# Patient Record
Sex: MALE | Race: WHITE | NOT HISPANIC OR LATINO | Employment: UNEMPLOYED | URBAN - METROPOLITAN AREA
[De-identification: names, ages, dates, MRNs, and addresses within clinical notes are randomized per-mention and may not be internally consistent; named-entity substitution may affect disease eponyms.]

---

## 2021-11-19 ENCOUNTER — HOSPITAL ENCOUNTER (OUTPATIENT)
Facility: HOSPITAL | Age: 81
Discharge: HOME | End: 2021-11-20
Attending: EMERGENCY MEDICINE | Admitting: FAMILY MEDICINE
Payer: MEDICARE

## 2021-11-19 DIAGNOSIS — I63.9 CVA (CEREBRAL VASCULAR ACCIDENT): ICD-10-CM

## 2021-11-19 DIAGNOSIS — R53.1 WEAKNESS: ICD-10-CM

## 2021-11-19 DIAGNOSIS — R42 DIZZINESS: Primary | ICD-10-CM

## 2021-11-19 DIAGNOSIS — I10 CHRONIC HYPERTENSION: ICD-10-CM

## 2021-11-19 DIAGNOSIS — I63.9 STROKE: ICD-10-CM

## 2021-11-19 LAB
ALBUMIN SERPL BCP-MCNC: 3.5 G/DL (ref 3.5–5.2)
ALP SERPL-CCNC: 116 U/L (ref 55–135)
ALT SERPL W/O P-5'-P-CCNC: 23 U/L (ref 10–44)
ANION GAP SERPL CALC-SCNC: 9 MMOL/L (ref 8–16)
AST SERPL-CCNC: 24 U/L (ref 10–40)
BASOPHILS # BLD AUTO: 0.03 K/UL (ref 0–0.2)
BASOPHILS NFR BLD: 0.3 % (ref 0–1.9)
BILIRUB SERPL-MCNC: 0.7 MG/DL (ref 0.1–1)
BUN SERPL-MCNC: 10 MG/DL (ref 8–23)
CALCIUM SERPL-MCNC: 9.1 MG/DL (ref 8.7–10.5)
CHLORIDE SERPL-SCNC: 104 MMOL/L (ref 95–110)
CO2 SERPL-SCNC: 23 MMOL/L (ref 23–29)
CREAT SERPL-MCNC: 0.8 MG/DL (ref 0.5–1.4)
CTP QC/QA: YES
DIFFERENTIAL METHOD: ABNORMAL
EOSINOPHIL # BLD AUTO: 0.1 K/UL (ref 0–0.5)
EOSINOPHIL NFR BLD: 0.6 % (ref 0–8)
ERYTHROCYTE [DISTWIDTH] IN BLOOD BY AUTOMATED COUNT: 13.3 % (ref 11.5–14.5)
EST. GFR  (AFRICAN AMERICAN): >60 ML/MIN/1.73 M^2
EST. GFR  (NON AFRICAN AMERICAN): >60 ML/MIN/1.73 M^2
GLUCOSE SERPL-MCNC: 93 MG/DL (ref 70–110)
HCT VFR BLD AUTO: 42.1 % (ref 40–54)
HGB BLD-MCNC: 14.2 G/DL (ref 14–18)
IMM GRANULOCYTES # BLD AUTO: 0.02 K/UL (ref 0–0.04)
IMM GRANULOCYTES NFR BLD AUTO: 0.2 % (ref 0–0.5)
LYMPHOCYTES # BLD AUTO: 2.4 K/UL (ref 1–4.8)
LYMPHOCYTES NFR BLD: 26.3 % (ref 18–48)
MCH RBC QN AUTO: 32.6 PG (ref 27–31)
MCHC RBC AUTO-ENTMCNC: 33.7 G/DL (ref 32–36)
MCV RBC AUTO: 97 FL (ref 82–98)
MONOCYTES # BLD AUTO: 0.8 K/UL (ref 0.3–1)
MONOCYTES NFR BLD: 8.5 % (ref 4–15)
NEUTROPHILS # BLD AUTO: 5.8 K/UL (ref 1.8–7.7)
NEUTROPHILS NFR BLD: 64.1 % (ref 38–73)
NRBC BLD-RTO: 0 /100 WBC
PLATELET # BLD AUTO: 246 K/UL (ref 150–450)
PMV BLD AUTO: 8.7 FL (ref 9.2–12.9)
POTASSIUM SERPL-SCNC: 4.5 MMOL/L (ref 3.5–5.1)
PROT SERPL-MCNC: 6.3 G/DL (ref 6–8.4)
RBC # BLD AUTO: 4.36 M/UL (ref 4.6–6.2)
SARS-COV-2 RDRP RESP QL NAA+PROBE: NEGATIVE
SODIUM SERPL-SCNC: 136 MMOL/L (ref 136–145)
TSH SERPL DL<=0.005 MIU/L-ACNC: 0.49 UIU/ML (ref 0.4–4)
WBC # BLD AUTO: 9.09 K/UL (ref 3.9–12.7)

## 2021-11-19 PROCEDURE — U0002 COVID-19 LAB TEST NON-CDC: HCPCS | Performed by: EMERGENCY MEDICINE

## 2021-11-19 PROCEDURE — G0378 HOSPITAL OBSERVATION PER HR: HCPCS

## 2021-11-19 PROCEDURE — 36415 COLL VENOUS BLD VENIPUNCTURE: CPT | Performed by: NURSE PRACTITIONER

## 2021-11-19 PROCEDURE — 93010 ELECTROCARDIOGRAM REPORT: CPT | Mod: ,,, | Performed by: INTERNAL MEDICINE

## 2021-11-19 PROCEDURE — 80053 COMPREHEN METABOLIC PANEL: CPT | Performed by: NURSE PRACTITIONER

## 2021-11-19 PROCEDURE — 84443 ASSAY THYROID STIM HORMONE: CPT | Performed by: EMERGENCY MEDICINE

## 2021-11-19 PROCEDURE — 93010 EKG 12-LEAD: ICD-10-PCS | Mod: ,,, | Performed by: INTERNAL MEDICINE

## 2021-11-19 PROCEDURE — 94760 N-INVAS EAR/PLS OXIMETRY 1: CPT

## 2021-11-19 PROCEDURE — 63600175 PHARM REV CODE 636 W HCPCS: Performed by: NURSE PRACTITIONER

## 2021-11-19 PROCEDURE — 99285 EMERGENCY DEPT VISIT HI MDM: CPT | Mod: 25

## 2021-11-19 PROCEDURE — 96372 THER/PROPH/DIAG INJ SC/IM: CPT

## 2021-11-19 PROCEDURE — 93005 ELECTROCARDIOGRAM TRACING: CPT

## 2021-11-19 PROCEDURE — 25000003 PHARM REV CODE 250: Performed by: EMERGENCY MEDICINE

## 2021-11-19 PROCEDURE — 80061 LIPID PANEL: CPT | Performed by: NURSE PRACTITIONER

## 2021-11-19 PROCEDURE — 85025 COMPLETE CBC W/AUTO DIFF WBC: CPT | Performed by: NURSE PRACTITIONER

## 2021-11-19 PROCEDURE — 83036 HEMOGLOBIN GLYCOSYLATED A1C: CPT | Performed by: NURSE PRACTITIONER

## 2021-11-19 RX ORDER — COLCHICINE 0.6 MG/1
TABLET ORAL
COMMUNITY
Start: 2021-09-16 | End: 2021-11-19

## 2021-11-19 RX ORDER — ATORVASTATIN CALCIUM 40 MG/1
80 TABLET, FILM COATED ORAL DAILY
Status: DISCONTINUED | OUTPATIENT
Start: 2021-11-20 | End: 2021-11-20 | Stop reason: HOSPADM

## 2021-11-19 RX ORDER — ENOXAPARIN SODIUM 100 MG/ML
40 INJECTION SUBCUTANEOUS EVERY 24 HOURS
Status: DISCONTINUED | OUTPATIENT
Start: 2021-11-19 | End: 2021-11-20 | Stop reason: HOSPADM

## 2021-11-19 RX ORDER — ACETAMINOPHEN 325 MG/1
650 TABLET ORAL EVERY 6 HOURS PRN
Status: DISCONTINUED | OUTPATIENT
Start: 2021-11-19 | End: 2021-11-20 | Stop reason: HOSPADM

## 2021-11-19 RX ORDER — ASPIRIN 81 MG/1
81 TABLET ORAL DAILY
Status: DISCONTINUED | OUTPATIENT
Start: 2021-11-20 | End: 2021-11-20 | Stop reason: HOSPADM

## 2021-11-19 RX ORDER — ASPIRIN 325 MG
325 TABLET ORAL
Status: COMPLETED | OUTPATIENT
Start: 2021-11-19 | End: 2021-11-19

## 2021-11-19 RX ORDER — TRANDOLAPRIL 4 MG/1
1 TABLET ORAL DAILY
COMMUNITY
Start: 2021-05-13 | End: 2024-01-24 | Stop reason: SDUPTHER

## 2021-11-19 RX ORDER — ALPRAZOLAM 0.5 MG/1
0.5 TABLET ORAL 2 TIMES DAILY PRN
COMMUNITY
Start: 2021-10-09

## 2021-11-19 RX ORDER — ESOMEPRAZOLE MAGNESIUM 40 MG/1
1 CAPSULE, DELAYED RELEASE ORAL EVERY MORNING
COMMUNITY
Start: 2021-05-13

## 2021-11-19 RX ORDER — LABETALOL HYDROCHLORIDE 5 MG/ML
10 INJECTION, SOLUTION INTRAVENOUS EVERY 4 HOURS PRN
Status: DISCONTINUED | OUTPATIENT
Start: 2021-11-19 | End: 2021-11-20 | Stop reason: HOSPADM

## 2021-11-19 RX ORDER — ATORVASTATIN CALCIUM 80 MG/1
1 TABLET, FILM COATED ORAL DAILY
COMMUNITY
Start: 2021-05-13 | End: 2024-01-24 | Stop reason: SDUPTHER

## 2021-11-19 RX ORDER — SODIUM CHLORIDE 0.9 % (FLUSH) 0.9 %
10 SYRINGE (ML) INJECTION
Status: DISCONTINUED | OUTPATIENT
Start: 2021-11-19 | End: 2021-11-20 | Stop reason: HOSPADM

## 2021-11-19 RX ORDER — ALPRAZOLAM 0.5 MG/1
0.5 TABLET ORAL 2 TIMES DAILY PRN
Status: DISCONTINUED | OUTPATIENT
Start: 2021-11-19 | End: 2021-11-20 | Stop reason: HOSPADM

## 2021-11-19 RX ORDER — LISINOPRIL 20 MG/1
20 TABLET ORAL DAILY
Status: DISCONTINUED | OUTPATIENT
Start: 2021-11-20 | End: 2021-11-20 | Stop reason: HOSPADM

## 2021-11-19 RX ORDER — ASPIRIN 81 MG/1
81 TABLET ORAL DAILY
COMMUNITY
End: 2024-01-24 | Stop reason: SDUPTHER

## 2021-11-19 RX ORDER — ONDANSETRON 2 MG/ML
4 INJECTION INTRAMUSCULAR; INTRAVENOUS EVERY 6 HOURS PRN
Status: DISCONTINUED | OUTPATIENT
Start: 2021-11-19 | End: 2021-11-20 | Stop reason: HOSPADM

## 2021-11-19 RX ADMIN — ENOXAPARIN SODIUM 40 MG: 40 INJECTION, SOLUTION INTRAVENOUS; SUBCUTANEOUS at 08:11

## 2021-11-19 RX ADMIN — ASPIRIN 325 MG ORAL TABLET 325 MG: 325 PILL ORAL at 04:11

## 2021-11-20 VITALS
WEIGHT: 202.81 LBS | DIASTOLIC BLOOD PRESSURE: 72 MMHG | TEMPERATURE: 97 F | HEIGHT: 70 IN | BODY MASS INDEX: 29.03 KG/M2 | OXYGEN SATURATION: 97 % | HEART RATE: 72 BPM | SYSTOLIC BLOOD PRESSURE: 138 MMHG | RESPIRATION RATE: 18 BRPM

## 2021-11-20 LAB
ALBUMIN SERPL BCP-MCNC: 3.5 G/DL (ref 3.5–5.2)
ALP SERPL-CCNC: 119 U/L (ref 55–135)
ALT SERPL W/O P-5'-P-CCNC: 22 U/L (ref 10–44)
ANION GAP SERPL CALC-SCNC: 10 MMOL/L (ref 8–16)
APTT BLDCRRT: 30 SEC (ref 21–32)
AST SERPL-CCNC: 18 U/L (ref 10–40)
AV INDEX (PROSTH): 0.64
AV MEAN GRADIENT: 7 MMHG
AV PEAK GRADIENT: 14 MMHG
AV REGURGITATION PRESSURE HALF TIME: 700.26 MS
AV VALVE AREA: 1.99 CM2
AV VELOCITY RATIO: 0.62
BACTERIA #/AREA URNS HPF: NORMAL /HPF
BASOPHILS # BLD AUTO: 0.04 K/UL (ref 0–0.2)
BASOPHILS NFR BLD: 0.5 % (ref 0–1.9)
BILIRUB SERPL-MCNC: 1.3 MG/DL (ref 0.1–1)
BILIRUB UR QL STRIP: NEGATIVE
BSA FOR ECHO PROCEDURE: 2.14 M2
BUN SERPL-MCNC: 11 MG/DL (ref 8–23)
CALCIUM SERPL-MCNC: 9 MG/DL (ref 8.7–10.5)
CHLORIDE SERPL-SCNC: 103 MMOL/L (ref 95–110)
CHOLEST SERPL-MCNC: 93 MG/DL (ref 120–199)
CHOLEST/HDLC SERPL: 2.2 {RATIO} (ref 2–5)
CK MB SERPL-MCNC: 1.6 NG/ML (ref 0.1–6.5)
CK MB SERPL-RTO: 2 % (ref 0–5)
CK SERPL-CCNC: 80 U/L (ref 20–200)
CLARITY UR: CLEAR
CO2 SERPL-SCNC: 23 MMOL/L (ref 23–29)
COLOR UR: YELLOW
CREAT SERPL-MCNC: 0.8 MG/DL (ref 0.5–1.4)
CV ECHO LV RWT: 0.56 CM
DIFFERENTIAL METHOD: ABNORMAL
DOP CALC AO PEAK VEL: 1.86 M/S
DOP CALC AO VTI: 42.3 CM
DOP CALC LVOT AREA: 3.1 CM2
DOP CALC LVOT DIAMETER: 1.99 CM
DOP CALC LVOT PEAK VEL: 1.16 M/S
DOP CALC LVOT STROKE VOLUME: 84.25 CM3
DOP CALCLVOT PEAK VEL VTI: 27.1 CM
E/A RATIO: 0.68
E/E' RATIO: 11.69 M/S
ECHO EF ESTIMATED: 50 %
ECHO LV POSTERIOR WALL: 1.18 CM (ref 0.6–1.1)
EJECTION FRACTION: 55 %
EOSINOPHIL # BLD AUTO: 0.1 K/UL (ref 0–0.5)
EOSINOPHIL NFR BLD: 1.5 % (ref 0–8)
ERYTHROCYTE [DISTWIDTH] IN BLOOD BY AUTOMATED COUNT: 13.5 % (ref 11.5–14.5)
EST. GFR  (AFRICAN AMERICAN): >60 ML/MIN/1.73 M^2
EST. GFR  (NON AFRICAN AMERICAN): >60 ML/MIN/1.73 M^2
ESTIMATED AVG GLUCOSE: 111 MG/DL (ref 68–131)
FRACTIONAL SHORTENING: 25 % (ref 28–44)
GLUCOSE SERPL-MCNC: 95 MG/DL (ref 70–110)
GLUCOSE UR QL STRIP: NEGATIVE
HBA1C MFR BLD: 5.5 % (ref 4–5.6)
HCT VFR BLD AUTO: 44.4 % (ref 40–54)
HDLC SERPL-MCNC: 42 MG/DL (ref 40–75)
HDLC SERPL: 45.2 % (ref 20–50)
HGB BLD-MCNC: 14.4 G/DL (ref 14–18)
HGB UR QL STRIP: ABNORMAL
IMM GRANULOCYTES # BLD AUTO: 0.03 K/UL (ref 0–0.04)
IMM GRANULOCYTES NFR BLD AUTO: 0.4 % (ref 0–0.5)
INR PPP: 1 (ref 0.8–1.2)
INTERVENTRICULAR SEPTUM: 1.17 CM (ref 0.6–1.1)
IVC DIAMETER: 1.25 CM
IVRT: 94.2 MSEC
KETONES UR QL STRIP: NEGATIVE
LA MAJOR: 3.98 CM
LA MINOR: 5.11 CM
LA WIDTH: 2.51 CM
LDLC SERPL CALC-MCNC: 40.8 MG/DL (ref 63–159)
LEFT ATRIUM SIZE: 3.49 CM
LEFT ATRIUM VOLUME INDEX: 15.9 ML/M2
LEFT ATRIUM VOLUME: 33.32 CM3
LEFT INTERNAL DIMENSION IN SYSTOLE: 3.12 CM (ref 2.1–4)
LEFT VENTRICLE DIASTOLIC VOLUME INDEX: 37.07 ML/M2
LEFT VENTRICLE DIASTOLIC VOLUME: 77.84 ML
LEFT VENTRICLE MASS INDEX: 82 G/M2
LEFT VENTRICLE SYSTOLIC VOLUME INDEX: 18.4 ML/M2
LEFT VENTRICLE SYSTOLIC VOLUME: 38.54 ML
LEFT VENTRICULAR INTERNAL DIMENSION IN DIASTOLE: 4.18 CM (ref 3.5–6)
LEFT VENTRICULAR MASS: 171.5 G
LEUKOCYTE ESTERASE UR QL STRIP: ABNORMAL
LV LATERAL E/E' RATIO: 10.86 M/S
LV SEPTAL E/E' RATIO: 12.67 M/S
LVOT MG: 2.94 MMHG
LVOT MV: 0.8 CM/S
LYMPHOCYTES # BLD AUTO: 2.2 K/UL (ref 1–4.8)
LYMPHOCYTES NFR BLD: 26.9 % (ref 18–48)
MAGNESIUM SERPL-MCNC: 2 MG/DL (ref 1.6–2.6)
MCH RBC QN AUTO: 31.9 PG (ref 27–31)
MCHC RBC AUTO-ENTMCNC: 32.4 G/DL (ref 32–36)
MCV RBC AUTO: 98 FL (ref 82–98)
MICROSCOPIC COMMENT: NORMAL
MONOCYTES # BLD AUTO: 0.7 K/UL (ref 0.3–1)
MONOCYTES NFR BLD: 9.3 % (ref 4–15)
MV PEAK A VEL: 1.12 M/S
MV PEAK E VEL: 0.76 M/S
MV STENOSIS PRESSURE HALF TIME: 77.11 MS
MV VALVE AREA P 1/2 METHOD: 2.85 CM2
NEUTROPHILS # BLD AUTO: 4.9 K/UL (ref 1.8–7.7)
NEUTROPHILS NFR BLD: 61.4 % (ref 38–73)
NITRITE UR QL STRIP: NEGATIVE
NONHDLC SERPL-MCNC: 51 MG/DL
NRBC BLD-RTO: 0 /100 WBC
PH UR STRIP: 6 [PH] (ref 5–8)
PHOSPHATE SERPL-MCNC: 3.4 MG/DL (ref 2.7–4.5)
PISA AR MAX VEL: 3.38 M/S
PISA TR MAX VEL: 2.37 M/S
PLATELET # BLD AUTO: 251 K/UL (ref 150–450)
PMV BLD AUTO: 8.9 FL (ref 9.2–12.9)
POTASSIUM SERPL-SCNC: 3.9 MMOL/L (ref 3.5–5.1)
PROT SERPL-MCNC: 6.6 G/DL (ref 6–8.4)
PROT UR QL STRIP: NEGATIVE
PROTHROMBIN TIME: 10.9 SEC (ref 9–12.5)
PV MV: 0.63 M/S
PV PEAK VELOCITY: 1.03 CM/S
RA MAJOR: 3.77 CM
RA PRESSURE: 3 MMHG
RA WIDTH: 3.29 CM
RBC # BLD AUTO: 4.51 M/UL (ref 4.6–6.2)
RBC #/AREA URNS HPF: 1 /HPF (ref 0–4)
SINUS: 3.16 CM
SODIUM SERPL-SCNC: 136 MMOL/L (ref 136–145)
SP GR UR STRIP: 1.01 (ref 1–1.03)
STJ: 2.52 CM
TDI LATERAL: 0.07 M/S
TDI SEPTAL: 0.06 M/S
TDI: 0.07 M/S
TR MAX PG: 22 MMHG
TRIGL SERPL-MCNC: 51 MG/DL (ref 30–150)
TROPONIN I SERPL DL<=0.01 NG/ML-MCNC: <0.006 NG/ML (ref 0–0.03)
TV REST PULMONARY ARTERY PRESSURE: 25 MMHG
URN SPEC COLLECT METH UR: ABNORMAL
UROBILINOGEN UR STRIP-ACNC: NEGATIVE EU/DL
WBC # BLD AUTO: 8 K/UL (ref 3.9–12.7)
WBC #/AREA URNS HPF: 5 /HPF (ref 0–5)

## 2021-11-20 PROCEDURE — G0378 HOSPITAL OBSERVATION PER HR: HCPCS

## 2021-11-20 PROCEDURE — 82553 CREATINE MB FRACTION: CPT | Performed by: NURSE PRACTITIONER

## 2021-11-20 PROCEDURE — 25000003 PHARM REV CODE 250: Performed by: NURSE PRACTITIONER

## 2021-11-20 PROCEDURE — 84484 ASSAY OF TROPONIN QUANT: CPT | Performed by: NURSE PRACTITIONER

## 2021-11-20 PROCEDURE — 84100 ASSAY OF PHOSPHORUS: CPT | Performed by: NURSE PRACTITIONER

## 2021-11-20 PROCEDURE — 92610 EVALUATE SWALLOWING FUNCTION: CPT

## 2021-11-20 PROCEDURE — 25000003 PHARM REV CODE 250: Performed by: INTERNAL MEDICINE

## 2021-11-20 PROCEDURE — 85610 PROTHROMBIN TIME: CPT | Performed by: NURSE PRACTITIONER

## 2021-11-20 PROCEDURE — 36415 COLL VENOUS BLD VENIPUNCTURE: CPT | Performed by: NURSE PRACTITIONER

## 2021-11-20 PROCEDURE — 80053 COMPREHEN METABOLIC PANEL: CPT | Performed by: NURSE PRACTITIONER

## 2021-11-20 PROCEDURE — 97165 OT EVAL LOW COMPLEX 30 MIN: CPT

## 2021-11-20 PROCEDURE — 97161 PT EVAL LOW COMPLEX 20 MIN: CPT

## 2021-11-20 PROCEDURE — 85730 THROMBOPLASTIN TIME PARTIAL: CPT | Performed by: NURSE PRACTITIONER

## 2021-11-20 PROCEDURE — 81000 URINALYSIS NONAUTO W/SCOPE: CPT | Performed by: NURSE PRACTITIONER

## 2021-11-20 PROCEDURE — 83735 ASSAY OF MAGNESIUM: CPT | Performed by: NURSE PRACTITIONER

## 2021-11-20 PROCEDURE — 85025 COMPLETE CBC W/AUTO DIFF WBC: CPT | Performed by: NURSE PRACTITIONER

## 2021-11-20 RX ADMIN — ACETAMINOPHEN 650 MG: 325 TABLET ORAL at 01:11

## 2021-11-20 RX ADMIN — ALPRAZOLAM 0.5 MG: 0.5 TABLET ORAL at 12:11

## 2021-11-20 RX ADMIN — ATORVASTATIN CALCIUM 80 MG: 40 TABLET, FILM COATED ORAL at 08:11

## 2021-11-20 RX ADMIN — LISINOPRIL 20 MG: 20 TABLET ORAL at 08:11

## 2021-11-20 RX ADMIN — ASPIRIN 81 MG: 81 TABLET, COATED ORAL at 08:11

## 2021-11-29 DIAGNOSIS — R06.02 SOB (SHORTNESS OF BREATH): Primary | ICD-10-CM

## 2021-12-01 ENCOUNTER — HOSPITAL ENCOUNTER (OUTPATIENT)
Dept: RADIATION THERAPY | Facility: HOSPITAL | Age: 81
Discharge: HOME | End: 2021-12-01
Attending: RADIOLOGY
Payer: MEDICARE

## 2021-12-02 PROBLEM — R91.8 LUNG MASS: Status: ACTIVE | Noted: 2021-12-02

## 2021-12-03 ENCOUNTER — HOSPITAL ENCOUNTER (OUTPATIENT)
Dept: RADIOLOGY | Facility: HOSPITAL | Age: 81
Discharge: HOME | End: 2021-12-03
Attending: INTERNAL MEDICINE
Payer: MEDICARE

## 2021-12-03 ENCOUNTER — OFFICE VISIT (OUTPATIENT)
Dept: PULMONOLOGY | Facility: CLINIC | Age: 81
End: 2021-12-03
Payer: MEDICARE

## 2021-12-03 VITALS
HEART RATE: 112 BPM | SYSTOLIC BLOOD PRESSURE: 138 MMHG | DIASTOLIC BLOOD PRESSURE: 72 MMHG | RESPIRATION RATE: 18 BRPM | BODY MASS INDEX: 29.37 KG/M2 | HEIGHT: 70 IN | OXYGEN SATURATION: 98 % | WEIGHT: 205.13 LBS

## 2021-12-03 DIAGNOSIS — R91.8 HILAR MASS: ICD-10-CM

## 2021-12-03 DIAGNOSIS — I63.9 CEREBROVASCULAR ACCIDENT (CVA), UNSPECIFIED MECHANISM: ICD-10-CM

## 2021-12-03 DIAGNOSIS — R06.02 SOB (SHORTNESS OF BREATH): ICD-10-CM

## 2021-12-03 DIAGNOSIS — R91.8 LUNG MASS: Primary | ICD-10-CM

## 2021-12-03 DIAGNOSIS — I10 BENIGN ESSENTIAL HTN: ICD-10-CM

## 2021-12-03 PROBLEM — I44.7 LBBB (LEFT BUNDLE BRANCH BLOCK): Status: ACTIVE | Noted: 2019-03-13

## 2021-12-03 PROBLEM — I25.2 HISTORY OF MI (MYOCARDIAL INFARCTION): Status: ACTIVE | Noted: 2017-12-12

## 2021-12-03 PROCEDURE — 99205 OFFICE O/P NEW HI 60 MIN: CPT | Mod: S$GLB,,, | Performed by: INTERNAL MEDICINE

## 2021-12-03 PROCEDURE — 99999 PR PBB SHADOW E&M-EST. PATIENT-LVL V: ICD-10-PCS | Mod: PBBFAC,,, | Performed by: INTERNAL MEDICINE

## 2021-12-03 PROCEDURE — 71046 XR CHEST PA AND LATERAL: ICD-10-PCS | Mod: 26,,, | Performed by: RADIOLOGY

## 2021-12-03 PROCEDURE — 71046 X-RAY EXAM CHEST 2 VIEWS: CPT | Mod: TC

## 2021-12-03 PROCEDURE — 99205 PR OFFICE/OUTPT VISIT, NEW, LEVL V, 60-74 MIN: ICD-10-PCS | Mod: S$GLB,,, | Performed by: INTERNAL MEDICINE

## 2021-12-03 PROCEDURE — 99999 PR PBB SHADOW E&M-EST. PATIENT-LVL V: CPT | Mod: PBBFAC,,, | Performed by: INTERNAL MEDICINE

## 2021-12-03 PROCEDURE — 71046 X-RAY EXAM CHEST 2 VIEWS: CPT | Mod: 26,,, | Performed by: RADIOLOGY

## 2021-12-03 RX ORDER — SODIUM CHLORIDE 9 MG/ML
INJECTION, SOLUTION INTRAVENOUS CONTINUOUS
Status: CANCELLED | OUTPATIENT
Start: 2021-12-03

## 2021-12-03 RX ORDER — LIDOCAINE HYDROCHLORIDE 40 MG/ML
4 SOLUTION TOPICAL ONCE
Status: CANCELLED | OUTPATIENT
Start: 2021-12-03 | End: 2021-12-03

## 2021-12-03 RX ORDER — NITROFURANTOIN 25; 75 MG/1; MG/1
100 CAPSULE ORAL 2 TIMES DAILY
COMMUNITY
Start: 2021-06-21

## 2021-12-06 ENCOUNTER — OFFICE VISIT (OUTPATIENT)
Dept: GASTROENTEROLOGY | Facility: CLINIC | Age: 81
End: 2021-12-06
Payer: MEDICARE

## 2021-12-06 VITALS
WEIGHT: 202.06 LBS | BODY MASS INDEX: 28.93 KG/M2 | OXYGEN SATURATION: 98 % | DIASTOLIC BLOOD PRESSURE: 70 MMHG | HEART RATE: 81 BPM | HEIGHT: 70 IN | SYSTOLIC BLOOD PRESSURE: 140 MMHG

## 2021-12-06 DIAGNOSIS — R91.8 LUNG MASS: ICD-10-CM

## 2021-12-06 DIAGNOSIS — Z86.010 HX OF COLONIC POLYPS: Primary | ICD-10-CM

## 2021-12-06 DIAGNOSIS — R63.4 WEIGHT LOSS: ICD-10-CM

## 2021-12-06 PROCEDURE — 99204 OFFICE O/P NEW MOD 45 MIN: CPT | Mod: S$GLB,,, | Performed by: INTERNAL MEDICINE

## 2021-12-06 PROCEDURE — 99204 PR OFFICE/OUTPT VISIT, NEW, LEVL IV, 45-59 MIN: ICD-10-PCS | Mod: S$GLB,,, | Performed by: INTERNAL MEDICINE

## 2021-12-06 PROCEDURE — 99999 PR PBB SHADOW E&M-EST. PATIENT-LVL III: CPT | Mod: PBBFAC,,, | Performed by: INTERNAL MEDICINE

## 2021-12-06 PROCEDURE — 99999 PR PBB SHADOW E&M-EST. PATIENT-LVL III: ICD-10-PCS | Mod: PBBFAC,,, | Performed by: INTERNAL MEDICINE

## 2021-12-08 ENCOUNTER — ANESTHESIA (OUTPATIENT)
Dept: ENDOSCOPY | Facility: HOSPITAL | Age: 81
End: 2021-12-08
Payer: MEDICARE

## 2021-12-08 ENCOUNTER — HOSPITAL ENCOUNTER (OUTPATIENT)
Facility: HOSPITAL | Age: 81
Discharge: HOME | End: 2021-12-08
Attending: INTERNAL MEDICINE | Admitting: INTERNAL MEDICINE
Payer: MEDICARE

## 2021-12-08 ENCOUNTER — ANESTHESIA EVENT (OUTPATIENT)
Dept: ENDOSCOPY | Facility: HOSPITAL | Age: 81
End: 2021-12-08
Payer: MEDICARE

## 2021-12-08 DIAGNOSIS — R91.8 HILAR MASS: ICD-10-CM

## 2021-12-08 DIAGNOSIS — R91.8 LUNG MASS: ICD-10-CM

## 2021-12-08 PROCEDURE — 63600175 PHARM REV CODE 636 W HCPCS: Performed by: NURSE ANESTHETIST, CERTIFIED REGISTERED

## 2021-12-08 PROCEDURE — 63600175 PHARM REV CODE 636 W HCPCS: Performed by: INTERNAL MEDICINE

## 2021-12-08 PROCEDURE — 31645 BRNCHSC W/THER ASPIR 1ST: CPT | Mod: ,,, | Performed by: INTERNAL MEDICINE

## 2021-12-08 PROCEDURE — 31652 PR BRONCH W/ EBUS, SAMPLING 1 OR 2 NODES, INCL GUIDE: ICD-10-PCS | Mod: ,,, | Performed by: INTERNAL MEDICINE

## 2021-12-08 PROCEDURE — 27200946 HC BRUSH, CYTOLOGY: Performed by: INTERNAL MEDICINE

## 2021-12-08 PROCEDURE — 31645 PR BRONCHOSCOPY,RX ASPIR PULM TREE: ICD-10-PCS | Mod: ,,, | Performed by: INTERNAL MEDICINE

## 2021-12-08 PROCEDURE — 37000008 HC ANESTHESIA 1ST 15 MINUTES: Performed by: INTERNAL MEDICINE

## 2021-12-08 PROCEDURE — 88305 TISSUE EXAM BY PATHOLOGIST: ICD-10-PCS | Mod: 26,,, | Performed by: PATHOLOGY

## 2021-12-08 PROCEDURE — 88173 CYTOPATH EVAL FNA REPORT: CPT | Mod: 26,,, | Performed by: PATHOLOGY

## 2021-12-08 PROCEDURE — 88305 TISSUE EXAM BY PATHOLOGIST: CPT | Mod: 26,,, | Performed by: PATHOLOGY

## 2021-12-08 PROCEDURE — 31623 DX BRONCHOSCOPE/BRUSH: CPT | Mod: 51,RT,, | Performed by: INTERNAL MEDICINE

## 2021-12-08 PROCEDURE — 27202059 HC NEEDLE, FNA (ANY): Performed by: INTERNAL MEDICINE

## 2021-12-08 PROCEDURE — 25000003 PHARM REV CODE 250: Performed by: NURSE ANESTHETIST, CERTIFIED REGISTERED

## 2021-12-08 PROCEDURE — 88173 CYTOPATH EVAL FNA REPORT: CPT | Mod: 59 | Performed by: PATHOLOGY

## 2021-12-08 PROCEDURE — 31625 BRONCHOSCOPY W/BIOPSY(S): CPT | Mod: 59,RT,, | Performed by: INTERNAL MEDICINE

## 2021-12-08 PROCEDURE — 31623 PR BRONCHOSCOPY,DIAGNOSTIC W BRUSH: ICD-10-PCS | Mod: 51,RT,, | Performed by: INTERNAL MEDICINE

## 2021-12-08 PROCEDURE — 31652 BRONCH EBUS SAMPLNG 1/2 NODE: CPT | Performed by: INTERNAL MEDICINE

## 2021-12-08 PROCEDURE — 31625 PR BRONCHOSCOPY,BIOPSY: ICD-10-PCS | Mod: 59,RT,, | Performed by: INTERNAL MEDICINE

## 2021-12-08 PROCEDURE — 88173 PR  INTERPRETATION OF FNA SMEAR: ICD-10-PCS | Mod: 26,,, | Performed by: PATHOLOGY

## 2021-12-08 PROCEDURE — 31623 DX BRONCHOSCOPE/BRUSH: CPT | Mod: 59,RT | Performed by: INTERNAL MEDICINE

## 2021-12-08 PROCEDURE — 27200944 HC BRONCH FORCEPS DISPOSABLE: Performed by: INTERNAL MEDICINE

## 2021-12-08 PROCEDURE — 31645 BRNCHSC W/THER ASPIR 1ST: CPT | Mod: 51 | Performed by: INTERNAL MEDICINE

## 2021-12-08 PROCEDURE — 88305 TISSUE EXAM BY PATHOLOGIST: CPT | Performed by: PATHOLOGY

## 2021-12-08 PROCEDURE — 31625 BRONCHOSCOPY W/BIOPSY(S): CPT | Mod: 59,RT | Performed by: INTERNAL MEDICINE

## 2021-12-08 PROCEDURE — 37000009 HC ANESTHESIA EA ADD 15 MINS: Performed by: INTERNAL MEDICINE

## 2021-12-08 PROCEDURE — 88305 TISSUE EXAM BY PATHOLOGIST: CPT | Mod: 59 | Performed by: PATHOLOGY

## 2021-12-08 PROCEDURE — 31652 BRONCH EBUS SAMPLNG 1/2 NODE: CPT | Mod: ,,, | Performed by: INTERNAL MEDICINE

## 2021-12-08 RX ORDER — PROPOFOL 10 MG/ML
VIAL (ML) INTRAVENOUS
Status: DISCONTINUED | OUTPATIENT
Start: 2021-12-08 | End: 2021-12-08

## 2021-12-08 RX ORDER — ONDANSETRON 2 MG/ML
INJECTION INTRAMUSCULAR; INTRAVENOUS
Status: DISCONTINUED | OUTPATIENT
Start: 2021-12-08 | End: 2021-12-08

## 2021-12-08 RX ORDER — LIDOCAINE HYDROCHLORIDE 10 MG/ML
INJECTION, SOLUTION EPIDURAL; INFILTRATION; INTRACAUDAL; PERINEURAL
Status: DISCONTINUED | OUTPATIENT
Start: 2021-12-08 | End: 2021-12-08

## 2021-12-08 RX ORDER — EPINEPHRINE 0.1 MG/ML
INJECTION INTRAVENOUS
Status: COMPLETED | OUTPATIENT
Start: 2021-12-08 | End: 2021-12-08

## 2021-12-08 RX ORDER — ROCURONIUM BROMIDE 10 MG/ML
INJECTION, SOLUTION INTRAVENOUS
Status: DISCONTINUED | OUTPATIENT
Start: 2021-12-08 | End: 2021-12-08

## 2021-12-08 RX ORDER — SODIUM CHLORIDE, SODIUM LACTATE, POTASSIUM CHLORIDE, CALCIUM CHLORIDE 600; 310; 30; 20 MG/100ML; MG/100ML; MG/100ML; MG/100ML
INJECTION, SOLUTION INTRAVENOUS CONTINUOUS
Status: DISCONTINUED | OUTPATIENT
Start: 2021-12-08 | End: 2021-12-08 | Stop reason: HOSPADM

## 2021-12-08 RX ADMIN — LIDOCAINE HYDROCHLORIDE 50 MG: 10 INJECTION, SOLUTION EPIDURAL; INFILTRATION; INTRACAUDAL; PERINEURAL at 01:12

## 2021-12-08 RX ADMIN — SUGAMMADEX 200 MG: 100 INJECTION, SOLUTION INTRAVENOUS at 02:12

## 2021-12-08 RX ADMIN — SODIUM CHLORIDE, SODIUM LACTATE, POTASSIUM CHLORIDE, AND CALCIUM CHLORIDE: .6; .31; .03; .02 INJECTION, SOLUTION INTRAVENOUS at 01:12

## 2021-12-08 RX ADMIN — ROCURONIUM BROMIDE 50 MG: 10 INJECTION, SOLUTION INTRAVENOUS at 01:12

## 2021-12-08 RX ADMIN — EPINEPHRINE 0.5 MG: 0.1 INJECTION, SOLUTION ENDOTRACHEAL; INTRACARDIAC; INTRAVENOUS at 01:12

## 2021-12-08 RX ADMIN — PROPOFOL 100 MG: 10 INJECTION, EMULSION INTRAVENOUS at 01:12

## 2021-12-08 RX ADMIN — ONDANSETRON 4 MG: 2 INJECTION, SOLUTION INTRAMUSCULAR; INTRAVENOUS at 02:12

## 2021-12-09 VITALS
HEART RATE: 79 BPM | RESPIRATION RATE: 17 BRPM | BODY MASS INDEX: 28.92 KG/M2 | DIASTOLIC BLOOD PRESSURE: 52 MMHG | SYSTOLIC BLOOD PRESSURE: 144 MMHG | TEMPERATURE: 98 F | WEIGHT: 202 LBS | HEIGHT: 70 IN | OXYGEN SATURATION: 92 %

## 2021-12-13 LAB
FINAL PATHOLOGIC DIAGNOSIS: NORMAL
GROSS: NORMAL
Lab: NORMAL

## 2021-12-14 ENCOUNTER — HOSPITAL ENCOUNTER (OUTPATIENT)
Dept: RADIOLOGY | Facility: HOSPITAL | Age: 81
Discharge: HOME | End: 2021-12-14
Attending: INTERNAL MEDICINE
Payer: MEDICARE

## 2021-12-14 DIAGNOSIS — R91.8 LUNG MASS: ICD-10-CM

## 2021-12-14 PROCEDURE — 78815 PET IMAGE W/CT SKULL-THIGH: CPT | Mod: TC,PI

## 2021-12-14 PROCEDURE — 78815 NM PET CT ROUTINE: ICD-10-PCS | Mod: 26,PI,, | Performed by: RADIOLOGY

## 2021-12-14 PROCEDURE — 78815 PET IMAGE W/CT SKULL-THIGH: CPT | Mod: 26,PI,, | Performed by: RADIOLOGY

## 2021-12-16 LAB
COMMENT: NORMAL
FINAL PATHOLOGIC DIAGNOSIS: NORMAL
Lab: NORMAL

## 2021-12-17 ENCOUNTER — TELEPHONE (OUTPATIENT)
Dept: SLEEP MEDICINE | Facility: CLINIC | Age: 81
End: 2021-12-17
Payer: MEDICARE

## 2021-12-17 DIAGNOSIS — C34.90 SQUAMOUS CELL CARCINOMA OF LUNG, UNSPECIFIED LATERALITY: ICD-10-CM

## 2021-12-22 ENCOUNTER — OFFICE VISIT (OUTPATIENT)
Dept: HEMATOLOGY/ONCOLOGY | Facility: CLINIC | Age: 81
End: 2021-12-22
Payer: MEDICARE

## 2021-12-22 ENCOUNTER — TELEPHONE (OUTPATIENT)
Dept: HEMATOLOGY/ONCOLOGY | Facility: CLINIC | Age: 81
End: 2021-12-22

## 2021-12-22 ENCOUNTER — OFFICE VISIT (OUTPATIENT)
Dept: RADIATION ONCOLOGY | Facility: CLINIC | Age: 81
End: 2021-12-22
Payer: MEDICARE

## 2021-12-22 VITALS
HEART RATE: 69 BPM | SYSTOLIC BLOOD PRESSURE: 168 MMHG | DIASTOLIC BLOOD PRESSURE: 79 MMHG | OXYGEN SATURATION: 97 % | BODY MASS INDEX: 28.69 KG/M2 | RESPIRATION RATE: 18 BRPM | HEART RATE: 69 BPM | DIASTOLIC BLOOD PRESSURE: 79 MMHG | OXYGEN SATURATION: 97 % | RESPIRATION RATE: 18 BRPM | SYSTOLIC BLOOD PRESSURE: 168 MMHG | WEIGHT: 200.38 LBS | TEMPERATURE: 98 F | TEMPERATURE: 98 F | HEIGHT: 70 IN | HEIGHT: 70 IN | BODY MASS INDEX: 28.69 KG/M2 | WEIGHT: 200.38 LBS

## 2021-12-22 DIAGNOSIS — C34.91 SQUAMOUS CELL CARCINOMA OF RIGHT LUNG: Primary | ICD-10-CM

## 2021-12-22 DIAGNOSIS — C34.90 SQUAMOUS CELL CARCINOMA OF LUNG, UNSPECIFIED LATERALITY: ICD-10-CM

## 2021-12-22 PROCEDURE — 3077F SYST BP >= 140 MM HG: CPT | Mod: CPTII,S$GLB,, | Performed by: RADIOLOGY

## 2021-12-22 PROCEDURE — 99999 PR PBB SHADOW E&M-EST. PATIENT-LVL III: ICD-10-PCS | Mod: PBBFAC,,, | Performed by: RADIOLOGY

## 2021-12-22 PROCEDURE — 99205 PR OFFICE/OUTPT VISIT, NEW, LEVL V, 60-74 MIN: ICD-10-PCS | Mod: S$GLB,,, | Performed by: INTERNAL MEDICINE

## 2021-12-22 PROCEDURE — 3288F FALL RISK ASSESSMENT DOCD: CPT | Mod: CPTII,S$GLB,, | Performed by: RADIOLOGY

## 2021-12-22 PROCEDURE — 1159F MED LIST DOCD IN RCRD: CPT | Mod: CPTII,S$GLB,, | Performed by: RADIOLOGY

## 2021-12-22 PROCEDURE — 1126F AMNT PAIN NOTED NONE PRSNT: CPT | Mod: CPTII,S$GLB,, | Performed by: RADIOLOGY

## 2021-12-22 PROCEDURE — 99205 OFFICE O/P NEW HI 60 MIN: CPT | Mod: S$GLB,,, | Performed by: INTERNAL MEDICINE

## 2021-12-22 PROCEDURE — 1101F PT FALLS ASSESS-DOCD LE1/YR: CPT | Mod: CPTII,S$GLB,, | Performed by: RADIOLOGY

## 2021-12-22 PROCEDURE — 99999 PR PBB SHADOW E&M-EST. PATIENT-LVL III: CPT | Mod: PBBFAC,,, | Performed by: RADIOLOGY

## 2021-12-22 PROCEDURE — 3288F FALL RISK ASSESSMENT DOCD: CPT | Mod: CPTII,S$GLB,, | Performed by: INTERNAL MEDICINE

## 2021-12-22 PROCEDURE — 3077F SYST BP >= 140 MM HG: CPT | Mod: CPTII,S$GLB,, | Performed by: INTERNAL MEDICINE

## 2021-12-22 PROCEDURE — 3288F PR FALLS RISK ASSESSMENT DOCUMENTED: ICD-10-PCS | Mod: CPTII,S$GLB,, | Performed by: RADIOLOGY

## 2021-12-22 PROCEDURE — 1159F PR MEDICATION LIST DOCUMENTED IN MEDICAL RECORD: ICD-10-PCS | Mod: CPTII,S$GLB,, | Performed by: RADIOLOGY

## 2021-12-22 PROCEDURE — 3288F PR FALLS RISK ASSESSMENT DOCUMENTED: ICD-10-PCS | Mod: CPTII,S$GLB,, | Performed by: INTERNAL MEDICINE

## 2021-12-22 PROCEDURE — 99205 OFFICE O/P NEW HI 60 MIN: CPT | Mod: S$GLB,,, | Performed by: RADIOLOGY

## 2021-12-22 PROCEDURE — 1101F PR PT FALLS ASSESS DOC 0-1 FALLS W/OUT INJ PAST YR: ICD-10-PCS | Mod: CPTII,S$GLB,, | Performed by: INTERNAL MEDICINE

## 2021-12-22 PROCEDURE — 99999 PR PBB SHADOW E&M-EST. PATIENT-LVL IV: ICD-10-PCS | Mod: PBBFAC,,, | Performed by: INTERNAL MEDICINE

## 2021-12-22 PROCEDURE — 1126F PR PAIN SEVERITY QUANTIFIED, NO PAIN PRESENT: ICD-10-PCS | Mod: CPTII,S$GLB,, | Performed by: INTERNAL MEDICINE

## 2021-12-22 PROCEDURE — 3078F DIAST BP <80 MM HG: CPT | Mod: CPTII,S$GLB,, | Performed by: RADIOLOGY

## 2021-12-22 PROCEDURE — 1101F PT FALLS ASSESS-DOCD LE1/YR: CPT | Mod: CPTII,S$GLB,, | Performed by: INTERNAL MEDICINE

## 2021-12-22 PROCEDURE — 3078F PR MOST RECENT DIASTOLIC BLOOD PRESSURE < 80 MM HG: ICD-10-PCS | Mod: CPTII,S$GLB,, | Performed by: INTERNAL MEDICINE

## 2021-12-22 PROCEDURE — 99999 PR PBB SHADOW E&M-EST. PATIENT-LVL IV: CPT | Mod: PBBFAC,,, | Performed by: INTERNAL MEDICINE

## 2021-12-22 PROCEDURE — 1126F AMNT PAIN NOTED NONE PRSNT: CPT | Mod: CPTII,S$GLB,, | Performed by: INTERNAL MEDICINE

## 2021-12-22 PROCEDURE — 3077F PR MOST RECENT SYSTOLIC BLOOD PRESSURE >= 140 MM HG: ICD-10-PCS | Mod: CPTII,S$GLB,, | Performed by: INTERNAL MEDICINE

## 2021-12-22 PROCEDURE — 99205 PR OFFICE/OUTPT VISIT, NEW, LEVL V, 60-74 MIN: ICD-10-PCS | Mod: S$GLB,,, | Performed by: RADIOLOGY

## 2021-12-22 PROCEDURE — 3078F PR MOST RECENT DIASTOLIC BLOOD PRESSURE < 80 MM HG: ICD-10-PCS | Mod: CPTII,S$GLB,, | Performed by: RADIOLOGY

## 2021-12-22 PROCEDURE — 3077F PR MOST RECENT SYSTOLIC BLOOD PRESSURE >= 140 MM HG: ICD-10-PCS | Mod: CPTII,S$GLB,, | Performed by: RADIOLOGY

## 2021-12-22 PROCEDURE — 1101F PR PT FALLS ASSESS DOC 0-1 FALLS W/OUT INJ PAST YR: ICD-10-PCS | Mod: CPTII,S$GLB,, | Performed by: RADIOLOGY

## 2021-12-22 PROCEDURE — 3078F DIAST BP <80 MM HG: CPT | Mod: CPTII,S$GLB,, | Performed by: INTERNAL MEDICINE

## 2021-12-22 PROCEDURE — 1126F PR PAIN SEVERITY QUANTIFIED, NO PAIN PRESENT: ICD-10-PCS | Mod: CPTII,S$GLB,, | Performed by: RADIOLOGY

## 2021-12-22 RX ORDER — PROCHLORPERAZINE MALEATE 5 MG
5 TABLET ORAL EVERY 6 HOURS PRN
Qty: 90 TABLET | Refills: 1 | Status: SHIPPED | OUTPATIENT
Start: 2021-12-22 | End: 2022-12-22

## 2021-12-22 RX ORDER — ONDANSETRON 8 MG/1
8 TABLET, ORALLY DISINTEGRATING ORAL EVERY 12 HOURS PRN
Qty: 90 TABLET | Refills: 1 | Status: SHIPPED | OUTPATIENT
Start: 2021-12-22 | End: 2022-12-22

## 2021-12-22 NOTE — H&P (VIEW-ONLY)
Subjective:   Date of Visit: 12/22/21   ?   ?    REFERRING PROVIDER: Riog Vences MD  [id number] [facility]  [place] [id number]   ?   CHIEF COMPLAINT:  LUNG CANCER???????   ?   ONCOLOGIC DIAGNOSIS:  Squamous cell carcinoma of lung  ?   CURRENT TREATMENT:  None    PAST TREATMENT:  None  ?   ONCOLOGIC HISTORY:   Final Pathologic Diagnosis  12/08/2021 BIOPSY OF BRONCHUS INTERMEDIUS:   SMALL BRONCHIAL FRAGMENT WITH NO SIGNIFICANT HISTOLOGIC ALTERATION   NO CARCINOMA, LYMPHOMA, OR GRANULOMATOUS DISEASE IDENTIFIED        FINE-NEEDLE ASPIRATION BIOPSY:  12/08/2021  1. Station 10R lymph node, fine needle aspiration (6 smears, 1 cell block):       -  Positive for malignant cells with histopathologic features of squamous   cell carcinoma, see comment   Comment: The aspirate shows detached fragments of squamous cell carcinoma in   a background of blood, cartilage and fibrous tissue.  No definitive lymph   node tissue is identified.   2. Bronchial washing, for cytology (1 ThinPrep, 1 cell block):       -  Negative for malignant cells.  Benign bronchial cells.   3. Bronchial brushing, for cytology (1 ThinPrep, 4 smears):       -  Negative for malignant cells.  Benign bronchial cells.     PET SCAN:  12/14/2021  FINDINGS:  Quality of the study: Adequate.     Head neck: No abnormal foci of increased tracer uptake are present.     Chest: There is a highly FDG avid perihilar soft tissue mass present in the superior segment of the right lower lobe which appears similar in size to prior CT measuring approximately 5.5 cm in long axis.  SUV max is 23.0.  There does appear to be some effacement and possible partial encasement of the bronchus intermedius.  No FDG avid thoracic adenopathy demonstrated.     Abdomen and pelvis: No abnormal foci of increased tracer uptake are present.     Skeletal: No abnormal foci of increased tracer uptake are present.     Physiologic uptake of the tracer is present within the brain, salivary  glands, myocardium, GI and  tracts.     Incidental CT findings: There is fairly extensive sigmoid diverticulosis without evidence of acute diverticulitis.  There is mild ectasia of the infrarenal abdominal aorta measuring up to 2.6 cm in diameter.  Clustered coarse calcifications noted at the dany hepatis, possibly representing calcified adenopathy with no associated FDG uptake.  Scattered pancreatic parenchymal calcifications also noted which may represent sequela of chronic pancreatitis.  There is a moderate-sized sliding-type hiatal hernia present.     Impression:     1. Highly FDG avid perihilar mass in the superior segment of the right lower lobe which is highly concerning for malignancy.  2. No FDG avid adenopathy or definite evidence of metastatic disease.  3. Observations as above        HPI:  81-year-old with coronary artery disease, hypertension and recent diagnosis of squamous cell carcinoma of right lung presents to discuss management with us.  No prior history for smoking or alcohol use patient.  Denies hemoptysis or hematemesis.  Denies worsening shortness of breath, unintentional weight loss, chest pain, nausea or vomiting, abdominal pain, diarrhea or dysuria.    Apparently patient was recently seen by Dr. Vences for shortness of breath for which x-ray showed a suspected right hilar mass.  CT scan was obtained and subsequently biopsied to revealed squamous cell carcinoma of right lung.    No pertinent family history.    Review of Systems   Constitutional: Negative for activity change, appetite change, chills, fatigue, fever and unexpected weight change.   HENT: Negative for hearing loss, mouth sores, nosebleeds, sore throat, tinnitus, trouble swallowing and voice change.    Eyes: Negative for visual disturbance.   Respiratory: Negative for cough, chest tightness, shortness of breath and wheezing.    Cardiovascular: Negative for chest pain, palpitations and leg swelling.   Gastrointestinal:  Negative for abdominal pain, anal bleeding, blood in stool, constipation, diarrhea, nausea and vomiting.   Genitourinary: Negative for frequency, hematuria and testicular pain.   Musculoskeletal: Negative for arthralgias, back pain, gait problem and neck pain.   Skin: Negative for color change, pallor, rash and wound.   Allergic/Immunologic: Negative for immunocompromised state.   Neurological: Positive for weakness. Negative for seizures, syncope, numbness and headaches.   Hematological: Negative for adenopathy. Does not bruise/bleed easily.   Psychiatric/Behavioral: Negative for agitation, confusion, decreased concentration, hallucinations and sleep disturbance. The patient is not nervous/anxious.        ?   PAST MEDICAL HISTORY:   Past Medical History:   Diagnosis Date    Benign essential HTN     CAD (coronary artery disease)     HLD (hyperlipidemia)     ?     PAST SURGICAL HISTORY:   Past Surgical History:   Procedure Laterality Date    BRONCHOSCOPY Bilateral 12/8/2021    Procedure: Bronchoscopy - insert lighted tube into airway to take a biopsy of lung;  Surgeon: Rigo Vences MD;  Location: [facility];  Service: Endoscopy;  Laterality: Bilateral;    CHOLECYSTECTOMY      CORONARY ANGIOPLASTY WITH STENT PLACEMENT      ENDOBRONCHIAL ULTRASOUND Bilateral 12/8/2021    Procedure: ENDOBRONCHIAL ULTRASOUND (EBUS);  Surgeon: Rigo Vences MD;  Location: [facility];  Service: Endoscopy;  Laterality: Bilateral;      ?   ALLERGIES:   Allergies as of 12/22/2021    (No Known Allergies)      ?   MEDICATIONS:?   Outpatient Medications Marked as Taking for the 12/22/21 encounter (Office Visit) with Juan Lopez MD   Medication Sig Dispense Refill    ALPRAZolam (XANAX) 0.5 MG tablet Take 0.5 mg by mouth 2 (two) times daily as needed.      aspirin (ECOTRIN) 81 MG EC tablet Take 81 mg by mouth once daily.      atorvastatin (LIPITOR) 80 MG tablet Take 1 tablet by mouth once daily.      esomeprazole  (NEXIUM) 40 MG capsule Take 1 capsule by mouth every morning.      trandolapriL (MAVIK) 4 MG Tab Take 1 tablet by mouth once daily.        ?   SOCIAL HISTORY:?   Social History     Tobacco Use    Smoking status: Never Smoker    Smokeless tobacco: Never Used    Tobacco comment: Chews on cigars   Substance Use Topics    Alcohol use: Not Currently        ?   FAMILY HISTORY:   family history includes Heart attack in his father.   ?     Objective:      Physical Exam  Constitutional:       General: He is not in acute distress.     Appearance: He is well-developed and well-nourished.   HENT:      Head: Normocephalic and atraumatic.      Right Ear: External ear normal.      Left Ear: External ear normal.      Mouth/Throat:      Pharynx: No oropharyngeal exudate.   Eyes:      General: No scleral icterus.        Right eye: No discharge.         Left eye: No discharge.      Conjunctiva/sclera: Conjunctivae normal.      Pupils: Pupils are equal, round, and reactive to light.   Neck:      Thyroid: No thyromegaly.   Cardiovascular:      Rate and Rhythm: Normal rate and regular rhythm.      Heart sounds: Normal heart sounds. No murmur heard.      Pulmonary:      Effort: Pulmonary effort is normal. No respiratory distress.      Breath sounds: Normal breath sounds. No wheezing.   Chest:      Chest wall: No tenderness.   Breasts:      Right: No supraclavicular adenopathy.      Left: No supraclavicular adenopathy.       Abdominal:      General: Bowel sounds are normal. There is no distension.      Palpations: Abdomen is soft. There is no mass.      Tenderness: There is no abdominal tenderness. There is no rebound.   Musculoskeletal:         General: No tenderness or edema. Normal range of motion.      Cervical back: Normal range of motion and neck supple.   Lymphadenopathy:      Cervical: No cervical adenopathy.      Right cervical: No superficial cervical adenopathy.     Left cervical: No superficial cervical adenopathy.       Upper Body:      Right upper body: No supraclavicular or pectoral adenopathy.      Left upper body: No supraclavicular or pectoral adenopathy.      Lower Body: No right inguinal adenopathy. No left inguinal adenopathy.   Skin:     General: Skin is warm and dry.      Capillary Refill: Capillary refill takes 2 to 3 seconds.      Coloration: Skin is not pale.      Findings: No erythema or rash.   Neurological:      Mental Status: He is alert and oriented to person, place, and time.      Cranial Nerves: No cranial nerve deficit.      Sensory: No sensory deficit.   Psychiatric:         Mood and Affect: Mood and affect normal.         Behavior: Behavior normal.         Judgment: Judgment normal.         ?   Vitals:    12/22/21 1311   BP: (!) 168/79   Pulse: 69   Resp: 18   Temp: 98.1 °F (36.7 °C)        ECOG SCORE    2 - Capable of all selfcare but unable to carry out any work activities, active > 50% of hours          Laboratory:  ?   No visits with results within 1 Day(s) from this visit.   Latest known visit with results is:   Admission on 12/08/2021, Discharged on 12/08/2021   Component Date Value Ref Range Status    Final Pathologic Diagnosis 12/08/2021    Final                    Value:BIOPSY OF BRONCHUS INTERMEDIUS:  SMALL BRONCHIAL FRAGMENT WITH NO SIGNIFICANT HISTOLOGIC ALTERATION  NO CARCINOMA, LYMPHOMA, OR GRANULOMATOUS DISEASE IDENTIFIED      Gross 12/08/2021    Final                    Value:Surgery ID:  [id number];  Pathology ID:  [id number]  1. Received in formalin labeled bronchus intermedius mass biopsy, are  multiple fragments, 1-2 mm in greatest dimension. Submitted entirely.  JEE--1-A  Grossed by: Verito Huang      Disclaimer 12/08/2021    Final                    Value:Unless the case is a 'gross only' or additional testing only, the final  diagnosis for each specimen is based on a microscopic examination of  appropriate tissue sections.      Final Pathologic Diagnosis 12/08/2021    Final                     Value:1. Station 10R lymph node, fine needle aspiration (6 smears, 1 cell block):      -  Positive for malignant cells with histopathologic features of squamous  cell carcinoma, see comment  Comment: The aspirate shows detached fragments of squamous cell carcinoma in  a background of blood, cartilage and fibrous tissue.  No definitive lymph  node tissue is identified.  2. Bronchial washing, for cytology (1 ThinPrep, 1 cell block):      -  Negative for malignant cells.  Benign bronchial cells.  3. Bronchial brushing, for cytology (1 ThinPrep, 4 smears):      -  Negative for malignant cells.  Benign bronchial cells.      Comment 12/08/2021    Final                    Value:The aspirate shows detached fragments of squamous cell carcinoma in a  background of blood, cartilage and fibrous tissue.  No definitive lymph node  tissue is identified.      Disclaimer 12/08/2021    Final                    Value:Screening was performed at [facility], [address] [place] [address].        ?   Tumor markers   ?   ?   Imaging: NM PET CT Routine Skull to Mid Thigh  Narrative: EXAMINATION:  NM PET CT ROUTINE    CLINICAL HISTORY:  right hilar mass; Other nonspecific abnormal finding of lung field    TECHNIQUE:  12.1 mCi of F18-FDG was administered intravenously in the left antecubital fossa.  After an approximately 60 min distribution time, PET/CT images were acquired from the skull base to the mid thigh. Transmission images were acquired to correct for attenuation using a whole body low-dose CT scan without contrast with the arms positioned above the head.    COMPARISON:  Outside CT performed 11/16/2021    FINDINGS:  Quality of the study: Adequate.    Head neck: No abnormal foci of increased tracer uptake are present.    Chest: There is a highly FDG avid perihilar soft tissue mass present in the superior segment of the right lower lobe which appears similar in  size to prior CT measuring approximately 5.5 cm in long axis.  SUV max is 23.0.  There does appear to be some effacement and possible partial encasement of the bronchus intermedius.  No FDG avid thoracic adenopathy demonstrated.    Abdomen and pelvis: No abnormal foci of increased tracer uptake are present.    Skeletal: No abnormal foci of increased tracer uptake are present.    Physiologic uptake of the tracer is present within the brain, salivary glands, myocardium, GI and  tracts.    Incidental CT findings: There is fairly extensive sigmoid diverticulosis without evidence of acute diverticulitis.  There is mild ectasia of the infrarenal abdominal aorta measuring up to 2.6 cm in diameter.  Clustered coarse calcifications noted at the dany hepatis, possibly representing calcified adenopathy with no associated FDG uptake.  Scattered pancreatic parenchymal calcifications also noted which may represent sequela of chronic pancreatitis.  There is a moderate-sized sliding-type hiatal hernia present.  Impression: 1. Highly FDG avid perihilar mass in the superior segment of the right lower lobe which is highly concerning for malignancy.  2. No FDG avid adenopathy or definite evidence of metastatic disease.  3. Observations as above    Electronically signed by: Geronimo Sigala MD  Date:    12/14/2021  Time:    11:42     ?      Pathology:  Pathology Results  (Last 10 years)               12/08/21 1412  Specimen to Pathology, Surgery Pulmonary and Thoracic Final result    Narrative:  Pre-op Diagnosis: Lung mass [R91.8]   Procedure(s):   Bronchoscopy - insert lighted tube into airway to take a   biopsy of lung   ENDOBRONCHIAL ULTRASOUND (EBUS)   1. Bronchus Intermedius Mass Bx   Release to patient->Immediate   Specimen total (fresh, frozen, permanent):->1              ?   Assessment/Plan:       1. Squamous cell carcinoma of lung, unspecified laterality          Squamous cell lung cancer  Stage II B (cT3, cN0, cM0) squamous  cell carcinoma of right lung.  Reviewed PET-CT scan, MRI of the brain and pathology report with patient and answered all his questions.  PET-CT scan did not reveal distant disease and MRI of brain was negative for metastasis.  We discussed prognosis and management of early stage lung cancer.    He is not interested in surgical options.  Will be discussing with our radiation oncology colleagues regarding concurrent chemotherapy and radiation    Regimen:  Carboplatin AUC=2 + Paclitaxel 45 mg/m² Weekly During Radiation [J Clin Oncol Off J Am Soc Clin Oncol. 2005; 23(77):6358-3378]    Patient is adamant of chemotherapy but willing to try for few cycles with option to stop at any time.    Recent 2D echocardiogram showed normal EF.    Will plan to obtain mediPort and signed chemo consent prior to initiating treatment.      Return in 2 weeks with repeat labs or sooner.      ?Squamous cell carcinoma of lung, unspecified laterality  -     CBC Auto Differential; Future; Expected date: 12/22/2021  -     Comprehensive Metabolic Panel; Future; Expected date: 12/22/2021  -     Echo; Future  -     IR Tunneled Cath Placement With Port; Future; Expected date: 12/22/2021  -     Ambulatory referral/consult to Hematology / Oncology  -     ondansetron (ZOFRAN-ODT) 8 MG TbDL; Take 1 tablet (8 mg total) by mouth every 12 (twelve) hours as needed.  Dispense: 90 tablet; Refill: 1  -     prochlorperazine (COMPAZINE) 5 MG tablet; Take 1 tablet (5 mg total) by mouth every 6 (six) hours as needed.  Dispense: 90 tablet; Refill: 1      Follow-Up: Follow up in about 2 weeks (around 1/5/2022).    ARIEL MOSS Md., Ph.D  Hematology & Oncology Department  Phone #: [phone]

## 2021-12-30 ENCOUNTER — TELEPHONE (OUTPATIENT)
Dept: HEMATOLOGY/ONCOLOGY | Facility: CLINIC | Age: 81
End: 2021-12-30
Payer: MEDICARE

## 2021-12-30 ENCOUNTER — PATIENT MESSAGE (OUTPATIENT)
Dept: HEMATOLOGY/ONCOLOGY | Facility: CLINIC | Age: 81
End: 2021-12-30

## 2021-12-30 ENCOUNTER — CLINICAL SUPPORT (OUTPATIENT)
Dept: HEMATOLOGY/ONCOLOGY | Facility: CLINIC | Age: 81
End: 2021-12-30
Payer: MEDICARE

## 2021-12-30 DIAGNOSIS — C34.90 SQUAMOUS CELL CARCINOMA OF LUNG, UNSPECIFIED LATERALITY: ICD-10-CM

## 2021-12-30 DIAGNOSIS — Z71.9 ENCOUNTER FOR EDUCATION: Primary | ICD-10-CM

## 2021-12-30 DIAGNOSIS — C34.90 SQUAMOUS CELL CARCINOMA OF LUNG, UNSPECIFIED LATERALITY: Primary | ICD-10-CM

## 2021-12-30 PROCEDURE — 77334 RADIATION TREATMENT AID(S): CPT | Mod: TC | Performed by: RADIOLOGY

## 2021-12-30 PROCEDURE — 77263 THER RADIOLOGY TX PLNG CPLX: CPT | Mod: ,,, | Performed by: RADIOLOGY

## 2021-12-30 PROCEDURE — 77334 PR  RADN TREATMENT AID(S) COMPLX: ICD-10-PCS | Mod: 26,,, | Performed by: RADIOLOGY

## 2021-12-30 PROCEDURE — 77014 PR  CT GUIDANCE PLACEMENT RAD THERAPY FIELDS: CPT | Mod: 26,,, | Performed by: RADIOLOGY

## 2021-12-30 PROCEDURE — 77014 HC CT GUIDANCE RADIATION THERAPY FLDS PLACEMENT: CPT | Mod: TC | Performed by: RADIOLOGY

## 2021-12-30 PROCEDURE — 77263 PR  RADIATION THERAPY PLAN COMPLEX: ICD-10-PCS | Mod: ,,, | Performed by: RADIOLOGY

## 2021-12-30 PROCEDURE — 77014 PR  CT GUIDANCE PLACEMENT RAD THERAPY FIELDS: ICD-10-PCS | Mod: 26,,, | Performed by: RADIOLOGY

## 2021-12-30 PROCEDURE — 77334 RADIATION TREATMENT AID(S): CPT | Mod: 26,,, | Performed by: RADIOLOGY

## 2021-12-30 RX ORDER — DEXAMETHASONE 4 MG/1
TABLET ORAL
Qty: 120 TABLET | Refills: 0 | Status: SHIPPED | OUTPATIENT
Start: 2021-12-30 | End: 2022-03-16

## 2022-01-02 DIAGNOSIS — D68.9 COAGULATION DEFECT, UNSPECIFIED: Primary | ICD-10-CM

## 2022-01-02 DIAGNOSIS — I10 BENIGN ESSENTIAL HTN: ICD-10-CM

## 2022-01-03 ENCOUNTER — TELEPHONE (OUTPATIENT)
Dept: HEMATOLOGY/ONCOLOGY | Facility: CLINIC | Age: 82
End: 2022-01-03
Payer: MEDICARE

## 2022-01-03 ENCOUNTER — HOSPITAL ENCOUNTER (OUTPATIENT)
Dept: RADIATION THERAPY | Facility: HOSPITAL | Age: 82
Discharge: HOME | End: 2022-01-03
Attending: RADIOLOGY
Payer: MEDICARE

## 2022-01-03 ENCOUNTER — TELEPHONE (OUTPATIENT)
Dept: RADIOLOGY | Facility: HOSPITAL | Age: 82
End: 2022-01-03
Payer: MEDICARE

## 2022-01-03 NOTE — NURSING
1147am: Incoming call from pt brother shereen regarding pt appts. Spoke with shereen. Answered all questions. Pt brother shereen verbalized understanding. Pt encouraged to contact me directly for any further questions and/or concerns.   Oncology Navigation   Intake  Date of Diagnosis: 2021  Cancer Type: Other  Internal / External Referral: Internal  Referral Source: Dr. Vences  Date of Referral: 2021  Initial Nurse Navigator Contact: 2021  Referral to Initial Contact Timeline (days): 5  Date Worked: 1/3/2022  Multiple appointments: Yes  Start of Treatment: 2021     Treatment  Current Status: Active       Medical Oncologist: Dr. Juan Lopez  Chemotherapy: Planned  Chemotherapy Regimen: Carboplatin Taxol    Radiation Therapy: Planned  Radiation Oncologist: Dr. Nadira Roberson  Start Date: 1/10/2021    Procedures: Port / PICC  Bronchoscopy Schedule Date: 2021  Echo Schedule Date: 2021  PET Scan Schedule Date: 2021  Port / PICC Schedule Date: 1/3/2021    General Referrals: Social work  Social Work: notified Haley Freitas LCSW  Social Work Referral Date: 2021       Radiation Oncologist: Dr. Nadira Roberson    Support Systems: Family members     Acuity  Stage: I-II  Systemic Treatment - predicted or initiated: More than one treatment modality concurrently (chemotherapy, radiation, etc.) (+2)  Treatment Tolerability: Has not started treatment yet/treatment fully completed and side effects resolved  ECO (+2)  Comorbidities in Medical History: 6+ (+2)   Needed: No  Verbalizes the need for more education: Yes  Navigation Acuity: 8     Follow Up  No follow-ups on file.

## 2022-01-03 NOTE — NURSING
1052am: Outgoing call to pt regarding missed call and VM from  from pt. Spoke with pt. Pt stated he ate cornflakes at 1030am. I asked pt about fasting instructions that radiology gave him when gave mediport date. Pt stated he didn't get any fasting instructions. I msg'd Jessica FRANCO nurse to let her know about this. Awaiting response.   Oncology Navigation   Intake  Date of Diagnosis: 2021  Cancer Type: Other  Internal / External Referral: Internal  Referral Source: Dr. Vences  Date of Referral: 2021  Initial Nurse Navigator Contact: 2021  Referral to Initial Contact Timeline (days): 5  Date Worked: 1/3/2022  Multiple appointments: Yes  Start of Treatment: 2021     Treatment  Current Status: Active       Medical Oncologist: Dr. Juan Lopez  Chemotherapy: Planned  Chemotherapy Regimen: Carboplatin Taxol    Radiation Therapy: Planned  Radiation Oncologist: Dr. Nadira Roberson  Start Date: 1/10/2021    Procedures: Port / PICC  Bronchoscopy Schedule Date: 2021  Echo Schedule Date: 2021  PET Scan Schedule Date: 2021  Port / PICC Schedule Date: 1/3/2021    General Referrals: Social work  Social Work: notified Haley Freitas LCSW  Social Work Referral Date: 2021       Radiation Oncologist: Dr. Nadira Roberson    Support Systems: Family members     Acuity  Stage: I-II  Systemic Treatment - predicted or initiated: More than one treatment modality concurrently (chemotherapy, radiation, etc.) (+2)  Treatment Tolerability: Has not started treatment yet/treatment fully completed and side effects resolved  ECO (+2)  Comorbidities in Medical History: 6+ (+2)   Needed: No  Verbalizes the need for more education: Yes  Navigation Acuity: 8     Follow Up  No follow-ups on file.

## 2022-01-03 NOTE — NURSING
1110am: Outgoing to pt regarding scheduled mediport placement today. Spoke with pt. Jessica from IR msg'd back stating pt will have to be rescheduled due to eating breakfast. Jessica informed of pt chemo start date on  and Rad onc starts 1/10. Pt instructed to write down instructions when given. Pt verbalized understanding.   Oncology Navigation   Intake  Date of Diagnosis: 2021  Cancer Type: Other  Internal / External Referral: Internal  Referral Source: Dr. Vences  Date of Referral: 2021  Initial Nurse Navigator Contact: 2021  Referral to Initial Contact Timeline (days): 5  Date Worked: 1/3/2022  Multiple appointments: Yes  Start of Treatment: 2021     Treatment  Current Status: Active       Medical Oncologist: Dr. Juan Lopez  Chemotherapy: Planned  Chemotherapy Regimen: Carboplatin Taxol    Radiation Therapy: Planned  Radiation Oncologist: Dr. Nadira Roberson  Start Date: 1/10/2021    Procedures: Port / PICC  Bronchoscopy Schedule Date: 2021  Echo Schedule Date: 2021  PET Scan Schedule Date: 2021  Port / PICC Schedule Date: 1/3/2021    General Referrals: Social work  Social Work: notified Haley Freitas LCSW  Social Work Referral Date: 2021       Radiation Oncologist: Dr. Nadira Roberson    Support Systems: Family members     Acuity  Stage: I-II  Systemic Treatment - predicted or initiated: More than one treatment modality concurrently (chemotherapy, radiation, etc.) (+2)  Treatment Tolerability: Has not started treatment yet/treatment fully completed and side effects resolved  ECO (+2)  Comorbidities in Medical History: 6+ (+2)   Needed: No  Verbalizes the need for more education: Yes  Navigation Acuity: 8     Follow Up  No follow-ups on file.

## 2022-01-03 NOTE — TELEPHONE ENCOUNTER
Interventional Radiology:    Spoke with both the pt and pt's brother and got pt rescheduled for 01/04 @ 1pm for mediport placement. Informed both pt and pt's brother for pt to be NPO after midnight, have a ride with pt to drive him home, sip of water with morning meds, and arrive to hospital on [address] for 11:30am. Both pt and pt's brother verbalized understanding of all.

## 2022-01-04 ENCOUNTER — HOSPITAL ENCOUNTER (OUTPATIENT)
Facility: HOSPITAL | Age: 82
Discharge: HOME | End: 2022-01-04
Attending: STUDENT IN AN ORGANIZED HEALTH CARE EDUCATION/TRAINING PROGRAM | Admitting: STUDENT IN AN ORGANIZED HEALTH CARE EDUCATION/TRAINING PROGRAM
Payer: MEDICARE

## 2022-01-04 ENCOUNTER — HOSPITAL ENCOUNTER (OUTPATIENT)
Dept: RADIOLOGY | Facility: HOSPITAL | Age: 82
Discharge: HOME | End: 2022-01-04
Attending: INTERNAL MEDICINE | Admitting: STUDENT IN AN ORGANIZED HEALTH CARE EDUCATION/TRAINING PROGRAM
Payer: MEDICARE

## 2022-01-04 DIAGNOSIS — C34.90 SQUAMOUS CELL CARCINOMA OF LUNG, UNSPECIFIED LATERALITY: ICD-10-CM

## 2022-01-04 LAB
CTP QC/QA: YES
SARS-COV-2 AG RESP QL IA.RAPID: NEGATIVE

## 2022-01-04 PROCEDURE — 76937 US GUIDE VASCULAR ACCESS: CPT | Mod: TC | Performed by: STUDENT IN AN ORGANIZED HEALTH CARE EDUCATION/TRAINING PROGRAM

## 2022-01-04 PROCEDURE — 63600175 PHARM REV CODE 636 W HCPCS: Performed by: STUDENT IN AN ORGANIZED HEALTH CARE EDUCATION/TRAINING PROGRAM

## 2022-01-04 PROCEDURE — 99152 MOD SED SAME PHYS/QHP 5/>YRS: CPT | Performed by: STUDENT IN AN ORGANIZED HEALTH CARE EDUCATION/TRAINING PROGRAM

## 2022-01-04 PROCEDURE — C1788 PORT, INDWELLING, IMP: HCPCS | Performed by: STUDENT IN AN ORGANIZED HEALTH CARE EDUCATION/TRAINING PROGRAM

## 2022-01-04 PROCEDURE — 36561 INSERT TUNNELED CV CATH: CPT | Mod: LT | Performed by: STUDENT IN AN ORGANIZED HEALTH CARE EDUCATION/TRAINING PROGRAM

## 2022-01-04 PROCEDURE — 77001 FLUOROGUIDE FOR VEIN DEVICE: CPT | Mod: TC | Performed by: STUDENT IN AN ORGANIZED HEALTH CARE EDUCATION/TRAINING PROGRAM

## 2022-01-04 PROCEDURE — 25000003 PHARM REV CODE 250: Performed by: STUDENT IN AN ORGANIZED HEALTH CARE EDUCATION/TRAINING PROGRAM

## 2022-01-04 PROCEDURE — 99153 MOD SED SAME PHYS/QHP EA: CPT | Performed by: STUDENT IN AN ORGANIZED HEALTH CARE EDUCATION/TRAINING PROGRAM

## 2022-01-04 RX ORDER — CEFAZOLIN SODIUM 2 G/50ML
SOLUTION INTRAVENOUS
Status: DISCONTINUED | OUTPATIENT
Start: 2022-01-04 | End: 2022-01-04 | Stop reason: HOSPADM

## 2022-01-04 RX ORDER — FENTANYL CITRATE 50 UG/ML
INJECTION, SOLUTION INTRAMUSCULAR; INTRAVENOUS
Status: DISCONTINUED | OUTPATIENT
Start: 2022-01-04 | End: 2022-01-04 | Stop reason: HOSPADM

## 2022-01-04 RX ORDER — HEPARIN SODIUM 1000 [USP'U]/ML
INJECTION, SOLUTION INTRAVENOUS; SUBCUTANEOUS
Status: DISCONTINUED | OUTPATIENT
Start: 2022-01-04 | End: 2022-01-04 | Stop reason: HOSPADM

## 2022-01-04 RX ORDER — CEFAZOLIN SODIUM 2 G/50ML
2 SOLUTION INTRAVENOUS ONCE
Status: DISCONTINUED | OUTPATIENT
Start: 2022-01-04 | End: 2022-01-04 | Stop reason: HOSPADM

## 2022-01-04 RX ORDER — VANCOMYCIN HYDROCHLORIDE 1 G/20ML
INJECTION, POWDER, LYOPHILIZED, FOR SOLUTION INTRAVENOUS
Status: DISCONTINUED | OUTPATIENT
Start: 2022-01-04 | End: 2022-01-04 | Stop reason: HOSPADM

## 2022-01-04 RX ORDER — LIDOCAINE HYDROCHLORIDE 20 MG/ML
INJECTION, SOLUTION INFILTRATION; PERINEURAL
Status: DISCONTINUED | OUTPATIENT
Start: 2022-01-04 | End: 2022-01-04 | Stop reason: HOSPADM

## 2022-01-04 RX ORDER — MIDAZOLAM HYDROCHLORIDE 1 MG/ML
INJECTION, SOLUTION INTRAMUSCULAR; INTRAVENOUS
Status: DISCONTINUED | OUTPATIENT
Start: 2022-01-04 | End: 2022-01-04 | Stop reason: HOSPADM

## 2022-01-04 RX ORDER — LIDOCAINE HYDROCHLORIDE AND EPINEPHRINE 20; 10 MG/ML; UG/ML
INJECTION, SOLUTION INFILTRATION; PERINEURAL
Status: DISCONTINUED | OUTPATIENT
Start: 2022-01-04 | End: 2022-01-04 | Stop reason: HOSPADM

## 2022-01-04 NOTE — Clinical Note
The left chest was prepped. The site was prepped with ChloraPrep. The site was clipped. The patient was draped. The patient was positioned supine.

## 2022-01-04 NOTE — DISCHARGE INSTRUCTIONS
DO NOT REMOVE DRESSING.  IT WILL BE REMOVED AT FOLLOW UP APPOINTMENT                                YOU MAY SHOWER THE DAY AFTER THE PROCEDURE,  DO NOT REMOVE THE DRESSING FOR SHOWER. USE HIBICLENS  SOAP ON CHEST AND NECK AREA                                  FOR 1 WEEK.  REMOVE SLING FOR SHOWER, THEN REAPPLY AFTER SHOWER.                                USE ICE PACK FOR 48 HOURS                                                                          NOZIN INSTRUCTIONS     GOAL: TO REDUCE THE RISK OF POST-PROCEDURAL INFECTIONS BY BACTERIA IN THE NASAL CAVITY.  THINK OF IT AS A HAND  FOR YOUR NOSE.       HOW TO USE:  1. SHAKE NOZIN BOTTLE WELL                            2. TAKE A COTTON SWAB AND APPLY FOUR DROPS TO TIP.                            3. INSERT COTTON SWAB INTO ONE NOSTRIL, BEING SURE NOT TO GO DEEPER INTO NOSE THAN THE TIP OF THE SWAB.                            4.SWAB NOSTRIL 6 TIMES CLOCKWISE AND 6 TIMES COUNERCLOCKWISE.  MAKE SURE TO SWAB THE INSIDE FRONT POCKET OF NOSTRIL.                             5. TAKE SWAB OUT AND APPLY 2 DROPS TO THE SAME COTTON TIP.  REPEAT STEPS 3 AND 4 IN THE OTHER NOSTRIL      DO STEPS 1-5 TWICE A DAY FOR 7 DAYS.

## 2022-01-04 NOTE — Clinical Note
0 ml of contrast were injected throughout the case. 0 mL of contrast was the total wasted during the case. 0 mL was the total amount used during the case.

## 2022-01-04 NOTE — PLAN OF CARE
Went over discharge instructions with patient.   Stressed importance of making and keeping all follow ups as well as making prescribed medication changes.   Patient verbalized understanding and has no questions in regards to discharge.  IV removed, catheter intact.  Primary nurse notified of pt's discharge status.

## 2022-01-05 ENCOUNTER — TELEPHONE (OUTPATIENT)
Dept: HEMATOLOGY/ONCOLOGY | Facility: CLINIC | Age: 82
End: 2022-01-05
Payer: MEDICARE

## 2022-01-05 NOTE — NURSING
1131am: incoming call from pt. Pt thought I called him. I didn't but pt said port placement yesterday 22 went well. Told pt everything is good   Oncology Navigation   Intake  Date of Diagnosis: 2021  Cancer Type: Other  Internal / External Referral: Internal  Referral Source: Dr. Vences  Date of Referral: 2021  Initial Nurse Navigator Contact: 2021  Referral to Initial Contact Timeline (days): 5  Date Worked: 2022  Multiple appointments: Yes  Start of Treatment: 2021     Treatment  Current Status: Active       Medical Oncologist: Dr. Juan Lopez  Chemotherapy: Planned  Chemotherapy Regimen: Carboplatin Taxol    Radiation Therapy: Planned  Radiation Oncologist: Dr. Nadira Roberson  Start Date: 1/10/2021    Procedures: Port / PICC  Bronchoscopy Schedule Date: 2021  Echo Schedule Date: 2021  PET Scan Schedule Date: 2021  Port / PICC Schedule Date: 1/3/2021    General Referrals: Social work  Social Work: notified Haley Freitas LCSW  Social Work Referral Date: 2021       Radiation Oncologist: Dr. Nadira Roberson    Support Systems: Family members     Acuity  Stage: I-II  Systemic Treatment - predicted or initiated: More than one treatment modality concurrently (chemotherapy, radiation, etc.) (+2)  Treatment Tolerability: Has not started treatment yet/treatment fully completed and side effects resolved  ECO (+2)  Comorbidities in Medical History: 6+ (+2)   Needed: No  Verbalizes the need for more education: Yes  Navigation Acuity: 8     Follow Up  No follow-ups on file.   to go for next appt on 1/10/22 with Dr. Roberson. Pt verbalized understanding.

## 2022-01-06 PROCEDURE — 77301 RADIOTHERAPY DOSE PLAN IMRT: CPT | Mod: 26,,, | Performed by: RADIOLOGY

## 2022-01-06 PROCEDURE — 77301 RADIOTHERAPY DOSE PLAN IMRT: CPT | Mod: TC | Performed by: RADIOLOGY

## 2022-01-06 PROCEDURE — 77301 PR  INTEN MOD RADIOTHER PLAN W/DOSE VOL HIST: ICD-10-PCS | Mod: 26,,, | Performed by: RADIOLOGY

## 2022-01-07 PROCEDURE — 77300 RADIATION THERAPY DOSE PLAN: CPT | Mod: 26,,, | Performed by: RADIOLOGY

## 2022-01-07 PROCEDURE — 77470 PR  SPECIAL RADIATION TREATMENT: ICD-10-PCS | Mod: 26,59,, | Performed by: RADIOLOGY

## 2022-01-07 PROCEDURE — 77470 SPECIAL RADIATION TREATMENT: CPT | Mod: 26,59,, | Performed by: RADIOLOGY

## 2022-01-07 PROCEDURE — 77338 DESIGN MLC DEVICE FOR IMRT: CPT | Mod: 26,,, | Performed by: RADIOLOGY

## 2022-01-07 PROCEDURE — 77300 PR RADIATION THERAPY,DOSIMETRY PLAN: ICD-10-PCS | Mod: 26,,, | Performed by: RADIOLOGY

## 2022-01-07 PROCEDURE — 77470 SPECIAL RADIATION TREATMENT: CPT | Mod: 59,TC | Performed by: RADIOLOGY

## 2022-01-07 PROCEDURE — 77338 PR  MLC IMRT DESIGN & CONSTRUCTION PER IMRT PLAN: ICD-10-PCS | Mod: 26,,, | Performed by: RADIOLOGY

## 2022-01-07 PROCEDURE — 77338 DESIGN MLC DEVICE FOR IMRT: CPT | Mod: TC | Performed by: RADIOLOGY

## 2022-01-07 PROCEDURE — 77300 RADIATION THERAPY DOSE PLAN: CPT | Mod: TC | Performed by: RADIOLOGY

## 2022-01-07 NOTE — DISCHARGE SUMMARY
Radiology Post-Procedure Note    Pre Op Diagnosis: Lung cancer    Post Op Diagnosis: Same    Procedure: Port placement    Procedure performed by: Elaina Larios MD    Written Informed Consent Obtained: Yes    Specimen Removed: No    Estimated Blood Loss: Minimal    Findings:   Using realtime U/S guidance a 8 Fr port catheter was placed into the left internal jugular vein with tip of the catheter in the SVC. A pocket was made in the upper chest wall, and a port was placed. The skin was sutured closed over the port.    Port is ready for use.     Elaina Larios MD  Interventional Radiology

## 2022-01-11 ENCOUNTER — PATIENT MESSAGE (OUTPATIENT)
Dept: HEMATOLOGY/ONCOLOGY | Facility: CLINIC | Age: 82
End: 2022-01-11
Payer: MEDICARE

## 2022-01-11 VITALS
WEIGHT: 191 LBS | RESPIRATION RATE: 18 BRPM | TEMPERATURE: 98 F | SYSTOLIC BLOOD PRESSURE: 130 MMHG | HEART RATE: 77 BPM | HEIGHT: 70 IN | DIASTOLIC BLOOD PRESSURE: 64 MMHG | BODY MASS INDEX: 27.35 KG/M2 | OXYGEN SATURATION: 100 %

## 2022-01-13 ENCOUNTER — LAB VISIT (OUTPATIENT)
Dept: LAB | Facility: HOSPITAL | Age: 82
End: 2022-01-13
Attending: INTERNAL MEDICINE
Payer: MEDICARE

## 2022-01-13 ENCOUNTER — DOCUMENTATION ONLY (OUTPATIENT)
Dept: RADIATION ONCOLOGY | Facility: CLINIC | Age: 82
End: 2022-01-13
Payer: MEDICARE

## 2022-01-13 DIAGNOSIS — C34.90 SQUAMOUS CELL CARCINOMA OF LUNG, UNSPECIFIED LATERALITY: ICD-10-CM

## 2022-01-13 LAB
ALBUMIN SERPL BCP-MCNC: 3.8 G/DL (ref 3.5–5.2)
ALP SERPL-CCNC: 130 U/L (ref 55–135)
ALT SERPL W/O P-5'-P-CCNC: 26 U/L (ref 10–44)
ANION GAP SERPL CALC-SCNC: 8 MMOL/L (ref 8–16)
AST SERPL-CCNC: 18 U/L (ref 10–40)
BASOPHILS # BLD AUTO: 0.02 K/UL (ref 0–0.2)
BASOPHILS NFR BLD: 0.2 % (ref 0–1.9)
BILIRUB SERPL-MCNC: 0.6 MG/DL (ref 0.1–1)
BUN SERPL-MCNC: 10 MG/DL (ref 8–23)
CALCIUM SERPL-MCNC: 9.4 MG/DL (ref 8.7–10.5)
CHLORIDE SERPL-SCNC: 102 MMOL/L (ref 95–110)
CO2 SERPL-SCNC: 28 MMOL/L (ref 23–29)
CREAT SERPL-MCNC: 0.8 MG/DL (ref 0.5–1.4)
DIFFERENTIAL METHOD: ABNORMAL
EOSINOPHIL # BLD AUTO: 0.1 K/UL (ref 0–0.5)
EOSINOPHIL NFR BLD: 0.7 % (ref 0–8)
ERYTHROCYTE [DISTWIDTH] IN BLOOD BY AUTOMATED COUNT: 12.9 % (ref 11.5–14.5)
EST. GFR  (AFRICAN AMERICAN): >60 ML/MIN/1.73 M^2
EST. GFR  (NON AFRICAN AMERICAN): >60 ML/MIN/1.73 M^2
GLUCOSE SERPL-MCNC: 104 MG/DL (ref 70–110)
HCT VFR BLD AUTO: 48.2 % (ref 40–54)
HGB BLD-MCNC: 15.8 G/DL (ref 14–18)
IMM GRANULOCYTES # BLD AUTO: 0.03 K/UL (ref 0–0.04)
IMM GRANULOCYTES NFR BLD AUTO: 0.3 % (ref 0–0.5)
LYMPHOCYTES # BLD AUTO: 1.8 K/UL (ref 1–4.8)
LYMPHOCYTES NFR BLD: 19.8 % (ref 18–48)
MCH RBC QN AUTO: 32.4 PG (ref 27–31)
MCHC RBC AUTO-ENTMCNC: 32.8 G/DL (ref 32–36)
MCV RBC AUTO: 99 FL (ref 82–98)
MONOCYTES # BLD AUTO: 0.7 K/UL (ref 0.3–1)
MONOCYTES NFR BLD: 7.5 % (ref 4–15)
NEUTROPHILS # BLD AUTO: 6.5 K/UL (ref 1.8–7.7)
NEUTROPHILS NFR BLD: 71.5 % (ref 38–73)
NRBC BLD-RTO: 0 /100 WBC
PLATELET # BLD AUTO: 228 K/UL (ref 150–450)
PMV BLD AUTO: 8.8 FL (ref 9.2–12.9)
POTASSIUM SERPL-SCNC: 4.5 MMOL/L (ref 3.5–5.1)
PROT SERPL-MCNC: 7.1 G/DL (ref 6–8.4)
RBC # BLD AUTO: 4.87 M/UL (ref 4.6–6.2)
SODIUM SERPL-SCNC: 138 MMOL/L (ref 136–145)
WBC # BLD AUTO: 9.07 K/UL (ref 3.9–12.7)

## 2022-01-13 PROCEDURE — 77014 HC CT GUIDANCE RADIATION THERAPY FLDS PLACEMENT: CPT | Mod: TC | Performed by: RADIOLOGY

## 2022-01-13 PROCEDURE — 36415 COLL VENOUS BLD VENIPUNCTURE: CPT | Performed by: INTERNAL MEDICINE

## 2022-01-13 PROCEDURE — 85025 COMPLETE CBC W/AUTO DIFF WBC: CPT | Performed by: INTERNAL MEDICINE

## 2022-01-13 PROCEDURE — 77014 PR  CT GUIDANCE PLACEMENT RAD THERAPY FIELDS: ICD-10-PCS | Mod: 26,,, | Performed by: RADIOLOGY

## 2022-01-13 PROCEDURE — 77014 PR  CT GUIDANCE PLACEMENT RAD THERAPY FIELDS: CPT | Mod: 26,,, | Performed by: RADIOLOGY

## 2022-01-13 PROCEDURE — 77386 HC IMRT, COMPLEX: CPT | Performed by: RADIOLOGY

## 2022-01-13 PROCEDURE — 80053 COMPREHEN METABOLIC PANEL: CPT | Performed by: INTERNAL MEDICINE

## 2022-01-13 NOTE — PLAN OF CARE
Day 1 of outpatient xrt to the lung. Lung handout, verbal instructions & Miaderm Cream were given to the patient. Skin care & side effects were reviewed. Contact info was provided. Patient verbalized understanding.

## 2022-01-14 ENCOUNTER — DOCUMENTATION ONLY (OUTPATIENT)
Dept: HEMATOLOGY/ONCOLOGY | Facility: CLINIC | Age: 82
End: 2022-01-14
Payer: MEDICARE

## 2022-01-14 ENCOUNTER — INFUSION (OUTPATIENT)
Dept: INFUSION THERAPY | Facility: HOSPITAL | Age: 82
End: 2022-01-14
Attending: INTERNAL MEDICINE
Payer: MEDICARE

## 2022-01-14 ENCOUNTER — OFFICE VISIT (OUTPATIENT)
Dept: HEMATOLOGY/ONCOLOGY | Facility: CLINIC | Age: 82
End: 2022-01-14
Payer: MEDICARE

## 2022-01-14 VITALS
OXYGEN SATURATION: 97 % | SYSTOLIC BLOOD PRESSURE: 110 MMHG | RESPIRATION RATE: 18 BRPM | DIASTOLIC BLOOD PRESSURE: 58 MMHG | HEART RATE: 63 BPM | WEIGHT: 197.44 LBS | TEMPERATURE: 98 F | BODY MASS INDEX: 28.33 KG/M2

## 2022-01-14 DIAGNOSIS — C34.91 SQUAMOUS CELL CARCINOMA OF RIGHT LUNG: Primary | ICD-10-CM

## 2022-01-14 DIAGNOSIS — C34.90 SQUAMOUS CELL CARCINOMA OF LUNG, UNSPECIFIED LATERALITY: Primary | ICD-10-CM

## 2022-01-14 PROCEDURE — 1159F MED LIST DOCD IN RCRD: CPT | Mod: CPTII,95,, | Performed by: INTERNAL MEDICINE

## 2022-01-14 PROCEDURE — 77014 PR  CT GUIDANCE PLACEMENT RAD THERAPY FIELDS: CPT | Mod: 26,,, | Performed by: RADIOLOGY

## 2022-01-14 PROCEDURE — 77386 HC IMRT, COMPLEX: CPT | Performed by: RADIOLOGY

## 2022-01-14 PROCEDURE — 99214 OFFICE O/P EST MOD 30 MIN: CPT | Mod: 95,,, | Performed by: INTERNAL MEDICINE

## 2022-01-14 PROCEDURE — 96413 CHEMO IV INFUSION 1 HR: CPT

## 2022-01-14 PROCEDURE — 1160F PR REVIEW ALL MEDS BY PRESCRIBER/CLIN PHARMACIST DOCUMENTED: ICD-10-PCS | Mod: CPTII,95,, | Performed by: INTERNAL MEDICINE

## 2022-01-14 PROCEDURE — 1160F RVW MEDS BY RX/DR IN RCRD: CPT | Mod: CPTII,95,, | Performed by: INTERNAL MEDICINE

## 2022-01-14 PROCEDURE — 77014 HC CT GUIDANCE RADIATION THERAPY FLDS PLACEMENT: CPT | Mod: TC | Performed by: RADIOLOGY

## 2022-01-14 PROCEDURE — 96375 TX/PRO/DX INJ NEW DRUG ADDON: CPT

## 2022-01-14 PROCEDURE — 63600175 PHARM REV CODE 636 W HCPCS: Performed by: INTERNAL MEDICINE

## 2022-01-14 PROCEDURE — 96367 TX/PROPH/DG ADDL SEQ IV INF: CPT

## 2022-01-14 PROCEDURE — 99214 PR OFFICE/OUTPT VISIT, EST, LEVL IV, 30-39 MIN: ICD-10-PCS | Mod: 95,,, | Performed by: INTERNAL MEDICINE

## 2022-01-14 PROCEDURE — 96415 CHEMO IV INFUSION ADDL HR: CPT

## 2022-01-14 PROCEDURE — 1159F PR MEDICATION LIST DOCUMENTED IN MEDICAL RECORD: ICD-10-PCS | Mod: CPTII,95,, | Performed by: INTERNAL MEDICINE

## 2022-01-14 PROCEDURE — 96417 CHEMO IV INFUS EACH ADDL SEQ: CPT

## 2022-01-14 PROCEDURE — 25000003 PHARM REV CODE 250: Performed by: INTERNAL MEDICINE

## 2022-01-14 PROCEDURE — 77014 PR  CT GUIDANCE PLACEMENT RAD THERAPY FIELDS: ICD-10-PCS | Mod: 26,,, | Performed by: RADIOLOGY

## 2022-01-14 RX ORDER — FAMOTIDINE 10 MG/ML
20 INJECTION INTRAVENOUS
Status: COMPLETED | OUTPATIENT
Start: 2022-01-14 | End: 2022-01-14

## 2022-01-14 RX ORDER — EPINEPHRINE 0.3 MG/.3ML
0.3 INJECTION SUBCUTANEOUS ONCE AS NEEDED
Status: CANCELLED | OUTPATIENT
Start: 2022-01-14

## 2022-01-14 RX ORDER — DIPHENHYDRAMINE HYDROCHLORIDE 50 MG/ML
50 INJECTION INTRAMUSCULAR; INTRAVENOUS ONCE AS NEEDED
Status: CANCELLED | OUTPATIENT
Start: 2022-01-14

## 2022-01-14 RX ORDER — FAMOTIDINE 10 MG/ML
20 INJECTION INTRAVENOUS
Status: CANCELLED | OUTPATIENT
Start: 2022-01-14

## 2022-01-14 RX ORDER — HEPARIN 100 UNIT/ML
500 SYRINGE INTRAVENOUS
Status: DISCONTINUED | OUTPATIENT
Start: 2022-01-14 | End: 2022-01-14 | Stop reason: HOSPADM

## 2022-01-14 RX ORDER — SODIUM CHLORIDE 0.9 % (FLUSH) 0.9 %
10 SYRINGE (ML) INJECTION
Status: CANCELLED | OUTPATIENT
Start: 2022-01-14

## 2022-01-14 RX ORDER — HEPARIN 100 UNIT/ML
500 SYRINGE INTRAVENOUS
Status: CANCELLED | OUTPATIENT
Start: 2022-01-14

## 2022-01-14 RX ADMIN — PACLITAXEL 96 MG: 6 INJECTION, SOLUTION INTRAVENOUS at 10:01

## 2022-01-14 RX ADMIN — FAMOTIDINE 20 MG: 10 INJECTION INTRAVENOUS at 09:01

## 2022-01-14 RX ADMIN — HEPARIN 500 UNITS: 100 SYRINGE at 12:01

## 2022-01-14 RX ADMIN — DIPHENHYDRAMINE HYDROCHLORIDE 50 MG: 50 INJECTION, SOLUTION INTRAMUSCULAR; INTRAVENOUS at 10:01

## 2022-01-14 RX ADMIN — PALONOSETRON HYDROCHLORIDE: 0.25 INJECTION, SOLUTION INTRAVENOUS at 09:01

## 2022-01-14 RX ADMIN — CARBOPLATIN 230 MG: 10 INJECTION, SOLUTION INTRAVENOUS at 12:01

## 2022-01-14 NOTE — DISCHARGE INSTRUCTIONS
Patient Education       Paclitaxel (Conventional) (pac li TAKS el con MARIA INES sha nal)   Brand Names: [place] APO-Paclitaxel   Warning   · This drug may lower the ability of the bone marrow to make blood cells that the body needs. If blood cell counts get very low, this can lead to bleeding problems, infections, or anemia. If you have questions, talk with the doctor.  · If you have a low white blood cell count, talk with your doctor. This drug must not be used in people with certain low white blood cell counts.  · Have blood work checked as you have been told by the doctor. Talk with the doctor.  · Very bad and sometimes deadly allergic side effects have rarely happened. Talk with your doctor.  · Other drugs will be given with this drug to help avoid side effects.    What is this drug used for?   · It is used to treat cancer.    What do I need to tell my doctor BEFORE I take this drug?   · If you are allergic to this drug; any part of this drug; or any other drugs, foods, or substances. Tell your doctor about the allergy and what signs you had.  · If you are breast-feeding. Do not breast-feed while you take this drug.  This drug may interact with other drugs or health problems.  Tell your doctor and pharmacist about all of your drugs (prescription or OTC, natural products, vitamins) and health problems. You must check to make sure that it is safe for you to take this drug with all of your drugs and health problems. Do not start, stop, or change the dose of any drug without checking with your doctor.  What are some things I need to know or do while I take this drug?   · Tell all of your health care providers that you take this drug. This includes your doctors, nurses, pharmacists, and dentists.  · If you have upset stomach, throwing up, diarrhea, or are not hungry, talk with your doctor. There may be ways to lower these side effects.  · You may have more chance of getting an infection. Wash hands often. Stay away from  people with infections, colds, or flu.  · You may bleed more easily. Be careful and avoid injury. Use a soft toothbrush and an electric razor.  · Talk with your doctor before getting any vaccines. Use of some vaccines with this drug may either raise the chance of an infection or make the vaccine not work as well.  · Some products have alcohol in them. Talk with the doctor.  · Talk with your doctor before you use alcohol, marijuana or other forms of cannabis, or prescription or OTC drugs that may slow your actions.  · Check blood pressure and heart rate as the doctor has told you.  · Change in eyesight may rarely happen. Eyesight most often gets back to normal when this drug is stopped.  · It is common to have nerve problems with this drug. Nerve problems may include a numbness, tingling, or burning feeling in your hands or feet. Call your doctor if you have nerve problems that are very bad, cause problems with daily living, or do not go away.  · If you are 65 or older, use this drug with care. You could have more side effects.  · This drug may cause harm to an unborn baby. If you may become pregnant, you must use birth control while taking this drug. If you get pregnant, call your doctor right away.    What are some side effects that I need to call my doctor about right away?   WARNING/CAUTION: Even though it may be rare, some people may have very bad and sometimes deadly side effects when taking a drug. Tell your doctor or get medical help right away if you have any of the following signs or symptoms that may be related to a very bad side effect:  · Signs of an allergic reaction, like rash; hives; itching; red, swollen, blistered, or peeling skin with or without fever; wheezing; tightness in the chest or throat; trouble breathing, swallowing, or talking; unusual hoarseness; or swelling of the mouth, face, lips, tongue, or throat.  · Signs of infection like fever, chills, very bad sore throat, ear or sinus pain,  cough, more sputum or change in color of sputum, pain with passing urine, mouth sores, or wound that will not heal.  · Signs of bleeding like throwing up or coughing up blood; vomit that looks like coffee grounds; blood in the urine; black, red, or tarry stools; bleeding from the gums; abnormal vaginal bleeding; bruises without a cause or that get bigger; or bleeding you cannot stop.  · Signs of high or low blood pressure like very bad headache or dizziness, passing out, or change in eyesight.  · Signs of kidney problems like unable to pass urine, change in how much urine is passed, blood in the urine, or a big weight gain.  · Shortness of breath.  · Swelling.  · Flushing.  · Chest pain or pressure.  · Fast, slow, or abnormal heartbeat.  · Change in eyesight.  · This drug may cause tissue damage if the drug leaks from the vein. Tell your nurse if you have any redness, burning, pain, swelling, blisters, skin sores, or leaking of fluid where the drug is going into your body.  · Very bad and sometimes deadly liver problems have happened with this drug. Call your doctor right away if you have signs of liver problems like dark urine, feeling tired, not hungry, upset stomach or stomach pain, light-colored stools, throwing up, or yellow skin or eyes.  What are some other side effects of this drug?   All drugs may cause side effects. However, many people have no side effects or only have minor side effects. Call your doctor or get medical help if any of these side effects or any other side effects bother you or do not go away:  · Hair loss.  · Feeling tired or weak.  · Diarrhea, upset stomach, or throwing up.  · Mouth sores.  · Muscle or joint pain.  These are not all of the side effects that may occur. If you have questions about side effects, call your doctor. Call your doctor for medical advice about side effects.  You may report side effects to your national health agency.  You may report side effects to the FDA at  9-385-300-9434. You may also report side effects at https://www.fda.gov/medwatch.  How is this drug best taken?   Use this drug as ordered by your doctor. Read all information given to you. Follow all instructions closely.  · It is given as an infusion into a vein over a period of time.  What do I do if I miss a dose?   · Call your doctor to find out what to do.    How do I store and/or throw out this drug?   · If you need to store this drug at home, talk with your doctor, nurse, or pharmacist about how to store it.    General drug facts   · If your symptoms or health problems do not get better or if they become worse, call your doctor.  · Do not share your drugs with others and do not take anyone else's drugs.  · Keep all drugs in a safe place. Keep all drugs out of the reach of children and pets.  · Throw away unused or  drugs. Do not flush down a toilet or pour down a drain unless you are told to do so. Check with your pharmacist if you have questions about the best way to throw out drugs. There may be drug take-back programs in your area.  · Some drugs may have another patient information leaflet. If you have any questions about this drug, please talk with your doctor, nurse, pharmacist, or other health care provider.  · Some drugs may have another patient information leaflet. Check with your pharmacist. If you have any questions about this drug, please talk with your doctor, nurse, pharmacist, or other health care provider.  · If you think there has been an overdose, call your poison control center or get medical care right away. Be ready to tell or show what was taken, how much, and when it happened.    Consumer Information Use and Disclaimer   This generalized information is a limited summary of diagnosis, treatment, and/or medication information. It is not meant to be comprehensive and should be used as a tool to help the user understand and/or assess potential diagnostic and treatment options. It  does NOT include all information about conditions, treatments, medications, side effects, or risks that may apply to a specific patient. It is not intended to be medical advice or a substitute for the medical advice, diagnosis, or treatment of a health care provider based on the health care provider's examination and assessment of a patient's specific and unique circumstances. Patients must speak with a health care provider for complete information about their health, medical questions, and treatment options, including any risks or benefits regarding use of medications. This information does not endorse any treatments or medications as safe, effective, or approved for treating a specific patient. UpToDate, Inc. and its affiliates disclaim any warranty or liability relating to this information or the use thereof. The use of this information is governed by the Terms of Use, available at https://www.[employer]/en/solutions/D[employer]/about/bruno.  Last Reviewed Date   2020-09-30  Copyright   © 2021 UpToDate, Inc. and its affiliates and/or licensors. All rights reserved.  [facility]  [id number] [place] [address] Drive  [phone] phone     [phone] fax  Hours of Operation: Monday- Friday 8:00am- 5:00pm  After hours phone  [phone]  Hematology / Oncology Physicians on call      Dr. Jae Gallegos, HEDY Stacy NP    Please call with any concerns regarding your appointment today.  Patient Education       Carboplatin (ALEX monico surinder tin)   Brand Names: [place] Paraplatin   Warning   · Low blood cell counts may happen with this drug. Low blood cell counts may raise the chance of needing a blood transfusion or getting an infection or bleeding. If you have questions, talk with the doctor.  · Unsafe allergic effects may rarely happen.  · Throwing up may happen often with this drug.  · Other drugs may be given before  this drug to help avoid side effects.    What is this drug used for?   · It is used to treat ovarian cancer.  · It may be given to you for other reasons. Talk with the doctor.    What do I need to tell my doctor BEFORE I take this drug?   · If you are allergic to this drug; any part of this drug; or any other drugs, foods, or substances. Tell your doctor about the allergy and what signs you had.  · If you have any of these health problems: Low blood cell count or poor bone marrow function.  · If you have bleeding problems.  · If you are breast-feeding. Do not breast-feed while you take this drug.  This is not a list of all drugs or health problems that interact with this drug.  Tell your doctor and pharmacist about all of your drugs (prescription or OTC, natural products, vitamins) and health problems. You must check to make sure that it is safe for you to take this drug with all of your drugs and health problems. Do not start, stop, or change the dose of any drug without checking with your doctor.  What are some things I need to know or do while I take this drug?   · Tell all of your health care providers that you take this drug. This includes your doctors, nurses, pharmacists, and dentists.  · Have blood work checked as you have been told by the doctor. Talk with the doctor.  · Talk with your doctor before getting any vaccines. Use of some vaccines with this drug may either raise the chance of an infection or make the vaccine not work as well.  · You may have more chance of getting an infection. Wash hands often. Stay away from people with infections, colds, or flu.  · You may bleed more easily. Be careful and avoid injury. Use a soft toothbrush and an electric razor.  · If you have upset stomach, throwing up, diarrhea, or are not hungry, talk with your doctor. There may be ways to lower these side effects.  · If you are 65 or older, use this drug with care. You could have more side effects.  · This drug may cause  harm to an unborn baby. If you may become pregnant, you must use birth control while taking this drug. If you get pregnant, call your doctor right away.    What are some side effects that I need to call my doctor about right away?   WARNING/CAUTION: Even though it may be rare, some people may have very bad and sometimes deadly side effects when taking a drug. Tell your doctor or get medical help right away if you have any of the following signs or symptoms that may be related to a very bad side effect:  · Signs of an allergic reaction, like rash; hives; itching; red, swollen, blistered, or peeling skin with or without fever; wheezing; tightness in the chest or throat; trouble breathing, swallowing, or talking; unusual hoarseness; or swelling of the mouth, face, lips, tongue, or throat.  · Signs of infection like fever, chills, very bad sore throat, ear or sinus pain, cough, more sputum or change in color of sputum, pain with passing urine, mouth sores, or wound that will not heal.  · Signs of bleeding like throwing up or coughing up blood; vomit that looks like coffee grounds; blood in the urine; black, red, or tarry stools; bleeding from the gums; abnormal vaginal bleeding; bruises without a cause or that get bigger; or bleeding you cannot stop.  · Signs of electrolyte problems like mood changes, confusion, muscle pain or weakness, a heartbeat that does not feel normal, seizures, not hungry, or very bad upset stomach or throwing up.  · Signs of kidney problems like unable to pass urine, change in how much urine is passed, blood in the urine, or a big weight gain.  · Signs of liver problems like dark urine, feeling tired, not hungry, upset stomach or stomach pain, light-colored stools, throwing up, or yellow skin or eyes.  · Pale skin.  · Ringing in the ears, hearing loss, or any other changes in hearing.  · A burning, numbness, or tingling feeling that is not normal.  · Loss of eyesight.  · This drug may cause  tissue damage if the drug leaks from the vein. Tell your nurse if you have any redness, burning, pain, swelling, blisters, skin sores, or leaking of fluid where the drug is going into your body.  What are some other side effects of this drug?   All drugs may cause side effects. However, many people have no side effects or only have minor side effects. Call your doctor or get medical help if any of these side effects or any other side effects bother you or do not go away:  · Feeling tired or weak.  · Constipation, diarrhea, stomach pain, upset stomach, or throwing up.  · Mouth irritation or mouth sores.  · Hair loss.  These are not all of the side effects that may occur. If you have questions about side effects, call your doctor. Call your doctor for medical advice about side effects.  You may report side effects to your national health agency.  You may report side effects to the FDA at 1-949.264.9516. You may also report side effects at https://www.fda.gov/medwatch.  How is this drug best taken?   Use this drug as ordered by your doctor. Read all information given to you. Follow all instructions closely.  · It is given as an infusion into a vein over a period of time.  What do I do if I miss a dose?   · Call your doctor to find out what to do.    How do I store and/or throw out this drug?   · If you need to store this drug at home, talk with your doctor, nurse, or pharmacist about how to store it.    General drug facts   · If your symptoms or health problems do not get better or if they become worse, call your doctor.  · Do not share your drugs with others and do not take anyone else's drugs.  · Keep all drugs in a safe place. Keep all drugs out of the reach of children and pets.  · Throw away unused or  drugs. Do not flush down a toilet or pour down a drain unless you are told to do so. Check with your pharmacist if you have questions about the best way to throw out drugs. There may be drug take-back  programs in your area.  · Some drugs may have another patient information leaflet. If you have any questions about this drug, please talk with your doctor, nurse, pharmacist, or other health care provider.  · Some drugs may have another patient information leaflet. Check with your pharmacist. If you have any questions about this drug, please talk with your doctor, nurse, pharmacist, or other health care provider.  · If you think there has been an overdose, call your poison control center or get medical care right away. Be ready to tell or show what was taken, how much, and when it happened.    Consumer Information Use and Disclaimer   This generalized information is a limited summary of diagnosis, treatment, and/or medication information. It is not meant to be comprehensive and should be used as a tool to help the user understand and/or assess potential diagnostic and treatment options. It does NOT include all information about conditions, treatments, medications, side effects, or risks that may apply to a specific patient. It is not intended to be medical advice or a substitute for the medical advice, diagnosis, or treatment of a health care provider based on the health care provider's examination and assessment of a patient's specific and unique circumstances. Patients must speak with a health care provider for complete information about their health, medical questions, and treatment options, including any risks or benefits regarding use of medications. This information does not endorse any treatments or medications as safe, effective, or approved for treating a specific patient. UpToDate, Inc. and its affiliates disclaim any warranty or liability relating to this information or the use thereof. The use of this information is governed by the Terms of Use, available at https://www.[employer].com/en/solutions/lexicomp/about/bruno.  Last Reviewed Date   2020-03-10  Copyright   © 2021 UpToDate, Inc. and its  affiliates and/or licensors. All rights reserved.  Patient Education       Preventing Falls   The Basics   Written by the doctors and editors at [facility]   Am I at risk of falling? -- Your risk of falling increases as you grow older. That's because getting older can make it harder to walk steadily and keep your balance. Also, the effects of falls are more serious in older people.  Overall, 3 to 4 out of every 10 people over the age of 65 fall each year. Up to 75 percent of people who fracture a hip never recover to the point they were before they had their fracture. If you have fallen in the past, you are at higher risk of falling again.  Several things can increase your risk of a fall, including:  · Illness  · A change in the medicines you take  · An unsafe or unfamiliar setting (for example, a room with rugs or furniture that might trip you, or an area you don't know well)  How can my doctor help me to avoid falling? -- Your doctor can talk to you about the following things:  · Past falls - It is important to tell your doctor about any times you have fallen or almost fallen. He or she can then suggest ways to prevent another fall.  · Your health conditions - Some health problems can put you at risk of falling. These include conditions that affect eyesight, hearing, muscle strength, or balance.  · The medicines you take - Certain medicines can increase the risk of falling. These include some medicines that are used for sleeping problems, anxiety, high blood pressure, or depression. Adding new medicines, or changing doses of some medicines, can also affect your risk of falling.  The more your doctor knows about your situation, the better he or she will be able to help you. For example, if you fell because you have a condition that causes pain, your doctor might suggest treatments to deal with the pain. Or if one of your medicines is making you dizzy and more likely to fall, your doctor might switch you to a  different medicine.  Is there anything I can do on my own? -- Yes. To help keep from falling, you can:  · Make your home safer - To avoid falling at home, get rid of things that might make you trip or slip. This might include furniture, electrical cords, clutter, and loose rugs (figure 1). Keep your home well-lit so that you can easily see where you are going. Avoid storing things in high places so you don't have to reach or climb.  · Wear sturdy shoes that fit well - Wearing shoes with high heels or slippery soles, or shoes that are too loose, can lead to falls. Walking around in bare feet, or only socks, can also increase your risk of falling.  · Take vitamin D pills - Taking vitamin D might lower the risk of falls in older people. This is because vitamin D helps make bones and muscles stronger. Your doctor can talk to you about whether you should take extra vitamin D, and how much.  · Stay active - Exercising on a regular basis can help lower your risk of falling. It might also help prevent you from getting hurt if you do fall. It is best to do a few different activities that help with both strength and balance. There are many kinds of exercise that can be safe for older people. These include walking, swimming, and Matt Chi (a Chinese martial art that involves slow, gentle movements).  · Use a cane, walker, and other safety devices - If your doctor recommends that you use a cane or walker, be sure that it's the right size and you know how to use it. There are other devices that might help you avoid falling, too. These include grab bars or a sturdy seat for the shower, non-slip bath mats, and hand rails or treads for the stairs (to prevent slipping).  If you worry that you could fall, there are also alarm buttons that let you call for help if you fall and can't get up.  What should I do if I fall? -- If you fall, see your doctor right away, even if you aren't hurt. Your doctor can try to figure out what caused you  to fall, and how likely you are to fall again. He or she will do an exam and talk to you about your health problems, medicines, and activities. Then he or she can suggest things you can do to avoid falling again.  Many older people have a hard time recovering after a fall. Doing things to prevent falling can help you to protect your health and independence.  All topics are updated as new evidence becomes available and our peer review process is complete.  This topic retrieved from [employer] on: Sep 21, 2021.  Topic 21420 Version 18.0  Release: 29.4.2 - C29.263  © 2021 UpToDate, Inc. and/or its affiliates. All rights reserved.  figure 1: How to avoid falling at home     This picture shows some of the things that can cause a fall in your home. Look around and remove any loose rugs, electrical cords, clutter, or furniture that could trip you.  Graphic 22065 Version 1.0    Consumer Information Use and Disclaimer   This information is not specific medical advice and does not replace information you receive from your health care provider. This is only a brief summary of general information. It does NOT include all information about conditions, illnesses, injuries, tests, procedures, treatments, therapies, discharge instructions or life-style choices that may apply to you. You must talk with your health care provider for complete information about your health and treatment options. This information should not be used to decide whether or not to accept your health care provider's advice, instructions or recommendations. Only your health care provider has the knowledge and training to provide advice that is right for you. The use of this information is governed by the [employer] End User License Agreement, available at https://www.[employer]/en/solutions/Joonto/about/bruno.The use of [employer] content is governed by the [employer] Terms of Use. ©2021 UpToDate, Inc. All rights reserved.  Copyright   © 2021 UpToDate, Inc.  and/or its affiliates. All rights reserved.  Patient Education       Fatigue   About this topic   Fatigue is a strong feeling of being tired and weak. This often happens after doing activities that are very hard to do. Then, you don't have much energy to do other things. Just as your body can be fatigued, your mind can as well. You might have trouble being able to think clearly about things. Some people have little desire to do anything. Fatigue is normal when you have had physical and mental activity. Stress can also cause fatigue. Other causes of fatigue can be the flu or other health problems, drugs, or sleep problems.  Fatigue can also be a sign of a more serious problem. Some of these are:  · Low red blood cells  · Anxiety or worrying too much  · Low mood  · Always being in pain  · Problems with your thyroid, liver, or kidneys  · Drug or alcohol use  · Health problems like cancer, arthritis, and heart or lung disease  Most of the time, fatigue will get better after a few days of rest.     What are the causes?   · Lifestyle causes like lack of sleep, working too much, unhealthy habits, or getting too little or too much exercise  · Emotional causes like stress, low mood, grief, or being bored  · Medical causes like illnesses or certain drugs that you take for those problems  What are the main signs?   · Feeling tired or sleepy  · Having no energy or feeling weak  · Feeling worn out and needing to rest a lot  · Not caring about work and other activities  How does the doctor diagnose this health problem?   Your doctor will take your history and do an exam. The doctor will ask you questions about how you feel. The doctor may order:  · Lab tests  · X-ray  · CT scan  · Electrocardiogram (ECG)  How does the doctor treat this health problem?   Your doctor will need to find out what is causing the problem to treat it. In many cases, more rest, sleep, a good diet, and less stress may help. Sometimes, the problem is caused  by a more serious illness or problem. Your doctor will work to find the cause. Your doctor may send you to an expert to help with low mood or worry.  What drugs may be needed?   The doctor may order drugs to:  · Give extra vitamins and minerals  · Treat the problem that causes the fatigue  What problems could happen?   · Body's normal defenses or immune system are lowered  · Not able to do the things you often do  · Problems with sex  · Not feeling hungry  · Not being able to act as fast or do things as well. This could cause accidents when driving, at work, or at home.  · Headaches, feeling angry, and not able to think clearly about things. These could cause you to not make good decisions.  · Trouble with your memory or concentration  · Having problems walking and exercising  · Falling asleep during the day  · Having problems seeing or seeing things that are not really there  · Feeling like not doing things  What can be done to prevent this health problem?   · Learn to cope well with your work and stress.  · Do relaxation exercises.  · Try to get enough sleep at night.  · Eat healthy foods. Don't eat foods that have a lot of sugar.  · Limit your alcohol intake  Where can I learn more?   Centers for Disease Control and Prevention  https://www.cdc.gov/me-cfs/about/index.html   National [place] on Aging  https://www.jeremias.nih.gov/health/fatigue-older-adults   NHS Choices  http://www.nhs.uk/livewell/tiredness-and-fatigue/pages/tiredness-and-fatigue.aspx   Last Reviewed Date   2021-02-24  Consumer Information Use and Disclaimer   This information is not specific medical advice and does not replace information you receive from your health care provider. This is only a brief summary of general information. It does NOT include all information about conditions, illnesses, injuries, tests, procedures, treatments, therapies, discharge instructions or life-style choices that may apply to you. You must talk with your health  care provider for complete information about your health and treatment options. This information should not be used to decide whether or not to accept your health care providers advice, instructions or recommendations. Only your health care provider has the knowledge and training to provide advice that is right for you.  Copyright   Copyright © 2021 [employer] and its affiliates and/or licensors. All rights reserved.

## 2022-01-14 NOTE — PROGRESS NOTES
Subjective:      DATE OF VISIT: 1/14/22     ?  Patient ID:?Alton Black is a 81 y.o. male.?? MR#: [id number]   ?   PRIMARY ONCOLOGIST: Dr. Lopez    ? Primary Care Providers:  Rohan Kenny MD, MD (General)     CHIEF COMPLAINT: ?  Initiation of chemoradiation 01/14/2022???   ?   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   CURRENT TREATMENT:  Chemoradiation with carboplatin paclitaxel weekly, cycle 1 day 1 1/14/22    PAST TREATMENT:  None  ?   ONCOLOGIC HISTORY:   ?   Oncology History   Squamous cell lung cancer   12/17/2021 Initial Diagnosis    Squamous cell lung cancer     12/22/2021 Cancer Staged    Staging form: Lung, AJCC 8th Edition  - Clinical stage from 12/22/2021: Stage IIB (cT3, cN0, cM0)     1/14/2022 -  Chemotherapy    Treatment Summary   Plan Name: OP NSCLC PACLITAXEL + CARBOPLATIN (AUC) QW + RADIATION  Treatment Goal: Curative  Status: Active  Start Date: 1/14/2022 (Planned)  End Date: 2/18/2022 (Planned)  Provider: Juan Lopez MD  Chemotherapy: dexAMETHasone (DECADRON) 4 MG Tab, 8 mg, Oral, Daily, 0 of 1 cycle, Start date: --, End date: --  CARBOplatin (PARAPLATIN) 230 mg in sodium chloride 0.9% 250 mL chemo infusion, 230 mg (original dose ), Intravenous, Clinic/HOD 1 time, 0 of 6 cycles  Dose modification:   (Cycle 1)  PACLitaxeL (TAXOL) 45 mg/m2 = 96 mg in sodium chloride 0.9% 250 mL chemo infusion, 45 mg/m2 = 96 mg, Intravenous, Clinic/HOD 1 time, 0 of 6 cycles       Cancer Staging  Squamous cell lung cancer  - Clinical stage from 12/22/2021: Stage IIB (cT3, cN0, cM0) - Signed by Juan Lopez MD on 12/22/2021         HPI    He has plan to initiate chemoradiation today.  Already had radiation earlier this morning.  He had chemotherapy teaching and consent already.  We reviewed prophylactic nausea medications as well as dexamethasone on days 2 and 3 he has picked up from pharmacy already.  No new symptoms or concerns.  No new cough chest pain  shortness of breath hemoptysis/hematemesis.    Review of Systems    ?   A comprehensive 14-point review of systems was reviewed with patient and was negative other than as specified above.   ?   PAST MEDICAL HISTORY:   Past Medical History:   Diagnosis Date    Benign essential HTN     CAD (coronary artery disease)     HLD (hyperlipidemia)     ?     PAST SURGICAL HISTORY:   Past Surgical History:   Procedure Laterality Date    BRONCHOSCOPY Bilateral 12/8/2021    Procedure: Bronchoscopy - insert lighted tube into airway to take a biopsy of lung;  Surgeon: Rigo Vences MD;  Location: [facility] ENDO;  Service: Endoscopy;  Laterality: Bilateral;    CHOLECYSTECTOMY      CORONARY ANGIOPLASTY WITH STENT PLACEMENT      ENDOBRONCHIAL ULTRASOUND Bilateral 12/8/2021    Procedure: ENDOBRONCHIAL ULTRASOUND (EBUS);  Surgeon: Rigo Vences MD;  Location: [facility] ENDO;  Service: Endoscopy;  Laterality: Bilateral;    FLUOROSCOPY N/A 1/4/2022    Procedure: Mediport Insertion;  Surgeon: Elaina Larios MD;  Location: [facility] CATH LAB;  Service: General;  Laterality: N/A;  yuridia from 1/3 to 1/4      ?   ALLERGIES:   Allergies as of 01/14/2022    (No Known Allergies)      ?   MEDICATIONS:?   No outpatient medications have been marked as taking for the 1/14/22 encounter (Office Visit) with Winnie Baez MD.      ?   SOCIAL HISTORY:?   Social History     Tobacco Use    Smoking status: Never Smoker    Smokeless tobacco: Never Used    Tobacco comment: Chews on cigars   Substance Use Topics    Alcohol use: Not Currently      ?      ?   FAMILY HISTORY:   family history includes Heart attack in his father.   ?        Objective:      Physical Exam      ?  Limited due to virtual visit  There were no vitals filed for this visit.   ?   ECOG:?1   General appearance: Generally well appearing, in no acute distress.     ?   Laboratory:  ?   No visits with results within 1 Day(s) from this visit.   Latest known visit with  results is:   Lab Visit on 01/13/2022   Component Date Value Ref Range Status    WBC 01/13/2022 9.07  3.90 - 12.70 K/uL Final    RBC 01/13/2022 4.87  4.60 - 6.20 M/uL Final    Hemoglobin 01/13/2022 15.8  14.0 - 18.0 g/dL Final    Hematocrit 01/13/2022 48.2  40.0 - 54.0 % Final    MCV 01/13/2022 99* 82 - 98 fL Final    MCH 01/13/2022 32.4* 27.0 - 31.0 pg Final    MCHC 01/13/2022 32.8  32.0 - 36.0 g/dL Final    RDW 01/13/2022 12.9  11.5 - 14.5 % Final    Platelets 01/13/2022 228  150 - 450 K/uL Final    MPV 01/13/2022 8.8* 9.2 - 12.9 fL Final    Immature Granulocytes 01/13/2022 0.3  0.0 - 0.5 % Final    Gran # (ANC) 01/13/2022 6.5  1.8 - 7.7 K/uL Final    Immature Grans (Abs) 01/13/2022 0.03  0.00 - 0.04 K/uL Final    Lymph # 01/13/2022 1.8  1.0 - 4.8 K/uL Final    Mono # 01/13/2022 0.7  0.3 - 1.0 K/uL Final    Eos # 01/13/2022 0.1  0.0 - 0.5 K/uL Final    Baso # 01/13/2022 0.02  0.00 - 0.20 K/uL Final    nRBC 01/13/2022 0  0 /100 WBC Final    Gran % 01/13/2022 71.5  38.0 - 73.0 % Final    Lymph % 01/13/2022 19.8  18.0 - 48.0 % Final    Mono % 01/13/2022 7.5  4.0 - 15.0 % Final    Eosinophil % 01/13/2022 0.7  0.0 - 8.0 % Final    Basophil % 01/13/2022 0.2  0.0 - 1.9 % Final    Differential Method 01/13/2022 Automated   Final    Sodium 01/13/2022 138  136 - 145 mmol/L Final    Potassium 01/13/2022 4.5  3.5 - 5.1 mmol/L Final    Chloride 01/13/2022 102  95 - 110 mmol/L Final    CO2 01/13/2022 28  23 - 29 mmol/L Final    Glucose 01/13/2022 104  70 - 110 mg/dL Final    BUN 01/13/2022 10  8 - 23 mg/dL Final    Creatinine 01/13/2022 0.8  0.5 - 1.4 mg/dL Final    Calcium 01/13/2022 9.4  8.7 - 10.5 mg/dL Final    Total Protein 01/13/2022 7.1  6.0 - 8.4 g/dL Final    Albumin 01/13/2022 3.8  3.5 - 5.2 g/dL Final    Total Bilirubin 01/13/2022 0.6  0.1 - 1.0 mg/dL Final    Alkaline Phosphatase 01/13/2022 130  55 - 135 U/L Final    AST 01/13/2022 18  10 - 40 U/L Final    ALT 01/13/2022 26  10  - 44 U/L Final    Anion Gap 01/13/2022 8  8 - 16 mmol/L Final    eGFR if African American 01/13/2022 >60  >60 mL/min/1.73 m^2 Final    eGFR if non African American 01/13/2022 >60  >60 mL/min/1.73 m^2 Final      ?     Imaging:  ?    Results for orders placed or performed during the hospital encounter of 11/19/21 (from the past 2160 hour(s))   CT Head Without Contrast    Impression    No CT evidence of acute intracranial abnormality.    Tiny, remote left basal ganglia lacunar type infarcts.    Moderate degree of chronic microvascular ischemic change.    All CT scans at this facility use dose modulation, iterative reconstruction, and/or weight base dosing when appropriate to reduce radiation dose to as low as reasonably achievable.      Electronically signed by: Juan J Redman  Date:    11/19/2021  Time:    15:22     Results for orders placed or performed during the hospital encounter of 11/19/21 (from the past 2160 hour(s))   MRI Brain Without Contrast    Impression    No hemorrhage or infarct.    Moderate microvascular ischemic change.      Electronically signed by: Baljinder Pappas  Date:    11/19/2021  Time:    17:43     Results for orders placed or performed during the hospital encounter of 12/14/21 (from the past 2160 hour(s))   NM PET CT Routine Skull to Mid Thigh    Impression    1. Highly FDG avid perihilar mass in the superior segment of the right lower lobe which is highly concerning for malignancy.  2. No FDG avid adenopathy or definite evidence of metastatic disease.  3. Observations as above      Electronically signed by: Geronimo Sigala MD  Date:    12/14/2021  Time:    11:42       Results for orders placed or performed during the hospital encounter of 11/19/21 (from the past 2160 hour(s))   MRI Brain Without Contrast    Impression    No hemorrhage or infarct.    Moderate microvascular ischemic change.      Electronically signed by: Baljinder Pappas  Date:    11/19/2021  Time:    17:43         ?    Assessment/Plan:   Squamous cell carcinoma of lung, unspecified laterality  -     CBC auto differential; Future; Expected date: 01/21/2022  -     Comprehensive Metabolic Panel; Future; Expected date: 01/21/2022       1. Squamous cell carcinoma of lung, unspecified laterality          Plan:     Problem List Items Addressed This Visit        Oncology    Squamous cell lung cancer - Primary        Squamous cell carcinoma lung stage II colon:  patient expressed not interested in surgical option and therefore definitive chemoradiation was recommended by primary oncologist Dr. Jessica doty and radiation oncologist  E-mail.  Cycle 1 day 1 plan today, 01/14/2022.  Received antiemetics and reviewed use potential toxicities of his therapy which she expresses good understanding of.  Labs reviewed from 01/13/2021 without any concerning findings.  Will proceed with 1st weekly carboplatin paclitaxel today, order sign.  Will have him follow-up with primary oncologist for cycle 2 in 1 week with CBC CMP prior.

## 2022-01-14 NOTE — NURSING
1015am: pt here in person in fusion for 1st chemo treatment. Spoke with pt at chairside with brother, Brodie. Pt was feeling ok still very anxious. Reviewed steroid day 2 and 3 medication regimen again. Pt verbalized understanding. Pt encouraged to contact me directly if have any further questions and/or concerns. Will visit pt for 2nd treatment next week.   Oncology Navigation   Intake  Date of Diagnosis: 2021  Cancer Type: Other  Internal / External Referral: Internal  Referral Source: Dr. Vences  Date of Referral: 2021  Initial Nurse Navigator Contact: 2021  Referral to Initial Contact Timeline (days): 5  Date Worked: 2022  Multiple appointments: Yes  Start of Treatment: 2021     Treatment  Current Status: Active       Medical Oncologist: Dr. Juan Lopez  Chemotherapy: Planned  Chemotherapy Regimen: Carboplatin Taxol    Radiation Therapy: Planned  Radiation Oncologist: Dr. Nadira Roberson  Start Date: 1/10/2021    Procedures: Port / PICC  Bronchoscopy Schedule Date: 2021  Echo Schedule Date: 2021  PET Scan Schedule Date: 2021  Port / PICC Schedule Date: 1/3/2021    General Referrals: Social work  Social Work: notified Haley Freitas LCSW  Social Work Referral Date: 2021       Radiation Oncologist: Dr. Nadira Roberson    Support Systems: Family members     Acuity  Stage: I-II  Systemic Treatment - predicted or initiated: More than one treatment modality concurrently (chemotherapy, radiation, etc.) (+2)  Treatment Tolerability: Has not started treatment yet/treatment fully completed and side effects resolved  ECO (+2)  Comorbidities in Medical History: 6+ (+2)   Needed: No  Verbalizes the need for more education: Yes  Navigation Acuity: 8     Follow Up  No follow-ups on file.

## 2022-01-14 NOTE — PLAN OF CARE
Problem: Adult Inpatient Plan of Care  Goal: Plan of Care Review  Outcome: Ongoing, Progressing  Flowsheets (Taken 1/14/2022 1050)  Plan of Care Reviewed With:   patient   sibling  Goal: Patient-Specific Goal (Individualized)  Outcome: Ongoing, Progressing  Flowsheets (Taken 1/14/2022 1050)  Anxieties, Fears or Concerns: worried because this his first treatment  Individualized Care Needs: answer questions, provide reassurance, feet elevated  Patient-Specific Goals (Include Timeframe): get familiar with treatment, have no side effects or minimal\  Goal: Optimal Comfort and Wellbeing  Outcome: Ongoing, Progressing  Intervention: Provide Person-Centered Care  Flowsheets (Taken 1/14/2022 1050)  Trust Relationship/Rapport:   care explained   questions encouraged   reassurance provided   choices provided   emotional support provided   thoughts/feelings acknowledged   empathic listening provided   questions answered     Problem: Fall Injury Risk  Goal: Absence of Fall and Fall-Related Injury  Outcome: Ongoing, Progressing     Problem: Nausea and Vomiting (Chemotherapy Effects)  Goal: Fluid and Electrolyte Balance  Outcome: Ongoing, Progressing  Intervention: Prevent and Manage Nausea and Vomiting  Flowsheets (Taken 1/14/2022 1050)  Nausea/Vomiting Interventions: (premedicated against nausea) other (see comments)  Environmental Support:   calm environment promoted   environmental consistency promoted   rest periods encouraged     Problem: Neutropenia (Chemotherapy Effects)  Goal: Absence of Infection  Outcome: Ongoing, Progressing  Intervention: Prevent Infection and Maximize Resistance  Flowsheets (Taken 1/14/2022 1050)  Infection Prevention:   hand hygiene promoted   equipment surfaces disinfected   environmental surveillance performed   rest/sleep promoted   visitors restricted/screened   personal protective equipment utilized

## 2022-01-18 PROCEDURE — 77014 PR  CT GUIDANCE PLACEMENT RAD THERAPY FIELDS: CPT | Mod: 26,,, | Performed by: RADIOLOGY

## 2022-01-18 PROCEDURE — 77386 HC IMRT, COMPLEX: CPT | Performed by: RADIOLOGY

## 2022-01-18 PROCEDURE — 77014 HC CT GUIDANCE RADIATION THERAPY FLDS PLACEMENT: CPT | Mod: TC | Performed by: RADIOLOGY

## 2022-01-18 PROCEDURE — 77014 PR  CT GUIDANCE PLACEMENT RAD THERAPY FIELDS: ICD-10-PCS | Mod: 26,,, | Performed by: RADIOLOGY

## 2022-01-19 ENCOUNTER — DOCUMENTATION ONLY (OUTPATIENT)
Dept: RADIATION ONCOLOGY | Facility: CLINIC | Age: 82
End: 2022-01-19
Payer: MEDICARE

## 2022-01-19 PROCEDURE — 77014 PR  CT GUIDANCE PLACEMENT RAD THERAPY FIELDS: ICD-10-PCS | Mod: 26,,, | Performed by: RADIOLOGY

## 2022-01-19 PROCEDURE — 77386 HC IMRT, COMPLEX: CPT | Performed by: RADIOLOGY

## 2022-01-19 PROCEDURE — 77014 HC CT GUIDANCE RADIATION THERAPY FLDS PLACEMENT: CPT | Mod: TC | Performed by: RADIOLOGY

## 2022-01-19 PROCEDURE — 77014 PR  CT GUIDANCE PLACEMENT RAD THERAPY FIELDS: CPT | Mod: 26,,, | Performed by: RADIOLOGY

## 2022-01-20 ENCOUNTER — LAB VISIT (OUTPATIENT)
Dept: LAB | Facility: HOSPITAL | Age: 82
End: 2022-01-20
Attending: INTERNAL MEDICINE
Payer: MEDICARE

## 2022-01-20 DIAGNOSIS — C34.90 SQUAMOUS CELL CARCINOMA OF LUNG, UNSPECIFIED LATERALITY: ICD-10-CM

## 2022-01-20 LAB
ALBUMIN SERPL BCP-MCNC: 3.3 G/DL (ref 3.5–5.2)
ALP SERPL-CCNC: 104 U/L (ref 55–135)
ALT SERPL W/O P-5'-P-CCNC: 37 U/L (ref 10–44)
ANION GAP SERPL CALC-SCNC: 8 MMOL/L (ref 8–16)
AST SERPL-CCNC: 20 U/L (ref 10–40)
BASOPHILS # BLD AUTO: 0.01 K/UL (ref 0–0.2)
BASOPHILS NFR BLD: 0.1 % (ref 0–1.9)
BILIRUB SERPL-MCNC: 1 MG/DL (ref 0.1–1)
BUN SERPL-MCNC: 10 MG/DL (ref 8–23)
CALCIUM SERPL-MCNC: 8.5 MG/DL (ref 8.7–10.5)
CHLORIDE SERPL-SCNC: 101 MMOL/L (ref 95–110)
CO2 SERPL-SCNC: 25 MMOL/L (ref 23–29)
CREAT SERPL-MCNC: 0.8 MG/DL (ref 0.5–1.4)
DIFFERENTIAL METHOD: ABNORMAL
EOSINOPHIL # BLD AUTO: 0.1 K/UL (ref 0–0.5)
EOSINOPHIL NFR BLD: 0.9 % (ref 0–8)
ERYTHROCYTE [DISTWIDTH] IN BLOOD BY AUTOMATED COUNT: 12.6 % (ref 11.5–14.5)
EST. GFR  (AFRICAN AMERICAN): >60 ML/MIN/1.73 M^2
EST. GFR  (NON AFRICAN AMERICAN): >60 ML/MIN/1.73 M^2
GLUCOSE SERPL-MCNC: 95 MG/DL (ref 70–110)
HCT VFR BLD AUTO: 43.8 % (ref 40–54)
HGB BLD-MCNC: 14.6 G/DL (ref 14–18)
IMM GRANULOCYTES # BLD AUTO: 0.07 K/UL (ref 0–0.04)
IMM GRANULOCYTES NFR BLD AUTO: 1 % (ref 0–0.5)
LYMPHOCYTES # BLD AUTO: 1.3 K/UL (ref 1–4.8)
LYMPHOCYTES NFR BLD: 18.6 % (ref 18–48)
MCH RBC QN AUTO: 32.4 PG (ref 27–31)
MCHC RBC AUTO-ENTMCNC: 33.3 G/DL (ref 32–36)
MCV RBC AUTO: 97 FL (ref 82–98)
MONOCYTES # BLD AUTO: 0.2 K/UL (ref 0.3–1)
MONOCYTES NFR BLD: 3.4 % (ref 4–15)
NEUTROPHILS # BLD AUTO: 5.3 K/UL (ref 1.8–7.7)
NEUTROPHILS NFR BLD: 76 % (ref 38–73)
NRBC BLD-RTO: 0 /100 WBC
PLATELET # BLD AUTO: 196 K/UL (ref 150–450)
PMV BLD AUTO: 9.6 FL (ref 9.2–12.9)
POTASSIUM SERPL-SCNC: 4.3 MMOL/L (ref 3.5–5.1)
PROT SERPL-MCNC: 6.2 G/DL (ref 6–8.4)
RBC # BLD AUTO: 4.5 M/UL (ref 4.6–6.2)
SODIUM SERPL-SCNC: 134 MMOL/L (ref 136–145)
WBC # BLD AUTO: 7 K/UL (ref 3.9–12.7)

## 2022-01-20 PROCEDURE — 36415 COLL VENOUS BLD VENIPUNCTURE: CPT | Performed by: INTERNAL MEDICINE

## 2022-01-20 PROCEDURE — 85025 COMPLETE CBC W/AUTO DIFF WBC: CPT | Performed by: INTERNAL MEDICINE

## 2022-01-20 PROCEDURE — 77014 PR  CT GUIDANCE PLACEMENT RAD THERAPY FIELDS: CPT | Mod: 26,,, | Performed by: RADIOLOGY

## 2022-01-20 PROCEDURE — 77386 HC IMRT, COMPLEX: CPT | Performed by: RADIOLOGY

## 2022-01-20 PROCEDURE — 80053 COMPREHEN METABOLIC PANEL: CPT | Performed by: INTERNAL MEDICINE

## 2022-01-20 PROCEDURE — 77014 PR  CT GUIDANCE PLACEMENT RAD THERAPY FIELDS: ICD-10-PCS | Mod: 26,,, | Performed by: RADIOLOGY

## 2022-01-20 PROCEDURE — 77014 HC CT GUIDANCE RADIATION THERAPY FLDS PLACEMENT: CPT | Mod: TC | Performed by: RADIOLOGY

## 2022-01-21 ENCOUNTER — DOCUMENTATION ONLY (OUTPATIENT)
Dept: HEMATOLOGY/ONCOLOGY | Facility: CLINIC | Age: 82
End: 2022-01-21

## 2022-01-21 ENCOUNTER — INFUSION (OUTPATIENT)
Dept: INFUSION THERAPY | Facility: HOSPITAL | Age: 82
End: 2022-01-21
Attending: INTERNAL MEDICINE
Payer: MEDICARE

## 2022-01-21 ENCOUNTER — OFFICE VISIT (OUTPATIENT)
Dept: HEMATOLOGY/ONCOLOGY | Facility: CLINIC | Age: 82
End: 2022-01-21
Payer: MEDICARE

## 2022-01-21 VITALS
DIASTOLIC BLOOD PRESSURE: 70 MMHG | WEIGHT: 199.75 LBS | OXYGEN SATURATION: 98 % | BODY MASS INDEX: 28.66 KG/M2 | HEART RATE: 82 BPM | SYSTOLIC BLOOD PRESSURE: 140 MMHG | TEMPERATURE: 97 F

## 2022-01-21 VITALS
HEART RATE: 60 BPM | RESPIRATION RATE: 18 BRPM | DIASTOLIC BLOOD PRESSURE: 66 MMHG | SYSTOLIC BLOOD PRESSURE: 123 MMHG | TEMPERATURE: 98 F | OXYGEN SATURATION: 98 %

## 2022-01-21 DIAGNOSIS — C34.91 SQUAMOUS CELL CARCINOMA OF RIGHT LUNG: Primary | ICD-10-CM

## 2022-01-21 DIAGNOSIS — C34.90 SQUAMOUS CELL CARCINOMA OF LUNG, UNSPECIFIED LATERALITY: Primary | ICD-10-CM

## 2022-01-21 PROCEDURE — 96413 CHEMO IV INFUSION 1 HR: CPT

## 2022-01-21 PROCEDURE — 96375 TX/PRO/DX INJ NEW DRUG ADDON: CPT

## 2022-01-21 PROCEDURE — 3077F SYST BP >= 140 MM HG: CPT | Mod: CPTII,S$GLB,, | Performed by: INTERNAL MEDICINE

## 2022-01-21 PROCEDURE — 77014 PR  CT GUIDANCE PLACEMENT RAD THERAPY FIELDS: CPT | Mod: 26,,, | Performed by: RADIOLOGY

## 2022-01-21 PROCEDURE — 1159F MED LIST DOCD IN RCRD: CPT | Mod: CPTII,S$GLB,, | Performed by: INTERNAL MEDICINE

## 2022-01-21 PROCEDURE — 63600175 PHARM REV CODE 636 W HCPCS: Performed by: INTERNAL MEDICINE

## 2022-01-21 PROCEDURE — 1126F AMNT PAIN NOTED NONE PRSNT: CPT | Mod: CPTII,S$GLB,, | Performed by: INTERNAL MEDICINE

## 2022-01-21 PROCEDURE — 77386 HC IMRT, COMPLEX: CPT | Performed by: RADIOLOGY

## 2022-01-21 PROCEDURE — 3288F PR FALLS RISK ASSESSMENT DOCUMENTED: ICD-10-PCS | Mod: CPTII,S$GLB,, | Performed by: INTERNAL MEDICINE

## 2022-01-21 PROCEDURE — 1101F PT FALLS ASSESS-DOCD LE1/YR: CPT | Mod: CPTII,S$GLB,, | Performed by: INTERNAL MEDICINE

## 2022-01-21 PROCEDURE — 77014 PR  CT GUIDANCE PLACEMENT RAD THERAPY FIELDS: ICD-10-PCS | Mod: 26,,, | Performed by: RADIOLOGY

## 2022-01-21 PROCEDURE — 99999 PR PBB SHADOW E&M-EST. PATIENT-LVL III: ICD-10-PCS | Mod: PBBFAC,,, | Performed by: INTERNAL MEDICINE

## 2022-01-21 PROCEDURE — 96367 TX/PROPH/DG ADDL SEQ IV INF: CPT

## 2022-01-21 PROCEDURE — 1101F PR PT FALLS ASSESS DOC 0-1 FALLS W/OUT INJ PAST YR: ICD-10-PCS | Mod: CPTII,S$GLB,, | Performed by: INTERNAL MEDICINE

## 2022-01-21 PROCEDURE — 3077F PR MOST RECENT SYSTOLIC BLOOD PRESSURE >= 140 MM HG: ICD-10-PCS | Mod: CPTII,S$GLB,, | Performed by: INTERNAL MEDICINE

## 2022-01-21 PROCEDURE — 96417 CHEMO IV INFUS EACH ADDL SEQ: CPT

## 2022-01-21 PROCEDURE — 99999 PR PBB SHADOW E&M-EST. PATIENT-LVL III: CPT | Mod: PBBFAC,,, | Performed by: INTERNAL MEDICINE

## 2022-01-21 PROCEDURE — 3078F DIAST BP <80 MM HG: CPT | Mod: CPTII,S$GLB,, | Performed by: INTERNAL MEDICINE

## 2022-01-21 PROCEDURE — 1126F PR PAIN SEVERITY QUANTIFIED, NO PAIN PRESENT: ICD-10-PCS | Mod: CPTII,S$GLB,, | Performed by: INTERNAL MEDICINE

## 2022-01-21 PROCEDURE — 77336 RADIATION PHYSICS CONSULT: CPT | Performed by: RADIOLOGY

## 2022-01-21 PROCEDURE — 3288F FALL RISK ASSESSMENT DOCD: CPT | Mod: CPTII,S$GLB,, | Performed by: INTERNAL MEDICINE

## 2022-01-21 PROCEDURE — 25000003 PHARM REV CODE 250: Performed by: INTERNAL MEDICINE

## 2022-01-21 PROCEDURE — 96415 CHEMO IV INFUSION ADDL HR: CPT

## 2022-01-21 PROCEDURE — 1159F PR MEDICATION LIST DOCUMENTED IN MEDICAL RECORD: ICD-10-PCS | Mod: CPTII,S$GLB,, | Performed by: INTERNAL MEDICINE

## 2022-01-21 PROCEDURE — 99215 PR OFFICE/OUTPT VISIT, EST, LEVL V, 40-54 MIN: ICD-10-PCS | Mod: S$GLB,,, | Performed by: INTERNAL MEDICINE

## 2022-01-21 PROCEDURE — 77014 HC CT GUIDANCE RADIATION THERAPY FLDS PLACEMENT: CPT | Mod: TC | Performed by: RADIOLOGY

## 2022-01-21 PROCEDURE — 3078F PR MOST RECENT DIASTOLIC BLOOD PRESSURE < 80 MM HG: ICD-10-PCS | Mod: CPTII,S$GLB,, | Performed by: INTERNAL MEDICINE

## 2022-01-21 PROCEDURE — 99215 OFFICE O/P EST HI 40 MIN: CPT | Mod: S$GLB,,, | Performed by: INTERNAL MEDICINE

## 2022-01-21 RX ORDER — FAMOTIDINE 10 MG/ML
20 INJECTION INTRAVENOUS
Status: COMPLETED | OUTPATIENT
Start: 2022-01-21 | End: 2022-01-21

## 2022-01-21 RX ORDER — EPINEPHRINE 1 MG/ML
0.3 INJECTION, SOLUTION INTRACARDIAC; INTRAMUSCULAR; INTRAVENOUS; SUBCUTANEOUS ONCE AS NEEDED
Status: DISCONTINUED | OUTPATIENT
Start: 2022-01-21 | End: 2022-01-21 | Stop reason: HOSPADM

## 2022-01-21 RX ORDER — SODIUM CHLORIDE 0.9 % (FLUSH) 0.9 %
10 SYRINGE (ML) INJECTION
Status: DISCONTINUED | OUTPATIENT
Start: 2022-01-21 | End: 2022-01-21 | Stop reason: HOSPADM

## 2022-01-21 RX ORDER — DIPHENHYDRAMINE HYDROCHLORIDE 50 MG/ML
50 INJECTION INTRAMUSCULAR; INTRAVENOUS ONCE AS NEEDED
Status: DISCONTINUED | OUTPATIENT
Start: 2022-01-21 | End: 2022-01-21 | Stop reason: HOSPADM

## 2022-01-21 RX ORDER — SODIUM CHLORIDE 0.9 % (FLUSH) 0.9 %
10 SYRINGE (ML) INJECTION
Status: CANCELLED | OUTPATIENT
Start: 2022-01-21

## 2022-01-21 RX ORDER — HEPARIN 100 UNIT/ML
500 SYRINGE INTRAVENOUS
Status: CANCELLED | OUTPATIENT
Start: 2022-01-21

## 2022-01-21 RX ORDER — EPINEPHRINE 0.3 MG/.3ML
0.3 INJECTION SUBCUTANEOUS ONCE AS NEEDED
Status: CANCELLED | OUTPATIENT
Start: 2022-01-21

## 2022-01-21 RX ORDER — DIPHENHYDRAMINE HYDROCHLORIDE 50 MG/ML
50 INJECTION INTRAMUSCULAR; INTRAVENOUS ONCE AS NEEDED
Status: CANCELLED | OUTPATIENT
Start: 2022-01-21

## 2022-01-21 RX ORDER — FAMOTIDINE 10 MG/ML
20 INJECTION INTRAVENOUS
Status: CANCELLED | OUTPATIENT
Start: 2022-01-21

## 2022-01-21 RX ORDER — HEPARIN 100 UNIT/ML
500 SYRINGE INTRAVENOUS
Status: DISCONTINUED | OUTPATIENT
Start: 2022-01-21 | End: 2022-01-21 | Stop reason: HOSPADM

## 2022-01-21 RX ADMIN — PACLITAXEL 96 MG: 6 INJECTION, SOLUTION INTRAVENOUS at 10:01

## 2022-01-21 RX ADMIN — SODIUM CHLORIDE 50 MG: 900 INJECTION, SOLUTION INTRAVENOUS at 10:01

## 2022-01-21 RX ADMIN — PALONOSETRON HYDROCHLORIDE: 0.25 INJECTION, SOLUTION INTRAVENOUS at 09:01

## 2022-01-21 RX ADMIN — CARBOPLATIN 230 MG: 10 INJECTION, SOLUTION INTRAVENOUS at 12:01

## 2022-01-21 RX ADMIN — SODIUM CHLORIDE: 0.9 INJECTION, SOLUTION INTRAVENOUS at 09:01

## 2022-01-21 RX ADMIN — FAMOTIDINE 20 MG: 10 INJECTION INTRAVENOUS at 09:01

## 2022-01-21 RX ADMIN — HEPARIN 500 UNITS: 100 SYRINGE at 12:01

## 2022-01-21 NOTE — PROGRESS NOTES
Subjective:       Patient ID: Alton Black is a 81 y.o. male.    Chief Complaint: No chief complaint on file.    HPI       Mr Black returns today for follow up.  Briefly, he is an 81 year old male currently being treated with concurernt chemo-XRT for a stage IIB squamous cell lung cancer.  He is due for his second dose of carboplatin plus paclitaxel today.  His LFTs today are normal while his creatinine is 0.8 mg/dL.  Bilirubin is 1 mg/dL.  His WBCs are 7000 per cubic mm, hemoglobin 14.6 grams/deciliter, hematocrit 43.8% and platelets 196,000 per cubic mm.  His calcium is 8.5 mg/dL.    Review of Systems    Overall he feels well.  He tolerated the chemotherapy last week without experiencing any significant side effects.  His ECOG PS is 1. He He denies any anxiety, depression, easy bruising, fevers, chills, night  sweats, weight loss, nausea, vomiting, diarrhea, constipation, diplopia, blurred vision, headache, chest pain, palpitations, shortness of breath, breast pain, abdominal pain, extremity pain, or difficulty ambulating.  The remainder of the ten-point ROS, including general, skin, lymph, H/N, cardiorespiratory, GI, , Neuro, Endocrine, and psychiatric is negative.   Objective:      Physical Exam    He is alert, oriented to time, place, person, pleasant, well      nourished, in no acute physical distress.  He is accompanied by his brother.                                   VITAL SIGNS:  Reviewed                                      HEENT:  Normal.  There are no nasal, oral, lip, gingival, auricular, lid,    or conjunctival lesions.  Mucosae are moist and pink, and there is no        thrush.  Pupils are equal, reactive to light and accommodation.              Extraocular muscle movements are intact.    Dentition is good.  There is no frontal or maxillary tenderness.                                   NECK:  Supple without JVD, adenopathy, or thyromegaly.                       LUNGS:  Clear to  auscultation without wheezing, rales, or rhonchi.           CARDIOVASCULAR:  Reveals an S1, S2, no murmurs, no rubs, no gallops.     A left-sided Port-A-Cath is identified.      ABDOMEN:  Soft, nontender, without organomegaly.  Bowel sounds are    present.                                                                     EXTREMITIES:  No cyanosis, clubbing, or edema.                                                               LYMPHATIC:  There is no cervical, axillary, or supraclavicular adenopathy.   SKIN:  Warm and moist, without petechiae, rashes, induration, or ecchymoses.           NEUROLOGIC:  DTRs are 0-1+ bilaterally, symmetrical, motor function is 5/5,  and cranial nerves are  within normal limits.    Assessment:       Problem List Items Addressed This Visit     Squamous cell lung cancer - Primary          Plan:         I had a very long discussion with Mr. Black and his brother.  I explained to him that he is being treated with curative intent.  He may proceed with his weekly radiosensitizing chemotherapy today.  He will have repeat labs 6 days from now, continue the daily radiation, and return in 7 days to see Dr. Lopez or me for his next cycle of chemotherapy.  His multiple questions and his brother's questions were answered to their satisfaction.

## 2022-01-21 NOTE — NURSING
"1025am: Pt here in infusion for 2nd chemo. Spoke with pt at chairside in infusion pt brother present as well. Pt appears well. Pt stated, "He feels fine." Pt had no concerns, complaints, and/or needs at this time. Pt informed I will contact him in 1 mth for acuity onc candelario check based on acuity score of 8. Pt verbalized understanding. Pt encouraged to contact me directly if have any further questions and/or concerns.   Oncology Navigation   Intake  Date of Diagnosis: 2021  Cancer Type: Other  Internal / External Referral: Internal  Referral Source: Dr. Vences  Date of Referral: 2021  Initial Nurse Navigator Contact: 2021  Referral to Initial Contact Timeline (days): 5  Date Worked: 2022  Multiple appointments: Yes  Start of Treatment: 2021     Treatment  Current Status: Active       Medical Oncologist: Dr. Juan Lopez  Chemotherapy: Planned  Chemotherapy Regimen: Carboplatin Taxol    Radiation Therapy: Planned  Radiation Oncologist: Dr. Nadira Roberson  Start Date: 1/10/2021    Procedures: Port / PICC  Bronchoscopy Schedule Date: 2021  Echo Schedule Date: 2021  PET Scan Schedule Date: 2021  Port / PICC Schedule Date: 1/3/2021    General Referrals: Social work  Social Work: notified Haley Freitas LCSW  Social Work Referral Date: 2021       Radiation Oncologist: Dr. Nadira Roberson    Support Systems: Family members     Acuity  Stage: I-II  Systemic Treatment - predicted or initiated: More than one treatment modality concurrently (chemotherapy, radiation, etc.) (+2)  Treatment Tolerability: Has not started treatment yet/treatment fully completed and side effects resolved  ECO (+2)  Comorbidities in Medical History: 6+ (+2)   Needed: No  Verbalizes the need for more education: Yes  Navigation Acuity: 8     Follow Up  No follow-ups on file.     "

## 2022-01-21 NOTE — PLAN OF CARE
Problem: Adult Inpatient Plan of Care  Goal: Plan of Care Review  Outcome: Ongoing, Progressing  Flowsheets (Taken 1/21/2022 0958)  Plan of Care Reviewed With:   patient   sibling  Goal: Patient-Specific Goal (Individualized)  Outcome: Ongoing, Progressing  Flowsheets (Taken 1/21/2022 0958)  Anxieties, Fears or Concerns: anxious about reaction to treatment  Individualized Care Needs: in recliner pillow provided, reassurance provided, POC reviewed extensively  Patient-Specific Goals (Include Timeframe): tolerate treatment  Goal: Optimal Comfort and Wellbeing  Outcome: Ongoing, Progressing  Intervention: Provide Person-Centered Care  Flowsheets (Taken 1/21/2022 0958)  Trust Relationship/Rapport:   care explained   choices provided   emotional support provided   empathic listening provided   questions answered   questions encouraged   reassurance provided   thoughts/feelings acknowledged     Problem: Fall Injury Risk  Goal: Absence of Fall and Fall-Related Injury  Outcome: Ongoing, Progressing  Intervention: Identify and Manage Contributors  Flowsheets (Taken 1/21/2022 0958)  Self-Care Promotion:   independence encouraged   safe use of adaptive equipment encouraged  Medication Review/Management: medications reviewed  Intervention: Promote Injury-Free Environment  Flowsheets (Taken 1/21/2022 0958)  Safety Promotion/Fall Prevention:   assistive device/personal item within reach   in recliner, wheels locked   instructed to call staff for mobility     Problem: Nausea and Vomiting (Chemotherapy Effects)  Goal: Fluid and Electrolyte Balance  Outcome: Ongoing, Progressing  Intervention: Prevent and Manage Nausea and Vomiting  Flowsheets (Taken 1/21/2022 0958)  Environmental Support:   calm environment promoted   distractions minimized   environmental consistency promoted   personal routine supported   rest periods encouraged     Problem: Neutropenia (Chemotherapy Effects)  Goal: Absence of Infection  Outcome: Ongoing,  Progressing  Intervention: Prevent Infection and Maximize Resistance  Flowsheets (Taken 1/21/2022 6873)  Infection Prevention:   environmental surveillance performed   equipment surfaces disinfected   hand hygiene promoted   personal protective equipment utilized   rest/sleep promoted   single patient room provided

## 2022-01-21 NOTE — Clinical Note
Return in a week to see either Dr. Lopez or me.  He needs labs one day prior please. Chemotherapy a week from today

## 2022-01-24 PROCEDURE — 77014 PR  CT GUIDANCE PLACEMENT RAD THERAPY FIELDS: ICD-10-PCS | Mod: 26,,, | Performed by: RADIOLOGY

## 2022-01-24 PROCEDURE — 77014 HC CT GUIDANCE RADIATION THERAPY FLDS PLACEMENT: CPT | Mod: TC | Performed by: RADIOLOGY

## 2022-01-24 PROCEDURE — 77014 PR  CT GUIDANCE PLACEMENT RAD THERAPY FIELDS: CPT | Mod: 26,,, | Performed by: RADIOLOGY

## 2022-01-24 PROCEDURE — 77386 HC IMRT, COMPLEX: CPT | Performed by: RADIOLOGY

## 2022-01-25 PROCEDURE — 77014 HC CT GUIDANCE RADIATION THERAPY FLDS PLACEMENT: CPT | Mod: TC | Performed by: RADIOLOGY

## 2022-01-25 PROCEDURE — 77386 HC IMRT, COMPLEX: CPT | Performed by: RADIOLOGY

## 2022-01-25 PROCEDURE — 77014 PR  CT GUIDANCE PLACEMENT RAD THERAPY FIELDS: CPT | Mod: 26,,, | Performed by: RADIOLOGY

## 2022-01-25 PROCEDURE — 77014 PR  CT GUIDANCE PLACEMENT RAD THERAPY FIELDS: ICD-10-PCS | Mod: 26,,, | Performed by: RADIOLOGY

## 2022-01-26 ENCOUNTER — DOCUMENTATION ONLY (OUTPATIENT)
Dept: RADIATION ONCOLOGY | Facility: CLINIC | Age: 82
End: 2022-01-26
Payer: MEDICARE

## 2022-01-26 PROCEDURE — 77386 HC IMRT, COMPLEX: CPT | Performed by: RADIOLOGY

## 2022-01-26 PROCEDURE — 77014 PR  CT GUIDANCE PLACEMENT RAD THERAPY FIELDS: CPT | Mod: 26,,, | Performed by: RADIOLOGY

## 2022-01-26 PROCEDURE — 77014 HC CT GUIDANCE RADIATION THERAPY FLDS PLACEMENT: CPT | Mod: TC | Performed by: RADIOLOGY

## 2022-01-26 PROCEDURE — 77014 PR  CT GUIDANCE PLACEMENT RAD THERAPY FIELDS: ICD-10-PCS | Mod: 26,,, | Performed by: RADIOLOGY

## 2022-01-26 NOTE — PLAN OF CARE
Day 9 of outpatient xrt to the lung. Doing well, denies any cp, no sore swallowing, no sob. Will continue to monitor.

## 2022-01-27 ENCOUNTER — LAB VISIT (OUTPATIENT)
Dept: LAB | Facility: HOSPITAL | Age: 82
End: 2022-01-27
Attending: INTERNAL MEDICINE
Payer: MEDICARE

## 2022-01-27 DIAGNOSIS — C34.90 SQUAMOUS CELL CARCINOMA OF LUNG, UNSPECIFIED LATERALITY: ICD-10-CM

## 2022-01-27 LAB
ALBUMIN SERPL BCP-MCNC: 3.2 G/DL (ref 3.5–5.2)
ALP SERPL-CCNC: 101 U/L (ref 55–135)
ALT SERPL W/O P-5'-P-CCNC: 31 U/L (ref 10–44)
ANION GAP SERPL CALC-SCNC: 8 MMOL/L (ref 8–16)
AST SERPL-CCNC: 20 U/L (ref 10–40)
BILIRUB SERPL-MCNC: 1.1 MG/DL (ref 0.1–1)
BUN SERPL-MCNC: 8 MG/DL (ref 8–23)
CALCIUM SERPL-MCNC: 8.3 MG/DL (ref 8.7–10.5)
CHLORIDE SERPL-SCNC: 100 MMOL/L (ref 95–110)
CO2 SERPL-SCNC: 26 MMOL/L (ref 23–29)
CREAT SERPL-MCNC: 0.7 MG/DL (ref 0.5–1.4)
ERYTHROCYTE [DISTWIDTH] IN BLOOD BY AUTOMATED COUNT: 12.5 % (ref 11.5–14.5)
EST. GFR  (AFRICAN AMERICAN): >60 ML/MIN/1.73 M^2
EST. GFR  (NON AFRICAN AMERICAN): >60 ML/MIN/1.73 M^2
GLUCOSE SERPL-MCNC: 98 MG/DL (ref 70–110)
HCT VFR BLD AUTO: 43.8 % (ref 40–54)
HGB BLD-MCNC: 14.5 G/DL (ref 14–18)
IMM GRANULOCYTES # BLD AUTO: 0.05 K/UL (ref 0–0.04)
MCH RBC QN AUTO: 32.6 PG (ref 27–31)
MCHC RBC AUTO-ENTMCNC: 33.1 G/DL (ref 32–36)
MCV RBC AUTO: 98 FL (ref 82–98)
NEUTROPHILS # BLD AUTO: 4.2 K/UL (ref 1.8–7.7)
PLATELET # BLD AUTO: 175 K/UL (ref 150–450)
PMV BLD AUTO: 9.5 FL (ref 9.2–12.9)
POTASSIUM SERPL-SCNC: 4.4 MMOL/L (ref 3.5–5.1)
PROT SERPL-MCNC: 5.9 G/DL (ref 6–8.4)
RBC # BLD AUTO: 4.45 M/UL (ref 4.6–6.2)
SODIUM SERPL-SCNC: 134 MMOL/L (ref 136–145)
WBC # BLD AUTO: 5.17 K/UL (ref 3.9–12.7)

## 2022-01-27 PROCEDURE — 80053 COMPREHEN METABOLIC PANEL: CPT | Performed by: INTERNAL MEDICINE

## 2022-01-27 PROCEDURE — 77014 HC CT GUIDANCE RADIATION THERAPY FLDS PLACEMENT: CPT | Mod: TC | Performed by: RADIOLOGY

## 2022-01-27 PROCEDURE — 77014 PR  CT GUIDANCE PLACEMENT RAD THERAPY FIELDS: CPT | Mod: 26,,, | Performed by: RADIOLOGY

## 2022-01-27 PROCEDURE — 36415 COLL VENOUS BLD VENIPUNCTURE: CPT | Performed by: INTERNAL MEDICINE

## 2022-01-27 PROCEDURE — 77014 PR  CT GUIDANCE PLACEMENT RAD THERAPY FIELDS: ICD-10-PCS | Mod: 26,,, | Performed by: RADIOLOGY

## 2022-01-27 PROCEDURE — 85027 COMPLETE CBC AUTOMATED: CPT | Performed by: INTERNAL MEDICINE

## 2022-01-27 PROCEDURE — 77386 HC IMRT, COMPLEX: CPT | Performed by: RADIOLOGY

## 2022-01-28 ENCOUNTER — OFFICE VISIT (OUTPATIENT)
Dept: HEMATOLOGY/ONCOLOGY | Facility: CLINIC | Age: 82
End: 2022-01-28
Payer: MEDICARE

## 2022-01-28 ENCOUNTER — INFUSION (OUTPATIENT)
Dept: INFUSION THERAPY | Facility: HOSPITAL | Age: 82
End: 2022-01-28
Attending: INTERNAL MEDICINE
Payer: MEDICARE

## 2022-01-28 VITALS
TEMPERATURE: 98 F | OXYGEN SATURATION: 99 % | WEIGHT: 198.19 LBS | RESPIRATION RATE: 18 BRPM | BODY MASS INDEX: 27.75 KG/M2 | DIASTOLIC BLOOD PRESSURE: 64 MMHG | HEART RATE: 66 BPM | HEIGHT: 71 IN | SYSTOLIC BLOOD PRESSURE: 116 MMHG

## 2022-01-28 VITALS
HEIGHT: 71 IN | SYSTOLIC BLOOD PRESSURE: 131 MMHG | TEMPERATURE: 97 F | DIASTOLIC BLOOD PRESSURE: 68 MMHG | WEIGHT: 198.19 LBS | BODY MASS INDEX: 27.75 KG/M2 | OXYGEN SATURATION: 98 % | HEART RATE: 79 BPM

## 2022-01-28 DIAGNOSIS — C34.90 SQUAMOUS CELL CARCINOMA OF LUNG, UNSPECIFIED LATERALITY: Primary | ICD-10-CM

## 2022-01-28 DIAGNOSIS — C34.91 SQUAMOUS CELL CARCINOMA OF RIGHT LUNG: Primary | ICD-10-CM

## 2022-01-28 DIAGNOSIS — D64.9 ANEMIA, UNSPECIFIED TYPE: ICD-10-CM

## 2022-01-28 PROCEDURE — 3288F PR FALLS RISK ASSESSMENT DOCUMENTED: ICD-10-PCS | Mod: CPTII,S$GLB,, | Performed by: INTERNAL MEDICINE

## 2022-01-28 PROCEDURE — 99215 OFFICE O/P EST HI 40 MIN: CPT | Mod: S$GLB,,, | Performed by: INTERNAL MEDICINE

## 2022-01-28 PROCEDURE — A4216 STERILE WATER/SALINE, 10 ML: HCPCS | Performed by: INTERNAL MEDICINE

## 2022-01-28 PROCEDURE — 3078F PR MOST RECENT DIASTOLIC BLOOD PRESSURE < 80 MM HG: ICD-10-PCS | Mod: CPTII,S$GLB,, | Performed by: INTERNAL MEDICINE

## 2022-01-28 PROCEDURE — 25000003 PHARM REV CODE 250: Performed by: INTERNAL MEDICINE

## 2022-01-28 PROCEDURE — 99999 PR PBB SHADOW E&M-EST. PATIENT-LVL III: CPT | Mod: PBBFAC,,, | Performed by: INTERNAL MEDICINE

## 2022-01-28 PROCEDURE — 1126F PR PAIN SEVERITY QUANTIFIED, NO PAIN PRESENT: ICD-10-PCS | Mod: CPTII,S$GLB,, | Performed by: INTERNAL MEDICINE

## 2022-01-28 PROCEDURE — 3288F FALL RISK ASSESSMENT DOCD: CPT | Mod: CPTII,S$GLB,, | Performed by: INTERNAL MEDICINE

## 2022-01-28 PROCEDURE — 3078F DIAST BP <80 MM HG: CPT | Mod: CPTII,S$GLB,, | Performed by: INTERNAL MEDICINE

## 2022-01-28 PROCEDURE — 77336 RADIATION PHYSICS CONSULT: CPT | Performed by: RADIOLOGY

## 2022-01-28 PROCEDURE — 1126F AMNT PAIN NOTED NONE PRSNT: CPT | Mod: CPTII,S$GLB,, | Performed by: INTERNAL MEDICINE

## 2022-01-28 PROCEDURE — 96367 TX/PROPH/DG ADDL SEQ IV INF: CPT

## 2022-01-28 PROCEDURE — 77014 HC CT GUIDANCE RADIATION THERAPY FLDS PLACEMENT: CPT | Mod: TC | Performed by: RADIOLOGY

## 2022-01-28 PROCEDURE — 1101F PR PT FALLS ASSESS DOC 0-1 FALLS W/OUT INJ PAST YR: ICD-10-PCS | Mod: CPTII,S$GLB,, | Performed by: INTERNAL MEDICINE

## 2022-01-28 PROCEDURE — 96417 CHEMO IV INFUS EACH ADDL SEQ: CPT

## 2022-01-28 PROCEDURE — 77014 PR  CT GUIDANCE PLACEMENT RAD THERAPY FIELDS: ICD-10-PCS | Mod: 26,,, | Performed by: RADIOLOGY

## 2022-01-28 PROCEDURE — 96375 TX/PRO/DX INJ NEW DRUG ADDON: CPT

## 2022-01-28 PROCEDURE — 99999 PR PBB SHADOW E&M-EST. PATIENT-LVL III: ICD-10-PCS | Mod: PBBFAC,,, | Performed by: INTERNAL MEDICINE

## 2022-01-28 PROCEDURE — 99215 PR OFFICE/OUTPT VISIT, EST, LEVL V, 40-54 MIN: ICD-10-PCS | Mod: S$GLB,,, | Performed by: INTERNAL MEDICINE

## 2022-01-28 PROCEDURE — 1101F PT FALLS ASSESS-DOCD LE1/YR: CPT | Mod: CPTII,S$GLB,, | Performed by: INTERNAL MEDICINE

## 2022-01-28 PROCEDURE — 3075F PR MOST RECENT SYSTOLIC BLOOD PRESS GE 130-139MM HG: ICD-10-PCS | Mod: CPTII,S$GLB,, | Performed by: INTERNAL MEDICINE

## 2022-01-28 PROCEDURE — 77386 HC IMRT, COMPLEX: CPT | Performed by: RADIOLOGY

## 2022-01-28 PROCEDURE — 96413 CHEMO IV INFUSION 1 HR: CPT

## 2022-01-28 PROCEDURE — 63600175 PHARM REV CODE 636 W HCPCS: Performed by: INTERNAL MEDICINE

## 2022-01-28 PROCEDURE — 3075F SYST BP GE 130 - 139MM HG: CPT | Mod: CPTII,S$GLB,, | Performed by: INTERNAL MEDICINE

## 2022-01-28 PROCEDURE — 77014 PR  CT GUIDANCE PLACEMENT RAD THERAPY FIELDS: CPT | Mod: 26,,, | Performed by: RADIOLOGY

## 2022-01-28 RX ORDER — FAMOTIDINE 10 MG/ML
20 INJECTION INTRAVENOUS
Status: CANCELLED | OUTPATIENT
Start: 2022-01-28

## 2022-01-28 RX ORDER — SODIUM CHLORIDE 0.9 % (FLUSH) 0.9 %
10 SYRINGE (ML) INJECTION
Status: DISCONTINUED | OUTPATIENT
Start: 2022-01-28 | End: 2022-01-28 | Stop reason: HOSPADM

## 2022-01-28 RX ORDER — SODIUM CHLORIDE 0.9 % (FLUSH) 0.9 %
10 SYRINGE (ML) INJECTION
Status: CANCELLED | OUTPATIENT
Start: 2022-01-28

## 2022-01-28 RX ORDER — EPINEPHRINE 0.3 MG/.3ML
0.3 INJECTION SUBCUTANEOUS ONCE AS NEEDED
Status: CANCELLED | OUTPATIENT
Start: 2022-01-28

## 2022-01-28 RX ORDER — HEPARIN 100 UNIT/ML
500 SYRINGE INTRAVENOUS
Status: CANCELLED | OUTPATIENT
Start: 2022-01-28

## 2022-01-28 RX ORDER — DIPHENHYDRAMINE HYDROCHLORIDE 50 MG/ML
50 INJECTION INTRAMUSCULAR; INTRAVENOUS ONCE AS NEEDED
Status: CANCELLED | OUTPATIENT
Start: 2022-01-28

## 2022-01-28 RX ORDER — HEPARIN 100 UNIT/ML
500 SYRINGE INTRAVENOUS
Status: DISCONTINUED | OUTPATIENT
Start: 2022-01-28 | End: 2022-01-28 | Stop reason: HOSPADM

## 2022-01-28 RX ORDER — FAMOTIDINE 10 MG/ML
20 INJECTION INTRAVENOUS
Status: COMPLETED | OUTPATIENT
Start: 2022-01-28 | End: 2022-01-28

## 2022-01-28 RX ADMIN — PALONOSETRON HYDROCHLORIDE: 0.25 INJECTION, SOLUTION INTRAVENOUS at 09:01

## 2022-01-28 RX ADMIN — Medication 10 ML: at 11:01

## 2022-01-28 RX ADMIN — FAMOTIDINE 20 MG: 10 INJECTION INTRAVENOUS at 09:01

## 2022-01-28 RX ADMIN — PACLITAXEL 96 MG: 6 INJECTION, SOLUTION INTRAVENOUS at 10:01

## 2022-01-28 RX ADMIN — CARBOPLATIN 255 MG: 10 INJECTION, SOLUTION INTRAVENOUS at 11:01

## 2022-01-28 RX ADMIN — SODIUM CHLORIDE: 0.9 INJECTION, SOLUTION INTRAVENOUS at 09:01

## 2022-01-28 RX ADMIN — Medication 500 UNITS: at 11:01

## 2022-01-28 RX ADMIN — DIPHENHYDRAMINE HYDROCHLORIDE 50 MG: 50 INJECTION, SOLUTION INTRAMUSCULAR; INTRAVENOUS at 09:01

## 2022-01-28 NOTE — PLAN OF CARE
Problem: Adult Inpatient Plan of Care  Goal: Plan of Care Review  Outcome: Ongoing, Progressing  Flowsheets (Taken 1/28/2022 0927)  Plan of Care Reviewed With: patient  Goal: Patient-Specific Goal (Individualized)  Outcome: Ongoing, Progressing  Flowsheets (Taken 1/28/2022 0927)  Anxieties, Fears or Concerns: Patient anxious about needle sticks  Individualized Care Needs: Feet reclined and pillow provided for comfort measures  Patient-Specific Goals (Include Timeframe): tolerate treatment without adverse reaction  Goal: Optimal Comfort and Wellbeing  Outcome: Ongoing, Progressing  Intervention: Provide Person-Centered Care  Flowsheets (Taken 1/28/2022 0927)  Trust Relationship/Rapport:   questions answered   choices provided   questions encouraged   care explained   emotional support provided   reassurance provided   empathic listening provided   thoughts/feelings acknowledged

## 2022-01-28 NOTE — DISCHARGE INSTRUCTIONS
[facility]  [address] [address]  [address]  [phone] phone     [phone] fax  Hours of Operation: Monday- Friday 8:00am- 5:00pm  After hours phone  [phone]  Hematology / Oncology Physicians on call      Dr. Jae Rubio, HEDY Contreras NP Tyesha Taylor, NP    Please call with any concerns regarding your appointment today.        FALL PREVENTION   Falls often occur due to slipping, tripping or losing your balance. Here are ways to reduce your risk of falling again.    Was there anything that caused your fall that can be fixed, removed or replaced?    Make your home safe by keeping walkways clear of objects you may trip over.    Use non-slip pads under rugs.    Do not walk in poorly lit areas.    Do not stand on chairs or wobbly ladders.    Use caution when reaching overhead or looking upward. This position can cause a loss of balance.    Be sure your shoes fit properly, have non-slip bottoms and are in good condition.    Be cautious when going up and down stairs, curbs, and when walking on uneven sidewalks.    If your balance is poor, consider using a cane or walker.    If your fall was related to alcohol use, stop or limit alcohol intake.    If your fall was related to use of sleeping medicines, talk to your doctor about this. You may need to reduce your dosage at bedtime if you awaken during the night to go to the bathroom.    To reduce the need for nighttime bathroom trips:    Avoid drinking fluids for several hours before going to bed    Empty your bladder before going to bed    Men can keep a urinal at the bedside   © 0333-1831 Jessy Ng, [address] [place] [address], [place] [address]. All rights reserved. This information is not intended as a substitute for professional medical care. Always follow your healthcare professional's  instructions.      Discharge Instructions for Chemotherapy   Your doctor prescribed a type of medication therapy for you called chemotherapy. Doctors prescribe chemotherapy for many different types of illnesses, including cancer. There are many types of chemotherapy. This sheet provides general guidelines on how you can take care of yourself after your chemotherapy.   Mouth Care   Dont be discouraged if you get mouth sores, even if you are following all your doctors instructions. Many people get mouth sores as a side effect of chemotherapy. Heres what you can do to prevent mouth sores:    Keep your mouth clean. Brush your teeth with a soft-bristle toothbrush after every meal.    Use an oral swab or special soft toothbrush if your gums bleed during regular brushing.    Dont use dental floss if your platelet count is below 50,000. Your doctor or nurse will tell you if this is the case.    Use any mouthwashes given to you as directed.    If you cant tolerate regular methods, use salt and baking soda to clean your mouth. Mix 1 teaspoon(s) of salt and 1 teaspoon(s) of baking soda into an 8-ounce glass of warm water. Swish and spit.    Watch your mouth and tongue for white patches. This is a sign of fungal infection, a common side effect of chemotherapy. Be sure to tell your doctor about these patches. Medication can be prescribed to help you fight the fungal infection.  Other Home Care    Try to exercise. Exercise keeps you strong and keeps your heart and lungs active. Walk as much as you can without becoming dizzy or weak.    Keep clean. During chemotherapy, your body cant fight infection very well. Take short baths or showers.    Use moisturizing soap. Chemotherapy can make your skin dry.    Apply moisturizing lotion several times a day to help relieve dry skin.    Dont take very hot or very cold showers or baths.    Dont be surprised if your chemotherapy causes slight burns to your skin--usually on  the hands and feet. Some drugs used in high doses cause this to happen. Ask for a special cream to help relieve the burn and protect your skin.    Let your doctor know if your throat is sore. You may have an infection that needs treatment.    Remember, many patients feel sick and lose their appetites during treatment. Eat small meals several times a day to keep your strength up.    Choose bland foods with little taste or smell if you are reacting strongly to food.    Be sure to cook all food thoroughly. This kills bacteria and helps you avoid infection.    Eat foods that are soft. Soft foods are less likely to cause stomach irritation.   Follow-Up   Make a follow-up appointment as directed by our staff.   When to Call Your Doctor   Call your doctor right away if you have any of the following:    Unexplained bleeding    Trouble concentrating    Ongoing fatigue    Shortness of breath, wheezing, or trouble breathing    Rapid, irregular heartbeat; chest pain    Dizziness, lightheadedness    Constant feeling of being cold    Hives or a cut or rash that swells, turns red, feels hot or painful, or begins to ooze    Fever of 100.4°F or higher, or chills    © 0633-6596 Jessy \[facility]\"", [address] [place] [address], [place] [address]. All rights reserved. This information is not intended as a substitute for professional medical care.

## 2022-01-28 NOTE — PROGRESS NOTES
Subjective:       Patient ID: Alton Black is a 81 y.o. male.    Chief Complaint: No chief complaint on file.    HPI       Mr Black returns today for follow up in his next cycle of chemotherapy.  Briefly, he is an 81 year old male currently being treated with concurernt chemo-XRT for a stage IIB squamous cell lung cancer.  He is due for his third dose of carboplatin plus paclitaxel today.  His LFTs from yesterday are normal while his creatinine is 0.7 mg/dL.  Bilirubin is 1.1 mg/dL.  His WBCs are 5,100 per cubic mm, hemoglobin 14.5 grams/deciliter, hematocrit 43.8% and platelets 175,000 per cubic mm.  His calcium is 8.3 mg/dL and his albumin is 3.2 grams/dL..    Review of Systems    Overall he feels well.  He tolerated the chemotherapy last week without experiencing any significant side effects other than fatigue.  His ECOG PS remains at 1.  He denies any anxiety, depression, easy bruising, fevers, chills, night  sweats, weight loss, nausea, vomiting, diarrhea, constipation, diplopia, blurred vision, headache, chest pain, palpitations, shortness of breath, breast pain, abdominal pain, extremity pain, or difficulty ambulating.  The remainder of the ten-point ROS, including general, skin, lymph, H/N, cardiorespiratory, GI, , Neuro, Endocrine, and psychiatric is negative.   Objective:      Physical Exam    He is alert, oriented to time, place, person, pleasant, well      nourished, in no acute physical distress.                                    VITAL SIGNS:  Reviewed                                      HEENT:  Normal.  There are no nasal, oral, lip, gingival, auricular, lid,    or conjunctival lesions.  Mucosae are moist and pink, and there is no        thrush.  Pupils are equal, reactive to light and accommodation.              Extraocular muscle movements are intact.    Dentition is good.  There is no frontal or maxillary tenderness.                                   NECK:  Supple without JVD,  adenopathy, or thyromegaly.                       LUNGS:  Clear to auscultation without wheezing, rales, or rhonchi.           CARDIOVASCULAR:  Reveals an S1, S2, no murmurs, no rubs, no gallops.     A left-sided Port-A-Cath is identified.      ABDOMEN:  Soft, nontender, without organomegaly.  Bowel sounds are    present.                                                                     EXTREMITIES:  No cyanosis, clubbing, or edema.                                                               LYMPHATIC:  There is no cervical, axillary, or supraclavicular adenopathy.   SKIN:  Warm and moist, without petechiae, rashes, induration, or ecchymoses.  There is minimal erythema within the radiation field.           NEUROLOGIC:  DTRs are 0-1+ bilaterally, symmetrical, motor function is 5/5,  and cranial nerves are  within normal limits.    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 Black.  I again explained to him that he is being treated with curative intent.  He may proceed with his weekly radiosensitizing chemotherapy today.  He will have repeat labs 6 days from now, continue the daily radiation, and return in 7 days to see Dr. Lopez or me for his next cycle of chemotherapy.  His multiple questions and his brother's questions were answered to their satisfaction.

## 2022-01-31 PROCEDURE — 77014 HC CT GUIDANCE RADIATION THERAPY FLDS PLACEMENT: CPT | Mod: TC | Performed by: RADIOLOGY

## 2022-01-31 PROCEDURE — 77014 PR  CT GUIDANCE PLACEMENT RAD THERAPY FIELDS: CPT | Mod: 26,,, | Performed by: RADIOLOGY

## 2022-01-31 PROCEDURE — 77386 HC IMRT, COMPLEX: CPT | Performed by: RADIOLOGY

## 2022-01-31 PROCEDURE — 77014 PR  CT GUIDANCE PLACEMENT RAD THERAPY FIELDS: ICD-10-PCS | Mod: 26,,, | Performed by: RADIOLOGY

## 2022-02-01 ENCOUNTER — HOSPITAL ENCOUNTER (OUTPATIENT)
Dept: RADIATION THERAPY | Facility: HOSPITAL | Age: 82
Discharge: HOME | End: 2022-02-01
Attending: RADIOLOGY
Payer: MEDICARE

## 2022-02-01 PROCEDURE — 77014 PR  CT GUIDANCE PLACEMENT RAD THERAPY FIELDS: ICD-10-PCS | Mod: 26,,, | Performed by: RADIOLOGY

## 2022-02-01 PROCEDURE — 77014 HC CT GUIDANCE RADIATION THERAPY FLDS PLACEMENT: CPT | Mod: TC | Performed by: RADIOLOGY

## 2022-02-01 PROCEDURE — 77014 PR  CT GUIDANCE PLACEMENT RAD THERAPY FIELDS: CPT | Mod: 26,,, | Performed by: RADIOLOGY

## 2022-02-01 PROCEDURE — 77386 HC IMRT, COMPLEX: CPT | Performed by: RADIOLOGY

## 2022-02-02 ENCOUNTER — DOCUMENTATION ONLY (OUTPATIENT)
Dept: RADIATION ONCOLOGY | Facility: CLINIC | Age: 82
End: 2022-02-02
Payer: MEDICARE

## 2022-02-02 PROCEDURE — 77014 HC CT GUIDANCE RADIATION THERAPY FLDS PLACEMENT: CPT | Mod: TC | Performed by: RADIOLOGY

## 2022-02-02 PROCEDURE — 77386 HC IMRT, COMPLEX: CPT | Performed by: RADIOLOGY

## 2022-02-02 PROCEDURE — 77014 PR  CT GUIDANCE PLACEMENT RAD THERAPY FIELDS: CPT | Mod: 26,,, | Performed by: RADIOLOGY

## 2022-02-02 PROCEDURE — 77014 PR  CT GUIDANCE PLACEMENT RAD THERAPY FIELDS: ICD-10-PCS | Mod: 26,,, | Performed by: RADIOLOGY

## 2022-02-02 NOTE — PLAN OF CARE
Day 14 of outpatient xrt to the lung. doing good; denies any SOB, cough, or sore swallowing; no pain. Will continue to monitor.

## 2022-02-03 ENCOUNTER — LAB VISIT (OUTPATIENT)
Dept: LAB | Facility: HOSPITAL | Age: 82
End: 2022-02-03
Attending: INTERNAL MEDICINE
Payer: MEDICARE

## 2022-02-03 DIAGNOSIS — C34.90 SQUAMOUS CELL CARCINOMA OF LUNG, UNSPECIFIED LATERALITY: ICD-10-CM

## 2022-02-03 LAB
ALBUMIN SERPL BCP-MCNC: 2.9 G/DL (ref 3.5–5.2)
ALP SERPL-CCNC: 100 U/L (ref 55–135)
ALT SERPL W/O P-5'-P-CCNC: 26 U/L (ref 10–44)
ANION GAP SERPL CALC-SCNC: 7 MMOL/L (ref 8–16)
AST SERPL-CCNC: 18 U/L (ref 10–40)
BASOPHILS # BLD AUTO: 0.01 K/UL (ref 0–0.2)
BASOPHILS NFR BLD: 0.3 % (ref 0–1.9)
BILIRUB SERPL-MCNC: 0.6 MG/DL (ref 0.1–1)
BUN SERPL-MCNC: 9 MG/DL (ref 8–23)
CALCIUM SERPL-MCNC: 8 MG/DL (ref 8.7–10.5)
CHLORIDE SERPL-SCNC: 101 MMOL/L (ref 95–110)
CO2 SERPL-SCNC: 27 MMOL/L (ref 23–29)
CREAT SERPL-MCNC: 0.7 MG/DL (ref 0.5–1.4)
DIFFERENTIAL METHOD: ABNORMAL
EOSINOPHIL # BLD AUTO: 0 K/UL (ref 0–0.5)
EOSINOPHIL NFR BLD: 0.6 % (ref 0–8)
ERYTHROCYTE [DISTWIDTH] IN BLOOD BY AUTOMATED COUNT: 13 % (ref 11.5–14.5)
EST. GFR  (AFRICAN AMERICAN): >60 ML/MIN/1.73 M^2
EST. GFR  (NON AFRICAN AMERICAN): >60 ML/MIN/1.73 M^2
GLUCOSE SERPL-MCNC: 92 MG/DL (ref 70–110)
HCT VFR BLD AUTO: 41 % (ref 40–54)
HGB BLD-MCNC: 13.5 G/DL (ref 14–18)
IMM GRANULOCYTES # BLD AUTO: 0.05 K/UL (ref 0–0.04)
IMM GRANULOCYTES NFR BLD AUTO: 1.5 % (ref 0–0.5)
LYMPHOCYTES # BLD AUTO: 0.4 K/UL (ref 1–4.8)
LYMPHOCYTES NFR BLD: 13.3 % (ref 18–48)
MCH RBC QN AUTO: 32.8 PG (ref 27–31)
MCHC RBC AUTO-ENTMCNC: 32.9 G/DL (ref 32–36)
MCV RBC AUTO: 100 FL (ref 82–98)
MONOCYTES # BLD AUTO: 0.2 K/UL (ref 0.3–1)
MONOCYTES NFR BLD: 4.6 % (ref 4–15)
NEUTROPHILS # BLD AUTO: 2.6 K/UL (ref 1.8–7.7)
NEUTROPHILS NFR BLD: 79.7 % (ref 38–73)
NRBC BLD-RTO: 0 /100 WBC
PLATELET # BLD AUTO: 140 K/UL (ref 150–450)
PMV BLD AUTO: 9.3 FL (ref 9.2–12.9)
POTASSIUM SERPL-SCNC: 4.1 MMOL/L (ref 3.5–5.1)
PROT SERPL-MCNC: 5.4 G/DL (ref 6–8.4)
RBC # BLD AUTO: 4.11 M/UL (ref 4.6–6.2)
SODIUM SERPL-SCNC: 135 MMOL/L (ref 136–145)
WBC # BLD AUTO: 3.24 K/UL (ref 3.9–12.7)

## 2022-02-03 PROCEDURE — 77014 PR  CT GUIDANCE PLACEMENT RAD THERAPY FIELDS: ICD-10-PCS | Mod: 26,,, | Performed by: RADIOLOGY

## 2022-02-03 PROCEDURE — 77014 HC CT GUIDANCE RADIATION THERAPY FLDS PLACEMENT: CPT | Mod: TC | Performed by: RADIOLOGY

## 2022-02-03 PROCEDURE — 77386 HC IMRT, COMPLEX: CPT | Performed by: RADIOLOGY

## 2022-02-03 PROCEDURE — 80053 COMPREHEN METABOLIC PANEL: CPT | Performed by: INTERNAL MEDICINE

## 2022-02-03 PROCEDURE — 77014 PR  CT GUIDANCE PLACEMENT RAD THERAPY FIELDS: CPT | Mod: 26,,, | Performed by: RADIOLOGY

## 2022-02-03 PROCEDURE — 36415 COLL VENOUS BLD VENIPUNCTURE: CPT | Performed by: INTERNAL MEDICINE

## 2022-02-03 PROCEDURE — 85025 COMPLETE CBC W/AUTO DIFF WBC: CPT | Performed by: INTERNAL MEDICINE

## 2022-02-04 ENCOUNTER — INFUSION (OUTPATIENT)
Dept: INFUSION THERAPY | Facility: HOSPITAL | Age: 82
End: 2022-02-04
Attending: INTERNAL MEDICINE
Payer: MEDICARE

## 2022-02-04 ENCOUNTER — OFFICE VISIT (OUTPATIENT)
Dept: HEMATOLOGY/ONCOLOGY | Facility: CLINIC | Age: 82
End: 2022-02-04
Payer: MEDICARE

## 2022-02-04 VITALS
WEIGHT: 199.75 LBS | TEMPERATURE: 97 F | HEART RATE: 74 BPM | BODY MASS INDEX: 27.97 KG/M2 | OXYGEN SATURATION: 97 % | HEIGHT: 71 IN | SYSTOLIC BLOOD PRESSURE: 155 MMHG | RESPIRATION RATE: 20 BRPM | DIASTOLIC BLOOD PRESSURE: 75 MMHG

## 2022-02-04 VITALS
DIASTOLIC BLOOD PRESSURE: 65 MMHG | WEIGHT: 199.75 LBS | HEIGHT: 71 IN | RESPIRATION RATE: 18 BRPM | OXYGEN SATURATION: 98 % | TEMPERATURE: 97 F | HEART RATE: 62 BPM | SYSTOLIC BLOOD PRESSURE: 129 MMHG | BODY MASS INDEX: 27.97 KG/M2

## 2022-02-04 DIAGNOSIS — C34.90 SQUAMOUS CELL CARCINOMA OF LUNG, UNSPECIFIED LATERALITY: Primary | ICD-10-CM

## 2022-02-04 DIAGNOSIS — T45.1X5A CHEMOTHERAPY INDUCED NEUTROPENIA: ICD-10-CM

## 2022-02-04 DIAGNOSIS — C34.91 SQUAMOUS CELL CARCINOMA OF RIGHT LUNG: Primary | ICD-10-CM

## 2022-02-04 DIAGNOSIS — D84.9 IMMUNE DEFICIENCY DISORDER: ICD-10-CM

## 2022-02-04 DIAGNOSIS — D64.9 ANEMIA, UNSPECIFIED TYPE: ICD-10-CM

## 2022-02-04 DIAGNOSIS — D70.1 CHEMOTHERAPY INDUCED NEUTROPENIA: ICD-10-CM

## 2022-02-04 PROCEDURE — 77014 PR  CT GUIDANCE PLACEMENT RAD THERAPY FIELDS: CPT | Mod: 26,,, | Performed by: RADIOLOGY

## 2022-02-04 PROCEDURE — 77386 HC IMRT, COMPLEX: CPT | Performed by: RADIOLOGY

## 2022-02-04 PROCEDURE — 96413 CHEMO IV INFUSION 1 HR: CPT

## 2022-02-04 PROCEDURE — 1159F MED LIST DOCD IN RCRD: CPT | Mod: CPTII,S$GLB,, | Performed by: INTERNAL MEDICINE

## 2022-02-04 PROCEDURE — 99999 PR PBB SHADOW E&M-EST. PATIENT-LVL III: ICD-10-PCS | Mod: PBBFAC,,, | Performed by: INTERNAL MEDICINE

## 2022-02-04 PROCEDURE — 25000003 PHARM REV CODE 250: Performed by: INTERNAL MEDICINE

## 2022-02-04 PROCEDURE — 1126F AMNT PAIN NOTED NONE PRSNT: CPT | Mod: CPTII,S$GLB,, | Performed by: INTERNAL MEDICINE

## 2022-02-04 PROCEDURE — 77014 HC CT GUIDANCE RADIATION THERAPY FLDS PLACEMENT: CPT | Mod: TC | Performed by: RADIOLOGY

## 2022-02-04 PROCEDURE — 3078F PR MOST RECENT DIASTOLIC BLOOD PRESSURE < 80 MM HG: ICD-10-PCS | Mod: CPTII,S$GLB,, | Performed by: INTERNAL MEDICINE

## 2022-02-04 PROCEDURE — A4216 STERILE WATER/SALINE, 10 ML: HCPCS | Performed by: INTERNAL MEDICINE

## 2022-02-04 PROCEDURE — 96375 TX/PRO/DX INJ NEW DRUG ADDON: CPT

## 2022-02-04 PROCEDURE — 1101F PR PT FALLS ASSESS DOC 0-1 FALLS W/OUT INJ PAST YR: ICD-10-PCS | Mod: CPTII,S$GLB,, | Performed by: INTERNAL MEDICINE

## 2022-02-04 PROCEDURE — 3077F PR MOST RECENT SYSTOLIC BLOOD PRESSURE >= 140 MM HG: ICD-10-PCS | Mod: CPTII,S$GLB,, | Performed by: INTERNAL MEDICINE

## 2022-02-04 PROCEDURE — 1101F PT FALLS ASSESS-DOCD LE1/YR: CPT | Mod: CPTII,S$GLB,, | Performed by: INTERNAL MEDICINE

## 2022-02-04 PROCEDURE — 96367 TX/PROPH/DG ADDL SEQ IV INF: CPT

## 2022-02-04 PROCEDURE — 3288F PR FALLS RISK ASSESSMENT DOCUMENTED: ICD-10-PCS | Mod: CPTII,S$GLB,, | Performed by: INTERNAL MEDICINE

## 2022-02-04 PROCEDURE — 96417 CHEMO IV INFUS EACH ADDL SEQ: CPT

## 2022-02-04 PROCEDURE — 63600175 PHARM REV CODE 636 W HCPCS: Performed by: INTERNAL MEDICINE

## 2022-02-04 PROCEDURE — 77336 RADIATION PHYSICS CONSULT: CPT | Performed by: RADIOLOGY

## 2022-02-04 PROCEDURE — 99999 PR PBB SHADOW E&M-EST. PATIENT-LVL III: CPT | Mod: PBBFAC,,, | Performed by: INTERNAL MEDICINE

## 2022-02-04 PROCEDURE — 3077F SYST BP >= 140 MM HG: CPT | Mod: CPTII,S$GLB,, | Performed by: INTERNAL MEDICINE

## 2022-02-04 PROCEDURE — 99215 PR OFFICE/OUTPT VISIT, EST, LEVL V, 40-54 MIN: ICD-10-PCS | Mod: S$GLB,,, | Performed by: INTERNAL MEDICINE

## 2022-02-04 PROCEDURE — 99215 OFFICE O/P EST HI 40 MIN: CPT | Mod: S$GLB,,, | Performed by: INTERNAL MEDICINE

## 2022-02-04 PROCEDURE — 1159F PR MEDICATION LIST DOCUMENTED IN MEDICAL RECORD: ICD-10-PCS | Mod: CPTII,S$GLB,, | Performed by: INTERNAL MEDICINE

## 2022-02-04 PROCEDURE — 3288F FALL RISK ASSESSMENT DOCD: CPT | Mod: CPTII,S$GLB,, | Performed by: INTERNAL MEDICINE

## 2022-02-04 PROCEDURE — 3078F DIAST BP <80 MM HG: CPT | Mod: CPTII,S$GLB,, | Performed by: INTERNAL MEDICINE

## 2022-02-04 PROCEDURE — 77014 PR  CT GUIDANCE PLACEMENT RAD THERAPY FIELDS: ICD-10-PCS | Mod: 26,,, | Performed by: RADIOLOGY

## 2022-02-04 PROCEDURE — 1126F PR PAIN SEVERITY QUANTIFIED, NO PAIN PRESENT: ICD-10-PCS | Mod: CPTII,S$GLB,, | Performed by: INTERNAL MEDICINE

## 2022-02-04 RX ORDER — DIPHENHYDRAMINE HYDROCHLORIDE 50 MG/ML
50 INJECTION INTRAMUSCULAR; INTRAVENOUS ONCE AS NEEDED
Status: CANCELLED | OUTPATIENT
Start: 2022-02-04

## 2022-02-04 RX ORDER — EPINEPHRINE 0.3 MG/.3ML
0.3 INJECTION SUBCUTANEOUS ONCE AS NEEDED
Status: CANCELLED | OUTPATIENT
Start: 2022-02-04

## 2022-02-04 RX ORDER — FAMOTIDINE 10 MG/ML
20 INJECTION INTRAVENOUS
Status: CANCELLED | OUTPATIENT
Start: 2022-02-04

## 2022-02-04 RX ORDER — HEPARIN 100 UNIT/ML
500 SYRINGE INTRAVENOUS
Status: CANCELLED | OUTPATIENT
Start: 2022-02-04

## 2022-02-04 RX ORDER — SODIUM CHLORIDE 0.9 % (FLUSH) 0.9 %
10 SYRINGE (ML) INJECTION
Status: DISCONTINUED | OUTPATIENT
Start: 2022-02-04 | End: 2022-02-04 | Stop reason: HOSPADM

## 2022-02-04 RX ORDER — SODIUM CHLORIDE 0.9 % (FLUSH) 0.9 %
10 SYRINGE (ML) INJECTION
Status: CANCELLED | OUTPATIENT
Start: 2022-02-04

## 2022-02-04 RX ORDER — HEPARIN 100 UNIT/ML
500 SYRINGE INTRAVENOUS
Status: DISCONTINUED | OUTPATIENT
Start: 2022-02-04 | End: 2022-02-04 | Stop reason: HOSPADM

## 2022-02-04 RX ORDER — FAMOTIDINE 10 MG/ML
20 INJECTION INTRAVENOUS
Status: COMPLETED | OUTPATIENT
Start: 2022-02-04 | End: 2022-02-04

## 2022-02-04 RX ADMIN — CARBOPLATIN 255 MG: 10 INJECTION, SOLUTION INTRAVENOUS at 11:02

## 2022-02-04 RX ADMIN — HEPARIN 500 UNITS: 100 SYRINGE at 11:02

## 2022-02-04 RX ADMIN — SODIUM CHLORIDE 50 MG: 900 INJECTION, SOLUTION INTRAVENOUS at 09:02

## 2022-02-04 RX ADMIN — Medication 10 ML: at 11:02

## 2022-02-04 RX ADMIN — PALONOSETRON HYDROCHLORIDE: 0.25 INJECTION, SOLUTION INTRAVENOUS at 09:02

## 2022-02-04 RX ADMIN — FAMOTIDINE 20 MG: 10 INJECTION, SOLUTION INTRAVENOUS at 09:02

## 2022-02-04 RX ADMIN — SODIUM CHLORIDE: 0.9 INJECTION, SOLUTION INTRAVENOUS at 09:02

## 2022-02-04 RX ADMIN — PACLITAXEL 96 MG: 6 INJECTION, SOLUTION INTRAVENOUS at 10:02

## 2022-02-04 NOTE — DISCHARGE INSTRUCTIONS
[facility]  [address]  [address]  [phone] phone     [phone] fax  Hours of Operation: Monday- Friday 8:00am- 5:00pm  After hours phone  [phone]  Hematology / Oncology Physicians on call      Dr. Jae Rubio, HEDY Contreras NP Tyesha Taylor, NP    Please call with any concerns regarding your appointment today.        FALL PREVENTION   Falls often occur due to slipping, tripping or losing your balance. Here are ways to reduce your risk of falling again.    Was there anything that caused your fall that can be fixed, removed or replaced?    Make your home safe by keeping walkways clear of objects you may trip over.    Use non-slip pads under rugs.    Do not walk in poorly lit areas.    Do not stand on chairs or wobbly ladders.    Use caution when reaching overhead or looking upward. This position can cause a loss of balance.    Be sure your shoes fit properly, have non-slip bottoms and are in good condition.    Be cautious when going up and down stairs, curbs, and when walking on uneven sidewalks.    If your balance is poor, consider using a cane or walker.    If your fall was related to alcohol use, stop or limit alcohol intake.    If your fall was related to use of sleeping medicines, talk to your doctor about this. You may need to reduce your dosage at bedtime if you awaken during the night to go to the bathroom.    To reduce the need for nighttime bathroom trips:    Avoid drinking fluids for several hours before going to bed    Empty your bladder before going to bed    Men can keep a urinal at the bedside   © 3462-2792 Jessy Ng, [address] [place] [address], [place] [address]. All rights reserved. This information is not intended as a substitute for professional medical care. Always follow your healthcare professional's  instructions.      Discharge Instructions for Chemotherapy   Your doctor prescribed a type of medication therapy for you called chemotherapy. Doctors prescribe chemotherapy for many different types of illnesses, including cancer. There are many types of chemotherapy. This sheet provides general guidelines on how you can take care of yourself after your chemotherapy.   Mouth Care   Dont be discouraged if you get mouth sores, even if you are following all your doctors instructions. Many people get mouth sores as a side effect of chemotherapy. Heres what you can do to prevent mouth sores:    Keep your mouth clean. Brush your teeth with a soft-bristle toothbrush after every meal.    Use an oral swab or special soft toothbrush if your gums bleed during regular brushing.    Dont use dental floss if your platelet count is below 50,000. Your doctor or nurse will tell you if this is the case.    Use any mouthwashes given to you as directed.    If you cant tolerate regular methods, use salt and baking soda to clean your mouth. Mix 1 teaspoon(s) of salt and 1 teaspoon(s) of baking soda into an 8-ounce glass of warm water. Swish and spit.    Watch your mouth and tongue for white patches. This is a sign of fungal infection, a common side effect of chemotherapy. Be sure to tell your doctor about these patches. Medication can be prescribed to help you fight the fungal infection.  Other Home Care    Try to exercise. Exercise keeps you strong and keeps your heart and lungs active. Walk as much as you can without becoming dizzy or weak.    Keep clean. During chemotherapy, your body cant fight infection very well. Take short baths or showers.    Use moisturizing soap. Chemotherapy can make your skin dry.    Apply moisturizing lotion several times a day to help relieve dry skin.    Dont take very hot or very cold showers or baths.    Dont be surprised if your chemotherapy causes slight burns to your skin--usually on  the hands and feet. Some drugs used in high doses cause this to happen. Ask for a special cream to help relieve the burn and protect your skin.    Let your doctor know if your throat is sore. You may have an infection that needs treatment.    Remember, many patients feel sick and lose their appetites during treatment. Eat small meals several times a day to keep your strength up.    Choose bland foods with little taste or smell if you are reacting strongly to food.    Be sure to cook all food thoroughly. This kills bacteria and helps you avoid infection.    Eat foods that are soft. Soft foods are less likely to cause stomach irritation.   Follow-Up   Make a follow-up appointment as directed by our staff.   When to Call Your Doctor   Call your doctor right away if you have any of the following:    Unexplained bleeding    Trouble concentrating    Ongoing fatigue    Shortness of breath, wheezing, or trouble breathing    Rapid, irregular heartbeat; chest pain    Dizziness, lightheadedness    Constant feeling of being cold    Hives or a cut or rash that swells, turns red, feels hot or painful, or begins to ooze    Fever of 100.4°F or higher, or chills    © 8824-8724 Jessy \[facility]\"", [address] [place] [address], [place] [address]. All rights reserved. This information is not intended as a substitute for professional medical care.

## 2022-02-04 NOTE — PLAN OF CARE
Problem: Adult Inpatient Plan of Care  Goal: Plan of Care Review  Outcome: Ongoing, Progressing  Flowsheets (Taken 2/4/2022 0922)  Plan of Care Reviewed With: patient  Goal: Patient-Specific Goal (Individualized)  Outcome: Ongoing, Progressing  Flowsheets (Taken 2/4/2022 0922)  Anxieties, Fears or Concerns: patietn anxious about port access and needle insertion  Individualized Care Needs: Feet elevated in recliner, warm blanket and pillow provided for comfort measures  Patient-Specific Goals (Include Timeframe): tolerate cycle 4 of treatment without adverse reaction  Goal: Optimal Comfort and Wellbeing  Outcome: Ongoing, Progressing  Intervention: Provide Person-Centered Care  Flowsheets (Taken 2/4/2022 0922)  Trust Relationship/Rapport:   care explained   questions answered   choices provided   questions encouraged   emotional support provided   reassurance provided   empathic listening provided   thoughts/feelings acknowledged

## 2022-02-04 NOTE — PROGRESS NOTES
Subjective:      DATE OF VISIT: 2/4/22     ?  Patient ID:?Alton Black is a 81 y.o. male.?? MR#: [id number]   ?   PRIMARY ONCOLOGIST: Dr. Lopez    ? Primary Care Providers:  Rohan Kenny MD, MD (General)     CHIEF COMPLAINT: ?  Follow-up on chemoradiation, cycle 4 planned today   ?   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   CURRENT TREATMENT:  Chemoradiation with carboplatin paclitaxel weekly, cycle 1 day 1 1/14/22    PAST TREATMENT:  None  ?   ONCOLOGIC HISTORY:   ?   Oncology History   Squamous cell lung cancer   12/17/2021 Initial Diagnosis    Squamous cell lung cancer     12/22/2021 Cancer Staged    Staging form: Lung, AJCC 8th Edition  - Clinical stage from 12/22/2021: Stage IIB (cT3, cN0, cM0)     1/14/2022 -  Chemotherapy    Treatment Summary   Plan Name: OP NSCLC PACLITAXEL + CARBOPLATIN (AUC) QW + RADIATION  Treatment Goal: Curative  Status: Active  Start Date: 1/14/2022  End Date: 2/18/2022 (Planned)  Provider: Juan Lopez MD  Chemotherapy: dexAMETHasone (DECADRON) 4 MG Tab, 8 mg, Oral, Daily, 1 of 1 cycle, Start date: --, End date: --  CARBOplatin (PARAPLATIN) 230 mg in sodium chloride 0.9% 273 mL chemo infusion, 230 mg (100 % of original dose 230 mg), Intravenous, Clinic/HOD 1 time, 3 of 6 cycles  Dose modification:   (original dose 230 mg, Cycle 1)  Administration: 230 mg (1/14/2022), 230 mg (1/21/2022), 255 mg (1/28/2022)  PACLitaxeL (TAXOL) 45 mg/m2 = 96 mg in sodium chloride 0.9% 266 mL chemo infusion, 45 mg/m2 = 96 mg, Intravenous, Clinic/HOD 1 time, 3 of 6 cycles  Administration: 96 mg (1/14/2022), 96 mg (1/21/2022), 96 mg (1/28/2022)       Cancer Staging  Squamous cell lung cancer  - Clinical stage from 12/22/2021: Stage IIB (cT3, cN0, cM0) - Signed by Juan Lopez MD on 12/22/2021         HPI    He has been doing well on chemoradiation presenting today for cycle 4. No infectious symptoms, cough, chest pain, shortness of breath, fatigue  or change in appetite.  Mild discomfort with port access.  Port site clean dry and intact.    Review of Systems    ?   A comprehensive 14-point review of systems was reviewed with patient and was negative other than as specified above.   ?      Objective:      Physical Exam        Vitals:    02/04/22 0832   BP: (!) 155/75   Pulse: 74   Resp: 20   Temp: 97.2 °F (36.2 °C)      ?   General appearance: Generally well appearing, in no acute distress.   Head, eyes, ears, nose, and throat: Oropharynx clear with moist mucous membranes.   Cardiovascular: Regular rate and rhythm, S1, S2, no audible murmurs.   Respiratory: Lungs clear to auscultation bilaterally.   Abdomen: nontender, nondistended.   Extremities: Warm, without edema.   Neurologic: Alert and oriented.  Skin: No rashes, ecchymoses or petechial lesion.   Psychiatric: normal mood and affect, conversant and appropriate    ?   Laboratory:  ?   No visits with results within 1 Day(s) from this visit.   Latest known visit with results is:   Lab Visit on 02/03/2022   Component Date Value Ref Range Status    WBC 02/03/2022 3.24* 3.90 - 12.70 K/uL Final    RBC 02/03/2022 4.11* 4.60 - 6.20 M/uL Final    Hemoglobin 02/03/2022 13.5* 14.0 - 18.0 g/dL Final    Hematocrit 02/03/2022 41.0  40.0 - 54.0 % Final    MCV 02/03/2022 100* 82 - 98 fL Final    MCH 02/03/2022 32.8* 27.0 - 31.0 pg Final    MCHC 02/03/2022 32.9  32.0 - 36.0 g/dL Final    RDW 02/03/2022 13.0  11.5 - 14.5 % Final    Platelets 02/03/2022 140* 150 - 450 K/uL Final    MPV 02/03/2022 9.3  9.2 - 12.9 fL Final    Immature Granulocytes 02/03/2022 1.5* 0.0 - 0.5 % Final    Gran # (ANC) 02/03/2022 2.6  1.8 - 7.7 K/uL Final    Immature Grans (Abs) 02/03/2022 0.05* 0.00 - 0.04 K/uL Final    Lymph # 02/03/2022 0.4* 1.0 - 4.8 K/uL Final    Mono # 02/03/2022 0.2* 0.3 - 1.0 K/uL Final    Eos # 02/03/2022 0.0  0.0 - 0.5 K/uL Final    Baso # 02/03/2022 0.01  0.00 - 0.20 K/uL Final    nRBC 02/03/2022 0  0  /100 WBC Final    Gran % 02/03/2022 79.7* 38.0 - 73.0 % Final    Lymph % 02/03/2022 13.3* 18.0 - 48.0 % Final    Mono % 02/03/2022 4.6  4.0 - 15.0 % Final    Eosinophil % 02/03/2022 0.6  0.0 - 8.0 % Final    Basophil % 02/03/2022 0.3  0.0 - 1.9 % Final    Differential Method 02/03/2022 Automated   Final    Sodium 02/03/2022 135* 136 - 145 mmol/L Final    Potassium 02/03/2022 4.1  3.5 - 5.1 mmol/L Final    Chloride 02/03/2022 101  95 - 110 mmol/L Final    CO2 02/03/2022 27  23 - 29 mmol/L Final    Glucose 02/03/2022 92  70 - 110 mg/dL Final    BUN 02/03/2022 9  8 - 23 mg/dL Final    Creatinine 02/03/2022 0.7  0.5 - 1.4 mg/dL Final    Calcium 02/03/2022 8.0* 8.7 - 10.5 mg/dL Final    Total Protein 02/03/2022 5.4* 6.0 - 8.4 g/dL Final    Albumin 02/03/2022 2.9* 3.5 - 5.2 g/dL Final    Total Bilirubin 02/03/2022 0.6  0.1 - 1.0 mg/dL Final    Alkaline Phosphatase 02/03/2022 100  55 - 135 U/L Final    AST 02/03/2022 18  10 - 40 U/L Final    ALT 02/03/2022 26  10 - 44 U/L Final    Anion Gap 02/03/2022 7* 8 - 16 mmol/L Final    eGFR if African American 02/03/2022 >60  >60 mL/min/1.73 m^2 Final    eGFR if non African American 02/03/2022 >60  >60 mL/min/1.73 m^2 Final      ?     Imaging:  ?    Results for orders placed or performed during the hospital encounter of 11/19/21 (from the past 2160 hour(s))   CT Head Without Contrast    Impression    No CT evidence of acute intracranial abnormality.    Tiny, remote left basal ganglia lacunar type infarcts.    Moderate degree of chronic microvascular ischemic change.    All CT scans at this facility use dose modulation, iterative reconstruction, and/or weight base dosing when appropriate to reduce radiation dose to as low as reasonably achievable.      Electronically signed by: Juan J Redman  Date:    11/19/2021  Time:    15:22     Results for orders placed or performed during the hospital encounter of 11/19/21 (from the past 2160 hour(s))   MRI Brain  Without Contrast    Impression    No hemorrhage or infarct.    Moderate microvascular ischemic change.      Electronically signed by: Baljinder Pappas  Date:    11/19/2021  Time:    17:43     Results for orders placed or performed during the hospital encounter of 12/14/21 (from the past 2160 hour(s))   NM PET CT Routine Skull to Mid Thigh    Impression    1. Highly FDG avid perihilar mass in the superior segment of the right lower lobe which is highly concerning for malignancy.  2. No FDG avid adenopathy or definite evidence of metastatic disease.  3. Observations as above      Electronically signed by: Geronimo Sigala MD  Date:    12/14/2021  Time:    11:42       Results for orders placed or performed during the hospital encounter of 11/19/21 (from the past 2160 hour(s))   MRI Brain Without Contrast    Impression    No hemorrhage or infarct.    Moderate microvascular ischemic change.      Electronically signed by: Baljinder Pappas  Date:    11/19/2021  Time:    17:43         ?   Assessment/Plan:   Squamous cell carcinoma of lung, unspecified laterality  -     CBC auto differential; Future; Expected date: 02/11/2022  -     Comprehensive Metabolic Panel; Future; Expected date: 02/11/2022    Anemia, unspecified type    Chemotherapy induced neutropenia    Immune deficiency disorder       1. Squamous cell carcinoma of lung, unspecified laterality    2. Anemia, unspecified type    3. Chemotherapy induced neutropenia    4. Immune deficiency disorder          Plan:     Problem List Items Addressed This Visit        Oncology    Anemia    Squamous cell lung cancer - Primary      Other Visit Diagnoses     Chemotherapy induced neutropenia        Immune deficiency disorder            Squamous cell carcinoma lung stage II colon:  patient expressed not interested in surgical option and therefore definitive chemoradiation was recommended by primary oncologist Dr. Jessica doty and radiation oncologist  E-mail.  Cycle 1 day 1 plan  today, 01/14/2022.  Received antiemetics and reviewed use potential toxicities of his therapy which she expresses good understanding of.  Labs reviewed from 01/13/2021 without any concerning findings.  Will proceed with 1st weekly carboplatin paclitaxel today, order sign.  Will have him follow-up with primary oncologist for cycle 2 in 1 week with CBC CMP prior.     Labs reviewed notable for progressive leukopenia ANC 2.6.  Tolerating treatment well.  Continue with ongoing daily radiation.  Proceed with cycle 4 carboplatin paclitaxel today  Revisit 1 week with CBC, CMP prior and follow-up with Dr. Lopez primary oncologist

## 2022-02-04 NOTE — PROGRESS NOTES
Patient, Alton Black (MRN #[id number]), presented with a recent Platelet count less than 150 K/uL consistent with the definition of thrombocytopenia (ICD10 - D69.6).    Platelets   Date Value Ref Range Status   02/03/2022 140 (L) 150 - 450 K/uL Final     The patient's thrombocytopenia was monitored, evaluated, addressed and/or treated. This addendum to the medical record is made on 02/04/2022.

## 2022-02-07 PROCEDURE — 77014 PR  CT GUIDANCE PLACEMENT RAD THERAPY FIELDS: CPT | Mod: 26,,, | Performed by: RADIOLOGY

## 2022-02-07 PROCEDURE — 77014 HC CT GUIDANCE RADIATION THERAPY FLDS PLACEMENT: CPT | Mod: TC | Performed by: RADIOLOGY

## 2022-02-07 PROCEDURE — 77386 HC IMRT, COMPLEX: CPT | Performed by: RADIOLOGY

## 2022-02-07 PROCEDURE — 77014 PR  CT GUIDANCE PLACEMENT RAD THERAPY FIELDS: ICD-10-PCS | Mod: 26,,, | Performed by: RADIOLOGY

## 2022-02-08 PROCEDURE — 77014 PR  CT GUIDANCE PLACEMENT RAD THERAPY FIELDS: ICD-10-PCS | Mod: 26,,, | Performed by: RADIOLOGY

## 2022-02-08 PROCEDURE — 77014 HC CT GUIDANCE RADIATION THERAPY FLDS PLACEMENT: CPT | Mod: TC | Performed by: RADIOLOGY

## 2022-02-08 PROCEDURE — 77386 HC IMRT, COMPLEX: CPT | Performed by: RADIOLOGY

## 2022-02-08 PROCEDURE — 77014 PR  CT GUIDANCE PLACEMENT RAD THERAPY FIELDS: CPT | Mod: 26,,, | Performed by: RADIOLOGY

## 2022-02-09 ENCOUNTER — DOCUMENTATION ONLY (OUTPATIENT)
Dept: RADIATION ONCOLOGY | Facility: CLINIC | Age: 82
End: 2022-02-09
Payer: MEDICARE

## 2022-02-09 PROCEDURE — 77014 PR  CT GUIDANCE PLACEMENT RAD THERAPY FIELDS: CPT | Mod: 26,,, | Performed by: RADIOLOGY

## 2022-02-09 PROCEDURE — 77386 HC IMRT, COMPLEX: CPT | Performed by: RADIOLOGY

## 2022-02-09 PROCEDURE — 77014 HC CT GUIDANCE RADIATION THERAPY FLDS PLACEMENT: CPT | Mod: TC | Performed by: RADIOLOGY

## 2022-02-09 PROCEDURE — 77014 PR  CT GUIDANCE PLACEMENT RAD THERAPY FIELDS: ICD-10-PCS | Mod: 26,,, | Performed by: RADIOLOGY

## 2022-02-09 NOTE — PLAN OF CARE
Day 19 of outpatient xrt to the lung. Doing well, denies any issues at this time, no pain, no SOB, no sore swallowing, no cough, good appetite. Will continue to monitor.

## 2022-02-10 PROCEDURE — 77014 PR  CT GUIDANCE PLACEMENT RAD THERAPY FIELDS: ICD-10-PCS | Mod: 26,,, | Performed by: RADIOLOGY

## 2022-02-10 PROCEDURE — 77386 HC IMRT, COMPLEX: CPT | Performed by: RADIOLOGY

## 2022-02-10 PROCEDURE — 77014 PR  CT GUIDANCE PLACEMENT RAD THERAPY FIELDS: CPT | Mod: 26,,, | Performed by: RADIOLOGY

## 2022-02-10 PROCEDURE — 77014 HC CT GUIDANCE RADIATION THERAPY FLDS PLACEMENT: CPT | Mod: TC | Performed by: RADIOLOGY

## 2022-02-11 PROCEDURE — 77014 PR  CT GUIDANCE PLACEMENT RAD THERAPY FIELDS: CPT | Mod: 26,,, | Performed by: RADIOLOGY

## 2022-02-11 PROCEDURE — 77386 HC IMRT, COMPLEX: CPT | Performed by: RADIOLOGY

## 2022-02-11 PROCEDURE — 77014 PR  CT GUIDANCE PLACEMENT RAD THERAPY FIELDS: ICD-10-PCS | Mod: 26,,, | Performed by: RADIOLOGY

## 2022-02-11 PROCEDURE — 77336 RADIATION PHYSICS CONSULT: CPT | Performed by: RADIOLOGY

## 2022-02-11 PROCEDURE — 77014 HC CT GUIDANCE RADIATION THERAPY FLDS PLACEMENT: CPT | Mod: TC | Performed by: RADIOLOGY

## 2022-02-12 NOTE — ASSESSMENT & PLAN NOTE
Stage II B (cT3, cN0, cM0) squamous cell carcinoma of right lung.  Reviewed PET-CT scan, MRI of the brain and pathology report with patient and answered all his questions.  PET-CT scan did not reveal distant disease and MRI of brain was negative for metastasis.  We discussed prognosis and management of early-stage lung cancer.     He is not interested in surgical options.  Opted for definitive chemoRT.      Regimen:  Carboplatin AUC=2 + Paclitaxel 45 mg/m² Weekly During Radiation [J Clin Oncol Off J Am Soc Clin Oncol. 2005; 23(25):1272-4371]       2D echocardiogram from 11/20/21 showed EF of 55%.     s/p C4D1 of treatment. Reviewed labs, no concerning cytopenia, will proceed with C5D1. RTC in 1 week.

## 2022-02-14 ENCOUNTER — OFFICE VISIT (OUTPATIENT)
Dept: HEMATOLOGY/ONCOLOGY | Facility: CLINIC | Age: 82
End: 2022-02-14
Payer: MEDICARE

## 2022-02-14 ENCOUNTER — INFUSION (OUTPATIENT)
Dept: INFUSION THERAPY | Facility: HOSPITAL | Age: 82
End: 2022-02-14
Attending: INTERNAL MEDICINE
Payer: MEDICARE

## 2022-02-14 VITALS
TEMPERATURE: 97 F | OXYGEN SATURATION: 99 % | WEIGHT: 201.25 LBS | SYSTOLIC BLOOD PRESSURE: 137 MMHG | DIASTOLIC BLOOD PRESSURE: 69 MMHG | HEART RATE: 91 BPM | HEIGHT: 71 IN | BODY MASS INDEX: 28.18 KG/M2

## 2022-02-14 VITALS
OXYGEN SATURATION: 98 % | WEIGHT: 201.25 LBS | HEART RATE: 65 BPM | HEIGHT: 71 IN | RESPIRATION RATE: 18 BRPM | DIASTOLIC BLOOD PRESSURE: 65 MMHG | BODY MASS INDEX: 28.18 KG/M2 | SYSTOLIC BLOOD PRESSURE: 129 MMHG | TEMPERATURE: 97 F

## 2022-02-14 DIAGNOSIS — C34.91 SQUAMOUS CELL CARCINOMA OF RIGHT LUNG: Primary | ICD-10-CM

## 2022-02-14 DIAGNOSIS — C34.90 SQUAMOUS CELL CARCINOMA OF LUNG, UNSPECIFIED LATERALITY: ICD-10-CM

## 2022-02-14 DIAGNOSIS — D84.821 IMMUNODEFICIENCY DUE TO CHEMOTHERAPY: Primary | ICD-10-CM

## 2022-02-14 DIAGNOSIS — Z79.899 IMMUNODEFICIENCY DUE TO CHEMOTHERAPY: Primary | ICD-10-CM

## 2022-02-14 DIAGNOSIS — T45.1X5A IMMUNODEFICIENCY DUE TO CHEMOTHERAPY: Primary | ICD-10-CM

## 2022-02-14 PROCEDURE — 25000003 PHARM REV CODE 250: Performed by: INTERNAL MEDICINE

## 2022-02-14 PROCEDURE — 3075F SYST BP GE 130 - 139MM HG: CPT | Mod: CPTII,S$GLB,, | Performed by: INTERNAL MEDICINE

## 2022-02-14 PROCEDURE — 99215 OFFICE O/P EST HI 40 MIN: CPT | Mod: 25,S$GLB,, | Performed by: INTERNAL MEDICINE

## 2022-02-14 PROCEDURE — 3078F DIAST BP <80 MM HG: CPT | Mod: CPTII,S$GLB,, | Performed by: INTERNAL MEDICINE

## 2022-02-14 PROCEDURE — 1101F PR PT FALLS ASSESS DOC 0-1 FALLS W/OUT INJ PAST YR: ICD-10-PCS | Mod: CPTII,S$GLB,, | Performed by: INTERNAL MEDICINE

## 2022-02-14 PROCEDURE — 99215 PR OFFICE/OUTPT VISIT, EST, LEVL V, 40-54 MIN: ICD-10-PCS | Mod: 25,S$GLB,, | Performed by: INTERNAL MEDICINE

## 2022-02-14 PROCEDURE — 96375 TX/PRO/DX INJ NEW DRUG ADDON: CPT

## 2022-02-14 PROCEDURE — 96413 CHEMO IV INFUSION 1 HR: CPT

## 2022-02-14 PROCEDURE — 77014 PR  CT GUIDANCE PLACEMENT RAD THERAPY FIELDS: CPT | Mod: 26,,, | Performed by: RADIOLOGY

## 2022-02-14 PROCEDURE — 1126F AMNT PAIN NOTED NONE PRSNT: CPT | Mod: CPTII,S$GLB,, | Performed by: INTERNAL MEDICINE

## 2022-02-14 PROCEDURE — 1101F PT FALLS ASSESS-DOCD LE1/YR: CPT | Mod: CPTII,S$GLB,, | Performed by: INTERNAL MEDICINE

## 2022-02-14 PROCEDURE — 63600175 PHARM REV CODE 636 W HCPCS: Performed by: INTERNAL MEDICINE

## 2022-02-14 PROCEDURE — 77386 HC IMRT, COMPLEX: CPT | Performed by: RADIOLOGY

## 2022-02-14 PROCEDURE — 96417 CHEMO IV INFUS EACH ADDL SEQ: CPT

## 2022-02-14 PROCEDURE — 3075F PR MOST RECENT SYSTOLIC BLOOD PRESS GE 130-139MM HG: ICD-10-PCS | Mod: CPTII,S$GLB,, | Performed by: INTERNAL MEDICINE

## 2022-02-14 PROCEDURE — 3078F PR MOST RECENT DIASTOLIC BLOOD PRESSURE < 80 MM HG: ICD-10-PCS | Mod: CPTII,S$GLB,, | Performed by: INTERNAL MEDICINE

## 2022-02-14 PROCEDURE — 99999 PR PBB SHADOW E&M-EST. PATIENT-LVL III: CPT | Mod: PBBFAC,,, | Performed by: INTERNAL MEDICINE

## 2022-02-14 PROCEDURE — 1126F PR PAIN SEVERITY QUANTIFIED, NO PAIN PRESENT: ICD-10-PCS | Mod: CPTII,S$GLB,, | Performed by: INTERNAL MEDICINE

## 2022-02-14 PROCEDURE — 3288F PR FALLS RISK ASSESSMENT DOCUMENTED: ICD-10-PCS | Mod: CPTII,S$GLB,, | Performed by: INTERNAL MEDICINE

## 2022-02-14 PROCEDURE — 77014 PR  CT GUIDANCE PLACEMENT RAD THERAPY FIELDS: ICD-10-PCS | Mod: 26,,, | Performed by: RADIOLOGY

## 2022-02-14 PROCEDURE — 77014 HC CT GUIDANCE RADIATION THERAPY FLDS PLACEMENT: CPT | Mod: TC | Performed by: RADIOLOGY

## 2022-02-14 PROCEDURE — 99999 PR PBB SHADOW E&M-EST. PATIENT-LVL III: ICD-10-PCS | Mod: PBBFAC,,, | Performed by: INTERNAL MEDICINE

## 2022-02-14 PROCEDURE — 3288F FALL RISK ASSESSMENT DOCD: CPT | Mod: CPTII,S$GLB,, | Performed by: INTERNAL MEDICINE

## 2022-02-14 PROCEDURE — 96367 TX/PROPH/DG ADDL SEQ IV INF: CPT

## 2022-02-14 RX ORDER — DIPHENHYDRAMINE HYDROCHLORIDE 50 MG/ML
50 INJECTION INTRAMUSCULAR; INTRAVENOUS ONCE AS NEEDED
Status: CANCELLED | OUTPATIENT
Start: 2022-02-14

## 2022-02-14 RX ORDER — EPINEPHRINE 0.3 MG/.3ML
0.3 INJECTION SUBCUTANEOUS ONCE AS NEEDED
Status: CANCELLED | OUTPATIENT
Start: 2022-02-14

## 2022-02-14 RX ORDER — HEPARIN 100 UNIT/ML
500 SYRINGE INTRAVENOUS
Status: CANCELLED | OUTPATIENT
Start: 2022-02-14

## 2022-02-14 RX ORDER — FAMOTIDINE 10 MG/ML
20 INJECTION INTRAVENOUS
Status: COMPLETED | OUTPATIENT
Start: 2022-02-14 | End: 2022-02-14

## 2022-02-14 RX ORDER — HEPARIN 100 UNIT/ML
500 SYRINGE INTRAVENOUS
Status: DISCONTINUED | OUTPATIENT
Start: 2022-02-14 | End: 2022-02-14 | Stop reason: HOSPADM

## 2022-02-14 RX ORDER — FAMOTIDINE 10 MG/ML
20 INJECTION INTRAVENOUS
Status: CANCELLED | OUTPATIENT
Start: 2022-02-14

## 2022-02-14 RX ORDER — SODIUM CHLORIDE 0.9 % (FLUSH) 0.9 %
10 SYRINGE (ML) INJECTION
Status: CANCELLED | OUTPATIENT
Start: 2022-02-14

## 2022-02-14 RX ADMIN — PALONOSETRON HYDROCHLORIDE: 0.25 INJECTION, SOLUTION INTRAVENOUS at 11:02

## 2022-02-14 RX ADMIN — CARBOPLATIN 255 MG: 10 INJECTION, SOLUTION INTRAVENOUS at 01:02

## 2022-02-14 RX ADMIN — Medication 500 UNITS: at 01:02

## 2022-02-14 RX ADMIN — DIPHENHYDRAMINE HYDROCHLORIDE 50 MG: 50 INJECTION INTRAMUSCULAR; INTRAVENOUS at 11:02

## 2022-02-14 RX ADMIN — PACLITAXEL 96 MG: 6 INJECTION, SOLUTION INTRAVENOUS at 12:02

## 2022-02-14 RX ADMIN — FAMOTIDINE 20 MG: 10 INJECTION, SOLUTION INTRAVENOUS at 11:02

## 2022-02-14 RX ADMIN — SODIUM CHLORIDE: 0.9 INJECTION, SOLUTION INTRAVENOUS at 11:02

## 2022-02-14 NOTE — DISCHARGE INSTRUCTIONS
.[facility]  [address] [address]  [address] [address]  [phone] phone     [phone] fax  Hours of Operation: Monday- Friday 8:00am- 5:00pm  After hours phone  [phone]  Hematology / Oncology Physicians on call    Dr. Jae Monterroso      Nurse Practitioners:    Laquita Gallegos, HEDY Stacy, HEDY Patel, MELIZA Ruiz      Please don't hesitate to call if you have any concerns.        .WAYS TO HELP PREVENT INFECTION         WASH YOUR HANDS OFTEN DURING THE DAY, ESPECIALLY BEFORE YOU EAT, AFTER USING THE BATHROOM, AND AFTER TOUCHING ANIMALS     STAY AWAY FROM PEOPLE WHO HAVE ILLNESSES YOU CAN CATCH; SUCH AS COLDS, FLU, CHICKEN POX     TRY TO AVOID CROWDS     STAY AWAY FROM CHILDREN WHO RECENTLY HAVE RECEIVED LIVE VIRUS VACCINES     MAINTAIN GOOD MOUTH CARE     DO NOT SQUEEZE OR SCRATCH PIMPLES     CLEAN CUTS & SCRAPES RIGHT AWAY AND DAILY UNTIL HEALED WITH WARM WATER, SOAP & AN ANTISEPTIC     AVOID CONTACT WITH LITTER BOXES, BIRD CAGES, & FISH TANKS     AVOID STANDING WATER, IE., BIRD BATHS, FLOWER POTS/VASES, OR HUMIDIFIERS     WEAR GLOVES WHEN GARDENING OR CLEANING UP AFTER OTHERS, ESPECIALLY BABIES & SMALL CHILDREN     DO NOT EAT RAW FISH, SEAFOOD, MEAT, OR EGGS    .FALL PREVENTION   Falls often occur due to slipping, tripping or losing your balance. Here are ways to reduce your risk of falling again.   Was there anything that caused your fall that can be fixed, removed or replaced?   Make your home safe by keeping walkways clear of objects you may trip over.   Use non-slip pads under rugs.   Do not walk in poorly lit areas.   Do not stand on chairs or wobbly ladders.   Use caution when reaching overhead or looking upward. This position can cause a loss of balance.   Be sure your shoes fit properly, have non-slip bottoms and are in good condition.   Be cautious when going up and down stairs,  curbs, and when walking on uneven sidewalks.   If your balance is poor, consider using a cane or walker.   If your fall was related to alcohol use, stop or limit alcohol intake.   If your fall was related to use of sleeping medicines, talk to your doctor about this. You may need to reduce your dosage at bedtime if you awaken during the night to go to the bathroom.   To reduce the need for nighttime bathroom trips:   Avoid drinking fluids for several hours before going to bed   Empty your bladder before going to bed   Men can keep a urinal at the bedside   © 5146-1669 [employer], [address] [place] [address], [place] [address]. All rights reserved. This information is not intended as a substitute for professional medical care. Always follow your healthcare professional's instructions.

## 2022-02-14 NOTE — PROGRESS NOTES
Subjective:      DATE OF VISIT: 2/14/22     ?  Patient ID:?Alton Black is a 81 y.o. male.?? MR#: [id number]   ?   PRIMARY ONCOLOGIST: Dr. Lopez    ? Primary Care Providers:  Rohan Kenny MD, MD (General)     CHIEF COMPLAINT: ?  Follow-up on chemoradiation, cycle 4 planned today   ?   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   CURRENT TREATMENT:  Chemoradiation with carboplatin paclitaxel weekly, cycle 1 day 1 1/14/22    PAST TREATMENT:  None  ?   ONCOLOGIC HISTORY:   ?   Oncology History   Squamous cell lung cancer   12/17/2021 Initial Diagnosis    Squamous cell lung cancer     12/22/2021 Cancer Staged    Staging form: Lung, AJCC 8th Edition  - Clinical stage from 12/22/2021: Stage IIB (cT3, cN0, cM0)     1/14/2022 -  Chemotherapy    Treatment Summary   Plan Name: OP NSCLC PACLITAXEL + CARBOPLATIN (AUC) QW + RADIATION  Treatment Goal: Curative  Status: Active  Start Date: 1/14/2022  End Date: 2/21/2022 (Planned)  Provider: Juan Lopez MD  Chemotherapy: dexAMETHasone (DECADRON) 4 MG Tab, 8 mg, Oral, Daily, 1 of 1 cycle, Start date: --, End date: --  CARBOplatin (PARAPLATIN) 230 mg in sodium chloride 0.9% 273 mL chemo infusion, 230 mg (100 % of original dose 230 mg), Intravenous, Clinic/HOD 1 time, 5 of 6 cycles  Dose modification:   (original dose 230 mg, Cycle 1)  Administration: 230 mg (1/14/2022), 230 mg (1/21/2022), 255 mg (1/28/2022), 255 mg (2/4/2022), 255 mg (2/14/2022)  PACLitaxeL (TAXOL) 45 mg/m2 = 96 mg in sodium chloride 0.9% 266 mL chemo infusion, 45 mg/m2 = 96 mg, Intravenous, Clinic/HOD 1 time, 5 of 6 cycles  Administration: 96 mg (1/14/2022), 96 mg (1/21/2022), 96 mg (1/28/2022), 96 mg (2/4/2022), 96 mg (2/14/2022)       Cancer Staging  Squamous cell lung cancer  - Clinical stage from 12/22/2021: Stage IIB (cT3, cN0, cM0) - Signed by Juan Lopez MD on 12/22/2021         HPI    He has been doing well on chemoradiation presenting today for cycle  5. No infectious symptoms, cough, chest pain, shortness of breath, fatigue or change in appetite.  Mild discomfort with port access.      Review of Systems    ?   A comprehensive 14-point review of systems was reviewed with patient and was negative other than as specified above.   ?      Objective:      Physical Exam        Vitals:    02/14/22 0905   BP: 137/69   Pulse: 91   Temp: 97.2 °F (36.2 °C)      ?   General appearance: Generally well appearing, in no acute distress.   Head, eyes, ears, nose, and throat: Oropharynx clear with moist mucous membranes.   Cardiovascular: Regular rate and rhythm, S1, S2, no audible murmurs.   Respiratory: Lungs clear to auscultation bilaterally.   Abdomen: nontender, nondistended.   Extremities: Warm, without edema.   Neurologic: Alert and oriented.  Skin: No rashes, ecchymoses or petechial lesion.   Psychiatric: normal mood and affect, conversant and appropriate    ?   Laboratory:  ?   Lab Visit on 02/14/2022   Component Date Value Ref Range Status    WBC 02/14/2022 2.16* 3.90 - 12.70 K/uL Final    RBC 02/14/2022 3.96* 4.60 - 6.20 M/uL Final    Hemoglobin 02/14/2022 13.1* 14.0 - 18.0 g/dL Final    Hematocrit 02/14/2022 39.7* 40.0 - 54.0 % Final    MCV 02/14/2022 100* 82 - 98 fL Final    MCH 02/14/2022 33.1* 27.0 - 31.0 pg Final    MCHC 02/14/2022 33.0  32.0 - 36.0 g/dL Final    RDW 02/14/2022 13.6  11.5 - 14.5 % Final    Platelets 02/14/2022 128* 150 - 450 K/uL Final    MPV 02/14/2022 9.4  9.2 - 12.9 fL Final    Immature Granulocytes 02/14/2022 1.9* 0.0 - 0.5 % Final    Gran # (ANC) 02/14/2022 1.4* 1.8 - 7.7 K/uL Final    Immature Grans (Abs) 02/14/2022 0.04  0.00 - 0.04 K/uL Final    Lymph # 02/14/2022 0.4* 1.0 - 4.8 K/uL Final    Mono # 02/14/2022 0.3  0.3 - 1.0 K/uL Final    Eos # 02/14/2022 0.0  0.0 - 0.5 K/uL Final    Baso # 02/14/2022 0.02  0.00 - 0.20 K/uL Final    nRBC 02/14/2022 0  0 /100 WBC Final    Gran % 02/14/2022 64.7  38.0 - 73.0 % Final     Lymph % 02/14/2022 20.4  18.0 - 48.0 % Final    Mono % 02/14/2022 11.6  4.0 - 15.0 % Final    Eosinophil % 02/14/2022 0.5  0.0 - 8.0 % Final    Basophil % 02/14/2022 0.9  0.0 - 1.9 % Final    Differential Method 02/14/2022 Automated   Final    Sodium 02/14/2022 137  136 - 145 mmol/L Final    Potassium 02/14/2022 4.5  3.5 - 5.1 mmol/L Final    Chloride 02/14/2022 102  95 - 110 mmol/L Final    CO2 02/14/2022 29  23 - 29 mmol/L Final    Glucose 02/14/2022 98  70 - 110 mg/dL Final    BUN 02/14/2022 8  8 - 23 mg/dL Final    Creatinine 02/14/2022 0.7  0.5 - 1.4 mg/dL Final    Calcium 02/14/2022 8.3* 8.7 - 10.5 mg/dL Final    Total Protein 02/14/2022 5.4* 6.0 - 8.4 g/dL Final    Albumin 02/14/2022 3.1* 3.5 - 5.2 g/dL Final    Total Bilirubin 02/14/2022 0.4  0.1 - 1.0 mg/dL Final    Alkaline Phosphatase 02/14/2022 104  55 - 135 U/L Final    AST 02/14/2022 17  10 - 40 U/L Final    ALT 02/14/2022 26  10 - 44 U/L Final    Anion Gap 02/14/2022 6* 8 - 16 mmol/L Final    eGFR if African American 02/14/2022 >60  >60 mL/min/1.73 m^2 Final    eGFR if non African American 02/14/2022 >60  >60 mL/min/1.73 m^2 Final      ?     Imaging:  ?    Results for orders placed or performed during the hospital encounter of 11/19/21 (from the past 2160 hour(s))   CT Head Without Contrast    Impression    No CT evidence of acute intracranial abnormality.    Tiny, remote left basal ganglia lacunar type infarcts.    Moderate degree of chronic microvascular ischemic change.    All CT scans at this facility use dose modulation, iterative reconstruction, and/or weight base dosing when appropriate to reduce radiation dose to as low as reasonably achievable.      Electronically signed by: Juan J Redman  Date:    11/19/2021  Time:    15:22     Results for orders placed or performed during the hospital encounter of 11/19/21 (from the past 2160 hour(s))   MRI Brain Without Contrast    Impression    No hemorrhage or infarct.    Moderate  microvascular ischemic change.      Electronically signed by: Baljinder Pappas  Date:    11/19/2021  Time:    17:43     Results for orders placed or performed during the hospital encounter of 12/14/21 (from the past 2160 hour(s))   NM PET CT Routine Skull to Mid Thigh    Impression    1. Highly FDG avid perihilar mass in the superior segment of the right lower lobe which is highly concerning for malignancy.  2. No FDG avid adenopathy or definite evidence of metastatic disease.  3. Observations as above      Electronically signed by: Geronimo Sigala MD  Date:    12/14/2021  Time:    11:42       Results for orders placed or performed during the hospital encounter of 11/19/21 (from the past 2160 hour(s))   MRI Brain Without Contrast    Impression    No hemorrhage or infarct.    Moderate microvascular ischemic change.      Electronically signed by: Baljinder Pappas  Date:    11/19/2021  Time:    17:43         ?   Assessment/Plan:   Immunodeficiency due to chemotherapy  -     CBC Auto Differential; Future; Expected date: 02/14/2022  -     Comprehensive Metabolic Panel; Future; Expected date: 02/14/2022    Squamous cell carcinoma of lung, unspecified laterality  -     CBC Auto Differential; Future; Expected date: 02/14/2022  -     Comprehensive Metabolic Panel; Future; Expected date: 02/14/2022       1. Immunodeficiency due to chemotherapy    2. Squamous cell carcinoma of lung, unspecified laterality          Plan:     Problem List Items Addressed This Visit        Oncology    Squamous cell lung cancer    Current Assessment & Plan     Stage II B (cT3, cN0, cM0) squamous cell carcinoma of right lung.  Reviewed PET-CT scan, MRI of the brain and pathology report with patient and answered all his questions.  PET-CT scan did not reveal distant disease and MRI of brain was negative for metastasis.  We discussed prognosis and management of early-stage lung cancer.     He is not interested in surgical options.  Opted for definitive  chemoRT.      Regimen:  Carboplatin AUC=2 + Paclitaxel 45 mg/m² Weekly During Radiation [J Clin Oncol Off J Am Soc Clin Oncol. 2005; 23(25):9827-0285]       2D echocardiogram from 11/20/21 showed EF of 55%.     s/p C4D1 of treatment. Reviewed labs, no concerning cytopenia, will proceed with C5D1. RTC in 1 week.            Other Visit Diagnoses     Immunodeficiency due to chemotherapy    -  Primary        Squamous cell lung cancer  Stage II B (cT3, cN0, cM0) squamous cell carcinoma of right lung.  Reviewed PET-CT scan, MRI of the brain and pathology report with patient and answered all his questions.  PET-CT scan did not reveal distant disease and MRI of brain was negative for metastasis.  We discussed prognosis and management of early-stage lung cancer.     He is not interested in surgical options.  Opted for definitive chemoRT.      Regimen:  Carboplatin AUC=2 + Paclitaxel 45 mg/m² Weekly During Radiation [J Clin Oncol Off J Am Soc Clin Oncol. 2005; 23(25):1090-7037]       2D echocardiogram from 11/20/21 showed EF of 55%.     s/p C4D1 of treatment. Reviewed labs, no concerning cytopenia, will proceed with C5D1. RTC in 1 week.     Immunodeficiency due to chemotherapy  -     CBC Auto Differential; Future; Expected date: 02/14/2022  -     Comprehensive Metabolic Panel; Future; Expected date: 02/14/2022    Squamous cell carcinoma of lung, unspecified laterality  -     CBC Auto Differential; Future; Expected date: 02/14/2022  -     Comprehensive Metabolic Panel; Future; Expected date: 02/14/2022          Juan Lopez MD

## 2022-02-15 PROCEDURE — 77014 HC CT GUIDANCE RADIATION THERAPY FLDS PLACEMENT: CPT | Mod: TC | Performed by: RADIOLOGY

## 2022-02-15 PROCEDURE — 77386 HC IMRT, COMPLEX: CPT | Performed by: RADIOLOGY

## 2022-02-15 PROCEDURE — 77014 PR  CT GUIDANCE PLACEMENT RAD THERAPY FIELDS: ICD-10-PCS | Mod: 26,,, | Performed by: RADIOLOGY

## 2022-02-15 PROCEDURE — 77014 PR  CT GUIDANCE PLACEMENT RAD THERAPY FIELDS: CPT | Mod: 26,,, | Performed by: RADIOLOGY

## 2022-02-16 ENCOUNTER — DOCUMENTATION ONLY (OUTPATIENT)
Dept: RADIATION ONCOLOGY | Facility: CLINIC | Age: 82
End: 2022-02-16
Payer: MEDICARE

## 2022-02-16 PROCEDURE — 77014 PR  CT GUIDANCE PLACEMENT RAD THERAPY FIELDS: CPT | Mod: 26,,, | Performed by: RADIOLOGY

## 2022-02-16 PROCEDURE — 77014 PR  CT GUIDANCE PLACEMENT RAD THERAPY FIELDS: ICD-10-PCS | Mod: 26,,, | Performed by: RADIOLOGY

## 2022-02-16 PROCEDURE — 77014 HC CT GUIDANCE RADIATION THERAPY FLDS PLACEMENT: CPT | Mod: TC | Performed by: RADIOLOGY

## 2022-02-16 PROCEDURE — 77386 HC IMRT, COMPLEX: CPT | Performed by: RADIOLOGY

## 2022-02-16 NOTE — PLAN OF CARE
Day 24 of outpatient xrt to the lung. Doing well, denies any pain, no sob, no reflux, no sore swallowing. Will continue to monitor.

## 2022-02-17 PROCEDURE — 77014 PR  CT GUIDANCE PLACEMENT RAD THERAPY FIELDS: CPT | Mod: 26,,, | Performed by: RADIOLOGY

## 2022-02-17 PROCEDURE — 77386 HC IMRT, COMPLEX: CPT | Performed by: RADIOLOGY

## 2022-02-17 PROCEDURE — 77014 PR  CT GUIDANCE PLACEMENT RAD THERAPY FIELDS: ICD-10-PCS | Mod: 26,,, | Performed by: RADIOLOGY

## 2022-02-17 PROCEDURE — 77014 HC CT GUIDANCE RADIATION THERAPY FLDS PLACEMENT: CPT | Mod: TC | Performed by: RADIOLOGY

## 2022-02-18 PROCEDURE — 77014 HC CT GUIDANCE RADIATION THERAPY FLDS PLACEMENT: CPT | Mod: TC | Performed by: RADIOLOGY

## 2022-02-18 PROCEDURE — 77386 HC IMRT, COMPLEX: CPT | Performed by: RADIOLOGY

## 2022-02-18 PROCEDURE — 77014 PR  CT GUIDANCE PLACEMENT RAD THERAPY FIELDS: ICD-10-PCS | Mod: 26,,, | Performed by: RADIOLOGY

## 2022-02-18 PROCEDURE — 77014 PR  CT GUIDANCE PLACEMENT RAD THERAPY FIELDS: CPT | Mod: 26,,, | Performed by: RADIOLOGY

## 2022-02-20 NOTE — ASSESSMENT & PLAN NOTE
Stage II B (cT3, cN0, cM0) squamous cell carcinoma of right lung.  Reviewed PET-CT scan, MRI of the brain and pathology report with patient and answered all his questions.  PET-CT scan did not reveal distant disease and MRI of brain was negative for metastasis.  We discussed prognosis and management of early-stage lung cancer.     He is not interested in surgical options.  Opted for definitive chemoRT.      Regimen:  Carboplatin AUC=2 + Paclitaxel 45 mg/m² Weekly During Radiation [J Clin Oncol Off J Am Soc Clin Oncol. 2005; 23(79):4863-4285]        2D echocardiogram from 11/20/21 showed EF of 55%.      s/p C5D1 of treatment. Reviewed labs, no concerning cytopenia, will proceed with C6D1. RTC in 2 weeks for lab evaluation. Plan for restaging CT in 4 weeks.

## 2022-02-21 ENCOUNTER — INFUSION (OUTPATIENT)
Dept: INFUSION THERAPY | Facility: HOSPITAL | Age: 82
End: 2022-02-21
Attending: INTERNAL MEDICINE
Payer: MEDICARE

## 2022-02-21 ENCOUNTER — OFFICE VISIT (OUTPATIENT)
Dept: HEMATOLOGY/ONCOLOGY | Facility: CLINIC | Age: 82
End: 2022-02-21
Payer: MEDICARE

## 2022-02-21 VITALS
HEIGHT: 71 IN | OXYGEN SATURATION: 100 % | BODY MASS INDEX: 28.02 KG/M2 | WEIGHT: 200.19 LBS | TEMPERATURE: 97 F | DIASTOLIC BLOOD PRESSURE: 73 MMHG | RESPIRATION RATE: 16 BRPM | HEART RATE: 78 BPM | SYSTOLIC BLOOD PRESSURE: 127 MMHG

## 2022-02-21 DIAGNOSIS — T45.1X5A IMMUNODEFICIENCY DUE TO CHEMOTHERAPY: Primary | ICD-10-CM

## 2022-02-21 DIAGNOSIS — C34.90 SQUAMOUS CELL CARCINOMA OF LUNG, UNSPECIFIED LATERALITY: ICD-10-CM

## 2022-02-21 DIAGNOSIS — D84.821 IMMUNODEFICIENCY DUE TO CHEMOTHERAPY: Primary | ICD-10-CM

## 2022-02-21 DIAGNOSIS — C34.91 SQUAMOUS CELL CARCINOMA OF RIGHT LUNG: Primary | ICD-10-CM

## 2022-02-21 DIAGNOSIS — Z79.899 IMMUNODEFICIENCY DUE TO CHEMOTHERAPY: Primary | ICD-10-CM

## 2022-02-21 PROCEDURE — 99215 OFFICE O/P EST HI 40 MIN: CPT | Mod: 25,95,, | Performed by: INTERNAL MEDICINE

## 2022-02-21 PROCEDURE — 77386 HC IMRT, COMPLEX: CPT | Performed by: RADIOLOGY

## 2022-02-21 PROCEDURE — 96413 CHEMO IV INFUSION 1 HR: CPT

## 2022-02-21 PROCEDURE — 96367 TX/PROPH/DG ADDL SEQ IV INF: CPT

## 2022-02-21 PROCEDURE — 77014 PR  CT GUIDANCE PLACEMENT RAD THERAPY FIELDS: ICD-10-PCS | Mod: 26,,, | Performed by: RADIOLOGY

## 2022-02-21 PROCEDURE — 77336 RADIATION PHYSICS CONSULT: CPT | Performed by: RADIOLOGY

## 2022-02-21 PROCEDURE — 77014 HC CT GUIDANCE RADIATION THERAPY FLDS PLACEMENT: CPT | Mod: TC | Performed by: RADIOLOGY

## 2022-02-21 PROCEDURE — 96375 TX/PRO/DX INJ NEW DRUG ADDON: CPT

## 2022-02-21 PROCEDURE — 99215 PR OFFICE/OUTPT VISIT, EST, LEVL V, 40-54 MIN: ICD-10-PCS | Mod: 25,95,, | Performed by: INTERNAL MEDICINE

## 2022-02-21 PROCEDURE — 77014 PR  CT GUIDANCE PLACEMENT RAD THERAPY FIELDS: CPT | Mod: 26,,, | Performed by: RADIOLOGY

## 2022-02-21 PROCEDURE — 96417 CHEMO IV INFUS EACH ADDL SEQ: CPT

## 2022-02-21 PROCEDURE — 25000003 PHARM REV CODE 250: Performed by: INTERNAL MEDICINE

## 2022-02-21 PROCEDURE — 63600175 PHARM REV CODE 636 W HCPCS: Performed by: INTERNAL MEDICINE

## 2022-02-21 RX ORDER — HEPARIN 100 UNIT/ML
500 SYRINGE INTRAVENOUS
Status: DISCONTINUED | OUTPATIENT
Start: 2022-02-21 | End: 2022-02-21 | Stop reason: HOSPADM

## 2022-02-21 RX ORDER — SODIUM CHLORIDE 0.9 % (FLUSH) 0.9 %
10 SYRINGE (ML) INJECTION
Status: CANCELLED | OUTPATIENT
Start: 2022-02-21

## 2022-02-21 RX ORDER — FAMOTIDINE 10 MG/ML
20 INJECTION INTRAVENOUS
Status: COMPLETED | OUTPATIENT
Start: 2022-02-21 | End: 2022-02-21

## 2022-02-21 RX ORDER — DIPHENHYDRAMINE HYDROCHLORIDE 50 MG/ML
50 INJECTION INTRAMUSCULAR; INTRAVENOUS ONCE AS NEEDED
Status: CANCELLED | OUTPATIENT
Start: 2022-02-21

## 2022-02-21 RX ORDER — EPINEPHRINE 0.3 MG/.3ML
0.3 INJECTION SUBCUTANEOUS ONCE AS NEEDED
Status: CANCELLED | OUTPATIENT
Start: 2022-02-21

## 2022-02-21 RX ORDER — HEPARIN 100 UNIT/ML
500 SYRINGE INTRAVENOUS
Status: CANCELLED | OUTPATIENT
Start: 2022-02-21

## 2022-02-21 RX ORDER — FAMOTIDINE 10 MG/ML
20 INJECTION INTRAVENOUS
Status: CANCELLED | OUTPATIENT
Start: 2022-02-21

## 2022-02-21 RX ADMIN — CARBOPLATIN 255 MG: 10 INJECTION, SOLUTION INTRAVENOUS at 01:02

## 2022-02-21 RX ADMIN — FAMOTIDINE 20 MG: 10 INJECTION, SOLUTION INTRAVENOUS at 10:02

## 2022-02-21 RX ADMIN — SODIUM CHLORIDE: 0.9 INJECTION, SOLUTION INTRAVENOUS at 10:02

## 2022-02-21 RX ADMIN — DIPHENHYDRAMINE HYDROCHLORIDE 50 MG: 50 INJECTION INTRAMUSCULAR; INTRAVENOUS at 11:02

## 2022-02-21 RX ADMIN — Medication 500 UNITS: at 01:02

## 2022-02-21 RX ADMIN — PALONOSETRON HYDROCHLORIDE: 0.25 INJECTION, SOLUTION INTRAVENOUS at 11:02

## 2022-02-21 RX ADMIN — PACLITAXEL 96 MG: 6 INJECTION, SOLUTION INTRAVENOUS at 12:02

## 2022-02-21 NOTE — PROGRESS NOTES
Subjective:      DATE OF VISIT: 2/21/22     ?  Patient ID:?Alton Black is a 81 y.o. male.?? MR#: [id number]   ?   PRIMARY ONCOLOGIST: Dr. Lopez    ? Primary Care Providers:  Rohan Kenny MD, MD (General)     CHIEF COMPLAINT: ?  Follow-up on chemoradiation, cycle 4 planned today   ?   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   CURRENT TREATMENT:  Chemoradiation with carboplatin paclitaxel weekly, cycle 1 day 1 1/14/22    PAST TREATMENT:  None  ?   ONCOLOGIC HISTORY:   ?   Oncology History   Squamous cell lung cancer   12/17/2021 Initial Diagnosis    Squamous cell lung cancer     12/22/2021 Cancer Staged    Staging form: Lung, AJCC 8th Edition  - Clinical stage from 12/22/2021: Stage IIB (cT3, cN0, cM0)     1/14/2022 -  Chemotherapy    Treatment Summary   Plan Name: OP NSCLC PACLITAXEL + CARBOPLATIN (AUC) QW + RADIATION  Treatment Goal: Curative  Status: Active  Start Date: 1/14/2022  End Date: 2/21/2022 (Planned)  Provider: Juan Lopez MD  Chemotherapy: dexAMETHasone (DECADRON) 4 MG Tab, 8 mg, Oral, Daily, 1 of 1 cycle, Start date: --, End date: --  CARBOplatin (PARAPLATIN) 230 mg in sodium chloride 0.9% 273 mL chemo infusion, 230 mg (100 % of original dose 230 mg), Intravenous, Clinic/HOD 1 time, 5 of 6 cycles  Dose modification:   (original dose 230 mg, Cycle 1)  Administration: 230 mg (1/14/2022), 230 mg (1/21/2022), 255 mg (1/28/2022), 255 mg (2/4/2022), 255 mg (2/14/2022)  PACLitaxeL (TAXOL) 45 mg/m2 = 96 mg in sodium chloride 0.9% 266 mL chemo infusion, 45 mg/m2 = 96 mg, Intravenous, Clinic/HOD 1 time, 5 of 6 cycles  Administration: 96 mg (1/14/2022), 96 mg (1/21/2022), 96 mg (1/28/2022), 96 mg (2/4/2022), 96 mg (2/14/2022)       Cancer Staging  Squamous cell lung cancer  - Clinical stage from 12/22/2021: Stage IIB (cT3, cN0, cM0) - Signed by Juan Lopez MD on 12/22/2021         HPI    He has been doing well on chemoradiation presenting today for cycle  6. No infectious symptoms, cough, chest pain, shortness of breath, fatigue or change in appetite.  Mild discomfort with port access.      Review of Systems    ?   A comprehensive 14-point review of systems was reviewed with patient and was negative other than as specified above.   ?      Objective:      Physical Exam    Unable to assess    There were no vitals filed for this visit.   ?   ?   Laboratory:  ?   Lab Visit on 02/21/2022   Component Date Value Ref Range Status    WBC 02/21/2022 2.59 (A) 3.90 - 12.70 K/uL Final    RBC 02/21/2022 4.04 (A) 4.60 - 6.20 M/uL Final    Hemoglobin 02/21/2022 13.2 (A) 14.0 - 18.0 g/dL Final    Hematocrit 02/21/2022 40.3  40.0 - 54.0 % Final    MCV 02/21/2022 100 (A) 82 - 98 fL Final    MCH 02/21/2022 32.7 (A) 27.0 - 31.0 pg Final    MCHC 02/21/2022 32.8  32.0 - 36.0 g/dL Final    RDW 02/21/2022 13.9  11.5 - 14.5 % Final    Platelets 02/21/2022 171  150 - 450 K/uL Final    MPV 02/21/2022 9.5  9.2 - 12.9 fL Final    Sodium 02/21/2022 137  136 - 145 mmol/L Final    Potassium 02/21/2022 4.7  3.5 - 5.1 mmol/L Final    Chloride 02/21/2022 103  95 - 110 mmol/L Final    CO2 02/21/2022 29  23 - 29 mmol/L Final    Glucose 02/21/2022 97  70 - 110 mg/dL Final    BUN 02/21/2022 11  8 - 23 mg/dL Final    Creatinine 02/21/2022 0.7  0.5 - 1.4 mg/dL Final    Calcium 02/21/2022 8.3 (A) 8.7 - 10.5 mg/dL Final    Total Protein 02/21/2022 5.5 (A) 6.0 - 8.4 g/dL Final    Albumin 02/21/2022 3.2 (A) 3.5 - 5.2 g/dL Final    Total Bilirubin 02/21/2022 0.5  0.1 - 1.0 mg/dL Final    Alkaline Phosphatase 02/21/2022 101  55 - 135 U/L Final    AST 02/21/2022 15  10 - 40 U/L Final    ALT 02/21/2022 28  10 - 44 U/L Final    Anion Gap 02/21/2022 5 (A) 8 - 16 mmol/L Final    eGFR if African American 02/21/2022 >60  >60 mL/min/1.73 m^2 Final    eGFR if non African American 02/21/2022 >60  >60 mL/min/1.73 m^2 Final      ?     Imaging:  ?    No results found for this or any previous visit  (from the past 2160 hour(s)).  No results found for this or any previous visit (from the past 2160 hour(s)).  Results for orders placed or performed during the hospital encounter of 12/14/21 (from the past 2160 hour(s))   NM PET CT Routine Skull to Mid Thigh    Impression    1. Highly FDG avid perihilar mass in the superior segment of the right lower lobe which is highly concerning for malignancy.  2. No FDG avid adenopathy or definite evidence of metastatic disease.  3. Observations as above      Electronically signed by: Geronimo Sigala MD  Date:    12/14/2021  Time:    11:42       No results found for this or any previous visit (from the past 2160 hour(s)).      ?   Assessment/Plan:   Immunodeficiency due to chemotherapy  -     CBC Auto Differential; Future; Expected date: 02/21/2022  -     Comprehensive Metabolic Panel; Future; Expected date: 02/21/2022    Squamous cell carcinoma of lung, unspecified laterality  -     CBC Auto Differential; Future; Expected date: 02/21/2022  -     Comprehensive Metabolic Panel; Future; Expected date: 02/21/2022       1. Immunodeficiency due to chemotherapy    2. Squamous cell carcinoma of lung, unspecified laterality          Plan:     Problem List Items Addressed This Visit        Oncology    Squamous cell lung cancer    Current Assessment & Plan     Stage II B (cT3, cN0, cM0) squamous cell carcinoma of right lung.  Reviewed PET-CT scan, MRI of the brain and pathology report with patient and answered all his questions.  PET-CT scan did not reveal distant disease and MRI of brain was negative for metastasis.  We discussed prognosis and management of early-stage lung cancer.     He is not interested in surgical options.  Opted for definitive chemoRT.      Regimen:  Carboplatin AUC=2 + Paclitaxel 45 mg/m² Weekly During Radiation [J Clin Oncol Off J Am Soc Clin Oncol. 2005; 23(25):8291-8407]        2D echocardiogram from 11/20/21 showed EF of 55%.      s/p C5D1 of treatment.  Reviewed labs, no concerning cytopenia, will proceed with C6D1. RTC in 2 weeks for lab evaluation. Plan for restaging CT in 4 weeks.              Other Visit Diagnoses     Immunodeficiency due to chemotherapy    -  Primary        Squamous cell lung cancer  Stage II B (cT3, cN0, cM0) squamous cell carcinoma of right lung.  Reviewed PET-CT scan, MRI of the brain and pathology report with patient and answered all his questions.  PET-CT scan did not reveal distant disease and MRI of brain was negative for metastasis.  We discussed prognosis and management of early-stage lung cancer.     He is not interested in surgical options.  Opted for definitive chemoRT.      Regimen:  Carboplatin AUC=2 + Paclitaxel 45 mg/m² Weekly During Radiation [J Clin Oncol Off J Am Soc Clin Oncol. 2005; 23(26):5549-0173]        2D echocardiogram from 11/20/21 showed EF of 55%.      s/p C5D1 of treatment. Reviewed labs, no concerning cytopenia, will proceed with C6D1. RTC in 2 weeks for lab evaluation. Plan for restaging CT in 4 weeks.     Immunodeficiency due to chemotherapy  -     CBC Auto Differential; Future; Expected date: 02/21/2022  -     Comprehensive Metabolic Panel; Future; Expected date: 02/21/2022    Squamous cell carcinoma of lung, unspecified laterality  -     CBC Auto Differential; Future; Expected date: 02/21/2022  -     Comprehensive Metabolic Panel; Future; Expected date: 02/21/2022      Consult Start Time: 02/21/2022 09:20 CST  Consult End Time: 02/21/2022 09:40 CST      The patient location is: [facility]  The chief complaint leading to consultation is: lung cancer    Visit type: audiovisual    Face to Face time with patient: 20 minutes  20 minutes of total time spent on the encounter, which includes face to face time and non-face to face time preparing to see the patient (eg, review of tests), Obtaining and/or reviewing separately obtained history, Documenting clinical information in the electronic or other health  record, Independently interpreting results (not separately reported) and communicating results to the patient/family/caregiver, or Care coordination (not separately reported).       Each patient to whom he or she provides medical services by telemedicine is:  (1) informed of the relationship between the physician and patient and the respective role of any other health care provider with respect to management of the patient; and (2) notified that he or she may decline to receive medical services by telemedicine and may withdraw from such care at any time.      Juan Lopez MD

## 2022-02-21 NOTE — DISCHARGE INSTRUCTIONS
.[facility]  [address] [facility]  [address]  [phone] phone     [phone] fax  Hours of Operation: Monday- Friday 8:00am- 5:00pm  After hours phone  [phone]  Hematology / Oncology Physicians on call    Dr. Jae Monterroso      Nurse Practitioners:    Laquita Gallegos, HEDY Rubio, HEDY Satcy, HEDY Patel, HEDY King, PA      Please don't hesitate to call if you have any concerns.    .FALL PREVENTION   Falls often occur due to slipping, tripping or losing your balance. Here are ways to reduce your risk of falling again.   Was there anything that caused your fall that can be fixed, removed or replaced?   Make your home safe by keeping walkways clear of objects you may trip over.   Use non-slip pads under rugs.   Do not walk in poorly lit areas.   Do not stand on chairs or wobbly ladders.   Use caution when reaching overhead or looking upward. This position can cause a loss of balance.   Be sure your shoes fit properly, have non-slip bottoms and are in good condition.   Be cautious when going up and down stairs, curbs, and when walking on uneven sidewalks.   If your balance is poor, consider using a cane or walker.   If your fall was related to alcohol use, stop or limit alcohol intake.   If your fall was related to use of sleeping medicines, talk to your doctor about this. You may need to reduce your dosage at bedtime if you awaken during the night to go to the bathroom.   To reduce the need for nighttime bathroom trips:   Avoid drinking fluids for several hours before going to bed   Empty your bladder before going to bed   Men can keep a urinal at the bedside   © 9412-9617 [facility], [address] [place] [address], [place] [address]. All rights reserved. This information is not intended as a substitute for professional medical care. Always follow your healthcare professional's instructions.  .WAYS  TO HELP PREVENT INFECTION        WASH YOUR HANDS OFTEN DURING THE DAY, ESPECIALLY BEFORE YOU EAT, AFTER USING THE BATHROOM, AND AFTER TOUCHING ANIMALS    STAY AWAY FROM PEOPLE WHO HAVE ILLNESSES YOU CAN CATCH; SUCH AS COLDS, FLU, CHICKEN POX    TRY TO AVOID CROWDS    STAY AWAY FROM CHILDREN WHO RECENTLY HAVE RECEIVED LIVE VIRUS VACCINES    MAINTAIN GOOD MOUTH CARE    DO NOT SQUEEZE OR SCRATCH PIMPLES    CLEAN CUTS & SCRAPES RIGHT AWAY AND DAILY UNTIL HEALED WITH WARM WATER, SOAP & AN ANTISEPTIC    AVOID CONTACT WITH LITTER BOXES, BIRD CAGES, & FISH TANKS    AVOID STANDING WATER, IE., BIRD BATHS, FLOWER POTS/VASES, OR HUMIDIFIERS    WEAR GLOVES WHEN GARDENING OR CLEANING UP AFTER OTHERS, ESPECIALLY BABIES & SMALL CHILDREN    DO NOT EAT RAW FISH, SEAFOOD, MEAT, OR EGGS

## 2022-02-22 PROCEDURE — 77014 PR  CT GUIDANCE PLACEMENT RAD THERAPY FIELDS: ICD-10-PCS | Mod: 26,,, | Performed by: RADIOLOGY

## 2022-02-22 PROCEDURE — 77014 PR  CT GUIDANCE PLACEMENT RAD THERAPY FIELDS: CPT | Mod: 26,,, | Performed by: RADIOLOGY

## 2022-02-22 PROCEDURE — 77386 HC IMRT, COMPLEX: CPT | Performed by: RADIOLOGY

## 2022-02-22 PROCEDURE — 77014 HC CT GUIDANCE RADIATION THERAPY FLDS PLACEMENT: CPT | Mod: TC | Performed by: RADIOLOGY

## 2022-02-23 ENCOUNTER — DOCUMENTATION ONLY (OUTPATIENT)
Dept: HEMATOLOGY/ONCOLOGY | Facility: CLINIC | Age: 82
End: 2022-02-23
Payer: MEDICARE

## 2022-02-23 ENCOUNTER — DOCUMENTATION ONLY (OUTPATIENT)
Dept: RADIATION ONCOLOGY | Facility: CLINIC | Age: 82
End: 2022-02-23
Payer: MEDICARE

## 2022-02-23 PROCEDURE — 77014 HC CT GUIDANCE RADIATION THERAPY FLDS PLACEMENT: CPT | Mod: TC | Performed by: RADIOLOGY

## 2022-02-23 PROCEDURE — 77014 PR  CT GUIDANCE PLACEMENT RAD THERAPY FIELDS: CPT | Mod: 26,,, | Performed by: RADIOLOGY

## 2022-02-23 PROCEDURE — 77014 PR  CT GUIDANCE PLACEMENT RAD THERAPY FIELDS: ICD-10-PCS | Mod: 26,,, | Performed by: RADIOLOGY

## 2022-02-23 PROCEDURE — 77386 HC IMRT, COMPLEX: CPT | Performed by: RADIOLOGY

## 2022-02-23 NOTE — PLAN OF CARE
Day 29 of outpatient xrt to the lung. Doing good; denies any pain, SOB, cough or sore swallowing. Will continue to monitor.

## 2022-02-23 NOTE — PROGRESS NOTES
He finishes radiation tomorrow - not sure if he had any more carbo planned in the future.  Has had a good response on our on board imaging here, so I think he should qualify for immunotherapy if you agree.

## 2022-02-24 ENCOUNTER — DOCUMENTATION ONLY (OUTPATIENT)
Dept: RADIATION ONCOLOGY | Facility: CLINIC | Age: 82
End: 2022-02-24
Payer: MEDICARE

## 2022-02-24 PROCEDURE — 77336 RADIATION PHYSICS CONSULT: CPT | Performed by: RADIOLOGY

## 2022-02-24 PROCEDURE — 77014 PR  CT GUIDANCE PLACEMENT RAD THERAPY FIELDS: ICD-10-PCS | Mod: 26,,, | Performed by: RADIOLOGY

## 2022-02-24 PROCEDURE — 77014 HC CT GUIDANCE RADIATION THERAPY FLDS PLACEMENT: CPT | Mod: TC | Performed by: RADIOLOGY

## 2022-02-24 PROCEDURE — 77386 HC IMRT, COMPLEX: CPT | Performed by: RADIOLOGY

## 2022-02-24 PROCEDURE — 77014 PR  CT GUIDANCE PLACEMENT RAD THERAPY FIELDS: CPT | Mod: 26,,, | Performed by: RADIOLOGY

## 2022-03-03 ENCOUNTER — TELEPHONE (OUTPATIENT)
Dept: RADIATION ONCOLOGY | Facility: CLINIC | Age: 82
End: 2022-03-03
Payer: MEDICARE

## 2022-03-03 NOTE — TELEPHONE ENCOUNTER
Made a f/u call to check on the patient since he completed xrt to the lung last week. He said he is doing great, he denies any issues, reminded him of his f/u appt with Dr Roberson & informed him if he needs anything before then to call us, patient verbalized understanding.  ----- Message from Alison Bermudez RN sent at 2/24/2022  9:39 AM CST -----  Regarding: Make 1 week f/u call

## 2022-03-07 ENCOUNTER — OFFICE VISIT (OUTPATIENT)
Dept: HEMATOLOGY/ONCOLOGY | Facility: CLINIC | Age: 82
End: 2022-03-07
Payer: MEDICARE

## 2022-03-07 ENCOUNTER — LAB VISIT (OUTPATIENT)
Dept: LAB | Facility: HOSPITAL | Age: 82
End: 2022-03-07
Attending: INTERNAL MEDICINE
Payer: MEDICARE

## 2022-03-07 VITALS
HEART RATE: 96 BPM | OXYGEN SATURATION: 96 % | BODY MASS INDEX: 28.8 KG/M2 | DIASTOLIC BLOOD PRESSURE: 65 MMHG | WEIGHT: 205.69 LBS | HEIGHT: 71 IN | SYSTOLIC BLOOD PRESSURE: 120 MMHG | TEMPERATURE: 98 F

## 2022-03-07 DIAGNOSIS — Z08 ENCOUNTER FOR FOLLOW-UP EXAMINATION AFTER COMPLETED TREATMENT FOR MALIGNANT NEOPLASM: ICD-10-CM

## 2022-03-07 DIAGNOSIS — D84.821 IMMUNODEFICIENCY DUE TO CHEMOTHERAPY: ICD-10-CM

## 2022-03-07 DIAGNOSIS — Z79.899 IMMUNODEFICIENCY DUE TO CHEMOTHERAPY: ICD-10-CM

## 2022-03-07 DIAGNOSIS — C34.90 SQUAMOUS CELL CARCINOMA OF LUNG, UNSPECIFIED LATERALITY: Primary | ICD-10-CM

## 2022-03-07 DIAGNOSIS — D70.1 CHEMOTHERAPY INDUCED NEUTROPENIA: ICD-10-CM

## 2022-03-07 DIAGNOSIS — T45.1X5A IMMUNODEFICIENCY DUE TO CHEMOTHERAPY: ICD-10-CM

## 2022-03-07 DIAGNOSIS — T45.1X5A CHEMOTHERAPY INDUCED NEUTROPENIA: ICD-10-CM

## 2022-03-07 DIAGNOSIS — C34.90 SQUAMOUS CELL CARCINOMA OF LUNG, UNSPECIFIED LATERALITY: ICD-10-CM

## 2022-03-07 DIAGNOSIS — D69.6 THROMBOCYTOPENIA: ICD-10-CM

## 2022-03-07 LAB
ALBUMIN SERPL BCP-MCNC: 3.2 G/DL (ref 3.5–5.2)
ALP SERPL-CCNC: 103 U/L (ref 55–135)
ALT SERPL W/O P-5'-P-CCNC: 37 U/L (ref 10–44)
ANION GAP SERPL CALC-SCNC: 7 MMOL/L (ref 8–16)
AST SERPL-CCNC: 26 U/L (ref 10–40)
BASOPHILS # BLD AUTO: 0.01 K/UL (ref 0–0.2)
BASOPHILS NFR BLD: 0.4 % (ref 0–1.9)
BILIRUB SERPL-MCNC: 0.6 MG/DL (ref 0.1–1)
BUN SERPL-MCNC: 8 MG/DL (ref 8–23)
CALCIUM SERPL-MCNC: 8.5 MG/DL (ref 8.7–10.5)
CHLORIDE SERPL-SCNC: 104 MMOL/L (ref 95–110)
CO2 SERPL-SCNC: 27 MMOL/L (ref 23–29)
CREAT SERPL-MCNC: 0.7 MG/DL (ref 0.5–1.4)
DIFFERENTIAL METHOD: ABNORMAL
EOSINOPHIL # BLD AUTO: 0 K/UL (ref 0–0.5)
EOSINOPHIL NFR BLD: 0 % (ref 0–8)
ERYTHROCYTE [DISTWIDTH] IN BLOOD BY AUTOMATED COUNT: 15.4 % (ref 11.5–14.5)
EST. GFR  (AFRICAN AMERICAN): >60 ML/MIN/1.73 M^2
EST. GFR  (NON AFRICAN AMERICAN): >60 ML/MIN/1.73 M^2
GLUCOSE SERPL-MCNC: 96 MG/DL (ref 70–110)
HCT VFR BLD AUTO: 37.1 % (ref 40–54)
HGB BLD-MCNC: 12.2 G/DL (ref 14–18)
IMM GRANULOCYTES # BLD AUTO: 0.08 K/UL (ref 0–0.04)
IMM GRANULOCYTES NFR BLD AUTO: 3.4 % (ref 0–0.5)
LYMPHOCYTES # BLD AUTO: 0.6 K/UL (ref 1–4.8)
LYMPHOCYTES NFR BLD: 26.1 % (ref 18–48)
MCH RBC QN AUTO: 32.8 PG (ref 27–31)
MCHC RBC AUTO-ENTMCNC: 32.9 G/DL (ref 32–36)
MCV RBC AUTO: 100 FL (ref 82–98)
MONOCYTES # BLD AUTO: 0.6 K/UL (ref 0.3–1)
MONOCYTES NFR BLD: 26.9 % (ref 4–15)
NEUTROPHILS # BLD AUTO: 1 K/UL (ref 1.8–7.7)
NEUTROPHILS NFR BLD: 43.2 % (ref 38–73)
NRBC BLD-RTO: 0 /100 WBC
PLATELET # BLD AUTO: 109 K/UL (ref 150–450)
PMV BLD AUTO: 9.4 FL (ref 9.2–12.9)
POTASSIUM SERPL-SCNC: 4.3 MMOL/L (ref 3.5–5.1)
PROT SERPL-MCNC: 5.8 G/DL (ref 6–8.4)
RBC # BLD AUTO: 3.72 M/UL (ref 4.6–6.2)
SODIUM SERPL-SCNC: 138 MMOL/L (ref 136–145)
WBC # BLD AUTO: 2.38 K/UL (ref 3.9–12.7)

## 2022-03-07 PROCEDURE — 99215 OFFICE O/P EST HI 40 MIN: CPT | Mod: S$GLB,,, | Performed by: INTERNAL MEDICINE

## 2022-03-07 PROCEDURE — 3074F SYST BP LT 130 MM HG: CPT | Mod: CPTII,S$GLB,, | Performed by: INTERNAL MEDICINE

## 2022-03-07 PROCEDURE — 1159F PR MEDICATION LIST DOCUMENTED IN MEDICAL RECORD: ICD-10-PCS | Mod: CPTII,S$GLB,, | Performed by: INTERNAL MEDICINE

## 2022-03-07 PROCEDURE — 1126F AMNT PAIN NOTED NONE PRSNT: CPT | Mod: CPTII,S$GLB,, | Performed by: INTERNAL MEDICINE

## 2022-03-07 PROCEDURE — 1160F PR REVIEW ALL MEDS BY PRESCRIBER/CLIN PHARMACIST DOCUMENTED: ICD-10-PCS | Mod: CPTII,S$GLB,, | Performed by: INTERNAL MEDICINE

## 2022-03-07 PROCEDURE — 3078F DIAST BP <80 MM HG: CPT | Mod: CPTII,S$GLB,, | Performed by: INTERNAL MEDICINE

## 2022-03-07 PROCEDURE — 3288F PR FALLS RISK ASSESSMENT DOCUMENTED: ICD-10-PCS | Mod: CPTII,S$GLB,, | Performed by: INTERNAL MEDICINE

## 2022-03-07 PROCEDURE — 99215 PR OFFICE/OUTPT VISIT, EST, LEVL V, 40-54 MIN: ICD-10-PCS | Mod: S$GLB,,, | Performed by: INTERNAL MEDICINE

## 2022-03-07 PROCEDURE — 99417 PR PROLONGED SVC, OUTPT, W/WO DIRECT PT CONTACT,  EA ADDTL 15 MIN: ICD-10-PCS | Mod: S$GLB,,, | Performed by: INTERNAL MEDICINE

## 2022-03-07 PROCEDURE — 3288F FALL RISK ASSESSMENT DOCD: CPT | Mod: CPTII,S$GLB,, | Performed by: INTERNAL MEDICINE

## 2022-03-07 PROCEDURE — 3078F PR MOST RECENT DIASTOLIC BLOOD PRESSURE < 80 MM HG: ICD-10-PCS | Mod: CPTII,S$GLB,, | Performed by: INTERNAL MEDICINE

## 2022-03-07 PROCEDURE — 1159F MED LIST DOCD IN RCRD: CPT | Mod: CPTII,S$GLB,, | Performed by: INTERNAL MEDICINE

## 2022-03-07 PROCEDURE — 3074F PR MOST RECENT SYSTOLIC BLOOD PRESSURE < 130 MM HG: ICD-10-PCS | Mod: CPTII,S$GLB,, | Performed by: INTERNAL MEDICINE

## 2022-03-07 PROCEDURE — 85025 COMPLETE CBC W/AUTO DIFF WBC: CPT | Performed by: INTERNAL MEDICINE

## 2022-03-07 PROCEDURE — 36415 COLL VENOUS BLD VENIPUNCTURE: CPT | Performed by: INTERNAL MEDICINE

## 2022-03-07 PROCEDURE — 1101F PR PT FALLS ASSESS DOC 0-1 FALLS W/OUT INJ PAST YR: ICD-10-PCS | Mod: CPTII,S$GLB,, | Performed by: INTERNAL MEDICINE

## 2022-03-07 PROCEDURE — 99999 PR PBB SHADOW E&M-EST. PATIENT-LVL V: CPT | Mod: PBBFAC,,, | Performed by: INTERNAL MEDICINE

## 2022-03-07 PROCEDURE — 99999 PR PBB SHADOW E&M-EST. PATIENT-LVL V: ICD-10-PCS | Mod: PBBFAC,,, | Performed by: INTERNAL MEDICINE

## 2022-03-07 PROCEDURE — 1126F PR PAIN SEVERITY QUANTIFIED, NO PAIN PRESENT: ICD-10-PCS | Mod: CPTII,S$GLB,, | Performed by: INTERNAL MEDICINE

## 2022-03-07 PROCEDURE — 80053 COMPREHEN METABOLIC PANEL: CPT | Performed by: INTERNAL MEDICINE

## 2022-03-07 PROCEDURE — 1160F RVW MEDS BY RX/DR IN RCRD: CPT | Mod: CPTII,S$GLB,, | Performed by: INTERNAL MEDICINE

## 2022-03-07 PROCEDURE — 1101F PT FALLS ASSESS-DOCD LE1/YR: CPT | Mod: CPTII,S$GLB,, | Performed by: INTERNAL MEDICINE

## 2022-03-07 PROCEDURE — 99417 PROLNG OP E/M EACH 15 MIN: CPT | Mod: S$GLB,,, | Performed by: INTERNAL MEDICINE

## 2022-03-07 NOTE — PATIENT INSTRUCTIONS
Nurse navigation arrange for restaging CT chest abdomen pelvis, immunotherapy teaching session and follow-up to discuss results of restaging and tentative plan for immunotherapy teaching and cycle 1 day 1 durvalumab (treatment plan placed, pending insurance authorization) with lab CBC, CMP, TSH prior ordered.

## 2022-03-07 NOTE — Clinical Note
Did Dr. SOUTH discuss with you any plan on this aptient? Completed CRT ?immunotherapy See no tx plan or consent or ct scan

## 2022-03-08 NOTE — PROGRESS NOTES
Subjective:      DATE OF VISIT: 3/7/22     ?  Patient ID:?Alton Black is a 81 y.o. male.?? MR#: [id number]   ?   PRIMARY ONCOLOGIST: Dr. Lopez    ? Primary Care Providers:  Rohan Kenny MD, MD (General)     CHIEF COMPLAINT: ?  Follow-up after completion of chemoradiation  ?   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   CURRENT TREATMENT:  Chemoradiation with carboplatin paclitaxel weekly, cycle 1 day 1 1/14/22, completed 02/24/2022; consideration of consolidation immunotherapy maintenance, see below    PAST TREATMENT:  None  ?   ONCOLOGIC HISTORY:   ?   Oncology History   Squamous cell lung cancer   1/13/2021 - 2/24/2022 Radiation Therapy    Treating physician: Karol  Total Dose: 60 Gy  Fractions: 30     12/17/2021 Initial Diagnosis    Squamous cell lung cancer     12/22/2021 Cancer Staged    Staging form: Lung, AJCC 8th Edition  - Clinical stage from 12/22/2021: Stage IIB (cT3, cN0, cM0)     1/14/2022 - 2/21/2022 Chemotherapy    Treatment Summary   Plan Name: OP NSCLC PACLITAXEL + CARBOPLATIN (AUC) QW + RADIATION  Treatment Goal: Curative  Status: Inactive  Start Date: 1/14/2022  End Date: 2/21/2022  Provider: Juan Lopez MD  Chemotherapy: dexAMETHasone (DECADRON) 4 MG Tab, 8 mg, Oral, Daily, 1 of 1 cycle, Start date: --, End date: --  CARBOplatin (PARAPLATIN) 230 mg in sodium chloride 0.9% 273 mL chemo infusion, 230 mg (100 % of original dose 230 mg), Intravenous, Clinic/HOD 1 time, 6 of 6 cycles  Dose modification:   (original dose 230 mg, Cycle 1)  Administration: 230 mg (1/14/2022), 230 mg (1/21/2022), 255 mg (1/28/2022), 255 mg (2/4/2022), 255 mg (2/14/2022), 255 mg (2/21/2022)  PACLitaxeL (TAXOL) 45 mg/m2 = 96 mg in sodium chloride 0.9% 266 mL chemo infusion, 45 mg/m2 = 96 mg, Intravenous, Clinic/HOD 1 time, 6 of 6 cycles  Administration: 96 mg (1/14/2022), 96 mg (1/21/2022), 96 mg (1/28/2022), 96 mg (2/4/2022), 96 mg (2/14/2022), 96 mg (2/21/2022)      2/28/2022 -  Chemotherapy    Treatment Summary   Plan Name: OP DURVALUMAB 1500 MG Q4W  Treatment Goal: Curative  Status: Active  Start Date: 2/28/2022 (Planned)  End Date: 1/30/2023 (Planned)  Provider: Winnie Baez MD  Chemotherapy: durvalumab (IMFINZI) 1,500 mg in sodium chloride 0.9% 280 mL chemo infusion, 1,500 mg, Intravenous, Clinic/HOD 1 time, 0 of 13 cycles       Cancer Staging  Squamous cell lung cancer  - Clinical stage from 12/22/2021: Stage IIB (cT3, cN0, cM0) - Signed by Juan Lopez MD on 12/22/2021         HPI    I had the pleasure seeing again Mr. Black who presents after completion of chemoradiation 02/24/2022.  He tolerated therapy exceptionally well.  He denies any notable side effects from his therapy.  Today we discussed consideration of maintenance immunotherapy pending restaging imaging.    Review of Systems    ?   A comprehensive 14-point review of systems was reviewed with patient and was negative other than as specified above.   ?      Objective:      Physical Exam        Vitals:    03/07/22 0959   BP: 120/65   Pulse: 96   Temp: 97.5 °F (36.4 °C)      ?   General appearance: Generally well appearing, in no acute distress.   Head, eyes, ears, nose, and throat: Oropharynx clear with moist mucous membranes.   Cardiovascular: Regular rate and rhythm, S1, S2, no audible murmurs.   Respiratory: Lungs clear to auscultation bilaterally.   Abdomen: nontender, nondistended.   Extremities: Warm, without edema.   Neurologic: Alert and oriented.  Skin: No rashes, ecchymoses or petechial lesion.   Psychiatric: normal mood and affect, conversant and appropriate    ?   Laboratory:  ?   Lab Visit on 03/07/2022   Component Date Value Ref Range Status    WBC 03/07/2022 2.38 (A) 3.90 - 12.70 K/uL Final    RBC 03/07/2022 3.72 (A) 4.60 - 6.20 M/uL Final    Hemoglobin 03/07/2022 12.2 (A) 14.0 - 18.0 g/dL Final    Hematocrit 03/07/2022 37.1 (A) 40.0 - 54.0 % Final    MCV 03/07/2022 100 (A)  82 - 98 fL Final    MCH 03/07/2022 32.8 (A) 27.0 - 31.0 pg Final    MCHC 03/07/2022 32.9  32.0 - 36.0 g/dL Final    RDW 03/07/2022 15.4 (A) 11.5 - 14.5 % Final    Platelets 03/07/2022 109 (A) 150 - 450 K/uL Final    MPV 03/07/2022 9.4  9.2 - 12.9 fL Final    Immature Granulocytes 03/07/2022 3.4 (A) 0.0 - 0.5 % Final    Gran # (ANC) 03/07/2022 1.0 (A) 1.8 - 7.7 K/uL Final    Immature Grans (Abs) 03/07/2022 0.08 (A) 0.00 - 0.04 K/uL Final    Lymph # 03/07/2022 0.6 (A) 1.0 - 4.8 K/uL Final    Mono # 03/07/2022 0.6  0.3 - 1.0 K/uL Final    Eos # 03/07/2022 0.0  0.0 - 0.5 K/uL Final    Baso # 03/07/2022 0.01  0.00 - 0.20 K/uL Final    nRBC 03/07/2022 0  0 /100 WBC Final    Gran % 03/07/2022 43.2  38.0 - 73.0 % Final    Lymph % 03/07/2022 26.1  18.0 - 48.0 % Final    Mono % 03/07/2022 26.9 (A) 4.0 - 15.0 % Final    Eosinophil % 03/07/2022 0.0  0.0 - 8.0 % Final    Basophil % 03/07/2022 0.4  0.0 - 1.9 % Final    Differential Method 03/07/2022 Automated   Final    Sodium 03/07/2022 138  136 - 145 mmol/L Final    Potassium 03/07/2022 4.3  3.5 - 5.1 mmol/L Final    Chloride 03/07/2022 104  95 - 110 mmol/L Final    CO2 03/07/2022 27  23 - 29 mmol/L Final    Glucose 03/07/2022 96  70 - 110 mg/dL Final    BUN 03/07/2022 8  8 - 23 mg/dL Final    Creatinine 03/07/2022 0.7  0.5 - 1.4 mg/dL Final    Calcium 03/07/2022 8.5 (A) 8.7 - 10.5 mg/dL Final    Total Protein 03/07/2022 5.8 (A) 6.0 - 8.4 g/dL Final    Albumin 03/07/2022 3.2 (A) 3.5 - 5.2 g/dL Final    Total Bilirubin 03/07/2022 0.6  0.1 - 1.0 mg/dL Final    Alkaline Phosphatase 03/07/2022 103  55 - 135 U/L Final    AST 03/07/2022 26  10 - 40 U/L Final    ALT 03/07/2022 37  10 - 44 U/L Final    Anion Gap 03/07/2022 7 (A) 8 - 16 mmol/L Final    eGFR if African American 03/07/2022 >60  >60 mL/min/1.73 m^2 Final    eGFR if non African American 03/07/2022 >60  >60 mL/min/1.73 m^2 Final      ?     Imaging:  ?    No results found for this or any  previous visit (from the past 2160 hour(s)).  No results found for this or any previous visit (from the past 2160 hour(s)).  Results for orders placed or performed during the hospital encounter of 12/14/21 (from the past 2160 hour(s))   NM PET CT Routine Skull to Mid Thigh    Impression    1. Highly FDG avid perihilar mass in the superior segment of the right lower lobe which is highly concerning for malignancy.  2. No FDG avid adenopathy or definite evidence of metastatic disease.  3. Observations as above      Electronically signed by: Geronimo Sigala MD  Date:    12/14/2021  Time:    11:42       No results found for this or any previous visit (from the past 2160 hour(s)).      ?   Assessment/Plan:   Squamous cell carcinoma of lung, unspecified laterality  -     CT Chest Abdomen Pelvis With Contrast; Future; Expected date: 03/07/2022  -     TSH; Future; Expected date: 03/14/2022  -     CBC auto differential; Future; Expected date: 03/14/2022  -     Comprehensive Metabolic Panel; Future; Expected date: 03/14/2022    Immunodeficiency due to chemotherapy    Thrombocytopenia    Chemotherapy induced neutropenia    Encounter for follow-up examination after completed treatment for malignant neoplasm   -     TSH; Future; Expected date: 03/14/2022       1. Squamous cell carcinoma of lung, unspecified laterality    2. Immunodeficiency due to chemotherapy    3. Thrombocytopenia    4. Chemotherapy induced neutropenia    5. Encounter for follow-up examination after completed treatment for malignant neoplasm           Plan:     Problem List Items Addressed This Visit        Oncology    Squamous cell lung cancer - Primary      Other Visit Diagnoses     Immunodeficiency due to chemotherapy        Thrombocytopenia        Chemotherapy induced neutropenia        Encounter for follow-up examination after completed treatment for malignant neoplasm             Squamous cell carcinoma lung stage II:  patient expressed not interested in  surgical option and therefore definitive chemoradiation was recommended by primary oncologist Dr. Jessica doty and radiation oncologist Dr. Roberson.  Cycle 1 day 1 plan today, 01/14/2022 and completed 02/24/2022.  Tolerated exceptionally well without notable toxicity.  He does have pancytopenia likely chemotherapy effect.  I am covering for Dr. Lopez today. We will plan for restaging imaging given completion of chemoradiation.  I discussed in detail consideration of consolidation maintenance immunotherapy while most validated in [place] trial with stage III non-small cell lung cancer reasonable to consider given non resected stage II disease if no evidence of disease progression after definitive chemoradiation (per NCCN guidelines).  Discussed with nurse navigation who will assist in restaging scan and follow-up afterwards.  We completed teaching and consent form for durvalumab today in clinic and have asked nurse navigation to arrange for immunotherapy teaching session as well.    FOLLOW-UP  Patient Instructions   Nurse navigation arrange for restaging CT chest abdomen pelvis, immunotherapy teaching session and follow-up to discuss results of restaging and tentative plan for immunotherapy teaching and cycle 1 day 1 durvalumab (treatment plan placed, pending insurance authorization) with lab CBC, CMP, TSH prior ordered.    60 minutes of total time spent on the encounter, which includes face to face time and non-face to face time preparing to see the patient (eg, review of tests), Obtaining and/or reviewing separately obtained history, Documenting clinical information in the electronic or other health record, Independently interpreting results (not separately reported) and communicating results to the patient/family/caregiver, or Care coordination (not separately reported).

## 2022-03-14 ENCOUNTER — HOSPITAL ENCOUNTER (OUTPATIENT)
Dept: RADIOLOGY | Facility: HOSPITAL | Age: 82
Discharge: HOME | End: 2022-03-14
Attending: INTERNAL MEDICINE
Payer: MEDICARE

## 2022-03-14 ENCOUNTER — TELEPHONE (OUTPATIENT)
Dept: HEMATOLOGY/ONCOLOGY | Facility: CLINIC | Age: 82
End: 2022-03-14
Payer: MEDICARE

## 2022-03-14 DIAGNOSIS — C34.90 SQUAMOUS CELL CARCINOMA OF LUNG, UNSPECIFIED LATERALITY: ICD-10-CM

## 2022-03-14 PROCEDURE — 74177 CT CHEST ABDOMEN PELVIS WITH CONTRAST (XPD): ICD-10-PCS | Mod: 26,,, | Performed by: RADIOLOGY

## 2022-03-14 PROCEDURE — 25500020 PHARM REV CODE 255: Performed by: INTERNAL MEDICINE

## 2022-03-14 PROCEDURE — 71260 CT THORAX DX C+: CPT | Mod: 26,,, | Performed by: RADIOLOGY

## 2022-03-14 PROCEDURE — 71260 CT THORAX DX C+: CPT | Mod: TC

## 2022-03-14 PROCEDURE — 71260 CT CHEST ABDOMEN PELVIS WITH CONTRAST (XPD): ICD-10-PCS | Mod: 26,,, | Performed by: RADIOLOGY

## 2022-03-14 PROCEDURE — 74177 CT ABD & PELVIS W/CONTRAST: CPT | Mod: TC

## 2022-03-14 PROCEDURE — 74177 CT ABD & PELVIS W/CONTRAST: CPT | Mod: 26,,, | Performed by: RADIOLOGY

## 2022-03-14 RX ADMIN — IOHEXOL 100 ML: 350 INJECTION, SOLUTION INTRAVENOUS at 01:03

## 2022-03-14 NOTE — NURSING
1430pm: Outgoing call to pt regarding immunotherapy teaching, labs, and infusion appts. Spoke with pt. Discussed with pt that we'll proceed with these appts as scheduled if when pt sees Dr. Baez on Wed 3/16 to review CT scan results and Imfinzi is still recommended. Pt scheduled for labs on 3/18 at 930am at [facility], teaching on 3/18 at 10am at [facility], and for Imfinzi infusion on 3/21 at 1pm at [facility]. Pt verbalized understanding. Pt encouraged to contact me directly if have any further questions and/or concerns.   Oncology Navigation   Intake  Date of Diagnosis: 2021  Cancer Type: Other  Internal / External Referral: Internal  Referral Source: Dr. Vences  Date of Referral: 2021  Initial Nurse Navigator Contact: 2021  Referral to Initial Contact Timeline (days): 5  Date Worked: 3/14/2022  Multiple appointments: Yes  Start of Treatment: 2021     Treatment  Current Status: Active       Medical Oncologist: Dr. Juan Lopez  Chemotherapy: Planned  Chemotherapy Regimen: Carboplatin Taxol    Radiation Therapy: Planned  Radiation Oncologist: Dr. Nadira Roberson  Start Date: 1/10/2021    Procedures: Port / PICC  Bronchoscopy Schedule Date: 2021  Echo Schedule Date: 2021  PET Scan Schedule Date: 2021  Port / PICC Schedule Date: 1/3/2021    General Referrals: Social work  Social Work: notified Haley Feritas LCSW  Social Work Referral Date: 2021       Radiation Oncologist: Dr. Nadira Roberson    Support Systems: Family members     Acuity  Stage: I-II  Systemic Treatment - predicted or initiated: More than one treatment modality concurrently (chemotherapy, radiation, etc.) (+2)  Treatment Tolerability: Has not started treatment yet/treatment fully completed and side effects resolved  ECO (+2)  Comorbidities in Medical History: 6+ (+2)   Needed: No  Verbalizes the need for more education: Yes  Navigation Acuity: 8     Follow Up  No follow-ups on file.

## 2022-03-16 ENCOUNTER — OFFICE VISIT (OUTPATIENT)
Dept: HEMATOLOGY/ONCOLOGY | Facility: CLINIC | Age: 82
End: 2022-03-16
Payer: MEDICARE

## 2022-03-16 ENCOUNTER — LAB VISIT (OUTPATIENT)
Dept: LAB | Facility: HOSPITAL | Age: 82
End: 2022-03-16
Attending: INTERNAL MEDICINE
Payer: MEDICARE

## 2022-03-16 VITALS
SYSTOLIC BLOOD PRESSURE: 126 MMHG | BODY MASS INDEX: 28.66 KG/M2 | OXYGEN SATURATION: 97 % | HEART RATE: 90 BPM | TEMPERATURE: 98 F | RESPIRATION RATE: 20 BRPM | WEIGHT: 205.5 LBS | DIASTOLIC BLOOD PRESSURE: 74 MMHG

## 2022-03-16 DIAGNOSIS — C34.90 SQUAMOUS CELL CARCINOMA OF LUNG, UNSPECIFIED LATERALITY: Primary | ICD-10-CM

## 2022-03-16 DIAGNOSIS — Z09 ENCOUNTER FOR FOLLOW-UP EXAMINATION AFTER COMPLETED TREATMENT FOR CONDITIONS OTHER THAN MALIGNANT NEOPLASM: ICD-10-CM

## 2022-03-16 DIAGNOSIS — R93.89 ABNORMAL FINDINGS ON DIAGNOSTIC IMAGING OF OTHER SPECIFIED BODY STRUCTURES: ICD-10-CM

## 2022-03-16 DIAGNOSIS — D69.6 THROMBOCYTOPENIA: ICD-10-CM

## 2022-03-16 DIAGNOSIS — Z08 ENCOUNTER FOR FOLLOW-UP EXAMINATION AFTER COMPLETED TREATMENT FOR MALIGNANT NEOPLASM: ICD-10-CM

## 2022-03-16 DIAGNOSIS — D84.821 IMMUNODEFICIENCY DUE TO CHEMOTHERAPY: ICD-10-CM

## 2022-03-16 DIAGNOSIS — Z79.899 IMMUNODEFICIENCY DUE TO CHEMOTHERAPY: ICD-10-CM

## 2022-03-16 DIAGNOSIS — T45.1X5A IMMUNODEFICIENCY DUE TO CHEMOTHERAPY: ICD-10-CM

## 2022-03-16 DIAGNOSIS — C34.90 SQUAMOUS CELL CARCINOMA OF LUNG, UNSPECIFIED LATERALITY: ICD-10-CM

## 2022-03-16 LAB
ALBUMIN SERPL BCP-MCNC: 3.6 G/DL (ref 3.5–5.2)
ALP SERPL-CCNC: 132 U/L (ref 55–135)
ALT SERPL W/O P-5'-P-CCNC: 29 U/L (ref 10–44)
ANION GAP SERPL CALC-SCNC: 11 MMOL/L (ref 8–16)
AST SERPL-CCNC: 21 U/L (ref 10–40)
BASOPHILS # BLD AUTO: 0.01 K/UL (ref 0–0.2)
BASOPHILS NFR BLD: 0.3 % (ref 0–1.9)
BILIRUB SERPL-MCNC: 0.7 MG/DL (ref 0.1–1)
BUN SERPL-MCNC: 6 MG/DL (ref 8–23)
CALCIUM SERPL-MCNC: 9.2 MG/DL (ref 8.7–10.5)
CHLORIDE SERPL-SCNC: 103 MMOL/L (ref 95–110)
CO2 SERPL-SCNC: 24 MMOL/L (ref 23–29)
CREAT SERPL-MCNC: 0.7 MG/DL (ref 0.5–1.4)
DIFFERENTIAL METHOD: ABNORMAL
EOSINOPHIL # BLD AUTO: 0 K/UL (ref 0–0.5)
EOSINOPHIL NFR BLD: 0.6 % (ref 0–8)
ERYTHROCYTE [DISTWIDTH] IN BLOOD BY AUTOMATED COUNT: 17.2 % (ref 11.5–14.5)
EST. GFR  (AFRICAN AMERICAN): >60 ML/MIN/1.73 M^2
EST. GFR  (NON AFRICAN AMERICAN): >60 ML/MIN/1.73 M^2
GLUCOSE SERPL-MCNC: 102 MG/DL (ref 70–110)
HCT VFR BLD AUTO: 38.9 % (ref 40–54)
HGB BLD-MCNC: 12.7 G/DL (ref 14–18)
IMM GRANULOCYTES # BLD AUTO: 0.03 K/UL (ref 0–0.04)
IMM GRANULOCYTES NFR BLD AUTO: 0.8 % (ref 0–0.5)
LYMPHOCYTES # BLD AUTO: 0.7 K/UL (ref 1–4.8)
LYMPHOCYTES NFR BLD: 19.5 % (ref 18–48)
MCH RBC QN AUTO: 33.4 PG (ref 27–31)
MCHC RBC AUTO-ENTMCNC: 32.6 G/DL (ref 32–36)
MCV RBC AUTO: 102 FL (ref 82–98)
MONOCYTES # BLD AUTO: 0.5 K/UL (ref 0.3–1)
MONOCYTES NFR BLD: 15 % (ref 4–15)
NEUTROPHILS # BLD AUTO: 2.3 K/UL (ref 1.8–7.7)
NEUTROPHILS NFR BLD: 63.8 % (ref 38–73)
NRBC BLD-RTO: 0 /100 WBC
PLATELET # BLD AUTO: 111 K/UL (ref 150–450)
PMV BLD AUTO: 8.8 FL (ref 9.2–12.9)
POTASSIUM SERPL-SCNC: 4.3 MMOL/L (ref 3.5–5.1)
PROT SERPL-MCNC: 6.5 G/DL (ref 6–8.4)
RBC # BLD AUTO: 3.8 M/UL (ref 4.6–6.2)
SODIUM SERPL-SCNC: 138 MMOL/L (ref 136–145)
TSH SERPL DL<=0.005 MIU/L-ACNC: 0.71 UIU/ML (ref 0.4–4)
WBC # BLD AUTO: 3.54 K/UL (ref 3.9–12.7)

## 2022-03-16 PROCEDURE — 3078F PR MOST RECENT DIASTOLIC BLOOD PRESSURE < 80 MM HG: ICD-10-PCS | Mod: CPTII,S$GLB,, | Performed by: INTERNAL MEDICINE

## 2022-03-16 PROCEDURE — 1101F PT FALLS ASSESS-DOCD LE1/YR: CPT | Mod: CPTII,S$GLB,, | Performed by: INTERNAL MEDICINE

## 2022-03-16 PROCEDURE — 99215 PR OFFICE/OUTPT VISIT, EST, LEVL V, 40-54 MIN: ICD-10-PCS | Mod: S$GLB,,, | Performed by: INTERNAL MEDICINE

## 2022-03-16 PROCEDURE — 99215 OFFICE O/P EST HI 40 MIN: CPT | Mod: S$GLB,,, | Performed by: INTERNAL MEDICINE

## 2022-03-16 PROCEDURE — 1126F PR PAIN SEVERITY QUANTIFIED, NO PAIN PRESENT: ICD-10-PCS | Mod: CPTII,S$GLB,, | Performed by: INTERNAL MEDICINE

## 2022-03-16 PROCEDURE — 84443 ASSAY THYROID STIM HORMONE: CPT | Performed by: INTERNAL MEDICINE

## 2022-03-16 PROCEDURE — 99999 PR PBB SHADOW E&M-EST. PATIENT-LVL IV: CPT | Mod: PBBFAC,,, | Performed by: INTERNAL MEDICINE

## 2022-03-16 PROCEDURE — 3288F FALL RISK ASSESSMENT DOCD: CPT | Mod: CPTII,S$GLB,, | Performed by: INTERNAL MEDICINE

## 2022-03-16 PROCEDURE — 80053 COMPREHEN METABOLIC PANEL: CPT | Performed by: INTERNAL MEDICINE

## 2022-03-16 PROCEDURE — 1126F AMNT PAIN NOTED NONE PRSNT: CPT | Mod: CPTII,S$GLB,, | Performed by: INTERNAL MEDICINE

## 2022-03-16 PROCEDURE — 36415 COLL VENOUS BLD VENIPUNCTURE: CPT | Performed by: INTERNAL MEDICINE

## 2022-03-16 PROCEDURE — 1159F MED LIST DOCD IN RCRD: CPT | Mod: CPTII,S$GLB,, | Performed by: INTERNAL MEDICINE

## 2022-03-16 PROCEDURE — 99999 PR PBB SHADOW E&M-EST. PATIENT-LVL IV: ICD-10-PCS | Mod: PBBFAC,,, | Performed by: INTERNAL MEDICINE

## 2022-03-16 PROCEDURE — 1159F PR MEDICATION LIST DOCUMENTED IN MEDICAL RECORD: ICD-10-PCS | Mod: CPTII,S$GLB,, | Performed by: INTERNAL MEDICINE

## 2022-03-16 PROCEDURE — 3078F DIAST BP <80 MM HG: CPT | Mod: CPTII,S$GLB,, | Performed by: INTERNAL MEDICINE

## 2022-03-16 PROCEDURE — 1101F PR PT FALLS ASSESS DOC 0-1 FALLS W/OUT INJ PAST YR: ICD-10-PCS | Mod: CPTII,S$GLB,, | Performed by: INTERNAL MEDICINE

## 2022-03-16 PROCEDURE — 3074F PR MOST RECENT SYSTOLIC BLOOD PRESSURE < 130 MM HG: ICD-10-PCS | Mod: CPTII,S$GLB,, | Performed by: INTERNAL MEDICINE

## 2022-03-16 PROCEDURE — 3074F SYST BP LT 130 MM HG: CPT | Mod: CPTII,S$GLB,, | Performed by: INTERNAL MEDICINE

## 2022-03-16 PROCEDURE — 85025 COMPLETE CBC W/AUTO DIFF WBC: CPT | Performed by: INTERNAL MEDICINE

## 2022-03-16 PROCEDURE — 3288F PR FALLS RISK ASSESSMENT DOCUMENTED: ICD-10-PCS | Mod: CPTII,S$GLB,, | Performed by: INTERNAL MEDICINE

## 2022-03-16 RX ORDER — SODIUM CHLORIDE 0.9 % (FLUSH) 0.9 %
10 SYRINGE (ML) INJECTION
Status: CANCELLED | OUTPATIENT
Start: 2022-03-16

## 2022-03-16 RX ORDER — EPINEPHRINE 0.3 MG/.3ML
0.3 INJECTION SUBCUTANEOUS ONCE AS NEEDED
Status: CANCELLED | OUTPATIENT
Start: 2022-03-16

## 2022-03-16 RX ORDER — DIPHENHYDRAMINE HYDROCHLORIDE 50 MG/ML
50 INJECTION INTRAMUSCULAR; INTRAVENOUS ONCE AS NEEDED
Status: CANCELLED | OUTPATIENT
Start: 2022-03-16

## 2022-03-16 RX ORDER — HEPARIN 100 UNIT/ML
500 SYRINGE INTRAVENOUS
Status: CANCELLED | OUTPATIENT
Start: 2022-03-16

## 2022-03-16 NOTE — PATIENT INSTRUCTIONS
Immunotherapy teaching planned   cycle 1 day 1 immunotherapy, order sign note: Labs already completed today 03/16/2022  RV week after cycle 1 day 1 revisit with nurse practitioner, CBC, CMP, follow-up  Revisit 4 weeks afterwards for cycle 2 day 1 with CBC CMP TSH prior

## 2022-03-16 NOTE — PROGRESS NOTES
Subjective:      DATE OF VISIT: 3/16/22     ?  Patient ID:?Alton Black is a 81 y.o. male.?? MR#: [id number]   ?   PRIMARY ONCOLOGIST: Dr. Lopez    ? Primary Care Providers:  Rohan Kenny MD, MD (General)     CHIEF COMPLAINT: ?  Follow-up after completion of chemoradiation  ?   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   CURRENT TREATMENT:  Chemoradiation with carboplatin paclitaxel weekly, cycle 1 day 1 1/14/22, completed 02/24/2022; plan for immunotherapy maintenance, see below    PAST TREATMENT:  None  ?   ONCOLOGIC HISTORY:   ?   Oncology History   Squamous cell lung cancer   1/13/2021 - 2/24/2022 Radiation Therapy    Treating physician: Karol  Total Dose: 60 Gy  Fractions: 30     12/17/2021 Initial Diagnosis    Squamous cell lung cancer     12/22/2021 Cancer Staged    Staging form: Lung, AJCC 8th Edition  - Clinical stage from 12/22/2021: Stage IIB (cT3, cN0, cM0)     1/14/2022 - 2/21/2022 Chemotherapy    Treatment Summary   Plan Name: OP NSCLC PACLITAXEL + CARBOPLATIN (AUC) QW + RADIATION  Treatment Goal: Curative  Status: Inactive  Start Date: 1/14/2022  End Date: 2/21/2022  Provider: Juan Lopez MD  Chemotherapy: dexAMETHasone (DECADRON) 4 MG Tab, 8 mg, Oral, Daily, 1 of 1 cycle, Start date: --, End date: --  CARBOplatin (PARAPLATIN) 230 mg in sodium chloride 0.9% 273 mL chemo infusion, 230 mg (100 % of original dose 230 mg), Intravenous, Clinic/HOD 1 time, 6 of 6 cycles  Dose modification:   (original dose 230 mg, Cycle 1)  Administration: 230 mg (1/14/2022), 230 mg (1/21/2022), 255 mg (1/28/2022), 255 mg (2/4/2022), 255 mg (2/14/2022), 255 mg (2/21/2022)  PACLitaxeL (TAXOL) 45 mg/m2 = 96 mg in sodium chloride 0.9% 266 mL chemo infusion, 45 mg/m2 = 96 mg, Intravenous, Clinic/HOD 1 time, 6 of 6 cycles  Administration: 96 mg (1/14/2022), 96 mg (1/21/2022), 96 mg (1/28/2022), 96 mg (2/4/2022), 96 mg (2/14/2022), 96 mg (2/21/2022)     2/21/2022 -  Chemotherapy     Treatment Summary   Plan Name: OP DURVALUMAB 1500 MG Q4W  Treatment Goal: Curative  Status: Active  Start Date: 2/21/2022 (Planned)  End Date: 1/23/2023 (Planned)  Provider: Winnie Baez MD  Chemotherapy: durvalumab (IMFINZI) 1,500 mg in sodium chloride 0.9% 280 mL chemo infusion, 1,500 mg, Intravenous, Clinic/HOD 1 time, 0 of 13 cycles       Cancer Staging  Squamous cell lung cancer  - Clinical stage from 12/22/2021: Stage IIB (cT3, cN0, cM0) - Signed by Juan Lopez MD on 12/22/2021         HPI    He comes today to review results of restaging imaging in further discussion of his diagnosis/treatment planning.  No history of rheumatologic condition.  Review of Systems    ?   A comprehensive 14-point review of systems was reviewed with patient and was negative other than as specified above.   ?      Objective:      Physical Exam        Vitals:    03/16/22 1039   BP: 126/74   Pulse: 90   Resp: 20   Temp: 97.5 °F (36.4 °C)      ?   General appearance: Generally well appearing, in no acute distress.   Head, eyes, ears, nose, and throat: Oropharynx clear with moist mucous membranes.   Cardiovascular: Regular rate and rhythm, S1, S2, no audible murmurs.   Respiratory: Lungs clear to auscultation bilaterally.   Abdomen: nontender, nondistended.   Extremities: Warm, without edema.   Neurologic: Alert and oriented.  Skin: No rashes, ecchymoses or petechial lesion.   Psychiatric: normal mood and affect, conversant and appropriate    ?   Laboratory:  ?   Lab Visit on 03/16/2022   Component Date Value Ref Range Status    WBC 03/16/2022 3.54 (A) 3.90 - 12.70 K/uL Final    RBC 03/16/2022 3.80 (A) 4.60 - 6.20 M/uL Final    Hemoglobin 03/16/2022 12.7 (A) 14.0 - 18.0 g/dL Final    Hematocrit 03/16/2022 38.9 (A) 40.0 - 54.0 % Final    MCV 03/16/2022 102 (A) 82 - 98 fL Final    MCH 03/16/2022 33.4 (A) 27.0 - 31.0 pg Final    MCHC 03/16/2022 32.6  32.0 - 36.0 g/dL Final    RDW 03/16/2022 17.2 (A) 11.5 - 14.5 %  Final    Platelets 03/16/2022 111 (A) 150 - 450 K/uL Final    MPV 03/16/2022 8.8 (A) 9.2 - 12.9 fL Final    Immature Granulocytes 03/16/2022 0.8 (A) 0.0 - 0.5 % Final    Gran # (ANC) 03/16/2022 2.3  1.8 - 7.7 K/uL Final    Immature Grans (Abs) 03/16/2022 0.03  0.00 - 0.04 K/uL Final    Lymph # 03/16/2022 0.7 (A) 1.0 - 4.8 K/uL Final    Mono # 03/16/2022 0.5  0.3 - 1.0 K/uL Final    Eos # 03/16/2022 0.0  0.0 - 0.5 K/uL Final    Baso # 03/16/2022 0.01  0.00 - 0.20 K/uL Final    nRBC 03/16/2022 0  0 /100 WBC Final    Gran % 03/16/2022 63.8  38.0 - 73.0 % Final    Lymph % 03/16/2022 19.5  18.0 - 48.0 % Final    Mono % 03/16/2022 15.0  4.0 - 15.0 % Final    Eosinophil % 03/16/2022 0.6  0.0 - 8.0 % Final    Basophil % 03/16/2022 0.3  0.0 - 1.9 % Final    Differential Method 03/16/2022 Automated   Final    Sodium 03/16/2022 138  136 - 145 mmol/L Final    Potassium 03/16/2022 4.3  3.5 - 5.1 mmol/L Final    Chloride 03/16/2022 103  95 - 110 mmol/L Final    CO2 03/16/2022 24  23 - 29 mmol/L Final    Glucose 03/16/2022 102  70 - 110 mg/dL Final    BUN 03/16/2022 6 (A) 8 - 23 mg/dL Final    Creatinine 03/16/2022 0.7  0.5 - 1.4 mg/dL Final    Calcium 03/16/2022 9.2  8.7 - 10.5 mg/dL Final    Total Protein 03/16/2022 6.5  6.0 - 8.4 g/dL Final    Albumin 03/16/2022 3.6  3.5 - 5.2 g/dL Final    Total Bilirubin 03/16/2022 0.7  0.1 - 1.0 mg/dL Final    Alkaline Phosphatase 03/16/2022 132  55 - 135 U/L Final    AST 03/16/2022 21  10 - 40 U/L Final    ALT 03/16/2022 29  10 - 44 U/L Final    Anion Gap 03/16/2022 11  8 - 16 mmol/L Final    eGFR if African American 03/16/2022 >60  >60 mL/min/1.73 m^2 Final    eGFR if non African American 03/16/2022 >60  >60 mL/min/1.73 m^2 Final      ?     Imaging:  ?    Results for orders placed or performed during the hospital encounter of 03/14/22 (from the past 2160 hour(s))   CT Chest Abdomen Pelvis With Contrast    Impression    Findings consistent with favorable  treatment response with decrease in size of the superior segment right lower lobe pulmonary mass.  Other stable findings as above.      Electronically signed by: Dave Lopez MD  Date:    03/14/2022  Time:    17:13     No results found for this or any previous visit (from the past 2160 hour(s)).  No results found for this or any previous visit (from the past 2160 hour(s)).    No results found for this or any previous visit (from the past 2160 hour(s)).    CT CHEST ABDOMEN PELVIS WITH CONTRAST (XPD)     CLINICAL HISTORY:  restaging ct; Malignant neoplasm of unspecified part of unspecified bronchus or lung     TECHNIQUE:  Low dose axial images, sagittal and coronal reformations were obtained from the thoracic inlet to the pubic symphysis following the IV administration of 100 mL of Omnipaque 350     COMPARISON:  PET-CT from 12/14/2021     FINDINGS:  A superior segment right lower lobe perihilar lung mass has decreased in size.  Soft tissue density is present in this region with adjacent spiculation or linear stranding likely related to post treatment changes.  The residual soft tissue density in this area measures 3.7 cm oblique AP by 3 cm transversely.  On the prior exam it measured 5.5 cm by 4 cm in the same dimension.  Please see series 6, image 182 for example.  Soft tissue density surrounding the adjacent bronchus intermedius is noted although appearance also appears improved compared to the recent PET-CT.  No new nodule or mass is seen.  Respiratory motion artifact is present.  No pneumothorax, pleural effusion, or infiltrate.     Heart size is normal without pericardial effusion.  Thoracic aortic and coronary artery atherosclerosis is noted.  Trachea and mainstem bronchi are patent.  Normal CT appearance of the visualized portions of the thyroid gland.  Small scattered thoracic nodes are unchanged.  No pathologic thoracic adenopathy     Abdomen/pelvis: There is a subcentimeter hypodense lesion in the right  hepatic lobe series 3, image 51 measuring 7 mm which is stable and is too small to characterize but favored to relate to a small cyst.  Patient is status post cholecystectomy.  No splenic abnormality.  Calcifications in the body of the pancreas are seen suggestive of sequelae of chronic pancreatitis.  No adrenal abnormality.  Exophytic hypodense lesion in the 4 posterior left kidney is seen and likely relates to a cyst.  Additional small hypodensities in either kidney is seen too small to characterize but statistically likely also relate to cysts.  There is no evidence of hydronephrosis.     Hiatal hernia is noted.  There is no evidence of bowel obstruction.  Normal appearance of the terminal ileum is noted.  Prominent sigmoid diverticulosis is noted without convincing evidence of diverticulitis.     Ectasia of the infrarenal abdominal aorta is seen measuring up to 2.6 cm, stable since the recent CT.  No bulky adenopathy.  No ascites or drainable fluid collections.     Mild circumferential urinary bladder wall thickening.  Would recommend correlation for any signs and symptoms of cystitis or obstructive uropathy.  Prostate gland is enlarged and contains coarse calcifications.     Prominent calcifications in the dany hepatic region are seen, stable in appearance and potentially related to prominent calcified lymph nodes.     Bones are demineralized.  Degenerative changes of the spine are noted.     Impression:     Findings consistent with favorable treatment response with decrease in size of the superior segment right lower lobe pulmonary mass.  Other stable findings as above.        Electronically signed by: Dave Lopez MD  Date:                                            03/14/2022  Time:                                           17:13  ?   Assessment/Plan:   Squamous cell carcinoma of lung, unspecified laterality  -     CBC auto differential; Future; Expected date: 03/16/2022  -     Comprehensive Metabolic  Panel; Future; Expected date: 03/16/2022  -     TSH; Future; Expected date: 03/16/2022  -     CBC auto differential; Future; Expected date: 03/23/2022  -     Comprehensive Metabolic Panel; Future; Expected date: 03/23/2022    Immunodeficiency due to chemotherapy    Thrombocytopenia    Abnormal findings on diagnostic imaging of other specified body structures   -     TSH; Future; Expected date: 03/16/2022       1. Squamous cell carcinoma of lung, unspecified laterality    2. Immunodeficiency due to chemotherapy    3. Thrombocytopenia    4. Abnormal findings on diagnostic imaging of other specified body structures           Plan:     Problem List Items Addressed This Visit        Oncology    Squamous cell lung cancer - Primary      Other Visit Diagnoses     Immunodeficiency due to chemotherapy        Thrombocytopenia        Abnormal findings on diagnostic imaging of other specified body structures             Squamous cell carcinoma lung stage II:  patient expressed not interested in surgical option and therefore definitive chemoradiation was recommended by primary oncologist Dr. Jessica doty and radiation oncologist Dr. Roberson.  Cycle 1 day 1  01/14/2022 and completed 02/24/2022.  Tolerated exceptionally well without notable toxicity.  He does have pancytopenia likely chemotherapy effect, repeat labs with improvement will need to continue to monitor.  I am covering for Dr. Lopez again today.  I had obtain restaging imaging after completion of chemoradiation which does show positive treatment effect reviewed imaging with patient in clinic today in detail.   I discussed in detail consideration of consolidation maintenance immunotherapy while most validated in [place] trial with stage III non-small cell lung cancer reasonable to consider given non resected stage II disease if no evidence of disease progression after definitive chemoradiation (per NCCN guidelines).  Previously we completed teaching and consent form for  durvalumab and have asked nurse navigation to arrange for immunotherapy teaching session as well planned next week with cycle 1 day 1.    FOLLOW-UP  Patient Instructions   Immunotherapy teaching planned   cycle 1 day 1 immunotherapy, order sign note: Labs already completed today 03/16/2022  RV week after cycle 1 day 1 revisit with nurse practitioner, CBC, CMP, follow-up  Revisit 4 weeks afterwards for cycle 2 day 1 with CBC CMP TSH prior

## 2022-03-18 ENCOUNTER — CLINICAL SUPPORT (OUTPATIENT)
Dept: HEMATOLOGY/ONCOLOGY | Facility: CLINIC | Age: 82
End: 2022-03-18
Payer: MEDICARE

## 2022-03-18 NOTE — PROGRESS NOTES
Met with patient  in person____) to discuss upcoming systemic therapy initiation. Discussed patient diagnosis including staging information. Also discussed that therapy regimen prescribed involves the following drugs: _Imfinzi_, and timeline of therapy administration. Went over what to expect on first day of treatment, including what to bring with you, visitor policy, and infusion suite guidelines. Also discussed supportive and shared services available to patient, including social work, financial counseling, oncology nutrition, and oncology psychology.      Covered with patient potential side effects and symptom management. Reviewed supportive medications that will be given before, during, and after treatment. Also stressed that other side effects are possible outside of those listed. Spent additional time on signs of infection, infection prevention, and proper nutrition/hydration.    Education provided to patient via chemotherapy education binder___). Also provided contact information for clinic and information related to [facility] communication. Discussed communication process for after-hours needs and some common scenarios in which patient should call provider for guidance vs. immediately report to the emergency room.     Finally, patient was given my contact information. Encouraged patient to call with any questions, concerns, or needs. Patient verbalized understanding of all above information.

## 2022-03-21 ENCOUNTER — INFUSION (OUTPATIENT)
Dept: INFUSION THERAPY | Facility: HOSPITAL | Age: 82
End: 2022-03-21
Attending: INTERNAL MEDICINE
Payer: MEDICARE

## 2022-03-21 VITALS
RESPIRATION RATE: 18 BRPM | HEIGHT: 71 IN | SYSTOLIC BLOOD PRESSURE: 131 MMHG | HEART RATE: 76 BPM | DIASTOLIC BLOOD PRESSURE: 71 MMHG | WEIGHT: 210.13 LBS | OXYGEN SATURATION: 98 % | BODY MASS INDEX: 29.42 KG/M2 | TEMPERATURE: 97 F

## 2022-03-21 DIAGNOSIS — C34.91 SQUAMOUS CELL CARCINOMA OF RIGHT LUNG: Primary | ICD-10-CM

## 2022-03-21 PROCEDURE — 96413 CHEMO IV INFUSION 1 HR: CPT

## 2022-03-21 PROCEDURE — 25000003 PHARM REV CODE 250: Performed by: INTERNAL MEDICINE

## 2022-03-21 PROCEDURE — 63600175 PHARM REV CODE 636 W HCPCS: Performed by: INTERNAL MEDICINE

## 2022-03-21 RX ORDER — HEPARIN 100 UNIT/ML
500 SYRINGE INTRAVENOUS
Status: DISCONTINUED | OUTPATIENT
Start: 2022-03-21 | End: 2022-03-21 | Stop reason: HOSPADM

## 2022-03-21 RX ADMIN — DURVALUMAB 1500 MG: 500 INJECTION, SOLUTION INTRAVENOUS at 08:03

## 2022-03-21 RX ADMIN — SODIUM CHLORIDE: 9 INJECTION, SOLUTION INTRAVENOUS at 08:03

## 2022-03-21 RX ADMIN — Medication 500 UNITS: at 09:03

## 2022-03-21 NOTE — DISCHARGE INSTRUCTIONS
.[facility]  [address] [facility]  [address]  [phone] phone     [phone] fax  Hours of Operation: Monday- Friday 8:00am- 5:00pm  After hours phone  [phone]  Hematology / Oncology Physicians on call    Dr. Jae Monterroso      Nurse Practitioners:    Laquita Gallegos, HEDY Stacy, HEDY Patel, MELIZA Ruiz      Please don't hesitate to call if you have any concerns.       .WAYS TO HELP PREVENT INFECTION        WASH YOUR HANDS OFTEN DURING THE DAY, ESPECIALLY BEFORE YOU EAT, AFTER USING THE BATHROOM, AND AFTER TOUCHING ANIMALS    STAY AWAY FROM PEOPLE WHO HAVE ILLNESSES YOU CAN CATCH; SUCH AS COLDS, FLU, CHICKEN POX    TRY TO AVOID CROWDS    STAY AWAY FROM CHILDREN WHO RECENTLY HAVE RECEIVED LIVE VIRUS VACCINES    MAINTAIN GOOD MOUTH CARE    DO NOT SQUEEZE OR SCRATCH PIMPLES    CLEAN CUTS & SCRAPES RIGHT AWAY AND DAILY UNTIL HEALED WITH WARM WATER, SOAP & AN ANTISEPTIC    AVOID CONTACT WITH LITTER BOXES, BIRD CAGES, & FISH TANKS    AVOID STANDING WATER, IE., BIRD BATHS, FLOWER POTS/VASES, OR HUMIDIFIERS    WEAR GLOVES WHEN GARDENING OR CLEANING UP AFTER OTHERS, ESPECIALLY BABIES & SMALL CHILDREN    DO NOT EAT RAW FISH, SEAFOOD, MEAT, OR EGGS    .FALL PREVENTION   Falls often occur due to slipping, tripping or losing your balance. Here are ways to reduce your risk of falling again.   Was there anything that caused your fall that can be fixed, removed or replaced?   Make your home safe by keeping walkways clear of objects you may trip over.   Use non-slip pads under rugs.   Do not walk in poorly lit areas.   Do not stand on chairs or wobbly ladders.   Use caution when reaching overhead or looking upward. This position can cause a loss of balance.   Be sure your shoes fit properly, have non-slip bottoms and are in good condition.   Be cautious when going up and down stairs, curbs, and when walking on  uneven sidewalks.   If your balance is poor, consider using a cane or walker.   If your fall was related to alcohol use, stop or limit alcohol intake.   If your fall was related to use of sleeping medicines, talk to your doctor about this. You may need to reduce your dosage at bedtime if you awaken during the night to go to the bathroom.   To reduce the need for nighttime bathroom trips:   Avoid drinking fluids for several hours before going to bed   Empty your bladder before going to bed   Men can keep a urinal at the bedside   © 0514-4369 [employer] [facility], [address] [place] [address], [place] [address]. All rights reserved. This information is not intended as a substitute for professional medical care. Always follow your healthcare professional's instructions.

## 2022-03-25 ENCOUNTER — OFFICE VISIT (OUTPATIENT)
Dept: RADIATION ONCOLOGY | Facility: CLINIC | Age: 82
End: 2022-03-25
Payer: MEDICARE

## 2022-03-25 VITALS
DIASTOLIC BLOOD PRESSURE: 73 MMHG | RESPIRATION RATE: 18 BRPM | WEIGHT: 208.69 LBS | HEIGHT: 71 IN | SYSTOLIC BLOOD PRESSURE: 135 MMHG | TEMPERATURE: 98 F | OXYGEN SATURATION: 97 % | BODY MASS INDEX: 29.22 KG/M2 | HEART RATE: 84 BPM

## 2022-03-25 DIAGNOSIS — C34.91 SQUAMOUS CELL CARCINOMA OF RIGHT LUNG: Primary | ICD-10-CM

## 2022-03-25 PROCEDURE — 99212 PR OFFICE/OUTPT VISIT, EST, LEVL II, 10-19 MIN: ICD-10-PCS | Mod: S$GLB,,, | Performed by: RADIOLOGY

## 2022-03-25 PROCEDURE — 3288F FALL RISK ASSESSMENT DOCD: CPT | Mod: CPTII,S$GLB,, | Performed by: RADIOLOGY

## 2022-03-25 PROCEDURE — 1159F MED LIST DOCD IN RCRD: CPT | Mod: CPTII,S$GLB,, | Performed by: RADIOLOGY

## 2022-03-25 PROCEDURE — 1126F PR PAIN SEVERITY QUANTIFIED, NO PAIN PRESENT: ICD-10-PCS | Mod: CPTII,S$GLB,, | Performed by: RADIOLOGY

## 2022-03-25 PROCEDURE — 1101F PR PT FALLS ASSESS DOC 0-1 FALLS W/OUT INJ PAST YR: ICD-10-PCS | Mod: CPTII,S$GLB,, | Performed by: RADIOLOGY

## 2022-03-25 PROCEDURE — 99999 PR PBB SHADOW E&M-EST. PATIENT-LVL III: CPT | Mod: PBBFAC,,, | Performed by: RADIOLOGY

## 2022-03-25 PROCEDURE — 3078F DIAST BP <80 MM HG: CPT | Mod: CPTII,S$GLB,, | Performed by: RADIOLOGY

## 2022-03-25 PROCEDURE — 99999 PR PBB SHADOW E&M-EST. PATIENT-LVL III: ICD-10-PCS | Mod: PBBFAC,,, | Performed by: RADIOLOGY

## 2022-03-25 PROCEDURE — 3288F PR FALLS RISK ASSESSMENT DOCUMENTED: ICD-10-PCS | Mod: CPTII,S$GLB,, | Performed by: RADIOLOGY

## 2022-03-25 PROCEDURE — 1159F PR MEDICATION LIST DOCUMENTED IN MEDICAL RECORD: ICD-10-PCS | Mod: CPTII,S$GLB,, | Performed by: RADIOLOGY

## 2022-03-25 PROCEDURE — 3078F PR MOST RECENT DIASTOLIC BLOOD PRESSURE < 80 MM HG: ICD-10-PCS | Mod: CPTII,S$GLB,, | Performed by: RADIOLOGY

## 2022-03-25 PROCEDURE — 1101F PT FALLS ASSESS-DOCD LE1/YR: CPT | Mod: CPTII,S$GLB,, | Performed by: RADIOLOGY

## 2022-03-25 PROCEDURE — 3075F PR MOST RECENT SYSTOLIC BLOOD PRESS GE 130-139MM HG: ICD-10-PCS | Mod: CPTII,S$GLB,, | Performed by: RADIOLOGY

## 2022-03-25 PROCEDURE — 99212 OFFICE O/P EST SF 10 MIN: CPT | Mod: S$GLB,,, | Performed by: RADIOLOGY

## 2022-03-25 PROCEDURE — 3075F SYST BP GE 130 - 139MM HG: CPT | Mod: CPTII,S$GLB,, | Performed by: RADIOLOGY

## 2022-03-25 PROCEDURE — 1126F AMNT PAIN NOTED NONE PRSNT: CPT | Mod: CPTII,S$GLB,, | Performed by: RADIOLOGY

## 2022-03-25 NOTE — PROGRESS NOTES
"[facility]  RADIATION ONCOLOGY FOLLOW UP    Name: Alton Black : 1940     DIAGNOSIS: R lung squamous cell carcinoma, cT2b vT4 N1M0, stage IIB v IIIA    TREATMENT HISTORY:   1. 60Gy/30fx chemoradiation completed 22  2. Adjuvant immunotherapy    INTERVAL HISTORY: Alton Black is a pleasant 81 y.o. male who presents today for follow-up.  This is their first follow up since completing radiation. During treatment, he had 60Gy/30fx to the R lung tumor without interruption or complication. He had no N/V, chest pain, hemoptysis, or esophagitis. Overall did very well. The R lung mass decreased in size on CBCT during last week of treatment significantly.    CT c/a/p 3/14/22 showed favorable treatment response with decrease in size of R lung mass, now 3.7 x 3cm (prev 5.5 x 4cm).  Subcentimeter lesion in R hepatic lobe favor cyst but follow.    Since then, he is starting on immunotherapy.    Today, doing well overall. No dysphagia, good appetite, No chest pain or hemoptysis or cough.    PHYSICAL EXAM:   Constitutional: well appearing, no acute distress, ECOG 2 - Ambulates, capable of self care only  Vitals:    /73   Pulse 84   Temp 97.7 °F (36.5 °C)   Resp 18   Ht 5' 11" (1.803 m)   Wt 94.7 kg (208 lb 11.2 oz)   SpO2 97%   BMI 29.11 kg/m²   Eyes: sclera anicteric, EOMI, pupils equal, round and reactive to light  ENT: oral cavity without lesions, moist mucous membranes  Neck: trachea midline, neck supple  Lymphatic: no cervical, supraclavicular or axillary adenopathy  Cardiovascular: regular rate, no edema of the upper or lower extremities, radial pulse 2+  Respiratory: unlabored effort, clear to auscultation, no wheezes  Abdomen: soft, non-tender, no rigidity, no masses, no hepatomegaly    Laboratory & X-Ray Findings: Per above.  Images reviewed personally.    ASSESSMENT: recovering well from acute toxicities of radiation with good response on " imaging    PLAN: Mr. Black tolerated chemoradiation very well and does not appear to have any untoward effects now one month out. Has started immunotherapy and will continue this hopefully for next year.   Continue f/u with [facility].    Follow up with me in 6 months, sooner if needed.    I spent approximately 17 minutes reviewing the available records and evaluating the patient, out of which over 50% of the time was spent face to face with the patient in counseling and coordinating this patient's care.    Nadira Roberson III, M.D.  Radiation Oncology  [facility] [address] [place] [address]  Ph: [phone]  [email or web address]

## 2022-03-27 NOTE — PROGRESS NOTES
Subjective:       Patient ID: Alton Black is a 81 y.o. male.    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 His primary oncologist is Dr. Baez.    Interval History: Patient presents for follow up on Imfinzi; he received cycle #1 last week and is seen today for follow up. He presents alone today and has no complaints. WBC WNL; H&H stable. Plts WNL at 220. Alk Phos mildly elevated at 136; remaining CMP stable. No TSH drawn today. He reports a good appetite and regular BMs. He denies n/v/d/c. His only concern today is his weight; he prefers his weight at 200lb. He has cut out ice cream from his diet. Discussed increasing dietary fiber intake. Also advised him to weigh himself every morning after eliminating and before breakfast in light weight or no clothes. He verbalizes understanding.    Social History     Socioeconomic History    Marital status: Single   Tobacco Use    Smoking status: Never Smoker    Smokeless tobacco: Never Used    Tobacco comment: Chews on cigars   Substance and Sexual Activity    Alcohol use: Not Currently    Drug use: Never    Sexual activity: Not Currently       Past Medical History:   Diagnosis Date    Benign essential HTN     CAD (coronary artery disease)     HLD (hyperlipidemia)        Family History   Problem Relation Age of Onset    Heart attack Father        Past Surgical History:   Procedure Laterality Date    BRONCHOSCOPY Bilateral 12/8/2021    Procedure: Bronchoscopy - insert lighted tube into airway to take a biopsy of lung;  Surgeon: Rigo Vences MD;  Location: [facility];  Service: Endoscopy;  Laterality: Bilateral;    CHOLECYSTECTOMY      CORONARY  ANGIOPLASTY WITH STENT PLACEMENT      ENDOBRONCHIAL ULTRASOUND Bilateral 12/8/2021    Procedure: ENDOBRONCHIAL ULTRASOUND (EBUS);  Surgeon: Rigo Vences MD;  Location: [facility] ENDO;  Service: Endoscopy;  Laterality: Bilateral;    FLUOROSCOPY N/A 1/4/2022    Procedure: Mediport Insertion;  Surgeon: Elaina Larios MD;  Location: [facility] CATH LAB;  Service: General;  Laterality: N/A;  yuridia from 1/3 to 1/4       Review of Systems   Constitutional: Negative.  Negative for fatigue and fever.   HENT: Negative.    Eyes: Negative.    Respiratory: Negative.    Cardiovascular: Negative.    Gastrointestinal: Negative.  Negative for constipation, diarrhea, nausea and vomiting.   Endocrine: Negative.    Genitourinary: Negative.    Musculoskeletal: Negative.    Skin: Negative.    Allergic/Immunologic: Negative.    Neurological: Negative.  Negative for weakness.   Hematological: Negative.    Psychiatric/Behavioral: Negative.          Medication List with Changes/Refills   Current Medications    ALPRAZOLAM (XANAX) 0.5 MG TABLET    Take 0.5 mg by mouth 2 (two) times daily as needed.    ASPIRIN (ECOTRIN) 81 MG EC TABLET    Take 81 mg by mouth once daily.    ATORVASTATIN (LIPITOR) 80 MG TABLET    Take 1 tablet by mouth once daily.    ESOMEPRAZOLE (NEXIUM) 40 MG CAPSULE    Take 1 capsule by mouth every morning.    LINACLOTIDE (LINZESS) 72 MCG CAP CAPSULE    Take 1 capsule by mouth every morning.    NITROFURANTOIN, MACROCRYSTAL-MONOHYDRATE, (MACROBID) 100 MG CAPSULE    Take 100 mg by mouth 2 (two) times daily.    ONDANSETRON (ZOFRAN-ODT) 8 MG TBDL    Take 1 tablet (8 mg total) by mouth every 12 (twelve) hours as needed.    PROCHLORPERAZINE (COMPAZINE) 5 MG TABLET    Take 1 tablet (5 mg total) by mouth every 6 (six) hours as needed.    TRANDOLAPRIL (MAVIK) 4 MG TAB    Take 1 tablet by mouth once daily.     Objective:     Vitals:    03/31/22 1311   BP: (!) 144/68   Pulse: 76   Temp: 98 °F (36.7 °C)     Lab Results   Component  Value Date    WBC 4.38 03/31/2022    HGB 12.2 (L) 03/31/2022    HCT 37.5 (L) 03/31/2022     (H) 03/31/2022     03/31/2022       CMP  Sodium   Date Value Ref Range Status   03/31/2022 139 136 - 145 mmol/L Final     Potassium   Date Value Ref Range Status   03/31/2022 4.4 3.5 - 5.1 mmol/L Final     Chloride   Date Value Ref Range Status   03/31/2022 105 95 - 110 mmol/L Final     CO2   Date Value Ref Range Status   03/31/2022 26 23 - 29 mmol/L Final     Glucose   Date Value Ref Range Status   03/31/2022 91 70 - 110 mg/dL Final     BUN   Date Value Ref Range Status   03/31/2022 7 (L) 8 - 23 mg/dL Final     Creatinine   Date Value Ref Range Status   03/31/2022 0.7 0.5 - 1.4 mg/dL Final     Calcium   Date Value Ref Range Status   03/31/2022 8.6 (L) 8.7 - 10.5 mg/dL Final     Total Protein   Date Value Ref Range Status   03/31/2022 5.9 (L) 6.0 - 8.4 g/dL Final     Albumin   Date Value Ref Range Status   03/31/2022 3.5 3.5 - 5.2 g/dL Final     Total Bilirubin   Date Value Ref Range Status   03/31/2022 0.5 0.1 - 1.0 mg/dL Final     Comment:     For infants and newborns, interpretation of results should be based  on gestational age, weight and in agreement with clinical  observations.    Premature Infant recommended reference ranges:  Up to 24 hours.............<8.0 mg/dL  Up to 48 hours............<12.0 mg/dL  3-5 days..................<15.0 mg/dL  6-29 days.................<15.0 mg/dL       Alkaline Phosphatase   Date Value Ref Range Status   03/31/2022 136 (H) 55 - 135 U/L Final     AST   Date Value Ref Range Status   03/31/2022 22 10 - 40 U/L Final     ALT   Date Value Ref Range Status   03/31/2022 21 10 - 44 U/L Final     Anion Gap   Date Value Ref Range Status   03/31/2022 8 8 - 16 mmol/L Final     eGFR if    Date Value Ref Range Status   03/31/2022 >60 >60 mL/min/1.73 m^2 Final     eGFR if non    Date Value Ref Range Status   03/31/2022 >60 >60 mL/min/1.73 m^2 Final      Comment:     Calculation used to obtain the estimated glomerular filtration  rate (eGFR) is the CKD-EPI equation.            Physical Exam  Constitutional:       Appearance: Normal appearance.   HENT:      Head: Normocephalic.   Eyes:      Extraocular Movements: Extraocular movements intact.      Pupils: Pupils are equal, round, and reactive to light.   Cardiovascular:      Rate and Rhythm: Normal rate and regular rhythm.      Pulses: Normal pulses.      Heart sounds: Normal heart sounds.   Pulmonary:      Effort: Pulmonary effort is normal.      Breath sounds: Normal breath sounds.   Abdominal:      General: Bowel sounds are normal.      Palpations: Abdomen is soft.   Genitourinary:     Comments: deferred  Musculoskeletal:         General: Normal range of motion.      Cervical back: Normal range of motion and neck supple.   Skin:     General: Skin is warm and dry.   Neurological:      General: No focal deficit present.      Mental Status: He is alert and oriented to person, place, and time.   Psychiatric:         Behavior: Behavior normal.         Thought Content: Thought content normal.          Assessment:     Problem List Items Addressed This Visit        Oncology    Squamous cell lung cancer - Primary    Relevant Orders    CBC Auto Differential    Comprehensive Metabolic Panel    TSH      Other Visit Diagnoses     Other specified disorders involving the immune mechanism, not elsewhere classified         Relevant Orders    TSH            Plan:     Squamous cell carcinoma of right lung  -     CBC Auto Differential; Future; Expected date: 04/21/2022  -     Comprehensive Metabolic Panel; Future; Expected date: 04/21/2022  -     TSH; Future; Expected date: 04/21/2022    Other specified disorders involving the immune mechanism, not elsewhere classified   -     TSH; Future; Expected date: 04/21/2022    Labs reviewed.  Patient tolerating immunotherapy with no side effects.   Follow up in 3 weeks with CBC, Comprehensive  Metabolic Panel and TSH prior to cycle #2.    I will review assessment/plan with collaborating physician Dr. Lopez.    AURELIANO Fields

## 2022-03-31 ENCOUNTER — OFFICE VISIT (OUTPATIENT)
Dept: HEMATOLOGY/ONCOLOGY | Facility: CLINIC | Age: 82
End: 2022-03-31
Payer: MEDICARE

## 2022-03-31 ENCOUNTER — LAB VISIT (OUTPATIENT)
Dept: LAB | Facility: HOSPITAL | Age: 82
End: 2022-03-31
Attending: INTERNAL MEDICINE
Payer: MEDICARE

## 2022-03-31 VITALS
SYSTOLIC BLOOD PRESSURE: 144 MMHG | WEIGHT: 209.19 LBS | DIASTOLIC BLOOD PRESSURE: 68 MMHG | TEMPERATURE: 98 F | OXYGEN SATURATION: 96 % | HEIGHT: 71 IN | BODY MASS INDEX: 29.29 KG/M2 | HEART RATE: 76 BPM

## 2022-03-31 DIAGNOSIS — C34.91 SQUAMOUS CELL CARCINOMA OF RIGHT LUNG: Primary | ICD-10-CM

## 2022-03-31 DIAGNOSIS — C34.90 SQUAMOUS CELL CARCINOMA OF LUNG, UNSPECIFIED LATERALITY: ICD-10-CM

## 2022-03-31 DIAGNOSIS — D89.89 OTHER SPECIFIED DISORDERS INVOLVING THE IMMUNE MECHANISM, NOT ELSEWHERE CLASSIFIED: ICD-10-CM

## 2022-03-31 LAB
ALBUMIN SERPL BCP-MCNC: 3.5 G/DL (ref 3.5–5.2)
ALP SERPL-CCNC: 136 U/L (ref 55–135)
ALT SERPL W/O P-5'-P-CCNC: 21 U/L (ref 10–44)
ANION GAP SERPL CALC-SCNC: 8 MMOL/L (ref 8–16)
AST SERPL-CCNC: 22 U/L (ref 10–40)
BASOPHILS # BLD AUTO: 0.02 K/UL (ref 0–0.2)
BASOPHILS NFR BLD: 0.5 % (ref 0–1.9)
BILIRUB SERPL-MCNC: 0.5 MG/DL (ref 0.1–1)
BUN SERPL-MCNC: 7 MG/DL (ref 8–23)
CALCIUM SERPL-MCNC: 8.6 MG/DL (ref 8.7–10.5)
CHLORIDE SERPL-SCNC: 105 MMOL/L (ref 95–110)
CO2 SERPL-SCNC: 26 MMOL/L (ref 23–29)
CREAT SERPL-MCNC: 0.7 MG/DL (ref 0.5–1.4)
DIFFERENTIAL METHOD: ABNORMAL
EOSINOPHIL # BLD AUTO: 0.1 K/UL (ref 0–0.5)
EOSINOPHIL NFR BLD: 1.1 % (ref 0–8)
ERYTHROCYTE [DISTWIDTH] IN BLOOD BY AUTOMATED COUNT: 18.3 % (ref 11.5–14.5)
EST. GFR  (AFRICAN AMERICAN): >60 ML/MIN/1.73 M^2
EST. GFR  (NON AFRICAN AMERICAN): >60 ML/MIN/1.73 M^2
GLUCOSE SERPL-MCNC: 91 MG/DL (ref 70–110)
HCT VFR BLD AUTO: 37.5 % (ref 40–54)
HGB BLD-MCNC: 12.2 G/DL (ref 14–18)
IMM GRANULOCYTES # BLD AUTO: 0.04 K/UL (ref 0–0.04)
IMM GRANULOCYTES NFR BLD AUTO: 0.9 % (ref 0–0.5)
LYMPHOCYTES # BLD AUTO: 1 K/UL (ref 1–4.8)
LYMPHOCYTES NFR BLD: 23.1 % (ref 18–48)
MCH RBC QN AUTO: 33.8 PG (ref 27–31)
MCHC RBC AUTO-ENTMCNC: 32.5 G/DL (ref 32–36)
MCV RBC AUTO: 104 FL (ref 82–98)
MONOCYTES # BLD AUTO: 0.8 K/UL (ref 0.3–1)
MONOCYTES NFR BLD: 17.8 % (ref 4–15)
NEUTROPHILS # BLD AUTO: 2.5 K/UL (ref 1.8–7.7)
NEUTROPHILS NFR BLD: 56.6 % (ref 38–73)
NRBC BLD-RTO: 0 /100 WBC
PLATELET # BLD AUTO: 220 K/UL (ref 150–450)
PMV BLD AUTO: 8.4 FL (ref 9.2–12.9)
POTASSIUM SERPL-SCNC: 4.4 MMOL/L (ref 3.5–5.1)
PROT SERPL-MCNC: 5.9 G/DL (ref 6–8.4)
RBC # BLD AUTO: 3.61 M/UL (ref 4.6–6.2)
SODIUM SERPL-SCNC: 139 MMOL/L (ref 136–145)
WBC # BLD AUTO: 4.38 K/UL (ref 3.9–12.7)

## 2022-03-31 PROCEDURE — 36415 COLL VENOUS BLD VENIPUNCTURE: CPT | Performed by: INTERNAL MEDICINE

## 2022-03-31 PROCEDURE — 80053 COMPREHEN METABOLIC PANEL: CPT | Performed by: INTERNAL MEDICINE

## 2022-03-31 PROCEDURE — 99999 PR PBB SHADOW E&M-EST. PATIENT-LVL III: CPT | Mod: PBBFAC,,, | Performed by: NURSE PRACTITIONER

## 2022-03-31 PROCEDURE — 3078F DIAST BP <80 MM HG: CPT | Mod: CPTII,S$GLB,, | Performed by: NURSE PRACTITIONER

## 2022-03-31 PROCEDURE — 1159F MED LIST DOCD IN RCRD: CPT | Mod: CPTII,S$GLB,, | Performed by: NURSE PRACTITIONER

## 2022-03-31 PROCEDURE — 1126F PR PAIN SEVERITY QUANTIFIED, NO PAIN PRESENT: ICD-10-PCS | Mod: CPTII,S$GLB,, | Performed by: NURSE PRACTITIONER

## 2022-03-31 PROCEDURE — 1160F RVW MEDS BY RX/DR IN RCRD: CPT | Mod: CPTII,S$GLB,, | Performed by: NURSE PRACTITIONER

## 2022-03-31 PROCEDURE — 1160F PR REVIEW ALL MEDS BY PRESCRIBER/CLIN PHARMACIST DOCUMENTED: ICD-10-PCS | Mod: CPTII,S$GLB,, | Performed by: NURSE PRACTITIONER

## 2022-03-31 PROCEDURE — 85025 COMPLETE CBC W/AUTO DIFF WBC: CPT | Performed by: INTERNAL MEDICINE

## 2022-03-31 PROCEDURE — 1101F PR PT FALLS ASSESS DOC 0-1 FALLS W/OUT INJ PAST YR: ICD-10-PCS | Mod: CPTII,S$GLB,, | Performed by: NURSE PRACTITIONER

## 2022-03-31 PROCEDURE — 3077F PR MOST RECENT SYSTOLIC BLOOD PRESSURE >= 140 MM HG: ICD-10-PCS | Mod: CPTII,S$GLB,, | Performed by: NURSE PRACTITIONER

## 2022-03-31 PROCEDURE — 1126F AMNT PAIN NOTED NONE PRSNT: CPT | Mod: CPTII,S$GLB,, | Performed by: NURSE PRACTITIONER

## 2022-03-31 PROCEDURE — 3078F PR MOST RECENT DIASTOLIC BLOOD PRESSURE < 80 MM HG: ICD-10-PCS | Mod: CPTII,S$GLB,, | Performed by: NURSE PRACTITIONER

## 2022-03-31 PROCEDURE — 99214 PR OFFICE/OUTPT VISIT, EST, LEVL IV, 30-39 MIN: ICD-10-PCS | Mod: S$GLB,,, | Performed by: NURSE PRACTITIONER

## 2022-03-31 PROCEDURE — 3077F SYST BP >= 140 MM HG: CPT | Mod: CPTII,S$GLB,, | Performed by: NURSE PRACTITIONER

## 2022-03-31 PROCEDURE — 99214 OFFICE O/P EST MOD 30 MIN: CPT | Mod: S$GLB,,, | Performed by: NURSE PRACTITIONER

## 2022-03-31 PROCEDURE — 1101F PT FALLS ASSESS-DOCD LE1/YR: CPT | Mod: CPTII,S$GLB,, | Performed by: NURSE PRACTITIONER

## 2022-03-31 PROCEDURE — 99999 PR PBB SHADOW E&M-EST. PATIENT-LVL III: ICD-10-PCS | Mod: PBBFAC,,, | Performed by: NURSE PRACTITIONER

## 2022-03-31 PROCEDURE — 1159F PR MEDICATION LIST DOCUMENTED IN MEDICAL RECORD: ICD-10-PCS | Mod: CPTII,S$GLB,, | Performed by: NURSE PRACTITIONER

## 2022-03-31 PROCEDURE — 3288F FALL RISK ASSESSMENT DOCD: CPT | Mod: CPTII,S$GLB,, | Performed by: NURSE PRACTITIONER

## 2022-03-31 PROCEDURE — 3288F PR FALLS RISK ASSESSMENT DOCUMENTED: ICD-10-PCS | Mod: CPTII,S$GLB,, | Performed by: NURSE PRACTITIONER

## 2022-04-16 NOTE — PROGRESS NOTES
Subjective:       Patient ID: Alton Black is a 81 y.o. male.    Chief Complaint: Chemotherapy and Lung Cancer    Primary Oncologist/Hematologist: Dr. Baez    HPI: Mr. Black is a 81 year old male who is following up for his stage II B squamous cell carcinoma of lung. Patient is s/p chemo with pacitaxel and carboplatin + radiation. He just started maintenance with durvalumab 1500mg q 4 weeks, on 3/21/22. He is here for cycle 2 day 1.     Today: Patient states he has gained some weight and isn't happy about it. He states he has been very stressed because of the stock market. He states all of his money is in the market and he doesn't work anymore so when there are changes in the market it is stressful for him. He states his appetite is good. He has been fatigued. He states he stays hydrated. He denies any n/v/d/c, fevers, infections, pain, sob.   Patient is inquiring about getting his 4th covid shot (booster). His last one was 10/28/21 (Moderna).    Social History     Socioeconomic History    Marital status: Single   Tobacco Use    Smoking status: Never Smoker    Smokeless tobacco: Never Used    Tobacco comment: Chews on cigars   Substance and Sexual Activity    Alcohol use: Not Currently    Drug use: Never    Sexual activity: Not Currently       Past Medical History:   Diagnosis Date    Benign essential HTN     CAD (coronary artery disease)     HLD (hyperlipidemia)        Family History   Problem Relation Age of Onset    Heart attack Father        Past Surgical History:   Procedure Laterality Date    BRONCHOSCOPY Bilateral 12/8/2021    Procedure: Bronchoscopy - insert lighted tube into airway to take a biopsy of lung;  Surgeon: Rigo Vences MD;  Location: [facility];  Service: Endoscopy;  Laterality: Bilateral;    CHOLECYSTECTOMY      CORONARY ANGIOPLASTY WITH STENT PLACEMENT      ENDOBRONCHIAL ULTRASOUND Bilateral 12/8/2021    Procedure: ENDOBRONCHIAL ULTRASOUND (EBUS);   Surgeon: Rigo Vences MD;  Location: [facility] ENDO;  Service: Endoscopy;  Laterality: Bilateral;    FLUOROSCOPY N/A 1/4/2022    Procedure: Mediport Insertion;  Surgeon: Elaina Larios MD;  Location: [facility] CATH LAB;  Service: General;  Laterality: N/A;  yuridia from 1/3 to 1/4       Review of Systems   Constitutional: Positive for fatigue. Negative for activity change, appetite change, chills, diaphoresis, fever and unexpected weight change.   HENT: Negative for congestion, nosebleeds and rhinorrhea.    Respiratory: Negative for cough and shortness of breath.    Cardiovascular: Negative for chest pain, palpitations and leg swelling.   Gastrointestinal: Negative for anal bleeding, blood in stool, constipation, diarrhea, nausea and vomiting.   Genitourinary: Negative for hematuria.   Musculoskeletal: Positive for arthralgias.   Skin: Negative for color change and rash.        Multiple hyperpigmentation lesions on back, face, arms   Neurological: Negative for dizziness, light-headedness, numbness and headaches.   Psychiatric/Behavioral: The patient is nervous/anxious.          Medication List with Changes/Refills   Current Medications    ALPRAZOLAM (XANAX) 0.5 MG TABLET    Take 0.5 mg by mouth 2 (two) times daily as needed.    ASPIRIN (ECOTRIN) 81 MG EC TABLET    Take 81 mg by mouth once daily.    ATORVASTATIN (LIPITOR) 80 MG TABLET    Take 1 tablet by mouth once daily.    ESOMEPRAZOLE (NEXIUM) 40 MG CAPSULE    Take 1 capsule by mouth every morning.    LINACLOTIDE (LINZESS) 72 MCG CAP CAPSULE    Take 1 capsule by mouth every morning.    NITROFURANTOIN, MACROCRYSTAL-MONOHYDRATE, (MACROBID) 100 MG CAPSULE    Take 100 mg by mouth 2 (two) times daily.    ONDANSETRON (ZOFRAN-ODT) 8 MG TBDL    Take 1 tablet (8 mg total) by mouth every 12 (twelve) hours as needed.    PROCHLORPERAZINE (COMPAZINE) 5 MG TABLET    Take 1 tablet (5 mg total) by mouth every 6 (six) hours as needed.    TRANDOLAPRIL (MAVIK) 4 MG TAB    Take 1  tablet by mouth once daily.     Objective:     Vitals:    04/18/22 1141   BP: 137/79   Pulse: 99   Temp: 97.5 °F (36.4 °C)       Physical Exam  Vitals reviewed.   Constitutional:       General: He is not in acute distress.     Appearance: He is not ill-appearing, toxic-appearing or diaphoretic.   HENT:      Head: Normocephalic and atraumatic.   Eyes:      Conjunctiva/sclera: Conjunctivae normal.   Cardiovascular:      Rate and Rhythm: Normal rate and regular rhythm.      Pulses: Normal pulses.   Pulmonary:      Effort: Pulmonary effort is normal. No respiratory distress.      Breath sounds: Normal breath sounds.   Abdominal:      General: Bowel sounds are normal.      Palpations: Abdomen is soft.   Musculoskeletal:      Right lower leg: No edema.      Left lower leg: No edema.   Skin:     General: Skin is warm and dry.      Coloration: Skin is not jaundiced or pale.      Findings: Lesion present. No bruising, erythema or rash.   Neurological:      General: No focal deficit present.      Mental Status: He is alert.   Psychiatric:         Mood and Affect: Mood normal.         Behavior: Behavior normal.         Thought Content: Thought content normal.            Labs/Results:  Lab Results   Component Value Date    WBC 7.88 04/18/2022    RBC 3.89 (L) 04/18/2022    HGB 13.2 (L) 04/18/2022    HCT 40.5 04/18/2022     (H) 04/18/2022    MCH 33.9 (H) 04/18/2022    MCHC 32.6 04/18/2022    RDW 16.4 (H) 04/18/2022     04/18/2022    MPV 8.7 (L) 04/18/2022    GRAN 5.3 04/18/2022    GRAN 66.7 04/18/2022    LYMPH 1.4 04/18/2022    LYMPH 17.5 (L) 04/18/2022    MONO 1.1 (H) 04/18/2022    MONO 13.3 04/18/2022    EOS 0.1 04/18/2022    BASO 0.03 04/18/2022    EOSINOPHIL 1.5 04/18/2022    BASOPHIL 0.4 04/18/2022     CMP  Sodium   Date Value Ref Range Status   04/18/2022 140 136 - 145 mmol/L Final     Potassium   Date Value Ref Range Status   04/18/2022 4.3 3.5 - 5.1 mmol/L Final     Chloride   Date Value Ref Range Status    04/18/2022 104 95 - 110 mmol/L Final     CO2   Date Value Ref Range Status   04/18/2022 25 23 - 29 mmol/L Final     Glucose   Date Value Ref Range Status   04/18/2022 99 70 - 110 mg/dL Final     BUN   Date Value Ref Range Status   04/18/2022 6 (L) 8 - 23 mg/dL Final     Creatinine   Date Value Ref Range Status   04/18/2022 0.8 0.5 - 1.4 mg/dL Final     Calcium   Date Value Ref Range Status   04/18/2022 8.9 8.7 - 10.5 mg/dL Final     Total Protein   Date Value Ref Range Status   04/18/2022 6.2 6.0 - 8.4 g/dL Final     Albumin   Date Value Ref Range Status   04/18/2022 3.5 3.5 - 5.2 g/dL Final     Total Bilirubin   Date Value Ref Range Status   04/18/2022 0.6 0.1 - 1.0 mg/dL Final     Comment:     For infants and newborns, interpretation of results should be based  on gestational age, weight and in agreement with clinical  observations.    Premature Infant recommended reference ranges:  Up to 24 hours.............<8.0 mg/dL  Up to 48 hours............<12.0 mg/dL  3-5 days..................<15.0 mg/dL  6-29 days.................<15.0 mg/dL       Alkaline Phosphatase   Date Value Ref Range Status   04/18/2022 138 (H) 55 - 135 U/L Final     AST   Date Value Ref Range Status   04/18/2022 18 10 - 40 U/L Final     ALT   Date Value Ref Range Status   04/18/2022 18 10 - 44 U/L Final     Anion Gap   Date Value Ref Range Status   04/18/2022 11 8 - 16 mmol/L Final     eGFR if    Date Value Ref Range Status   04/18/2022 >60 >60 mL/min/1.73 m^2 Final     eGFR if non    Date Value Ref Range Status   04/18/2022 >60 >60 mL/min/1.73 m^2 Final     Comment:     Calculation used to obtain the estimated glomerular filtration  rate (eGFR) is the CKD-EPI equation.           Latest Reference Range & Units 04/18/22 11:29   TSH 0.400 - 4.000 uIU/mL 0.957     Ct c/a/p 3/7/22  Impression:  Findings consistent with favorable treatment response with decrease in size of the superior segment right lower lobe  pulmonary mass.  Other stable findings as above.  Assessment:     Problem List Items Addressed This Visit        Oncology    Squamous cell lung cancer - Primary    Relevant Orders    Comprehensive Metabolic Panel    CBC Auto Differential    TSH      Other Visit Diagnoses     Nutritional anemia, unspecified         Relevant Orders    TSH        Plan:     Squamous cell carcinoma of lung, unspecified laterality  --cycle 2 day 1 durvalumab 1500mg q 4 weeks  --13 cycles of maintenance  --restaging imaging after completion of chemoradiation which does show positive treatment effect   --advised to try and reduce stress  --TSH-WNL    --patient is eligible for covid vaccine-4th dose (booster). Patient is above the age of 50, it has been over 4 months since first booster shot and he is immunocompromised.     Follow-Up: 4 weeks, cbc cmp prior for cycle 3 day 1    Krystin King PA-C

## 2022-04-18 ENCOUNTER — OFFICE VISIT (OUTPATIENT)
Dept: HEMATOLOGY/ONCOLOGY | Facility: CLINIC | Age: 82
End: 2022-04-18
Payer: MEDICARE

## 2022-04-18 ENCOUNTER — LAB VISIT (OUTPATIENT)
Dept: LAB | Facility: HOSPITAL | Age: 82
End: 2022-04-18
Attending: INTERNAL MEDICINE
Payer: MEDICARE

## 2022-04-18 VITALS
HEART RATE: 99 BPM | BODY MASS INDEX: 29.72 KG/M2 | SYSTOLIC BLOOD PRESSURE: 137 MMHG | TEMPERATURE: 98 F | DIASTOLIC BLOOD PRESSURE: 79 MMHG | HEIGHT: 71 IN | OXYGEN SATURATION: 98 % | WEIGHT: 212.31 LBS

## 2022-04-18 DIAGNOSIS — C34.90 SQUAMOUS CELL CARCINOMA OF LUNG, UNSPECIFIED LATERALITY: ICD-10-CM

## 2022-04-18 DIAGNOSIS — D53.9 NUTRITIONAL ANEMIA, UNSPECIFIED: ICD-10-CM

## 2022-04-18 DIAGNOSIS — C34.90 SQUAMOUS CELL CARCINOMA OF LUNG, UNSPECIFIED LATERALITY: Primary | ICD-10-CM

## 2022-04-18 DIAGNOSIS — Z09 ENCOUNTER FOR FOLLOW-UP EXAMINATION AFTER COMPLETED TREATMENT FOR CONDITIONS OTHER THAN MALIGNANT NEOPLASM: ICD-10-CM

## 2022-04-18 LAB
ALBUMIN SERPL BCP-MCNC: 3.5 G/DL (ref 3.5–5.2)
ALP SERPL-CCNC: 138 U/L (ref 55–135)
ALT SERPL W/O P-5'-P-CCNC: 18 U/L (ref 10–44)
ANION GAP SERPL CALC-SCNC: 11 MMOL/L (ref 8–16)
AST SERPL-CCNC: 18 U/L (ref 10–40)
BASOPHILS # BLD AUTO: 0.03 K/UL (ref 0–0.2)
BASOPHILS NFR BLD: 0.4 % (ref 0–1.9)
BILIRUB SERPL-MCNC: 0.6 MG/DL (ref 0.1–1)
BUN SERPL-MCNC: 6 MG/DL (ref 8–23)
CALCIUM SERPL-MCNC: 8.9 MG/DL (ref 8.7–10.5)
CHLORIDE SERPL-SCNC: 104 MMOL/L (ref 95–110)
CO2 SERPL-SCNC: 25 MMOL/L (ref 23–29)
CREAT SERPL-MCNC: 0.8 MG/DL (ref 0.5–1.4)
DIFFERENTIAL METHOD: ABNORMAL
EOSINOPHIL # BLD AUTO: 0.1 K/UL (ref 0–0.5)
EOSINOPHIL NFR BLD: 1.5 % (ref 0–8)
ERYTHROCYTE [DISTWIDTH] IN BLOOD BY AUTOMATED COUNT: 16.4 % (ref 11.5–14.5)
EST. GFR  (AFRICAN AMERICAN): >60 ML/MIN/1.73 M^2
EST. GFR  (NON AFRICAN AMERICAN): >60 ML/MIN/1.73 M^2
GLUCOSE SERPL-MCNC: 99 MG/DL (ref 70–110)
HCT VFR BLD AUTO: 40.5 % (ref 40–54)
HGB BLD-MCNC: 13.2 G/DL (ref 14–18)
IMM GRANULOCYTES # BLD AUTO: 0.05 K/UL (ref 0–0.04)
IMM GRANULOCYTES NFR BLD AUTO: 0.6 % (ref 0–0.5)
LYMPHOCYTES # BLD AUTO: 1.4 K/UL (ref 1–4.8)
LYMPHOCYTES NFR BLD: 17.5 % (ref 18–48)
MCH RBC QN AUTO: 33.9 PG (ref 27–31)
MCHC RBC AUTO-ENTMCNC: 32.6 G/DL (ref 32–36)
MCV RBC AUTO: 104 FL (ref 82–98)
MONOCYTES # BLD AUTO: 1.1 K/UL (ref 0.3–1)
MONOCYTES NFR BLD: 13.3 % (ref 4–15)
NEUTROPHILS # BLD AUTO: 5.3 K/UL (ref 1.8–7.7)
NEUTROPHILS NFR BLD: 66.7 % (ref 38–73)
NRBC BLD-RTO: 0 /100 WBC
PLATELET # BLD AUTO: 231 K/UL (ref 150–450)
PMV BLD AUTO: 8.7 FL (ref 9.2–12.9)
POTASSIUM SERPL-SCNC: 4.3 MMOL/L (ref 3.5–5.1)
PROT SERPL-MCNC: 6.2 G/DL (ref 6–8.4)
RBC # BLD AUTO: 3.89 M/UL (ref 4.6–6.2)
SODIUM SERPL-SCNC: 140 MMOL/L (ref 136–145)
TSH SERPL DL<=0.005 MIU/L-ACNC: 0.96 UIU/ML (ref 0.4–4)
WBC # BLD AUTO: 7.88 K/UL (ref 3.9–12.7)

## 2022-04-18 PROCEDURE — 99999 PR PBB SHADOW E&M-EST. PATIENT-LVL III: ICD-10-PCS | Mod: PBBFAC,,,

## 2022-04-18 PROCEDURE — 1126F AMNT PAIN NOTED NONE PRSNT: CPT | Mod: CPTII,S$GLB,,

## 2022-04-18 PROCEDURE — 99215 OFFICE O/P EST HI 40 MIN: CPT | Mod: 25,S$GLB,,

## 2022-04-18 PROCEDURE — 1159F MED LIST DOCD IN RCRD: CPT | Mod: CPTII,S$GLB,,

## 2022-04-18 PROCEDURE — 3078F PR MOST RECENT DIASTOLIC BLOOD PRESSURE < 80 MM HG: ICD-10-PCS | Mod: CPTII,S$GLB,,

## 2022-04-18 PROCEDURE — 85025 COMPLETE CBC W/AUTO DIFF WBC: CPT | Performed by: INTERNAL MEDICINE

## 2022-04-18 PROCEDURE — 99999 PR PBB SHADOW E&M-EST. PATIENT-LVL III: CPT | Mod: PBBFAC,,,

## 2022-04-18 PROCEDURE — 99215 PR OFFICE/OUTPT VISIT, EST, LEVL V, 40-54 MIN: ICD-10-PCS | Mod: 25,S$GLB,,

## 2022-04-18 PROCEDURE — 84443 ASSAY THYROID STIM HORMONE: CPT | Performed by: INTERNAL MEDICINE

## 2022-04-18 PROCEDURE — 1101F PT FALLS ASSESS-DOCD LE1/YR: CPT | Mod: CPTII,S$GLB,,

## 2022-04-18 PROCEDURE — 36415 COLL VENOUS BLD VENIPUNCTURE: CPT | Performed by: INTERNAL MEDICINE

## 2022-04-18 PROCEDURE — 1159F PR MEDICATION LIST DOCUMENTED IN MEDICAL RECORD: ICD-10-PCS | Mod: CPTII,S$GLB,,

## 2022-04-18 PROCEDURE — 3288F FALL RISK ASSESSMENT DOCD: CPT | Mod: CPTII,S$GLB,,

## 2022-04-18 PROCEDURE — 3288F PR FALLS RISK ASSESSMENT DOCUMENTED: ICD-10-PCS | Mod: CPTII,S$GLB,,

## 2022-04-18 PROCEDURE — 80053 COMPREHEN METABOLIC PANEL: CPT | Performed by: INTERNAL MEDICINE

## 2022-04-18 PROCEDURE — 1126F PR PAIN SEVERITY QUANTIFIED, NO PAIN PRESENT: ICD-10-PCS | Mod: CPTII,S$GLB,,

## 2022-04-18 PROCEDURE — 3075F PR MOST RECENT SYSTOLIC BLOOD PRESS GE 130-139MM HG: ICD-10-PCS | Mod: CPTII,S$GLB,,

## 2022-04-18 PROCEDURE — 3078F DIAST BP <80 MM HG: CPT | Mod: CPTII,S$GLB,,

## 2022-04-18 PROCEDURE — 1101F PR PT FALLS ASSESS DOC 0-1 FALLS W/OUT INJ PAST YR: ICD-10-PCS | Mod: CPTII,S$GLB,,

## 2022-04-18 PROCEDURE — 3075F SYST BP GE 130 - 139MM HG: CPT | Mod: CPTII,S$GLB,,

## 2022-04-19 ENCOUNTER — DOCUMENTATION ONLY (OUTPATIENT)
Dept: HEMATOLOGY/ONCOLOGY | Facility: CLINIC | Age: 82
End: 2022-04-19
Payer: MEDICARE

## 2022-04-19 ENCOUNTER — INFUSION (OUTPATIENT)
Dept: INFUSION THERAPY | Facility: HOSPITAL | Age: 82
End: 2022-04-19
Attending: INTERNAL MEDICINE
Payer: MEDICARE

## 2022-04-19 VITALS
TEMPERATURE: 98 F | DIASTOLIC BLOOD PRESSURE: 74 MMHG | BODY MASS INDEX: 29.72 KG/M2 | HEIGHT: 71 IN | WEIGHT: 212.31 LBS | SYSTOLIC BLOOD PRESSURE: 132 MMHG | OXYGEN SATURATION: 98 % | RESPIRATION RATE: 18 BRPM | HEART RATE: 78 BPM

## 2022-04-19 DIAGNOSIS — C34.91 SQUAMOUS CELL CARCINOMA OF RIGHT LUNG: Primary | ICD-10-CM

## 2022-04-19 PROCEDURE — 63600175 PHARM REV CODE 636 W HCPCS: Performed by: INTERNAL MEDICINE

## 2022-04-19 PROCEDURE — 25000003 PHARM REV CODE 250: Performed by: INTERNAL MEDICINE

## 2022-04-19 PROCEDURE — 96413 CHEMO IV INFUSION 1 HR: CPT

## 2022-04-19 RX ORDER — DIPHENHYDRAMINE HYDROCHLORIDE 50 MG/ML
50 INJECTION INTRAMUSCULAR; INTRAVENOUS ONCE AS NEEDED
Status: CANCELLED | OUTPATIENT
Start: 2022-04-19

## 2022-04-19 RX ORDER — SODIUM CHLORIDE 0.9 % (FLUSH) 0.9 %
10 SYRINGE (ML) INJECTION
Status: DISCONTINUED | OUTPATIENT
Start: 2022-04-19 | End: 2022-04-19 | Stop reason: HOSPADM

## 2022-04-19 RX ORDER — HEPARIN 100 UNIT/ML
500 SYRINGE INTRAVENOUS
Status: DISCONTINUED | OUTPATIENT
Start: 2022-04-19 | End: 2022-04-19 | Stop reason: HOSPADM

## 2022-04-19 RX ORDER — SODIUM CHLORIDE 0.9 % (FLUSH) 0.9 %
10 SYRINGE (ML) INJECTION
Status: CANCELLED | OUTPATIENT
Start: 2022-04-19

## 2022-04-19 RX ORDER — HEPARIN 100 UNIT/ML
500 SYRINGE INTRAVENOUS
Status: CANCELLED | OUTPATIENT
Start: 2022-04-19

## 2022-04-19 RX ORDER — EPINEPHRINE 0.3 MG/.3ML
0.3 INJECTION SUBCUTANEOUS ONCE AS NEEDED
Status: CANCELLED | OUTPATIENT
Start: 2022-04-19

## 2022-04-19 RX ADMIN — DURVALUMAB 1500 MG: 500 INJECTION, SOLUTION INTRAVENOUS at 08:04

## 2022-04-19 RX ADMIN — Medication 500 UNITS: at 10:04

## 2022-04-19 RX ADMIN — SODIUM CHLORIDE: 9 INJECTION, SOLUTION INTRAVENOUS at 08:04

## 2022-04-19 NOTE — NURSING
10am: Pt here in infusion for 2nd Imfinzi treatment. Spoke with pt in person at chairside. Pt doing well. Pt given Hem Onc March pt spotlight gift today. Pt asked about switching providers from Dr. Lopez to Dr. Baez. I explained that process will take place once Dr. Baez return from maternioty leave. Pt encouraged to reach out to me directly if have nay further questions and/or concerns. Pt verbalized understanding. Will fup with pt in 1 mth.   Oncology Navigation   Intake  Date of Diagnosis: 2021  Cancer Type: Other  Internal / External Referral: Internal  Referral Source: Dr. Vences  Date of Referral: 2021  Initial Nurse Navigator Contact: 2021  Referral to Initial Contact Timeline (days): 5  Date Worked: 2022  Multiple appointments: Yes  Start of Treatment: 2021     Treatment  Current Status: Active       Medical Oncologist: Dr. Juan Lopez  Chemotherapy: Planned  Chemotherapy Regimen: Carboplatin Taxol    Radiation Therapy: Planned  Radiation Oncologist: Dr. Nadira Roberson  Start Date: 1/10/2021    Procedures: Port / PICC  Bronchoscopy Schedule Date: 2021  Echo Schedule Date: 2021  PET Scan Schedule Date: 2021  Port / PICC Schedule Date: 1/3/2021    General Referrals: Social work  Social Work: notified Haley Freitas LCSW  Social Work Referral Date: 2021       Radiation Oncologist: Dr. Nadira Roberson    Support Systems: Family members     Acuity  Stage: I-II  Systemic Treatment - predicted or initiated: More than one treatment modality concurrently (chemotherapy, radiation, etc.) (+2)  Treatment Tolerability: Has not started treatment yet/treatment fully completed and side effects resolved  ECO (+2)  Comorbidities in Medical History: 6+ (+2)   Needed: No  Verbalizes the need for more education: Yes  Navigation Acuity: 8     Follow Up  No follow-ups on file.

## 2022-04-19 NOTE — PLAN OF CARE
Pt is stable, pt administered IMFINZI today. Pt will follow up in four weeks.   Problem: Anemia (Chemotherapy Effects)  Goal: Anemia Symptom Improvement  Outcome: Ongoing, Progressing     Problem: Urinary Bleeding Risk or Actual (Chemotherapy Effects)  Goal: Absence of Hematuria  Outcome: Ongoing, Progressing     Problem: Nausea and Vomiting (Chemotherapy Effects)  Goal: Fluid and Electrolyte Balance  Outcome: Ongoing, Progressing     Problem: Neurotoxicity (Chemotherapy Effects)  Goal: Neurotoxicity Symptom Control  Outcome: Ongoing, Progressing     Problem: Neutropenia (Chemotherapy Effects)  Goal: Absence of Infection  Outcome: Ongoing, Progressing     Problem: Oral Mucositis (Chemotherapy Effects)  Goal: Improved Oral Mucous Membrane Integrity  Outcome: Ongoing, Progressing     Problem: Thrombocytopenia Bleeding Risk (Chemotherapy Effects)  Goal: Absence of Bleeding  Outcome: Ongoing, Progressing     Problem: Fall Injury Risk  Goal: Absence of Fall and Fall-Related Injury  Outcome: Ongoing, Progressing     Problem: Adult Inpatient Plan of Care  Goal: Plan of Care Review  Outcome: Ongoing, Progressing  Goal: Patient-Specific Goal (Individualized)  Outcome: Ongoing, Progressing  Flowsheets (Taken 4/19/2022 0850)  Anxieties, Fears or Concerns: Pt denies any needs.  Individualized Care Needs: Feet elevated in reclined position, pillow provided and blanket offerred.  Patient-Specific Goals (Include Timeframe): Tolerate treatment as scheduled

## 2022-04-19 NOTE — DISCHARGE INSTRUCTIONS
[facility]  [address] [address] [address]  [address] [address]  [phone] phone     [phone] fax  Hours of Operation: Monday- Friday 8:00am- 5:00pm  After hours phone  [phone]  Hematology / Oncology Physicians on call      DARINEL Sood Dr., Dr., HEDY Gallegos, HEDY Patel, AURELIANO Waldrop    Please call with any concerns regarding your appointment today. FALL PREVENTION   Falls often occur due to slipping, tripping or losing your balance. Here are ways to reduce your risk of falling again.   Was there anything that caused your fall that can be fixed, removed or replaced?   Make your home safe by keeping walkways clear of objects you may trip over.   Use non-slip pads under rugs.   Do not walk in poorly lit areas.   Do not stand on chairs or wobbly ladders.   Use caution when reaching overhead or looking upward. This position can cause a loss of balance.   Be sure your shoes fit properly, have non-slip bottoms and are in good condition.   Be cautious when going up and down stairs, curbs, and when walking on uneven sidewalks.   If your balance is poor, consider using a cane or walker.   If your fall was related to alcohol use, stop or limit alcohol intake.   If your fall was related to use of sleeping medicines, talk to your doctor about this. You may need to reduce your dosage at bedtime if you awaken during the night to go to the bathroom.   To reduce the need for nighttime bathroom trips:   Avoid drinking fluids for several hours before going to bed   Empty your bladder before going to bed   Men can keep a urinal at the bedside   © 4681-7351 [facility], [address] [place] [address], [place] [address]. All rights reserved. This information is not intended as a substitute for professional medical care. Always follow your healthcare professional's instructions. HOME CARE AFTER CHEMOTHERAPY   Meals    Many patients feel sick and lose their appetites during treatment. Eat small meals several times a day. Choose bland foods with little taste or smell if you have problems with nausea. Be sure to cook all food thoroughly. This kills bacteria and helps you avoid intestinal infection. Soft foods are easier to swallow and digest.   Activity   Exercise keeps you strong and keeps your heart and lungs active. Talk to your doctor about an appropriate exercise program for you.   Skin Care   To prevent a skin infection, bathe or shower once a day. Use a moisturizing soap and wash with warm water. Avoid very hot or cold water. Chemotherapy can make your skin dry . Apply moisturizing lotion to help relieve dry skin. Some drugs used in high doses can cause slight burns to appear (like sunburn). Ask for a special cream to help relieve the burn and protect your skin.   Prevent Mouth Sores   During chemotherapy, many people get mouth sores. Do the following to help prevent mouth sores or to ease discomfort.   Brush your teeth with a soft-bristle toothbrush after every meal.  Don't use dental floss if your platelet count is below 50,000. Your doctor or nurse will tell you if this is the case.  Use an oral swab or special soft toothbrush if your gums bleed during regular brushing.  Use mouthwash as directed. If you can't tolerate commercial mouthwash, use salt and baking soda to clean your mouth. Mix 1 teaspoon of salt and 1 teaspoon of baking soda into a glass of water. Swish and spit.  Call your doctor or return to this facility if you develop any of the following:   Sore throat   White patches in the mouth or throat   Fever of 100.4ºF (38ºC) or higher, or as directed by your healthcare provider  © 1572-0987 Jessy Ng, [address] [place] [address], [place] [address]. All rights reserved. This information is not intended as a substitute for professional medical care. Always follow your healthcare professional's  Support  "Groups/Classes    Support groups and classes are being offered at the   [facility] and [facility]!!    "Cooking with Cancer" (Nutrition Class):  Second Wednesday of each month   at noon at the Cancer Center.  Metastatic Support Group:  Third Tuesday of each month   at noon at the Cancer Center.  Next Steps Class/Group: Second and fourth Thursday of each month at noon at the Cancer Center.  Hope Chest (Breast Cancer Support Group): First Tuesday of each month   at 5:30pm at the [facility] location.  [facility]: [facility]: Second and third Tuesday of each month from 7:30am - 2pm.  [facility]: First and fourth Tuesday of each month from 7:30am - 2pm    If you are interested in attending or would like more information please ask our social workers or your nurse!   "

## 2022-05-13 NOTE — PROGRESS NOTES
"Subjective:       Patient ID: Alton Black is a 81 y.o. male.    Chief Complaint:   1. Squamous cell carcinoma of lung, unspecified laterality  Stage IIB (cT3, cN0, cM0) right lung     Current Treatment:  OP DURVALUMAB 1500 MG Q4W    Treatment History:  Chemoradiation with carboplatin paclitaxel weekly; started 1/14/22, completed 2/24/2022    HPI: This is an 81 year old male with PMHx of coronary artery disease, hypertension who was diagnosed with Stage IIb vs IIIa squamous cell carcinoma of right lung after presenting to an appointment with Dr. Vences with complaints of SOB. CXR done on 11/29/202 revealed a right hilar mass. FNA biopsy of station 10R LN done on 12/8/2021 was positive for malignant cells with histopathologic features of squamous   cell carcinoma. PET/CT performed on 12/14/2021 showed a highly FDG avid perihilar mass in the superior segment of the right lower lobe which is highly concerning for malignancy and no evidence of metastasis.     He was treated with Carbo/Taxol + XRT in 1/2022 which he completed in 2/2022. He is currently on consolidation maintenance immunotherapy with Imfinzi every 4 weeks for 13 cycles.     His primary oncologist is Dr. Baez.    Interval History: Patient presents for follow up on Imfinzi; he is scheduled to receive C3D1 tomorrow. He presents alone and reports a good appetite and normal BMs. He eats only once a day in the evenings; some days, he eats a bowl of Raisin Bran around 1pm. He complains that he is continuing to gain weight. 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 Discussed when and how to take the most accurate weight. H&H 13.6 & 42.2; WBC, ANC, plts WNL. CMP stable. TSH 1.349.     Social History     Socioeconomic History    Marital status: Single   Tobacco Use    Smoking status: Never Smoker    Smokeless tobacco: Never Used    Tobacco comment: Chews on cigars   Substance and Sexual Activity "    Alcohol use: Not Currently    Drug use: Never    Sexual activity: Not Currently       Past Medical History:   Diagnosis Date    Benign essential HTN     CAD (coronary artery disease)     HLD (hyperlipidemia)        Family History   Problem Relation Age of Onset    Heart attack Father        Past Surgical History:   Procedure Laterality Date    BRONCHOSCOPY Bilateral 12/8/2021    Procedure: Bronchoscopy - insert lighted tube into airway to take a biopsy of lung;  Surgeon: Rigo Vences MD;  Location: [facility] ENDO;  Service: Endoscopy;  Laterality: Bilateral;    CHOLECYSTECTOMY      CORONARY ANGIOPLASTY WITH STENT PLACEMENT      ENDOBRONCHIAL ULTRASOUND Bilateral 12/8/2021    Procedure: ENDOBRONCHIAL ULTRASOUND (EBUS);  Surgeon: Rigo Vences MD;  Location: [facility] ENDO;  Service: Endoscopy;  Laterality: Bilateral;    FLUOROSCOPY N/A 1/4/2022    Procedure: Mediport Insertion;  Surgeon: Elaina Larios MD;  Location: [facility] CATH LAB;  Service: General;  Laterality: N/A;  yuridia from 1/3 to 1/4       Review of Systems   Constitutional: Negative.  Negative for fatigue and fever.   HENT: Negative.    Eyes: Negative.    Respiratory: Negative.    Cardiovascular: Negative.    Gastrointestinal: Negative.  Negative for constipation, diarrhea, nausea and vomiting.   Endocrine: Negative.    Genitourinary: Negative.    Musculoskeletal: Negative.    Skin: Negative.    Allergic/Immunologic: Negative.    Neurological: Negative.  Negative for weakness.   Hematological: Negative.    Psychiatric/Behavioral: Negative.          Medication List with Changes/Refills   Current Medications    ALPRAZOLAM (XANAX) 0.5 MG TABLET    Take 0.5 mg by mouth 2 (two) times daily as needed.    ASPIRIN (ECOTRIN) 81 MG EC TABLET    Take 81 mg by mouth once daily.    ATORVASTATIN (LIPITOR) 80 MG TABLET    Take 1 tablet by mouth once daily.    ESOMEPRAZOLE (NEXIUM) 40 MG CAPSULE    Take 1 capsule by mouth every morning.     LINACLOTIDE (LINZESS) 72 MCG CAP CAPSULE    Take 1 capsule by mouth every morning.    NITROFURANTOIN, MACROCRYSTAL-MONOHYDRATE, (MACROBID) 100 MG CAPSULE    Take 100 mg by mouth 2 (two) times daily.    ONDANSETRON (ZOFRAN-ODT) 8 MG TBDL    Take 1 tablet (8 mg total) by mouth every 12 (twelve) hours as needed.    PROCHLORPERAZINE (COMPAZINE) 5 MG TABLET    Take 1 tablet (5 mg total) by mouth every 6 (six) hours as needed.    TRANDOLAPRIL (MAVIK) 4 MG TAB    Take 1 tablet by mouth once daily.     Objective:     Vitals:    05/16/22 0758   BP: 126/69   Pulse: 91   Temp: 97.7 °F (36.5 °C)     Lab Results   Component Value Date    WBC 7.23 05/16/2022    HGB 13.6 (L) 05/16/2022    HCT 42.2 05/16/2022     (H) 05/16/2022     05/16/2022       CMP  Sodium   Date Value Ref Range Status   05/16/2022 141 136 - 145 mmol/L Final     Potassium   Date Value Ref Range Status   05/16/2022 4.2 3.5 - 5.1 mmol/L Final     Chloride   Date Value Ref Range Status   05/16/2022 106 95 - 110 mmol/L Final     CO2   Date Value Ref Range Status   05/16/2022 25 23 - 29 mmol/L Final     Glucose   Date Value Ref Range Status   05/16/2022 93 70 - 110 mg/dL Final     BUN   Date Value Ref Range Status   05/16/2022 6 (L) 8 - 23 mg/dL Final     Creatinine   Date Value Ref Range Status   05/16/2022 0.8 0.5 - 1.4 mg/dL Final     Calcium   Date Value Ref Range Status   05/16/2022 9.0 8.7 - 10.5 mg/dL Final     Total Protein   Date Value Ref Range Status   05/16/2022 6.4 6.0 - 8.4 g/dL Final     Albumin   Date Value Ref Range Status   05/16/2022 3.3 (L) 3.5 - 5.2 g/dL Final     Total Bilirubin   Date Value Ref Range Status   05/16/2022 0.6 0.1 - 1.0 mg/dL Final     Comment:     For infants and newborns, interpretation of results should be based  on gestational age, weight and in agreement with clinical  observations.    Premature Infant recommended reference ranges:  Up to 24 hours.............<8.0 mg/dL  Up to 48 hours............<12.0  mg/dL  3-5 days..................<15.0 mg/dL  6-29 days.................<15.0 mg/dL       Alkaline Phosphatase   Date Value Ref Range Status   05/16/2022 114 55 - 135 U/L Final     AST   Date Value Ref Range Status   05/16/2022 27 10 - 40 U/L Final     ALT   Date Value Ref Range Status   05/16/2022 18 10 - 44 U/L Final     Anion Gap   Date Value Ref Range Status   05/16/2022 10 8 - 16 mmol/L Final     eGFR if    Date Value Ref Range Status   05/16/2022 >60 >60 mL/min/1.73 m^2 Final     eGFR if non    Date Value Ref Range Status   05/16/2022 >60 >60 mL/min/1.73 m^2 Final     Comment:     Calculation used to obtain the estimated glomerular filtration  rate (eGFR) is the CKD-EPI equation.        Lab Results   Component Value Date    TSH 1.349 05/16/2022     Physical Exam  Constitutional:       Appearance: Normal appearance.   HENT:      Head: Normocephalic.   Eyes:      Extraocular Movements: Extraocular movements intact.      Pupils: Pupils are equal, round, and reactive to light.   Cardiovascular:      Rate and Rhythm: Normal rate and regular rhythm.      Pulses: Normal pulses.      Heart sounds: Normal heart sounds.   Pulmonary:      Effort: Pulmonary effort is normal.      Breath sounds: Normal breath sounds.   Abdominal:      General: Bowel sounds are normal.      Palpations: Abdomen is soft.   Genitourinary:     Comments: deferred  Musculoskeletal:         General: Normal range of motion.      Cervical back: Normal range of motion and neck supple.   Skin:     General: Skin is warm and dry.   Neurological:      General: No focal deficit present.      Mental Status: He is alert and oriented to person, place, and time.   Psychiatric:         Behavior: Behavior normal.         Thought Content: Thought content normal.          Assessment:     Problem List Items Addressed This Visit        Oncology    Squamous cell carcinoma of lung - Primary        Plan:     Squamous cell carcinoma of  lung, unspecified laterality    Labs reviewed.  Ok to proceed with C3D1 of Imfinzi tomorrow.  CT C/A/P due in 6/2022; ordered today.  Follow up in 3 weeks with CBC, Comprehensive Metabolic Panel and TSH prior to cycle #4.    I will review assessment/plan with collaborating physician Dr. Lopez.    AURELIANO Fields

## 2022-05-16 ENCOUNTER — OFFICE VISIT (OUTPATIENT)
Dept: HEMATOLOGY/ONCOLOGY | Facility: CLINIC | Age: 82
End: 2022-05-16
Payer: MEDICARE

## 2022-05-16 ENCOUNTER — LAB VISIT (OUTPATIENT)
Dept: LAB | Facility: HOSPITAL | Age: 82
End: 2022-05-16
Payer: MEDICARE

## 2022-05-16 VITALS
BODY MASS INDEX: 29.97 KG/M2 | WEIGHT: 214.06 LBS | DIASTOLIC BLOOD PRESSURE: 69 MMHG | HEART RATE: 91 BPM | HEIGHT: 71 IN | TEMPERATURE: 98 F | OXYGEN SATURATION: 96 % | SYSTOLIC BLOOD PRESSURE: 126 MMHG

## 2022-05-16 DIAGNOSIS — C34.90 SQUAMOUS CELL CARCINOMA OF LUNG, UNSPECIFIED LATERALITY: Primary | ICD-10-CM

## 2022-05-16 DIAGNOSIS — C34.90 SQUAMOUS CELL CARCINOMA OF LUNG, UNSPECIFIED LATERALITY: ICD-10-CM

## 2022-05-16 DIAGNOSIS — E78.5 HYPERLIPIDEMIA, UNSPECIFIED: ICD-10-CM

## 2022-05-16 DIAGNOSIS — D53.9 NUTRITIONAL ANEMIA, UNSPECIFIED: ICD-10-CM

## 2022-05-16 LAB
ALBUMIN SERPL BCP-MCNC: 3.3 G/DL (ref 3.5–5.2)
ALP SERPL-CCNC: 114 U/L (ref 55–135)
ALT SERPL W/O P-5'-P-CCNC: 18 U/L (ref 10–44)
ANION GAP SERPL CALC-SCNC: 10 MMOL/L (ref 8–16)
AST SERPL-CCNC: 27 U/L (ref 10–40)
BASOPHILS # BLD AUTO: 0.02 K/UL (ref 0–0.2)
BASOPHILS NFR BLD: 0.3 % (ref 0–1.9)
BILIRUB SERPL-MCNC: 0.6 MG/DL (ref 0.1–1)
BUN SERPL-MCNC: 6 MG/DL (ref 8–23)
CALCIUM SERPL-MCNC: 9 MG/DL (ref 8.7–10.5)
CHLORIDE SERPL-SCNC: 106 MMOL/L (ref 95–110)
CO2 SERPL-SCNC: 25 MMOL/L (ref 23–29)
CREAT SERPL-MCNC: 0.8 MG/DL (ref 0.5–1.4)
DIFFERENTIAL METHOD: ABNORMAL
EOSINOPHIL # BLD AUTO: 0.1 K/UL (ref 0–0.5)
EOSINOPHIL NFR BLD: 1.7 % (ref 0–8)
ERYTHROCYTE [DISTWIDTH] IN BLOOD BY AUTOMATED COUNT: 13.3 % (ref 11.5–14.5)
EST. GFR  (AFRICAN AMERICAN): >60 ML/MIN/1.73 M^2
EST. GFR  (NON AFRICAN AMERICAN): >60 ML/MIN/1.73 M^2
GLUCOSE SERPL-MCNC: 93 MG/DL (ref 70–110)
HCT VFR BLD AUTO: 42.2 % (ref 40–54)
HGB BLD-MCNC: 13.6 G/DL (ref 14–18)
IMM GRANULOCYTES # BLD AUTO: 0.04 K/UL (ref 0–0.04)
IMM GRANULOCYTES NFR BLD AUTO: 0.6 % (ref 0–0.5)
LYMPHOCYTES # BLD AUTO: 1.1 K/UL (ref 1–4.8)
LYMPHOCYTES NFR BLD: 15.4 % (ref 18–48)
MCH RBC QN AUTO: 34.4 PG (ref 27–31)
MCHC RBC AUTO-ENTMCNC: 32.2 G/DL (ref 32–36)
MCV RBC AUTO: 107 FL (ref 82–98)
MONOCYTES # BLD AUTO: 0.8 K/UL (ref 0.3–1)
MONOCYTES NFR BLD: 11.5 % (ref 4–15)
NEUTROPHILS # BLD AUTO: 5.1 K/UL (ref 1.8–7.7)
NEUTROPHILS NFR BLD: 70.5 % (ref 38–73)
NRBC BLD-RTO: 0 /100 WBC
PLATELET # BLD AUTO: 195 K/UL (ref 150–450)
PMV BLD AUTO: 9.2 FL (ref 9.2–12.9)
POTASSIUM SERPL-SCNC: 4.2 MMOL/L (ref 3.5–5.1)
PROT SERPL-MCNC: 6.4 G/DL (ref 6–8.4)
RBC # BLD AUTO: 3.95 M/UL (ref 4.6–6.2)
SODIUM SERPL-SCNC: 141 MMOL/L (ref 136–145)
TSH SERPL DL<=0.005 MIU/L-ACNC: 1.35 UIU/ML (ref 0.4–4)
WBC # BLD AUTO: 7.23 K/UL (ref 3.9–12.7)

## 2022-05-16 PROCEDURE — 3078F PR MOST RECENT DIASTOLIC BLOOD PRESSURE < 80 MM HG: ICD-10-PCS | Mod: CPTII,S$GLB,, | Performed by: NURSE PRACTITIONER

## 2022-05-16 PROCEDURE — 85025 COMPLETE CBC W/AUTO DIFF WBC: CPT

## 2022-05-16 PROCEDURE — 1160F PR REVIEW ALL MEDS BY PRESCRIBER/CLIN PHARMACIST DOCUMENTED: ICD-10-PCS | Mod: CPTII,S$GLB,, | Performed by: NURSE PRACTITIONER

## 2022-05-16 PROCEDURE — 99214 OFFICE O/P EST MOD 30 MIN: CPT | Mod: S$GLB,,, | Performed by: NURSE PRACTITIONER

## 2022-05-16 PROCEDURE — 3074F PR MOST RECENT SYSTOLIC BLOOD PRESSURE < 130 MM HG: ICD-10-PCS | Mod: CPTII,S$GLB,, | Performed by: NURSE PRACTITIONER

## 2022-05-16 PROCEDURE — 99999 PR PBB SHADOW E&M-EST. PATIENT-LVL III: CPT | Mod: PBBFAC,,, | Performed by: NURSE PRACTITIONER

## 2022-05-16 PROCEDURE — 3288F FALL RISK ASSESSMENT DOCD: CPT | Mod: CPTII,S$GLB,, | Performed by: NURSE PRACTITIONER

## 2022-05-16 PROCEDURE — 1101F PT FALLS ASSESS-DOCD LE1/YR: CPT | Mod: CPTII,S$GLB,, | Performed by: NURSE PRACTITIONER

## 2022-05-16 PROCEDURE — 1159F MED LIST DOCD IN RCRD: CPT | Mod: CPTII,S$GLB,, | Performed by: NURSE PRACTITIONER

## 2022-05-16 PROCEDURE — 3288F PR FALLS RISK ASSESSMENT DOCUMENTED: ICD-10-PCS | Mod: CPTII,S$GLB,, | Performed by: NURSE PRACTITIONER

## 2022-05-16 PROCEDURE — 1101F PR PT FALLS ASSESS DOC 0-1 FALLS W/OUT INJ PAST YR: ICD-10-PCS | Mod: CPTII,S$GLB,, | Performed by: NURSE PRACTITIONER

## 2022-05-16 PROCEDURE — 80053 COMPREHEN METABOLIC PANEL: CPT

## 2022-05-16 PROCEDURE — 1159F PR MEDICATION LIST DOCUMENTED IN MEDICAL RECORD: ICD-10-PCS | Mod: CPTII,S$GLB,, | Performed by: NURSE PRACTITIONER

## 2022-05-16 PROCEDURE — 1126F AMNT PAIN NOTED NONE PRSNT: CPT | Mod: CPTII,S$GLB,, | Performed by: NURSE PRACTITIONER

## 2022-05-16 PROCEDURE — 99214 PR OFFICE/OUTPT VISIT, EST, LEVL IV, 30-39 MIN: ICD-10-PCS | Mod: S$GLB,,, | Performed by: NURSE PRACTITIONER

## 2022-05-16 PROCEDURE — 1126F PR PAIN SEVERITY QUANTIFIED, NO PAIN PRESENT: ICD-10-PCS | Mod: CPTII,S$GLB,, | Performed by: NURSE PRACTITIONER

## 2022-05-16 PROCEDURE — 99999 PR PBB SHADOW E&M-EST. PATIENT-LVL III: ICD-10-PCS | Mod: PBBFAC,,, | Performed by: NURSE PRACTITIONER

## 2022-05-16 PROCEDURE — 3078F DIAST BP <80 MM HG: CPT | Mod: CPTII,S$GLB,, | Performed by: NURSE PRACTITIONER

## 2022-05-16 PROCEDURE — 3074F SYST BP LT 130 MM HG: CPT | Mod: CPTII,S$GLB,, | Performed by: NURSE PRACTITIONER

## 2022-05-16 PROCEDURE — 1160F RVW MEDS BY RX/DR IN RCRD: CPT | Mod: CPTII,S$GLB,, | Performed by: NURSE PRACTITIONER

## 2022-05-16 PROCEDURE — 36415 COLL VENOUS BLD VENIPUNCTURE: CPT

## 2022-05-16 PROCEDURE — 84443 ASSAY THYROID STIM HORMONE: CPT

## 2022-05-16 RX ORDER — EPINEPHRINE 0.3 MG/.3ML
0.3 INJECTION SUBCUTANEOUS ONCE AS NEEDED
Status: CANCELLED | OUTPATIENT
Start: 2022-05-17

## 2022-05-16 RX ORDER — HEPARIN 100 UNIT/ML
500 SYRINGE INTRAVENOUS
Status: CANCELLED | OUTPATIENT
Start: 2022-05-17

## 2022-05-16 RX ORDER — SODIUM CHLORIDE 0.9 % (FLUSH) 0.9 %
10 SYRINGE (ML) INJECTION
Status: CANCELLED | OUTPATIENT
Start: 2022-05-17

## 2022-05-16 RX ORDER — DIPHENHYDRAMINE HYDROCHLORIDE 50 MG/ML
50 INJECTION INTRAMUSCULAR; INTRAVENOUS ONCE AS NEEDED
Status: CANCELLED | OUTPATIENT
Start: 2022-05-17

## 2022-05-17 ENCOUNTER — INFUSION (OUTPATIENT)
Dept: INFUSION THERAPY | Facility: HOSPITAL | Age: 82
End: 2022-05-17
Attending: INTERNAL MEDICINE
Payer: MEDICARE

## 2022-05-17 VITALS
OXYGEN SATURATION: 97 % | SYSTOLIC BLOOD PRESSURE: 128 MMHG | TEMPERATURE: 98 F | RESPIRATION RATE: 18 BRPM | HEART RATE: 72 BPM | BODY MASS INDEX: 29.97 KG/M2 | HEIGHT: 71 IN | WEIGHT: 214.06 LBS | DIASTOLIC BLOOD PRESSURE: 76 MMHG

## 2022-05-17 DIAGNOSIS — C34.90 SQUAMOUS CELL CARCINOMA OF LUNG, UNSPECIFIED LATERALITY: Primary | ICD-10-CM

## 2022-05-17 PROCEDURE — 96413 CHEMO IV INFUSION 1 HR: CPT

## 2022-05-17 PROCEDURE — 25000003 PHARM REV CODE 250: Performed by: INTERNAL MEDICINE

## 2022-05-17 PROCEDURE — 63600175 PHARM REV CODE 636 W HCPCS: Mod: JG | Performed by: INTERNAL MEDICINE

## 2022-05-17 RX ORDER — HEPARIN 100 UNIT/ML
500 SYRINGE INTRAVENOUS
Status: DISCONTINUED | OUTPATIENT
Start: 2022-05-17 | End: 2022-05-17 | Stop reason: HOSPADM

## 2022-05-17 RX ADMIN — SODIUM CHLORIDE 1500 MG: 9 INJECTION, SOLUTION INTRAVENOUS at 08:05

## 2022-05-17 RX ADMIN — SODIUM CHLORIDE: 9 INJECTION, SOLUTION INTRAVENOUS at 08:05

## 2022-05-17 RX ADMIN — HEPARIN 500 UNITS: 100 SYRINGE at 09:05

## 2022-05-17 NOTE — PLAN OF CARE
Pt voiced he was doing well today.   Problem: Anemia (Chemotherapy Effects)  Goal: Anemia Symptom Improvement  Outcome: Ongoing, Progressing     Problem: Urinary Bleeding Risk or Actual (Chemotherapy Effects)  Goal: Absence of Hematuria  Outcome: Ongoing, Progressing     Problem: Nausea and Vomiting (Chemotherapy Effects)  Goal: Fluid and Electrolyte Balance  Outcome: Ongoing, Progressing     Problem: Neurotoxicity (Chemotherapy Effects)  Goal: Neurotoxicity Symptom Control  Outcome: Ongoing, Progressing     Problem: Neutropenia (Chemotherapy Effects)  Goal: Absence of Infection  Outcome: Ongoing, Progressing     Problem: Oral Mucositis (Chemotherapy Effects)  Goal: Improved Oral Mucous Membrane Integrity  Outcome: Ongoing, Progressing     Problem: Thrombocytopenia Bleeding Risk (Chemotherapy Effects)  Goal: Absence of Bleeding  Outcome: Ongoing, Progressing     Problem: Fall Injury Risk  Goal: Absence of Fall and Fall-Related Injury  Outcome: Ongoing, Progressing     Problem: Adult Inpatient Plan of Care  Goal: Plan of Care Review  Outcome: Ongoing, Progressing  Goal: Patient-Specific Goal (Individualized)  Outcome: Ongoing, Progressing  Flowsheets (Taken 5/17/2022 1016)  Anxieties, Fears or Concerns: Pt deneis any.  Individualized Care Needs: Pt provided pillow, warm blanket and legs elevated in reclined position[facility].  Patient-Specific Goals (Include Timeframe): Tolerate treatment as scheduled.

## 2022-05-17 NOTE — DISCHARGE INSTRUCTIONS
[facility]  [address] [facility]  [address]  [phone] phone     [phone] fax  Hours of Operation: Monday- Friday 8:00am- 5:00pm  After hours phone  [phone]  Hematology / Oncology Physicians on call      DARINEL Sood Dr., Dr., HEDY Gallegos, HEDY Patel, AURELIANO Waldrop    Please call with any concerns regarding your appointment today. FALL PREVENTION   Falls often occur due to slipping, tripping or losing your balance. Here are ways to reduce your risk of falling again.   Was there anything that caused your fall that can be fixed, removed or replaced?   Make your home safe by keeping walkways clear of objects you may trip over.   Use non-slip pads under rugs.   Do not walk in poorly lit areas.   Do not stand on chairs or wobbly ladders.   Use caution when reaching overhead or looking upward. This position can cause a loss of balance.   Be sure your shoes fit properly, have non-slip bottoms and are in good condition.   Be cautious when going up and down stairs, curbs, and when walking on uneven sidewalks.   If your balance is poor, consider using a cane or walker.   If your fall was related to alcohol use, stop or limit alcohol intake.   If your fall was related to use of sleeping medicines, talk to your doctor about this. You may need to reduce your dosage at bedtime if you awaken during the night to go to the bathroom.   To reduce the need for nighttime bathroom trips:   Avoid drinking fluids for several hours before going to bed   Empty your bladder before going to bed   Men can keep a urinal at the bedside   © 8753-0058 [facility], [address] [place] [address], [place] [address]. All rights reserved. This information is not intended as a substitute for professional medical care. Always follow your healthcare professional's instructions. HOME CARE AFTER CHEMOTHERAPY   Meals    Many patients feel sick and lose their appetites during treatment. Eat small meals several times a day. Choose bland foods with little taste or smell if you have problems with nausea. Be sure to cook all food thoroughly. This kills bacteria and helps you avoid intestinal infection. Soft foods are easier to swallow and digest.   Activity   Exercise keeps you strong and keeps your heart and lungs active. Talk to your doctor about an appropriate exercise program for you.   Skin Care   To prevent a skin infection, bathe or shower once a day. Use a moisturizing soap and wash with warm water. Avoid very hot or cold water. Chemotherapy can make your skin dry . Apply moisturizing lotion to help relieve dry skin. Some drugs used in high doses can cause slight burns to appear (like sunburn). Ask for a special cream to help relieve the burn and protect your skin.   Prevent Mouth Sores   During chemotherapy, many people get mouth sores. Do the following to help prevent mouth sores or to ease discomfort.   Brush your teeth with a soft-bristle toothbrush after every meal.  Don't use dental floss if your platelet count is below 50,000. Your doctor or nurse will tell you if this is the case.  Use an oral swab or special soft toothbrush if your gums bleed during regular brushing.  Use mouthwash as directed. If you can't tolerate commercial mouthwash, use salt and baking soda to clean your mouth. Mix 1 teaspoon of salt and 1 teaspoon of baking soda into a glass of water. Swish and spit.  Call your doctor or return to this facility if you develop any of the following:   Sore throat   White patches in the mouth or throat   Fever of 100.4ºF (38ºC) or higher, or as directed by your healthcare provider  © 4091-4310 [employer]y Ng, [address] [place] [address], [place] [address]. All rights reserved. This information is not intended as a substitute for professional medical care. Always follow your healthcare professional's  Support  "Groups/Classes    Support groups and classes are being offered at the   [facility] and [facility]!!    "Cooking with Cancer" (Nutrition Class):  Second Wednesday of each month   at noon at the Cancer Center.  Metastatic Support Group:  Third Tuesday of each month   at noon at the Cancer Center.  Next Steps Class/Group: Second and fourth Thursday of each month at noon at the Cancer Center.  Hope Chest (Breast Cancer Support Group): First Tuesday of each month   at 5:30pm at the [facility] location.  [employer]: [facility]: Second and third Tuesday of each month from 7:30am - 2pm.  [facility]: First and fourth Tuesday of each month from 7:30am - 2pm    If you are interested in attending or would like more information please ask our social workers or your nurse!   "

## 2022-06-08 NOTE — PROGRESS NOTES
Subjective:       Patient ID: Alton Black is a 81 y.o. male.    Chief Complaint:   1. Squamous cell carcinoma of lung, unspecified laterality  Stage IIB (cT3, cN0, cM0) right lung     Current Treatment:  OP DURVALUMAB 1500 MG Q4W    Treatment History:  Chemoradiation with carboplatin paclitaxel weekly; started 1/14/22, completed 2/24/2022    HPI: This is an 81 year old male with medical history significant for coronary artery disease, hypertension who was diagnosed with Stage IIb vs IIIa squamous cell carcinoma of right lung after presenting to an appointment with Dr. Vences with complaints of SOB. CXR done on 11/29/202 revealed a right hilar mass. FNA biopsy of station 10R LN done on 12/8/2021 was positive for malignant cells with histopathologic features of squamous   cell carcinoma. PET/CT performed on 12/14/2021 showed a highly FDG avid perihilar mass in the superior segment of the right lower lobe which is highly concerning for malignancy and no evidence of metastasis.     He was treated with Carbo/Taxol + XRT in 1/2022 which he completed in 2/2022. He is currently on consolidation maintenance immunotherapy with Imfinzi every 4 weeks for 13 cycles.     His primary oncologist is Dr. Baez.    Interval History: Patient presents for follow up on Imfinzi; he is scheduled to receive C4D1 tomorrow. He presents alone and reports a good appetite and normal BMs. He denies diarrhea; he has minimal cough. He states he would like to know what his cancer is doing. Explained that CTs have been ordered but will be delayed due to shortage of contrast. Patient requests that PET/CT be done. He asks how long he has to live; explained to him that he does not have Stage IV cancer, and it is difficult to determine how long he will live. Reviewed most recent CT scan results with him; indicated that if his symptoms have not worsened, we have no reason to believe his disease process is worse until/unless his scans show  "it. He verbalizes understanding and states he feels "fine".     Reviewed labs with him: H&H, WBC, plts, ANC all WNL. CMP stable. TSH WNL.     Social History     Socioeconomic History    Marital status: Single   Tobacco Use    Smoking status: Never Smoker    Smokeless tobacco: Never Used    Tobacco comment: Chews on cigars   Substance and Sexual Activity    Alcohol use: Not Currently    Drug use: Never    Sexual activity: Not Currently     Past Medical History:   Diagnosis Date    Benign essential HTN     CAD (coronary artery disease)     HLD (hyperlipidemia)      Family History   Problem Relation Age of Onset    Heart attack Father      Past Surgical History:   Procedure Laterality Date    BRONCHOSCOPY Bilateral 12/8/2021    Procedure: Bronchoscopy - insert lighted tube into airway to take a biopsy of lung;  Surgeon: Rigo Vences MD;  Location: [facility] ENDO;  Service: Endoscopy;  Laterality: Bilateral;    CHOLECYSTECTOMY      CORONARY ANGIOPLASTY WITH STENT PLACEMENT      ENDOBRONCHIAL ULTRASOUND Bilateral 12/8/2021    Procedure: ENDOBRONCHIAL ULTRASOUND (EBUS);  Surgeon: Rigo Vences MD;  Location: [facility] ENDO;  Service: Endoscopy;  Laterality: Bilateral;    FLUOROSCOPY N/A 1/4/2022    Procedure: Mediport Insertion;  Surgeon: Elaina Larios MD;  Location: [facility] CATH LAB;  Service: General;  Laterality: N/A;  yuridia from 1/3 to 1/4     Review of Systems   Constitutional: Negative.  Negative for appetite change, fatigue and fever.   HENT: Negative.    Eyes: Negative.    Respiratory: Negative.    Cardiovascular: Negative.    Gastrointestinal: Negative.  Negative for constipation, diarrhea, nausea and vomiting.   Endocrine: Negative.    Genitourinary: Negative.    Musculoskeletal: Negative.    Skin: Negative.    Allergic/Immunologic: Negative.    Neurological: Negative.  Negative for weakness.   Hematological: Negative.    Psychiatric/Behavioral: Negative.        Medication List with " Changes/Refills   Current Medications    ALPRAZOLAM (XANAX) 0.5 MG TABLET    Take 0.5 mg by mouth 2 (two) times daily as needed.    ASPIRIN (ECOTRIN) 81 MG EC TABLET    Take 81 mg by mouth once daily.    ATORVASTATIN (LIPITOR) 80 MG TABLET    Take 1 tablet by mouth once daily.    ESOMEPRAZOLE (NEXIUM) 40 MG CAPSULE    Take 1 capsule by mouth every morning.    LINACLOTIDE (LINZESS) 72 MCG CAP CAPSULE    Take 1 capsule by mouth every morning.    NITROFURANTOIN, MACROCRYSTAL-MONOHYDRATE, (MACROBID) 100 MG CAPSULE    Take 100 mg by mouth 2 (two) times daily.    ONDANSETRON (ZOFRAN-ODT) 8 MG TBDL    Take 1 tablet (8 mg total) by mouth every 12 (twelve) hours as needed.    PROCHLORPERAZINE (COMPAZINE) 5 MG TABLET    Take 1 tablet (5 mg total) by mouth every 6 (six) hours as needed.    TRANDOLAPRIL (MAVIK) 4 MG TAB    Take 1 tablet by mouth once daily.     Objective:     Vitals:    06/13/22 0926   BP: 120/60   Pulse: 88   Temp: 97.5 °F (36.4 °C)     Lab Results   Component Value Date    WBC 6.45 06/13/2022    HGB 14.5 06/13/2022    HCT 44.7 06/13/2022     (H) 06/13/2022     06/13/2022     CMP  Sodium   Date Value Ref Range Status   06/13/2022 140 136 - 145 mmol/L Final     Potassium   Date Value Ref Range Status   06/13/2022 4.1 3.5 - 5.1 mmol/L Final     Chloride   Date Value Ref Range Status   06/13/2022 107 95 - 110 mmol/L Final     CO2   Date Value Ref Range Status   06/13/2022 25 23 - 29 mmol/L Final     Glucose   Date Value Ref Range Status   06/13/2022 95 70 - 110 mg/dL Final     BUN   Date Value Ref Range Status   06/13/2022 5 (L) 8 - 23 mg/dL Final     Creatinine   Date Value Ref Range Status   06/13/2022 0.8 0.5 - 1.4 mg/dL Final     Calcium   Date Value Ref Range Status   06/13/2022 8.9 8.7 - 10.5 mg/dL Final     Total Protein   Date Value Ref Range Status   06/13/2022 6.2 6.0 - 8.4 g/dL Final     Albumin   Date Value Ref Range Status   06/13/2022 3.5 3.5 - 5.2 g/dL Final     Total Bilirubin   Date  Value Ref Range Status   06/13/2022 0.6 0.1 - 1.0 mg/dL Final     Comment:     For infants and newborns, interpretation of results should be based  on gestational age, weight and in agreement with clinical  observations.    Premature Infant recommended reference ranges:  Up to 24 hours.............<8.0 mg/dL  Up to 48 hours............<12.0 mg/dL  3-5 days..................<15.0 mg/dL  6-29 days.................<15.0 mg/dL       Alkaline Phosphatase   Date Value Ref Range Status   06/13/2022 130 55 - 135 U/L Final     AST   Date Value Ref Range Status   06/13/2022 18 10 - 40 U/L Final     ALT   Date Value Ref Range Status   06/13/2022 18 10 - 44 U/L Final     Anion Gap   Date Value Ref Range Status   06/13/2022 8 8 - 16 mmol/L Final     eGFR if    Date Value Ref Range Status   06/13/2022 >60 >60 mL/min/1.73 m^2 Final     eGFR if non    Date Value Ref Range Status   06/13/2022 >60 >60 mL/min/1.73 m^2 Final     Comment:     Calculation used to obtain the estimated glomerular filtration  rate (eGFR) is the CKD-EPI equation.        Lab Results   Component Value Date    TSH 1.115 06/13/2022     Physical Exam  Vitals reviewed.   Constitutional:       Appearance: Normal appearance.   HENT:      Head: Normocephalic.      Mouth/Throat:      Comments: Wearing mask    Eyes:      Extraocular Movements: Extraocular movements intact.      Pupils: Pupils are equal, round, and reactive to light.   Cardiovascular:      Rate and Rhythm: Normal rate and regular rhythm.      Pulses: Normal pulses.      Heart sounds: Normal heart sounds.   Pulmonary:      Effort: Pulmonary effort is normal.      Breath sounds: Normal breath sounds.   Abdominal:      General: Bowel sounds are normal.      Palpations: Abdomen is soft.      Comments: rounded     Genitourinary:     Comments: deferred  Musculoskeletal:         General: Normal range of motion.      Cervical back: Normal range of motion and neck supple.    Skin:     General: Skin is warm and dry.   Neurological:      General: No focal deficit present.      Mental Status: He is alert and oriented to person, place, and time.   Psychiatric:         Behavior: Behavior normal.         Thought Content: Thought content normal.          Assessment:     Problem List Items Addressed This Visit        Oncology    Squamous cell carcinoma of lung - Primary        Plan:     Squamous cell carcinoma of lung, unspecified laterality    Labs reviewed.  Ok to proceed with C4D1 of Imfinzi tomorrow.  CT C/A/P due in 6/2022; already ordered.  Patient prefers to have PET/CT to avoid delay with CT scans; will order to be done in 1-2 weeks.   Follow up in 4 weeks with Dr. Baez with PET/CT results, CBC, Comprehensive Metabolic Panel and TSH prior to C5D1.    I will review assessment/plan with collaborating physician Dr. Lopez.    AURELIANO Fields

## 2022-06-13 ENCOUNTER — LAB VISIT (OUTPATIENT)
Dept: LAB | Facility: HOSPITAL | Age: 82
End: 2022-06-13
Attending: INTERNAL MEDICINE
Payer: MEDICARE

## 2022-06-13 ENCOUNTER — OFFICE VISIT (OUTPATIENT)
Dept: HEMATOLOGY/ONCOLOGY | Facility: CLINIC | Age: 82
End: 2022-06-13
Payer: MEDICARE

## 2022-06-13 VITALS
BODY MASS INDEX: 29.66 KG/M2 | OXYGEN SATURATION: 97 % | SYSTOLIC BLOOD PRESSURE: 120 MMHG | HEIGHT: 71 IN | TEMPERATURE: 98 F | DIASTOLIC BLOOD PRESSURE: 60 MMHG | HEART RATE: 88 BPM | WEIGHT: 211.88 LBS

## 2022-06-13 DIAGNOSIS — C34.90 SQUAMOUS CELL CARCINOMA OF LUNG, UNSPECIFIED LATERALITY: ICD-10-CM

## 2022-06-13 DIAGNOSIS — C34.90 SQUAMOUS CELL CARCINOMA OF LUNG, UNSPECIFIED LATERALITY: Primary | ICD-10-CM

## 2022-06-13 DIAGNOSIS — E78.5 HYPERLIPIDEMIA, UNSPECIFIED: ICD-10-CM

## 2022-06-13 DIAGNOSIS — D89.89 OTHER SPECIFIED DISORDERS INVOLVING THE IMMUNE MECHANISM, NOT ELSEWHERE CLASSIFIED: ICD-10-CM

## 2022-06-13 LAB
ALBUMIN SERPL BCP-MCNC: 3.5 G/DL (ref 3.5–5.2)
ALP SERPL-CCNC: 130 U/L (ref 55–135)
ALT SERPL W/O P-5'-P-CCNC: 18 U/L (ref 10–44)
ANION GAP SERPL CALC-SCNC: 8 MMOL/L (ref 8–16)
AST SERPL-CCNC: 18 U/L (ref 10–40)
BASOPHILS # BLD AUTO: 0.04 K/UL (ref 0–0.2)
BASOPHILS NFR BLD: 0.6 % (ref 0–1.9)
BILIRUB SERPL-MCNC: 0.6 MG/DL (ref 0.1–1)
BUN SERPL-MCNC: 5 MG/DL (ref 8–23)
CALCIUM SERPL-MCNC: 8.9 MG/DL (ref 8.7–10.5)
CHLORIDE SERPL-SCNC: 107 MMOL/L (ref 95–110)
CO2 SERPL-SCNC: 25 MMOL/L (ref 23–29)
CREAT SERPL-MCNC: 0.8 MG/DL (ref 0.5–1.4)
DIFFERENTIAL METHOD: ABNORMAL
EOSINOPHIL # BLD AUTO: 0.1 K/UL (ref 0–0.5)
EOSINOPHIL NFR BLD: 1.2 % (ref 0–8)
ERYTHROCYTE [DISTWIDTH] IN BLOOD BY AUTOMATED COUNT: 12.4 % (ref 11.5–14.5)
EST. GFR  (AFRICAN AMERICAN): >60 ML/MIN/1.73 M^2
EST. GFR  (NON AFRICAN AMERICAN): >60 ML/MIN/1.73 M^2
GLUCOSE SERPL-MCNC: 95 MG/DL (ref 70–110)
HCT VFR BLD AUTO: 44.7 % (ref 40–54)
HGB BLD-MCNC: 14.5 G/DL (ref 14–18)
IMM GRANULOCYTES # BLD AUTO: 0.02 K/UL (ref 0–0.04)
IMM GRANULOCYTES NFR BLD AUTO: 0.3 % (ref 0–0.5)
LYMPHOCYTES # BLD AUTO: 1.2 K/UL (ref 1–4.8)
LYMPHOCYTES NFR BLD: 17.8 % (ref 18–48)
MCH RBC QN AUTO: 33.3 PG (ref 27–31)
MCHC RBC AUTO-ENTMCNC: 32.4 G/DL (ref 32–36)
MCV RBC AUTO: 103 FL (ref 82–98)
MONOCYTES # BLD AUTO: 0.7 K/UL (ref 0.3–1)
MONOCYTES NFR BLD: 11.5 % (ref 4–15)
NEUTROPHILS # BLD AUTO: 4.4 K/UL (ref 1.8–7.7)
NEUTROPHILS NFR BLD: 68.6 % (ref 38–73)
NRBC BLD-RTO: 0 /100 WBC
PLATELET # BLD AUTO: 213 K/UL (ref 150–450)
PMV BLD AUTO: 8.7 FL (ref 9.2–12.9)
POTASSIUM SERPL-SCNC: 4.1 MMOL/L (ref 3.5–5.1)
PROT SERPL-MCNC: 6.2 G/DL (ref 6–8.4)
RBC # BLD AUTO: 4.36 M/UL (ref 4.6–6.2)
SODIUM SERPL-SCNC: 140 MMOL/L (ref 136–145)
TSH SERPL DL<=0.005 MIU/L-ACNC: 1.11 UIU/ML (ref 0.4–4)
WBC # BLD AUTO: 6.45 K/UL (ref 3.9–12.7)

## 2022-06-13 PROCEDURE — 1159F MED LIST DOCD IN RCRD: CPT | Mod: CPTII,S$GLB,, | Performed by: NURSE PRACTITIONER

## 2022-06-13 PROCEDURE — 99214 OFFICE O/P EST MOD 30 MIN: CPT | Mod: S$GLB,,, | Performed by: NURSE PRACTITIONER

## 2022-06-13 PROCEDURE — 99999 PR PBB SHADOW E&M-EST. PATIENT-LVL III: ICD-10-PCS | Mod: PBBFAC,,, | Performed by: NURSE PRACTITIONER

## 2022-06-13 PROCEDURE — 3078F DIAST BP <80 MM HG: CPT | Mod: CPTII,S$GLB,, | Performed by: NURSE PRACTITIONER

## 2022-06-13 PROCEDURE — 1160F PR REVIEW ALL MEDS BY PRESCRIBER/CLIN PHARMACIST DOCUMENTED: ICD-10-PCS | Mod: CPTII,S$GLB,, | Performed by: NURSE PRACTITIONER

## 2022-06-13 PROCEDURE — 1101F PR PT FALLS ASSESS DOC 0-1 FALLS W/OUT INJ PAST YR: ICD-10-PCS | Mod: CPTII,S$GLB,, | Performed by: NURSE PRACTITIONER

## 2022-06-13 PROCEDURE — 36415 COLL VENOUS BLD VENIPUNCTURE: CPT | Performed by: NURSE PRACTITIONER

## 2022-06-13 PROCEDURE — 85025 COMPLETE CBC W/AUTO DIFF WBC: CPT | Performed by: NURSE PRACTITIONER

## 2022-06-13 PROCEDURE — 1126F PR PAIN SEVERITY QUANTIFIED, NO PAIN PRESENT: ICD-10-PCS | Mod: CPTII,S$GLB,, | Performed by: NURSE PRACTITIONER

## 2022-06-13 PROCEDURE — 3074F PR MOST RECENT SYSTOLIC BLOOD PRESSURE < 130 MM HG: ICD-10-PCS | Mod: CPTII,S$GLB,, | Performed by: NURSE PRACTITIONER

## 2022-06-13 PROCEDURE — 3074F SYST BP LT 130 MM HG: CPT | Mod: CPTII,S$GLB,, | Performed by: NURSE PRACTITIONER

## 2022-06-13 PROCEDURE — 1159F PR MEDICATION LIST DOCUMENTED IN MEDICAL RECORD: ICD-10-PCS | Mod: CPTII,S$GLB,, | Performed by: NURSE PRACTITIONER

## 2022-06-13 PROCEDURE — 3288F PR FALLS RISK ASSESSMENT DOCUMENTED: ICD-10-PCS | Mod: CPTII,S$GLB,, | Performed by: NURSE PRACTITIONER

## 2022-06-13 PROCEDURE — 1101F PT FALLS ASSESS-DOCD LE1/YR: CPT | Mod: CPTII,S$GLB,, | Performed by: NURSE PRACTITIONER

## 2022-06-13 PROCEDURE — 84443 ASSAY THYROID STIM HORMONE: CPT | Performed by: NURSE PRACTITIONER

## 2022-06-13 PROCEDURE — 99214 PR OFFICE/OUTPT VISIT, EST, LEVL IV, 30-39 MIN: ICD-10-PCS | Mod: S$GLB,,, | Performed by: NURSE PRACTITIONER

## 2022-06-13 PROCEDURE — 3288F FALL RISK ASSESSMENT DOCD: CPT | Mod: CPTII,S$GLB,, | Performed by: NURSE PRACTITIONER

## 2022-06-13 PROCEDURE — 1126F AMNT PAIN NOTED NONE PRSNT: CPT | Mod: CPTII,S$GLB,, | Performed by: NURSE PRACTITIONER

## 2022-06-13 PROCEDURE — 80053 COMPREHEN METABOLIC PANEL: CPT | Performed by: NURSE PRACTITIONER

## 2022-06-13 PROCEDURE — 1160F RVW MEDS BY RX/DR IN RCRD: CPT | Mod: CPTII,S$GLB,, | Performed by: NURSE PRACTITIONER

## 2022-06-13 PROCEDURE — 99999 PR PBB SHADOW E&M-EST. PATIENT-LVL III: CPT | Mod: PBBFAC,,, | Performed by: NURSE PRACTITIONER

## 2022-06-13 PROCEDURE — 3078F PR MOST RECENT DIASTOLIC BLOOD PRESSURE < 80 MM HG: ICD-10-PCS | Mod: CPTII,S$GLB,, | Performed by: NURSE PRACTITIONER

## 2022-06-13 RX ORDER — EPINEPHRINE 0.3 MG/.3ML
0.3 INJECTION SUBCUTANEOUS ONCE AS NEEDED
Status: CANCELLED | OUTPATIENT
Start: 2022-06-14

## 2022-06-13 RX ORDER — DIPHENHYDRAMINE HYDROCHLORIDE 50 MG/ML
50 INJECTION INTRAMUSCULAR; INTRAVENOUS ONCE AS NEEDED
Status: CANCELLED | OUTPATIENT
Start: 2022-06-14

## 2022-06-13 RX ORDER — SODIUM CHLORIDE 0.9 % (FLUSH) 0.9 %
10 SYRINGE (ML) INJECTION
Status: CANCELLED | OUTPATIENT
Start: 2022-06-14

## 2022-06-13 RX ORDER — HEPARIN 100 UNIT/ML
500 SYRINGE INTRAVENOUS
Status: CANCELLED | OUTPATIENT
Start: 2022-06-14

## 2022-06-14 ENCOUNTER — INFUSION (OUTPATIENT)
Dept: INFUSION THERAPY | Facility: HOSPITAL | Age: 82
End: 2022-06-14
Attending: INTERNAL MEDICINE
Payer: MEDICARE

## 2022-06-14 VITALS
BODY MASS INDEX: 29.66 KG/M2 | DIASTOLIC BLOOD PRESSURE: 75 MMHG | HEIGHT: 71 IN | SYSTOLIC BLOOD PRESSURE: 121 MMHG | TEMPERATURE: 98 F | WEIGHT: 211.88 LBS | HEART RATE: 70 BPM | RESPIRATION RATE: 18 BRPM | OXYGEN SATURATION: 96 %

## 2022-06-14 DIAGNOSIS — C34.90 SQUAMOUS CELL CARCINOMA OF LUNG, UNSPECIFIED LATERALITY: Primary | ICD-10-CM

## 2022-06-14 PROCEDURE — 63600175 PHARM REV CODE 636 W HCPCS: Mod: JG | Performed by: NURSE PRACTITIONER

## 2022-06-14 PROCEDURE — 25000003 PHARM REV CODE 250: Performed by: NURSE PRACTITIONER

## 2022-06-14 PROCEDURE — 96413 CHEMO IV INFUSION 1 HR: CPT

## 2022-06-14 PROCEDURE — A4216 STERILE WATER/SALINE, 10 ML: HCPCS | Performed by: NURSE PRACTITIONER

## 2022-06-14 RX ORDER — HEPARIN 100 UNIT/ML
500 SYRINGE INTRAVENOUS
Status: DISCONTINUED | OUTPATIENT
Start: 2022-06-14 | End: 2022-06-14 | Stop reason: HOSPADM

## 2022-06-14 RX ORDER — SODIUM CHLORIDE 0.9 % (FLUSH) 0.9 %
10 SYRINGE (ML) INJECTION
Status: DISCONTINUED | OUTPATIENT
Start: 2022-06-14 | End: 2022-06-14 | Stop reason: HOSPADM

## 2022-06-14 RX ADMIN — DURVALUMAB 1500 MG: 500 INJECTION, SOLUTION INTRAVENOUS at 11:06

## 2022-06-14 RX ADMIN — HEPARIN 500 UNITS: 100 SYRINGE at 12:06

## 2022-06-14 RX ADMIN — SODIUM CHLORIDE: 9 INJECTION, SOLUTION INTRAVENOUS at 11:06

## 2022-06-14 RX ADMIN — SODIUM CHLORIDE, PRESERVATIVE FREE 10 ML: 5 INJECTION INTRAVENOUS at 12:06

## 2022-06-14 NOTE — PLAN OF CARE
Problem: Adult Inpatient Plan of Care  Goal: Plan of Care Review  Outcome: Ongoing, Progressing  Flowsheets (Taken 6/14/2022 1131)  Plan of Care Reviewed With: patient  Goal: Patient-Specific Goal (Individualized)  Outcome: Ongoing, Progressing  Flowsheets (Taken 6/14/2022 1131)  Anxieties, Fears or Concerns: patient reports fatigue  Individualized Care Needs: Feet elevated in recliner for comfort measures  Patient-Specific Goals (Include Timeframe): tolerate treatment without adverse reaction  Goal: Optimal Comfort and Wellbeing  Outcome: Ongoing, Progressing  Intervention: Provide Person-Centered Care  Flowsheets (Taken 6/14/2022 1131)  Trust Relationship/Rapport:   care explained   questions encouraged   reassurance provided   emotional support provided   thoughts/feelings acknowledged   choices provided   empathic listening provided   questions answered

## 2022-06-14 NOTE — DISCHARGE INSTRUCTIONS
[facility]  [address] [facility]  [address]  [phone] phone     [phone] fax  Hours of Operation: Monday- Friday 8:00am- 5:00pm  After hours phone  [phone]  Hematology / Oncology Physicians on call      DARINEL Sood Dr., Dr., HEDY Gallegos, HEDY Patel, AURELIANO Waldrop    Please call with any concerns regarding your appointment today.        FALL PREVENTION   Falls often occur due to slipping, tripping or losing your balance. Here are ways to reduce your risk of falling again.   Was there anything that caused your fall that can be fixed, removed or replaced?   Make your home safe by keeping walkways clear of objects you may trip over.   Use non-slip pads under rugs.   Do not walk in poorly lit areas.   Do not stand on chairs or wobbly ladders.   Use caution when reaching overhead or looking upward. This position can cause a loss of balance.   Be sure your shoes fit properly, have non-slip bottoms and are in good condition.   Be cautious when going up and down stairs, curbs, and when walking on uneven sidewalks.   If your balance is poor, consider using a cane or walker.   If your fall was related to alcohol use, stop or limit alcohol intake.   If your fall was related to use of sleeping medicines, talk to your doctor about this. You may need to reduce your dosage at bedtime if you awaken during the night to go to the bathroom.   To reduce the need for nighttime bathroom trips:   Avoid drinking fluids for several hours before going to bed   Empty your bladder before going to bed   Men can keep a urinal at the bedside   © 6493-2005 [employer] [facility], [address] [place] [address], [place] [address]. All rights reserved. This information is not intended as a substitute for professional medical care. Always follow your healthcare professional's instructions.

## 2022-06-21 ENCOUNTER — HOSPITAL ENCOUNTER (OUTPATIENT)
Dept: RADIOLOGY | Facility: HOSPITAL | Age: 82
Discharge: HOME | End: 2022-06-21
Attending: NURSE PRACTITIONER
Payer: MEDICARE

## 2022-06-21 DIAGNOSIS — D89.89 OTHER SPECIFIED DISORDERS INVOLVING THE IMMUNE MECHANISM, NOT ELSEWHERE CLASSIFIED: ICD-10-CM

## 2022-06-21 DIAGNOSIS — C34.90 SQUAMOUS CELL CARCINOMA OF LUNG, UNSPECIFIED LATERALITY: ICD-10-CM

## 2022-06-23 ENCOUNTER — HOSPITAL ENCOUNTER (OUTPATIENT)
Dept: RADIOLOGY | Facility: HOSPITAL | Age: 82
Discharge: HOME | End: 2022-06-23
Attending: NURSE PRACTITIONER
Payer: MEDICARE

## 2022-06-23 PROCEDURE — 78815 PET IMAGE W/CT SKULL-THIGH: CPT | Mod: 26,PS,, | Performed by: RADIOLOGY

## 2022-06-23 PROCEDURE — 78815 PET IMAGE W/CT SKULL-THIGH: CPT | Mod: TC

## 2022-06-23 PROCEDURE — 78815 NM PET CT ROUTINE: ICD-10-PCS | Mod: 26,PS,, | Performed by: RADIOLOGY

## 2022-07-11 ENCOUNTER — OFFICE VISIT (OUTPATIENT)
Dept: HEMATOLOGY/ONCOLOGY | Facility: CLINIC | Age: 82
End: 2022-07-11
Payer: MEDICARE

## 2022-07-11 ENCOUNTER — LAB VISIT (OUTPATIENT)
Dept: LAB | Facility: HOSPITAL | Age: 82
End: 2022-07-11
Attending: INTERNAL MEDICINE
Payer: MEDICARE

## 2022-07-11 VITALS
WEIGHT: 211.63 LBS | HEIGHT: 71 IN | DIASTOLIC BLOOD PRESSURE: 58 MMHG | SYSTOLIC BLOOD PRESSURE: 124 MMHG | HEART RATE: 79 BPM | TEMPERATURE: 97 F | BODY MASS INDEX: 29.63 KG/M2 | OXYGEN SATURATION: 95 % | RESPIRATION RATE: 18 BRPM

## 2022-07-11 DIAGNOSIS — T45.1X5A IMMUNODEFICIENCY DUE TO CHEMOTHERAPY: ICD-10-CM

## 2022-07-11 DIAGNOSIS — R93.89 ABNORMAL FINDINGS ON DIAGNOSTIC IMAGING OF OTHER SPECIFIED BODY STRUCTURES: ICD-10-CM

## 2022-07-11 DIAGNOSIS — Z79.899 IMMUNODEFICIENCY DUE TO CHEMOTHERAPY: ICD-10-CM

## 2022-07-11 DIAGNOSIS — D89.89 OTHER SPECIFIED DISORDERS INVOLVING THE IMMUNE MECHANISM, NOT ELSEWHERE CLASSIFIED: ICD-10-CM

## 2022-07-11 DIAGNOSIS — C34.90 SQUAMOUS CELL CARCINOMA OF LUNG, UNSPECIFIED LATERALITY: Primary | ICD-10-CM

## 2022-07-11 DIAGNOSIS — C34.90 SQUAMOUS CELL CARCINOMA OF LUNG, UNSPECIFIED LATERALITY: ICD-10-CM

## 2022-07-11 DIAGNOSIS — D84.821 IMMUNODEFICIENCY DUE TO CHEMOTHERAPY: ICD-10-CM

## 2022-07-11 LAB
ALBUMIN SERPL BCP-MCNC: 3.7 G/DL (ref 3.5–5.2)
ALP SERPL-CCNC: 126 U/L (ref 55–135)
ALT SERPL W/O P-5'-P-CCNC: 25 U/L (ref 10–44)
ANION GAP SERPL CALC-SCNC: 6 MMOL/L (ref 8–16)
AST SERPL-CCNC: 22 U/L (ref 10–40)
BASOPHILS # BLD AUTO: 0.03 K/UL (ref 0–0.2)
BASOPHILS NFR BLD: 0.4 % (ref 0–1.9)
BILIRUB SERPL-MCNC: 0.7 MG/DL (ref 0.1–1)
BUN SERPL-MCNC: 4 MG/DL (ref 8–23)
CALCIUM SERPL-MCNC: 9 MG/DL (ref 8.7–10.5)
CHLORIDE SERPL-SCNC: 108 MMOL/L (ref 95–110)
CO2 SERPL-SCNC: 26 MMOL/L (ref 23–29)
CREAT SERPL-MCNC: 0.8 MG/DL (ref 0.5–1.4)
DIFFERENTIAL METHOD: ABNORMAL
EOSINOPHIL # BLD AUTO: 0.1 K/UL (ref 0–0.5)
EOSINOPHIL NFR BLD: 1.4 % (ref 0–8)
ERYTHROCYTE [DISTWIDTH] IN BLOOD BY AUTOMATED COUNT: 12.6 % (ref 11.5–14.5)
EST. GFR  (AFRICAN AMERICAN): >60 ML/MIN/1.73 M^2
EST. GFR  (NON AFRICAN AMERICAN): >60 ML/MIN/1.73 M^2
GLUCOSE SERPL-MCNC: 98 MG/DL (ref 70–110)
HCT VFR BLD AUTO: 43.1 % (ref 40–54)
HGB BLD-MCNC: 14.3 G/DL (ref 14–18)
IMM GRANULOCYTES # BLD AUTO: 0.03 K/UL (ref 0–0.04)
IMM GRANULOCYTES NFR BLD AUTO: 0.4 % (ref 0–0.5)
LYMPHOCYTES # BLD AUTO: 1.2 K/UL (ref 1–4.8)
LYMPHOCYTES NFR BLD: 16.8 % (ref 18–48)
MCH RBC QN AUTO: 32.8 PG (ref 27–31)
MCHC RBC AUTO-ENTMCNC: 33.2 G/DL (ref 32–36)
MCV RBC AUTO: 99 FL (ref 82–98)
MONOCYTES # BLD AUTO: 0.7 K/UL (ref 0.3–1)
MONOCYTES NFR BLD: 10.2 % (ref 4–15)
NEUTROPHILS # BLD AUTO: 5 K/UL (ref 1.8–7.7)
NEUTROPHILS NFR BLD: 70.8 % (ref 38–73)
NRBC BLD-RTO: 0 /100 WBC
PLATELET # BLD AUTO: 199 K/UL (ref 150–450)
PMV BLD AUTO: 9 FL (ref 9.2–12.9)
POTASSIUM SERPL-SCNC: 3.8 MMOL/L (ref 3.5–5.1)
PROT SERPL-MCNC: 6.5 G/DL (ref 6–8.4)
RBC # BLD AUTO: 4.36 M/UL (ref 4.6–6.2)
SODIUM SERPL-SCNC: 140 MMOL/L (ref 136–145)
TSH SERPL DL<=0.005 MIU/L-ACNC: 1.12 UIU/ML (ref 0.4–4)
WBC # BLD AUTO: 7.03 K/UL (ref 3.9–12.7)

## 2022-07-11 PROCEDURE — 3074F SYST BP LT 130 MM HG: CPT | Mod: CPTII,S$GLB,, | Performed by: INTERNAL MEDICINE

## 2022-07-11 PROCEDURE — 3078F PR MOST RECENT DIASTOLIC BLOOD PRESSURE < 80 MM HG: ICD-10-PCS | Mod: CPTII,S$GLB,, | Performed by: INTERNAL MEDICINE

## 2022-07-11 PROCEDURE — 36415 COLL VENOUS BLD VENIPUNCTURE: CPT | Performed by: NURSE PRACTITIONER

## 2022-07-11 PROCEDURE — 99999 PR PBB SHADOW E&M-EST. PATIENT-LVL III: CPT | Mod: PBBFAC,,, | Performed by: INTERNAL MEDICINE

## 2022-07-11 PROCEDURE — 3078F DIAST BP <80 MM HG: CPT | Mod: CPTII,S$GLB,, | Performed by: INTERNAL MEDICINE

## 2022-07-11 PROCEDURE — 3288F PR FALLS RISK ASSESSMENT DOCUMENTED: ICD-10-PCS | Mod: CPTII,S$GLB,, | Performed by: INTERNAL MEDICINE

## 2022-07-11 PROCEDURE — 1160F PR REVIEW ALL MEDS BY PRESCRIBER/CLIN PHARMACIST DOCUMENTED: ICD-10-PCS | Mod: CPTII,S$GLB,, | Performed by: INTERNAL MEDICINE

## 2022-07-11 PROCEDURE — 84443 ASSAY THYROID STIM HORMONE: CPT | Performed by: NURSE PRACTITIONER

## 2022-07-11 PROCEDURE — 3074F PR MOST RECENT SYSTOLIC BLOOD PRESSURE < 130 MM HG: ICD-10-PCS | Mod: CPTII,S$GLB,, | Performed by: INTERNAL MEDICINE

## 2022-07-11 PROCEDURE — 85025 COMPLETE CBC W/AUTO DIFF WBC: CPT | Performed by: NURSE PRACTITIONER

## 2022-07-11 PROCEDURE — 99215 OFFICE O/P EST HI 40 MIN: CPT | Mod: S$GLB,,, | Performed by: INTERNAL MEDICINE

## 2022-07-11 PROCEDURE — 1101F PR PT FALLS ASSESS DOC 0-1 FALLS W/OUT INJ PAST YR: ICD-10-PCS | Mod: CPTII,S$GLB,, | Performed by: INTERNAL MEDICINE

## 2022-07-11 PROCEDURE — 99215 PR OFFICE/OUTPT VISIT, EST, LEVL V, 40-54 MIN: ICD-10-PCS | Mod: S$GLB,,, | Performed by: INTERNAL MEDICINE

## 2022-07-11 PROCEDURE — 1126F AMNT PAIN NOTED NONE PRSNT: CPT | Mod: CPTII,S$GLB,, | Performed by: INTERNAL MEDICINE

## 2022-07-11 PROCEDURE — 1101F PT FALLS ASSESS-DOCD LE1/YR: CPT | Mod: CPTII,S$GLB,, | Performed by: INTERNAL MEDICINE

## 2022-07-11 PROCEDURE — 80053 COMPREHEN METABOLIC PANEL: CPT | Performed by: NURSE PRACTITIONER

## 2022-07-11 PROCEDURE — 1126F PR PAIN SEVERITY QUANTIFIED, NO PAIN PRESENT: ICD-10-PCS | Mod: CPTII,S$GLB,, | Performed by: INTERNAL MEDICINE

## 2022-07-11 PROCEDURE — 1159F PR MEDICATION LIST DOCUMENTED IN MEDICAL RECORD: ICD-10-PCS | Mod: CPTII,S$GLB,, | Performed by: INTERNAL MEDICINE

## 2022-07-11 PROCEDURE — 99999 PR PBB SHADOW E&M-EST. PATIENT-LVL III: ICD-10-PCS | Mod: PBBFAC,,, | Performed by: INTERNAL MEDICINE

## 2022-07-11 PROCEDURE — 1159F MED LIST DOCD IN RCRD: CPT | Mod: CPTII,S$GLB,, | Performed by: INTERNAL MEDICINE

## 2022-07-11 PROCEDURE — 1160F RVW MEDS BY RX/DR IN RCRD: CPT | Mod: CPTII,S$GLB,, | Performed by: INTERNAL MEDICINE

## 2022-07-11 PROCEDURE — 3288F FALL RISK ASSESSMENT DOCD: CPT | Mod: CPTII,S$GLB,, | Performed by: INTERNAL MEDICINE

## 2022-07-11 RX ORDER — SODIUM CHLORIDE 0.9 % (FLUSH) 0.9 %
10 SYRINGE (ML) INJECTION
Status: CANCELLED | OUTPATIENT
Start: 2022-07-11

## 2022-07-11 RX ORDER — EPINEPHRINE 0.3 MG/.3ML
0.3 INJECTION SUBCUTANEOUS ONCE AS NEEDED
Status: CANCELLED | OUTPATIENT
Start: 2022-07-11

## 2022-07-11 RX ORDER — DIPHENHYDRAMINE HYDROCHLORIDE 50 MG/ML
50 INJECTION INTRAMUSCULAR; INTRAVENOUS ONCE AS NEEDED
Status: CANCELLED | OUTPATIENT
Start: 2022-07-11

## 2022-07-11 RX ORDER — HEPARIN 100 UNIT/ML
500 SYRINGE INTRAVENOUS
Status: CANCELLED | OUTPATIENT
Start: 2022-07-11

## 2022-07-11 NOTE — PROGRESS NOTES
Subjective:      DATE OF VISIT: 7/11/22     ?  Patient ID:?Alton Black is a 81 y.o. male.?? MR#: [id number]   ?   PRIMARY ONCOLOGIST: Dr. Lopez    ? Primary Care Providers:  Rohan Kenny MD, MD (General)     CHIEF COMPLAINT: ?  Follow-up after completion of chemoradiation  ?   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   CURRENT TREATMENT:  Chemoradiation with carboplatin paclitaxel weekly, cycle 1 day 1 1/14/22, completed 02/24/2022; currently on durvalumab maintenance, q.4 weeks the    PAST TREATMENT:  None  ?   ONCOLOGIC HISTORY:   ?   Oncology History   Squamous cell carcinoma of lung   1/13/2021 - 2/24/2022 Radiation Therapy    Treating physician: Karol  Total Dose: 60 Gy  Fractions: 30     12/17/2021 Initial Diagnosis    Squamous cell lung cancer     12/22/2021 Cancer Staged    Staging form: Lung, AJCC 8th Edition  - Clinical stage from 12/22/2021: Stage IIB (cT3, cN0, cM0)     1/14/2022 - 2/21/2022 Chemotherapy    Treatment Summary   Plan Name: OP NSCLC PACLITAXEL + CARBOPLATIN (AUC) QW + RADIATION  Treatment Goal: Curative  Status: Inactive  Start Date: 1/14/2022  End Date: 2/21/2022  Provider: Juan Lopez MD  Chemotherapy: dexAMETHasone (DECADRON) 4 MG Tab, 8 mg, Oral, Daily, 1 of 1 cycle, Start date: --, End date: --  CARBOplatin (PARAPLATIN) 230 mg in sodium chloride 0.9% 273 mL chemo infusion, 230 mg (100 % of original dose 230 mg), Intravenous, Clinic/HOD 1 time, 6 of 6 cycles  Dose modification:   (original dose 230 mg, Cycle 1)  Administration: 230 mg (1/14/2022), 230 mg (1/21/2022), 255 mg (1/28/2022), 255 mg (2/4/2022), 255 mg (2/14/2022), 255 mg (2/21/2022)  PACLitaxeL (TAXOL) 45 mg/m2 = 96 mg in sodium chloride 0.9% 266 mL chemo infusion, 45 mg/m2 = 96 mg, Intravenous, Clinic/HOD 1 time, 6 of 6 cycles  Administration: 96 mg (1/14/2022), 96 mg (1/21/2022), 96 mg (1/28/2022), 96 mg (2/4/2022), 96 mg (2/14/2022), 96 mg (2/21/2022)     3/21/2022 -   Chemotherapy    Treatment Summary   Plan Name: OP DURVALUMAB 1500 MG Q4W  Treatment Goal: Curative  Status: Active  Start Date: 3/21/2022  End Date: 2/20/2023 (Planned)  Provider: Winnie Baez MD  Chemotherapy: durvalumab (IMFINZI) 1,500 mg in sodium chloride 0.9% 280 mL chemo infusion, 1,500 mg, Intravenous, Clinic/HOD 1 time, 4 of 13 cycles  Administration: 1,500 mg (3/21/2022), 1,500 mg (4/19/2022), 1,500 mg (5/17/2022), 1,500 mg (6/14/2022)       Cancer Staging  Squamous cell carcinoma of lung  - Clinical stage from 12/22/2021: Stage IIB (cT3, cN0, cM0) - Signed by Juan Lopez MD on 12/22/2021         HPI    He comes today to review results of restaging imaging .  He has been doing very well on maintenance immunotherapy and denies any side effects.  No chest pain shortness of breath hemoptysis hematemesis new pain or unintentional weight loss.  Review of Systems    ?   A comprehensive 14-point review of systems was reviewed with patient and was negative other than as specified above.   ?      Objective:      Physical Exam        Vitals:    07/11/22 0924   BP: (!) 124/58   Pulse: 79   Resp: 18   Temp: 97.3 °F (36.3 °C)      ?   General appearance: Generally well appearing, in no acute distress.   Head, eyes, ears, nose, and throat: Oropharynx clear with moist mucous membranes.   Cardiovascular: Regular rate and rhythm, S1, S2, no audible murmurs.   Respiratory: Lungs clear to auscultation bilaterally.   Abdomen: nontender, nondistended.   Extremities: Warm, without edema.   Neurologic: Alert and oriented.  Skin: No rashes, ecchymoses or petechial lesion.   Psychiatric: normal mood and affect, conversant and appropriate    ?   Laboratory:  ?   Lab Visit on 07/11/2022   Component Date Value Ref Range Status    WBC 07/11/2022 7.03  3.90 - 12.70 K/uL Final    RBC 07/11/2022 4.36 (A) 4.60 - 6.20 M/uL Final    Hemoglobin 07/11/2022 14.3  14.0 - 18.0 g/dL Final    Hematocrit 07/11/2022 43.1  40.0 -  54.0 % Final    MCV 07/11/2022 99 (A) 82 - 98 fL Final    MCH 07/11/2022 32.8 (A) 27.0 - 31.0 pg Final    MCHC 07/11/2022 33.2  32.0 - 36.0 g/dL Final    RDW 07/11/2022 12.6  11.5 - 14.5 % Final    Platelets 07/11/2022 199  150 - 450 K/uL Final    MPV 07/11/2022 9.0 (A) 9.2 - 12.9 fL Final    Immature Granulocytes 07/11/2022 0.4  0.0 - 0.5 % Final    Gran # (ANC) 07/11/2022 5.0  1.8 - 7.7 K/uL Final    Immature Grans (Abs) 07/11/2022 0.03  0.00 - 0.04 K/uL Final    Lymph # 07/11/2022 1.2  1.0 - 4.8 K/uL Final    Mono # 07/11/2022 0.7  0.3 - 1.0 K/uL Final    Eos # 07/11/2022 0.1  0.0 - 0.5 K/uL Final    Baso # 07/11/2022 0.03  0.00 - 0.20 K/uL Final    nRBC 07/11/2022 0  0 /100 WBC Final    Gran % 07/11/2022 70.8  38.0 - 73.0 % Final    Lymph % 07/11/2022 16.8 (A) 18.0 - 48.0 % Final    Mono % 07/11/2022 10.2  4.0 - 15.0 % Final    Eosinophil % 07/11/2022 1.4  0.0 - 8.0 % Final    Basophil % 07/11/2022 0.4  0.0 - 1.9 % Final    Differential Method 07/11/2022 Automated   Final    Sodium 07/11/2022 140  136 - 145 mmol/L Final    Potassium 07/11/2022 3.8  3.5 - 5.1 mmol/L Final    Chloride 07/11/2022 108  95 - 110 mmol/L Final    CO2 07/11/2022 26  23 - 29 mmol/L Final    Glucose 07/11/2022 98  70 - 110 mg/dL Final    BUN 07/11/2022 4 (A) 8 - 23 mg/dL Final    Creatinine 07/11/2022 0.8  0.5 - 1.4 mg/dL Final    Calcium 07/11/2022 9.0  8.7 - 10.5 mg/dL Final    Total Protein 07/11/2022 6.5  6.0 - 8.4 g/dL Final    Albumin 07/11/2022 3.7  3.5 - 5.2 g/dL Final    Total Bilirubin 07/11/2022 0.7  0.1 - 1.0 mg/dL Final    Alkaline Phosphatase 07/11/2022 126  55 - 135 U/L Final    AST 07/11/2022 22  10 - 40 U/L Final    ALT 07/11/2022 25  10 - 44 U/L Final    Anion Gap 07/11/2022 6 (A) 8 - 16 mmol/L Final    eGFR if African American 07/11/2022 >60  >60 mL/min/1.73 m^2 Final    eGFR if non African American 07/11/2022 >60  >60 mL/min/1.73 m^2 Final    TSH 07/11/2022 1.117  0.400 - 4.000  uIU/mL Final      ?     Imaging:  ?    No results found for this or any previous visit (from the past 2160 hour(s)).  No results found for this or any previous visit (from the past 2160 hour(s)).  Results for orders placed or performed during the hospital encounter of 06/21/22 (from the past 2160 hour(s))   NM PET CT Routine Skull to Mid Thigh    Impression    Findings consistent with positive treatment response with decrease in size and FDG avidity of the right perihilar mass extending into the superior segment right lower lobe.  Adjacent right lung parenchymal opacities which are favored inflammatory in etiology as above.  However, attention on follow-up CT can be performed to ensure stability/resolution.  Other incidental findings as above.      Electronically signed by: Dave Lopez MD  Date:    06/23/2022  Time:    14:11       No results found for this or any previous visit (from the past 2160 hour(s)).  NM PET CT ROUTINE     CLINICAL HISTORY:  Non-small cell lung cancer (NSCLC), metastatic, assess treatment response; Malignant neoplasm of unspecified part of unspecified bronchus or lung     TECHNIQUE:  13 mCi of F18-FDG was administered intravenously in the right antecubital fossa.  After an approximately 60 min distribution time, PET/CT images were acquired from the skull base to the mid thigh. Transmission images were acquired to correct for attenuation using a whole body low-dose CT scan without contrast with the arms positioned above the head.     COMPARISON:  PET-CT from December 2021. more recently the chest abdomen pelvis CT from March.     FINDINGS:  Head and neck: There is physiologic uptake in the brain parenchyma.  No FDG avid cervical lymph nodes.     Chest: Left-sided MediPort catheter terminates in the SVC.  Thoracic aortic and coronary artery atherosclerosis is present.  No FDG avid or pathologic thoracic adenopathy.  When compared to the prior PET-CT there is been interval decrease in size a  superior segment right lower lobe perihilar lung mass with decreased max SUV.  Max SUV currently measures 3.4 compared with 23 on the prior exam.  On today's exam the area measures on the order of 3.5 x 2.4 cm.  Previously was measured at 5.5 x 4 cm on PET-CT from last year.  On the more recent CT from March of this year was measured at 3.7 x 3 cm.  More inferiorly in the posteromedial right lower lobe on series 2, image 109 is a parenchymal density/air bronchogram containing consolidation measuring 3.5 cm.  Max SUV measures 3.2.  Findings likely relate to postobstructive/inflammatory changes.  New patchy opacity more superiorly in the left lower lobe series 2, image 98 measuring 2.2 cm.  Max SUV measures 2.9.  Findings are also potentially  inflammatory in etiology.  However, continued surveillance is recommended.     Abdomen/pelvis: No abnormal areas of uptake in the liver, spleen, pancreas, or adrenals.  Pancreatic calcifications consistent with chronic pancreatitis.  Suspected small right renal cyst.  Moderate hiatal hernia.  Low level uptake in the bowel most likely physiologic.  Extensive sigmoid diverticulosis without evidence of diverticulitis.  Prostate gland is enlarged with coarse central calcifications and low level uptake.  Correlate with PSA levels.  Findings are stable compared to prior study.     Calcified plaque in the aorta.  Unchanged mild ectasia of the infrarenal abdominal aorta no ascites or drainable fluid collections.  Stable clustered calcifications are calcified lymph nodes in the dany hepatic region.  No associated FDG avidity.  No FDG avid nodes.     Bones: There are degenerative changes of the spine.  No FDG avid osseous lesions.  Bones are demineralized.     Impression:     Findings consistent with positive treatment response with decrease in size and FDG avidity of the right perihilar mass extending into the superior segment right lower lobe.  Adjacent right lung parenchymal opacities  which are favored inflammatory in etiology as above.  However, attention on follow-up CT can be performed to ensure stability/resolution.  Other incidental findings as above.   ?   Assessment/Plan:   Squamous cell carcinoma of lung, unspecified laterality  -     CBC Auto Differential; Standing  -     Comprehensive Metabolic Panel; Standing  -     TSH; Standing    Immunodeficiency due to chemotherapy    Abnormal findings on diagnostic imaging of other specified body structures   -     TSH; Standing    Other orders  -     durvalumab (IMFINZI) 1,500 mg in sodium chloride 0.9% 280 mL chemo infusion  -     EPINEPHrine (EPIPEN) 0.3 mg/0.3 mL pen injection 0.3 mg  -     diphenhydrAMINE injection 50 mg  -     hydrocortisone sodium succinate injection 100 mg  -     sodium chloride 0.9% 100 mL flush bag  -     sodium chloride 0.9% flush 10 mL  -     heparin, porcine (PF) 100 unit/mL injection flush 500 Units  -     alteplase injection 2 mg       1. Squamous cell carcinoma of lung, unspecified laterality    2. Immunodeficiency due to chemotherapy    3. Abnormal findings on diagnostic imaging of other specified body structures           Plan:     Problem List Items Addressed This Visit        Oncology    Squamous cell carcinoma of lung - Primary      Other Visit Diagnoses     Immunodeficiency due to chemotherapy        Abnormal findings on diagnostic imaging of other specified body structures             Squamous cell carcinoma lung stage II:  patient expressed not interested in surgical option and therefore definitive chemoradiation was recommended by primary oncologist Dr. Jessica doty and radiation oncologist Dr. Roberson.  Cycle 1 day 1  01/14/2022 and completed 02/24/2022.  He has continued on maintenance immunotherapy with durvalumab q.4 weeks tolerating well.  We review restaging imaging 06/25/2022 showing continued treatment response possible some inflammatory affect.  Recommend follow-up with Radiation Oncology in  continuation on his maintenance regimen at this time.      FOLLOW-UP  Durvalumab tomorrow  Revisit 4 weeks with CBC CMP TSH and durvalumab planned

## 2022-07-12 ENCOUNTER — INFUSION (OUTPATIENT)
Dept: INFUSION THERAPY | Facility: HOSPITAL | Age: 82
End: 2022-07-12
Attending: INTERNAL MEDICINE
Payer: MEDICARE

## 2022-07-12 VITALS
TEMPERATURE: 98 F | DIASTOLIC BLOOD PRESSURE: 68 MMHG | OXYGEN SATURATION: 97 % | RESPIRATION RATE: 18 BRPM | SYSTOLIC BLOOD PRESSURE: 140 MMHG | HEART RATE: 60 BPM

## 2022-07-12 DIAGNOSIS — C34.90 SQUAMOUS CELL CARCINOMA OF LUNG, UNSPECIFIED LATERALITY: Primary | ICD-10-CM

## 2022-07-12 PROCEDURE — 63600175 PHARM REV CODE 636 W HCPCS: Performed by: INTERNAL MEDICINE

## 2022-07-12 PROCEDURE — 25000003 PHARM REV CODE 250: Performed by: INTERNAL MEDICINE

## 2022-07-12 PROCEDURE — 96413 CHEMO IV INFUSION 1 HR: CPT

## 2022-07-12 RX ORDER — DIPHENHYDRAMINE HYDROCHLORIDE 50 MG/ML
50 INJECTION INTRAMUSCULAR; INTRAVENOUS ONCE AS NEEDED
Status: DISCONTINUED | OUTPATIENT
Start: 2022-07-12 | End: 2022-07-12 | Stop reason: HOSPADM

## 2022-07-12 RX ORDER — EPINEPHRINE 1 MG/ML
0.3 INJECTION, SOLUTION INTRACARDIAC; INTRAMUSCULAR; INTRAVENOUS; SUBCUTANEOUS ONCE AS NEEDED
Status: DISCONTINUED | OUTPATIENT
Start: 2022-07-12 | End: 2022-07-12 | Stop reason: HOSPADM

## 2022-07-12 RX ORDER — SODIUM CHLORIDE 0.9 % (FLUSH) 0.9 %
10 SYRINGE (ML) INJECTION
Status: DISCONTINUED | OUTPATIENT
Start: 2022-07-12 | End: 2022-07-12 | Stop reason: HOSPADM

## 2022-07-12 RX ORDER — HEPARIN 100 UNIT/ML
500 SYRINGE INTRAVENOUS
Status: DISCONTINUED | OUTPATIENT
Start: 2022-07-12 | End: 2022-07-12 | Stop reason: HOSPADM

## 2022-07-12 RX ADMIN — HEPARIN 500 UNITS: 100 SYRINGE at 12:07

## 2022-07-12 RX ADMIN — DURVALUMAB 1500 MG: 500 INJECTION, SOLUTION INTRAVENOUS at 11:07

## 2022-07-12 RX ADMIN — SODIUM CHLORIDE: 9 INJECTION, SOLUTION INTRAVENOUS at 11:07

## 2022-07-12 NOTE — PLAN OF CARE
Problem: Adult Inpatient Plan of Care  Goal: Plan of Care Review  Outcome: Ongoing, Progressing  Flowsheets (Taken 7/12/2022 1151)  Plan of Care Reviewed With: patient  Goal: Patient-Specific Goal (Individualized)  Outcome: Ongoing, Progressing  Flowsheets (Taken 7/12/2022 1151)  Anxieties, Fears or Concerns: denies  Individualized Care Needs: feet elevated, pillow provided  Patient-Specific Goals (Include Timeframe): tolerate infusion today  Goal: Optimal Comfort and Wellbeing  Outcome: Ongoing, Progressing  Intervention: Provide Person-Centered Care  Flowsheets (Taken 7/12/2022 1151)  Trust Relationship/Rapport:   care explained   reassurance provided   choices provided   thoughts/feelings acknowledged   emotional support provided   empathic listening provided   questions answered   questions encouraged     Problem: Fall Injury Risk  Goal: Absence of Fall and Fall-Related Injury  Outcome: Ongoing, Progressing  Intervention: Identify and Manage Contributors  Flowsheets (Taken 7/12/2022 1151)  Self-Care Promotion: independence encouraged  Medication Review/Management: medications reviewed  Intervention: Promote Injury-Free Environment  Flowsheets (Taken 7/12/2022 1151)  Safety Promotion/Fall Prevention:   assistive device/personal item within reach   instructed to call staff for mobility   in recliner, wheels locked   medications reviewed     Problem: Anemia (Chemotherapy Effects)  Goal: Anemia Symptom Improvement  Outcome: Ongoing, Progressing  Intervention: Monitor and Manage Anemia  Flowsheets (Taken 7/12/2022 1151)  Safety Promotion/Fall Prevention:   assistive device/personal item within reach   instructed to call staff for mobility   in recliner, wheels locked   medications reviewed  Fatigue Management: frequent rest breaks encouraged     Problem: Nausea and Vomiting (Chemotherapy Effects)  Goal: Fluid and Electrolyte Balance  Outcome: Ongoing, Progressing  Intervention: Prevent and Manage Nausea and  Vomiting  Flowsheets (Taken 7/12/2022 1151)  Environmental Support: calm environment promoted     Problem: Neutropenia (Chemotherapy Effects)  Goal: Absence of Infection  Outcome: Ongoing, Progressing  Intervention: Prevent Infection and Maximize Resistance  Flowsheets (Taken 7/12/2022 1151)  Infection Prevention:   environmental surveillance performed   equipment surfaces disinfected   hand hygiene promoted   personal protective equipment utilized     Problem: Thrombocytopenia Bleeding Risk (Chemotherapy Effects)  Goal: Absence of Bleeding  Outcome: Ongoing, Progressing  Intervention: Minimize Bleeding Risk  Flowsheets (Taken 7/12/2022 1151)  Bleeding Precautions:   blood pressure closely monitored   monitored for signs of bleeding

## 2022-08-08 ENCOUNTER — OFFICE VISIT (OUTPATIENT)
Dept: HEMATOLOGY/ONCOLOGY | Facility: CLINIC | Age: 82
End: 2022-08-08
Payer: MEDICARE

## 2022-08-08 ENCOUNTER — LAB VISIT (OUTPATIENT)
Dept: LAB | Facility: HOSPITAL | Age: 82
End: 2022-08-08
Attending: INTERNAL MEDICINE
Payer: MEDICARE

## 2022-08-08 VITALS
BODY MASS INDEX: 29.75 KG/M2 | WEIGHT: 212.5 LBS | HEIGHT: 71 IN | TEMPERATURE: 97 F | OXYGEN SATURATION: 97 % | SYSTOLIC BLOOD PRESSURE: 114 MMHG | DIASTOLIC BLOOD PRESSURE: 60 MMHG | HEART RATE: 68 BPM

## 2022-08-08 DIAGNOSIS — T45.1X5A IMMUNODEFICIENCY DUE TO CHEMOTHERAPY: ICD-10-CM

## 2022-08-08 DIAGNOSIS — D84.821 IMMUNODEFICIENCY DUE TO CHEMOTHERAPY: ICD-10-CM

## 2022-08-08 DIAGNOSIS — C34.90 SQUAMOUS CELL CARCINOMA OF LUNG, UNSPECIFIED LATERALITY: ICD-10-CM

## 2022-08-08 DIAGNOSIS — R93.89 ABNORMAL FINDINGS ON DIAGNOSTIC IMAGING OF OTHER SPECIFIED BODY STRUCTURES: ICD-10-CM

## 2022-08-08 DIAGNOSIS — C34.90 SQUAMOUS CELL CARCINOMA OF LUNG, UNSPECIFIED LATERALITY: Primary | ICD-10-CM

## 2022-08-08 DIAGNOSIS — Z79.899 IMMUNODEFICIENCY DUE TO CHEMOTHERAPY: ICD-10-CM

## 2022-08-08 LAB
ALBUMIN SERPL BCP-MCNC: 3.7 G/DL (ref 3.5–5.2)
ALP SERPL-CCNC: 129 U/L (ref 55–135)
ALT SERPL W/O P-5'-P-CCNC: 23 U/L (ref 10–44)
ANION GAP SERPL CALC-SCNC: 8 MMOL/L (ref 8–16)
AST SERPL-CCNC: 22 U/L (ref 10–40)
BASOPHILS # BLD AUTO: 0.03 K/UL (ref 0–0.2)
BASOPHILS NFR BLD: 0.4 % (ref 0–1.9)
BILIRUB SERPL-MCNC: 0.7 MG/DL (ref 0.1–1)
BUN SERPL-MCNC: 7 MG/DL (ref 8–23)
CALCIUM SERPL-MCNC: 9.3 MG/DL (ref 8.7–10.5)
CHLORIDE SERPL-SCNC: 107 MMOL/L (ref 95–110)
CO2 SERPL-SCNC: 26 MMOL/L (ref 23–29)
CREAT SERPL-MCNC: 0.8 MG/DL (ref 0.5–1.4)
DIFFERENTIAL METHOD: ABNORMAL
EOSINOPHIL # BLD AUTO: 0.1 K/UL (ref 0–0.5)
EOSINOPHIL NFR BLD: 0.9 % (ref 0–8)
ERYTHROCYTE [DISTWIDTH] IN BLOOD BY AUTOMATED COUNT: 13.6 % (ref 11.5–14.5)
EST. GFR  (NO RACE VARIABLE): >60 ML/MIN/1.73 M^2
GLUCOSE SERPL-MCNC: 99 MG/DL (ref 70–110)
HCT VFR BLD AUTO: 43.7 % (ref 40–54)
HGB BLD-MCNC: 14.4 G/DL (ref 14–18)
IMM GRANULOCYTES # BLD AUTO: 0.02 K/UL (ref 0–0.04)
IMM GRANULOCYTES NFR BLD AUTO: 0.3 % (ref 0–0.5)
LYMPHOCYTES # BLD AUTO: 1.1 K/UL (ref 1–4.8)
LYMPHOCYTES NFR BLD: 16.1 % (ref 18–48)
MCH RBC QN AUTO: 32.4 PG (ref 27–31)
MCHC RBC AUTO-ENTMCNC: 33 G/DL (ref 32–36)
MCV RBC AUTO: 98 FL (ref 82–98)
MONOCYTES # BLD AUTO: 0.7 K/UL (ref 0.3–1)
MONOCYTES NFR BLD: 10 % (ref 4–15)
NEUTROPHILS # BLD AUTO: 4.9 K/UL (ref 1.8–7.7)
NEUTROPHILS NFR BLD: 72.3 % (ref 38–73)
NRBC BLD-RTO: 0 /100 WBC
PLATELET # BLD AUTO: 188 K/UL (ref 150–450)
PMV BLD AUTO: 9 FL (ref 9.2–12.9)
POTASSIUM SERPL-SCNC: 4.4 MMOL/L (ref 3.5–5.1)
PROT SERPL-MCNC: 6.5 G/DL (ref 6–8.4)
RBC # BLD AUTO: 4.45 M/UL (ref 4.6–6.2)
SODIUM SERPL-SCNC: 141 MMOL/L (ref 136–145)
TSH SERPL DL<=0.005 MIU/L-ACNC: 1.1 UIU/ML (ref 0.4–4)
WBC # BLD AUTO: 6.79 K/UL (ref 3.9–12.7)

## 2022-08-08 PROCEDURE — 3288F PR FALLS RISK ASSESSMENT DOCUMENTED: ICD-10-PCS | Mod: CPTII,S$GLB,, | Performed by: INTERNAL MEDICINE

## 2022-08-08 PROCEDURE — 99999 PR PBB SHADOW E&M-EST. PATIENT-LVL III: CPT | Mod: PBBFAC,,, | Performed by: INTERNAL MEDICINE

## 2022-08-08 PROCEDURE — 99215 OFFICE O/P EST HI 40 MIN: CPT | Mod: S$GLB,,, | Performed by: INTERNAL MEDICINE

## 2022-08-08 PROCEDURE — 3078F PR MOST RECENT DIASTOLIC BLOOD PRESSURE < 80 MM HG: ICD-10-PCS | Mod: CPTII,S$GLB,, | Performed by: INTERNAL MEDICINE

## 2022-08-08 PROCEDURE — 84443 ASSAY THYROID STIM HORMONE: CPT | Performed by: INTERNAL MEDICINE

## 2022-08-08 PROCEDURE — 1101F PT FALLS ASSESS-DOCD LE1/YR: CPT | Mod: CPTII,S$GLB,, | Performed by: INTERNAL MEDICINE

## 2022-08-08 PROCEDURE — 85025 COMPLETE CBC W/AUTO DIFF WBC: CPT | Performed by: INTERNAL MEDICINE

## 2022-08-08 PROCEDURE — 80053 COMPREHEN METABOLIC PANEL: CPT | Performed by: INTERNAL MEDICINE

## 2022-08-08 PROCEDURE — 1159F PR MEDICATION LIST DOCUMENTED IN MEDICAL RECORD: ICD-10-PCS | Mod: CPTII,S$GLB,, | Performed by: INTERNAL MEDICINE

## 2022-08-08 PROCEDURE — 1126F AMNT PAIN NOTED NONE PRSNT: CPT | Mod: CPTII,S$GLB,, | Performed by: INTERNAL MEDICINE

## 2022-08-08 PROCEDURE — 1159F MED LIST DOCD IN RCRD: CPT | Mod: CPTII,S$GLB,, | Performed by: INTERNAL MEDICINE

## 2022-08-08 PROCEDURE — 3074F SYST BP LT 130 MM HG: CPT | Mod: CPTII,S$GLB,, | Performed by: INTERNAL MEDICINE

## 2022-08-08 PROCEDURE — 1101F PR PT FALLS ASSESS DOC 0-1 FALLS W/OUT INJ PAST YR: ICD-10-PCS | Mod: CPTII,S$GLB,, | Performed by: INTERNAL MEDICINE

## 2022-08-08 PROCEDURE — 99999 PR PBB SHADOW E&M-EST. PATIENT-LVL III: ICD-10-PCS | Mod: PBBFAC,,, | Performed by: INTERNAL MEDICINE

## 2022-08-08 PROCEDURE — 1160F PR REVIEW ALL MEDS BY PRESCRIBER/CLIN PHARMACIST DOCUMENTED: ICD-10-PCS | Mod: CPTII,S$GLB,, | Performed by: INTERNAL MEDICINE

## 2022-08-08 PROCEDURE — 1160F RVW MEDS BY RX/DR IN RCRD: CPT | Mod: CPTII,S$GLB,, | Performed by: INTERNAL MEDICINE

## 2022-08-08 PROCEDURE — 3078F DIAST BP <80 MM HG: CPT | Mod: CPTII,S$GLB,, | Performed by: INTERNAL MEDICINE

## 2022-08-08 PROCEDURE — 3074F PR MOST RECENT SYSTOLIC BLOOD PRESSURE < 130 MM HG: ICD-10-PCS | Mod: CPTII,S$GLB,, | Performed by: INTERNAL MEDICINE

## 2022-08-08 PROCEDURE — 99215 PR OFFICE/OUTPT VISIT, EST, LEVL V, 40-54 MIN: ICD-10-PCS | Mod: S$GLB,,, | Performed by: INTERNAL MEDICINE

## 2022-08-08 PROCEDURE — 1126F PR PAIN SEVERITY QUANTIFIED, NO PAIN PRESENT: ICD-10-PCS | Mod: CPTII,S$GLB,, | Performed by: INTERNAL MEDICINE

## 2022-08-08 PROCEDURE — 3288F FALL RISK ASSESSMENT DOCD: CPT | Mod: CPTII,S$GLB,, | Performed by: INTERNAL MEDICINE

## 2022-08-08 PROCEDURE — 36415 COLL VENOUS BLD VENIPUNCTURE: CPT | Performed by: INTERNAL MEDICINE

## 2022-08-08 RX ORDER — EPINEPHRINE 0.3 MG/.3ML
0.3 INJECTION SUBCUTANEOUS ONCE AS NEEDED
Status: CANCELLED | OUTPATIENT
Start: 2022-08-08

## 2022-08-08 RX ORDER — DIPHENHYDRAMINE HYDROCHLORIDE 50 MG/ML
50 INJECTION INTRAMUSCULAR; INTRAVENOUS ONCE AS NEEDED
Status: CANCELLED | OUTPATIENT
Start: 2022-08-08

## 2022-08-08 RX ORDER — HEPARIN 100 UNIT/ML
500 SYRINGE INTRAVENOUS
Status: CANCELLED | OUTPATIENT
Start: 2022-08-08

## 2022-08-08 RX ORDER — SODIUM CHLORIDE 0.9 % (FLUSH) 0.9 %
10 SYRINGE (ML) INJECTION
Status: CANCELLED | OUTPATIENT
Start: 2022-08-08

## 2022-08-08 NOTE — PROGRESS NOTES
Subjective:      DATE OF VISIT: 8/8/22     ?  Patient ID:?Alton Black is a 81 y.o. male.?? MR#: [id number]   ?   PRIMARY ONCOLOGIST: Dr. Lopez    ? Primary Care Providers:  Rohan Kenny MD, MD (General)     CHIEF COMPLAINT: ?  Follow-up after completion of chemoradiation  ?   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   CURRENT TREATMENT:  Chemoradiation with carboplatin paclitaxel weekly, cycle 1 day 1 1/14/22, completed 02/24/2022; currently on durvalumab maintenance, q.4 weeks the    PAST TREATMENT:  None  ?   ONCOLOGIC HISTORY:   ?   Oncology History   Squamous cell carcinoma of lung   1/13/2021 - 2/24/2022 Radiation Therapy    Treating physician: Karol  Total Dose: 60 Gy  Fractions: 30     12/17/2021 Initial Diagnosis    Squamous cell lung cancer     12/22/2021 Cancer Staged    Staging form: Lung, AJCC 8th Edition  - Clinical stage from 12/22/2021: Stage IIB (cT3, cN0, cM0)     1/14/2022 - 2/21/2022 Chemotherapy    Treatment Summary   Plan Name: OP NSCLC PACLITAXEL + CARBOPLATIN (AUC) QW + RADIATION  Treatment Goal: Curative  Status: Inactive  Start Date: 1/14/2022  End Date: 2/21/2022  Provider: Juan Lopez MD  Chemotherapy: dexAMETHasone (DECADRON) 4 MG Tab, 8 mg, Oral, Daily, 1 of 1 cycle, Start date: --, End date: --  CARBOplatin (PARAPLATIN) 230 mg in sodium chloride 0.9% 273 mL chemo infusion, 230 mg (100 % of original dose 230 mg), Intravenous, Clinic/HOD 1 time, 6 of 6 cycles  Dose modification:   (original dose 230 mg, Cycle 1)  Administration: 230 mg (1/14/2022), 230 mg (1/21/2022), 255 mg (1/28/2022), 255 mg (2/4/2022), 255 mg (2/14/2022), 255 mg (2/21/2022)  PACLitaxeL (TAXOL) 45 mg/m2 = 96 mg in sodium chloride 0.9% 266 mL chemo infusion, 45 mg/m2 = 96 mg, Intravenous, Clinic/HOD 1 time, 6 of 6 cycles  Administration: 96 mg (1/14/2022), 96 mg (1/21/2022), 96 mg (1/28/2022), 96 mg (2/4/2022), 96 mg (2/14/2022), 96 mg (2/21/2022)     3/21/2022 -   Chemotherapy    Treatment Summary   Plan Name: OP DURVALUMAB 1500 MG Q4W  Treatment Goal: Curative  Status: Active  Start Date: 3/21/2022  End Date: 2/20/2023 (Planned)  Provider: Winnie Baez MD  Chemotherapy: durvalumab (IMFINZI) 1,500 mg in sodium chloride 0.9% 280 mL chemo infusion, 1,500 mg, Intravenous, Clinic/HOD 1 time, 5 of 13 cycles  Administration: 1,500 mg (3/21/2022), 1,500 mg (4/19/2022), 1,500 mg (5/17/2022), 1,500 mg (6/14/2022), 1,500 mg (7/12/2022)       Cancer Staging  Squamous cell carcinoma of lung  - Clinical stage from 12/22/2021: Stage IIB (cT3, cN0, cM0) - Signed by Juan Lopez MD on 12/22/2021         HPI    He has been in stable health since our last visit no new issues or concerns or side effects from his immunotherapy maintenance.  Good energy appetite no unintentional weight loss or new chest pain shortness of breath.    Review of Systems    ?   A comprehensive 14-point review of systems was reviewed with patient and was negative other than as specified above.   ?      Objective:      Physical Exam        Vitals:    08/08/22 1045   BP: 114/60   Pulse: 68   Temp: 97.4 °F (36.3 °C)      ?   General appearance: Generally well appearing, in no acute distress.   Head, eyes, ears, nose, and throat: Oropharynx clear with moist mucous membranes.   Cardiovascular: Regular rate and rhythm, S1, S2, no audible murmurs.   Respiratory: Lungs clear to auscultation bilaterally.   Abdomen: nontender, nondistended.   Extremities: Warm, without edema.   Neurologic: Alert and oriented.  Skin: No rashes, ecchymoses or petechial lesion.   Psychiatric: normal mood and affect, conversant and appropriate    ?   Laboratory:  ?   Lab Visit on 08/08/2022   Component Date Value Ref Range Status    WBC 08/08/2022 6.79  3.90 - 12.70 K/uL Final    RBC 08/08/2022 4.45 (A) 4.60 - 6.20 M/uL Final    Hemoglobin 08/08/2022 14.4  14.0 - 18.0 g/dL Final    Hematocrit 08/08/2022 43.7  40.0 - 54.0 % Final     MCV 08/08/2022 98  82 - 98 fL Final    MCH 08/08/2022 32.4 (A) 27.0 - 31.0 pg Final    MCHC 08/08/2022 33.0  32.0 - 36.0 g/dL Final    RDW 08/08/2022 13.6  11.5 - 14.5 % Final    Platelets 08/08/2022 188  150 - 450 K/uL Final    MPV 08/08/2022 9.0 (A) 9.2 - 12.9 fL Final    Immature Granulocytes 08/08/2022 0.3  0.0 - 0.5 % Final    Gran # (ANC) 08/08/2022 4.9  1.8 - 7.7 K/uL Final    Immature Grans (Abs) 08/08/2022 0.02  0.00 - 0.04 K/uL Final    Lymph # 08/08/2022 1.1  1.0 - 4.8 K/uL Final    Mono # 08/08/2022 0.7  0.3 - 1.0 K/uL Final    Eos # 08/08/2022 0.1  0.0 - 0.5 K/uL Final    Baso # 08/08/2022 0.03  0.00 - 0.20 K/uL Final    nRBC 08/08/2022 0  0 /100 WBC Final    Gran % 08/08/2022 72.3  38.0 - 73.0 % Final    Lymph % 08/08/2022 16.1 (A) 18.0 - 48.0 % Final    Mono % 08/08/2022 10.0  4.0 - 15.0 % Final    Eosinophil % 08/08/2022 0.9  0.0 - 8.0 % Final    Basophil % 08/08/2022 0.4  0.0 - 1.9 % Final    Differential Method 08/08/2022 Automated   Final    Sodium 08/08/2022 141  136 - 145 mmol/L Final    Potassium 08/08/2022 4.4  3.5 - 5.1 mmol/L Final    Chloride 08/08/2022 107  95 - 110 mmol/L Final    CO2 08/08/2022 26  23 - 29 mmol/L Final    Glucose 08/08/2022 99  70 - 110 mg/dL Final    BUN 08/08/2022 7 (A) 8 - 23 mg/dL Final    Creatinine 08/08/2022 0.8  0.5 - 1.4 mg/dL Final    Calcium 08/08/2022 9.3  8.7 - 10.5 mg/dL Final    Total Protein 08/08/2022 6.5  6.0 - 8.4 g/dL Final    Albumin 08/08/2022 3.7  3.5 - 5.2 g/dL Final    Total Bilirubin 08/08/2022 0.7  0.1 - 1.0 mg/dL Final    Alkaline Phosphatase 08/08/2022 129  55 - 135 U/L Final    AST 08/08/2022 22  10 - 40 U/L Final    ALT 08/08/2022 23  10 - 44 U/L Final    Anion Gap 08/08/2022 8  8 - 16 mmol/L Final    eGFR 08/08/2022 >60  >60 mL/min/1.73 m^2 Final    TSH 08/08/2022 1.104  0.400 - 4.000 uIU/mL Final      ?     Imaging:  ?    No results found for this or any previous visit (from the past 2160  hour(s)).  No results found for this or any previous visit (from the past 2160 hour(s)).  Results for orders placed or performed during the hospital encounter of 06/21/22 (from the past 2160 hour(s))   NM PET CT Routine Skull to Mid Thigh    Impression    Findings consistent with positive treatment response with decrease in size and FDG avidity of the right perihilar mass extending into the superior segment right lower lobe.  Adjacent right lung parenchymal opacities which are favored inflammatory in etiology as above.  However, attention on follow-up CT can be performed to ensure stability/resolution.  Other incidental findings as above.      Electronically signed by: Dave Lopez MD  Date:    06/23/2022  Time:    14:11       No results found for this or any previous visit (from the past 2160 hour(s)).  NM PET CT ROUTINE     CLINICAL HISTORY:  Non-small cell lung cancer (NSCLC), metastatic, assess treatment response; Malignant neoplasm of unspecified part of unspecified bronchus or lung     TECHNIQUE:  13 mCi of F18-FDG was administered intravenously in the right antecubital fossa.  After an approximately 60 min distribution time, PET/CT images were acquired from the skull base to the mid thigh. Transmission images were acquired to correct for attenuation using a whole body low-dose CT scan without contrast with the arms positioned above the head.     COMPARISON:  PET-CT from December 2021. more recently the chest abdomen pelvis CT from March.     FINDINGS:  Head and neck: There is physiologic uptake in the brain parenchyma.  No FDG avid cervical lymph nodes.     Chest: Left-sided MediPort catheter terminates in the SVC.  Thoracic aortic and coronary artery atherosclerosis is present.  No FDG avid or pathologic thoracic adenopathy.  When compared to the prior PET-CT there is been interval decrease in size a superior segment right lower lobe perihilar lung mass with decreased max SUV.  Max SUV currently measures  3.4 compared with 23 on the prior exam.  On today's exam the area measures on the order of 3.5 x 2.4 cm.  Previously was measured at 5.5 x 4 cm on PET-CT from last year.  On the more recent CT from March of this year was measured at 3.7 x 3 cm.  More inferiorly in the posteromedial right lower lobe on series 2, image 109 is a parenchymal density/air bronchogram containing consolidation measuring 3.5 cm.  Max SUV measures 3.2.  Findings likely relate to postobstructive/inflammatory changes.  New patchy opacity more superiorly in the left lower lobe series 2, image 98 measuring 2.2 cm.  Max SUV measures 2.9.  Findings are also potentially  inflammatory in etiology.  However, continued surveillance is recommended.     Abdomen/pelvis: No abnormal areas of uptake in the liver, spleen, pancreas, or adrenals.  Pancreatic calcifications consistent with chronic pancreatitis.  Suspected small right renal cyst.  Moderate hiatal hernia.  Low level uptake in the bowel most likely physiologic.  Extensive sigmoid diverticulosis without evidence of diverticulitis.  Prostate gland is enlarged with coarse central calcifications and low level uptake.  Correlate with PSA levels.  Findings are stable compared to prior study.     Calcified plaque in the aorta.  Unchanged mild ectasia of the infrarenal abdominal aorta no ascites or drainable fluid collections.  Stable clustered calcifications are calcified lymph nodes in the dany hepatic region.  No associated FDG avidity.  No FDG avid nodes.     Bones: There are degenerative changes of the spine.  No FDG avid osseous lesions.  Bones are demineralized.     Impression:     Findings consistent with positive treatment response with decrease in size and FDG avidity of the right perihilar mass extending into the superior segment right lower lobe.  Adjacent right lung parenchymal opacities which are favored inflammatory in etiology as above.  However, attention on follow-up CT can be performed  to ensure stability/resolution.  Other incidental findings as above.   ?   Assessment/Plan:   Squamous cell carcinoma of lung, unspecified laterality    Immunodeficiency due to chemotherapy    Other orders  -     durvalumab (IMFINZI) 1,500 mg in sodium chloride 0.9% 280 mL chemo infusion  -     EPINEPHrine (EPIPEN) 0.3 mg/0.3 mL pen injection 0.3 mg  -     diphenhydrAMINE injection 50 mg  -     hydrocortisone sodium succinate injection 100 mg  -     sodium chloride 0.9% 100 mL flush bag  -     sodium chloride 0.9% flush 10 mL  -     heparin, porcine (PF) 100 unit/mL injection flush 500 Units  -     alteplase injection 2 mg       1. Squamous cell carcinoma of lung, unspecified laterality    2. Immunodeficiency due to chemotherapy          Plan:     Problem List Items Addressed This Visit        Oncology    Squamous cell carcinoma of lung - Primary      Other Visit Diagnoses     Immunodeficiency due to chemotherapy            Squamous cell carcinoma lung stage II:  patient expressed not interested in surgical option and therefore definitive chemoradiation was recommended by primary oncologist Dr. Jessica doty and radiation oncologist Dr. Roberson.  Cycle 1 day 1  01/14/2022 and completed 02/24/2022.  He has continued on maintenance immunotherapy with durvalumab q.4 weeks tolerating well.  We review restaging imaging 06/25/2022 showing continued treatment response possible some inflammatory affect.  Recommend follow-up with Radiation Oncology in continuation on his maintenance regimen at this time.    Labs reviewed no abnormalities which would prevent ongoing immunotherapy.  Will follow up with treatment tomorrow on next treatment plan in follow-up weeks by DIDIER. Would plan on restaging imaging 3 months from last 9/25/22.    FOLLOW-UP  Durvalumab tomorrow  Revisit 4 weeks with CBC CMP TSH and durvalumab planned

## 2022-08-09 ENCOUNTER — INFUSION (OUTPATIENT)
Dept: INFUSION THERAPY | Facility: HOSPITAL | Age: 82
End: 2022-08-09
Attending: INTERNAL MEDICINE
Payer: MEDICARE

## 2022-08-09 VITALS
BODY MASS INDEX: 29.75 KG/M2 | DIASTOLIC BLOOD PRESSURE: 66 MMHG | RESPIRATION RATE: 16 BRPM | HEIGHT: 71 IN | WEIGHT: 212.5 LBS | OXYGEN SATURATION: 97 % | SYSTOLIC BLOOD PRESSURE: 132 MMHG | HEART RATE: 61 BPM | TEMPERATURE: 98 F

## 2022-08-09 DIAGNOSIS — C34.90 SQUAMOUS CELL CARCINOMA OF LUNG, UNSPECIFIED LATERALITY: Primary | ICD-10-CM

## 2022-08-09 PROCEDURE — 96413 CHEMO IV INFUSION 1 HR: CPT

## 2022-08-09 PROCEDURE — 63600175 PHARM REV CODE 636 W HCPCS: Performed by: INTERNAL MEDICINE

## 2022-08-09 PROCEDURE — 25000003 PHARM REV CODE 250: Performed by: INTERNAL MEDICINE

## 2022-08-09 RX ORDER — HEPARIN 100 UNIT/ML
500 SYRINGE INTRAVENOUS
Status: DISCONTINUED | OUTPATIENT
Start: 2022-08-09 | End: 2022-08-09 | Stop reason: HOSPADM

## 2022-08-09 RX ADMIN — DURVALUMAB 1500 MG: 500 INJECTION, SOLUTION INTRAVENOUS at 09:08

## 2022-08-09 RX ADMIN — HEPARIN 500 UNITS: 100 SYRINGE at 11:08

## 2022-08-09 RX ADMIN — SODIUM CHLORIDE: 9 INJECTION, SOLUTION INTRAVENOUS at 09:08

## 2022-08-09 NOTE — DISCHARGE INSTRUCTIONS
.[facility]  [address] [address]  [address]  [phone] phone     [phone] fax  Hours of Operation: Monday- Friday 8:00am- 5:00pm  After hours phone  [phone]  Hematology / Oncology Physicians on call    Dr. Jae Souza      Nurse Practitioners:    Laquita Gallegos, HEDY Rubio, HEDY Stacy, HEDY Patel, HEDY Montes, HEDY King, [place]      Please don't hesitate to call if you have any concerns.    .FALL PREVENTION   Falls often occur due to slipping, tripping or losing your balance. Here are ways to reduce your risk of falling again.   Was there anything that caused your fall that can be fixed, removed or replaced?   Make your home safe by keeping walkways clear of objects you may trip over.   Use non-slip pads under rugs.   Do not walk in poorly lit areas.   Do not stand on chairs or wobbly ladders.   Use caution when reaching overhead or looking upward. This position can cause a loss of balance.   Be sure your shoes fit properly, have non-slip bottoms and are in good condition.   Be cautious when going up and down stairs, curbs, and when walking on uneven sidewalks.   If your balance is poor, consider using a cane or walker.   If your fall was related to alcohol use, stop or limit alcohol intake.   If your fall was related to use of sleeping medicines, talk to your doctor about this. You may need to reduce your dosage at bedtime if you awaken during the night to go to the bathroom.   To reduce the need for nighttime bathroom trips:   Avoid drinking fluids for several hours before going to bed   Empty your bladder before going to bed   Men can keep a urinal at the bedside   © [phone] [employer], [address] [place] [address], [place] [address]. All rights reserved. This information is not intended as a substitute for professional medical care. Always follow your healthcare  professional's instructions.  .WAYS TO HELP PREVENT INFECTION        WASH YOUR HANDS OFTEN DURING THE DAY, ESPECIALLY BEFORE YOU EAT, AFTER USING THE BATHROOM, AND AFTER TOUCHING ANIMALS    STAY AWAY FROM PEOPLE WHO HAVE ILLNESSES YOU CAN CATCH; SUCH AS COLDS, FLU, CHICKEN POX    TRY TO AVOID CROWDS    STAY AWAY FROM CHILDREN WHO RECENTLY HAVE RECEIVED LIVE VIRUS VACCINES    MAINTAIN GOOD MOUTH CARE    DO NOT SQUEEZE OR SCRATCH PIMPLES    CLEAN CUTS & SCRAPES RIGHT AWAY AND DAILY UNTIL HEALED WITH WARM WATER, SOAP & AN ANTISEPTIC    AVOID CONTACT WITH LITTER BOXES, BIRD CAGES, & FISH TANKS    AVOID STANDING WATER, IE., BIRD BATHS, FLOWER POTS/VASES, OR HUMIDIFIERS    WEAR GLOVES WHEN GARDENING OR CLEANING UP AFTER OTHERS, ESPECIALLY BABIES & SMALL CHILDREN    DO NOT EAT RAW FISH, SEAFOOD, MEAT, OR EGGS

## 2022-08-09 NOTE — PLAN OF CARE
Problem: Anemia (Chemotherapy Effects)  Goal: Anemia Symptom Improvement  Outcome: Ongoing, Progressing     Problem: Nausea and Vomiting (Chemotherapy Effects)  Goal: Fluid and Electrolyte Balance  Outcome: Ongoing, Progressing     Problem: Neutropenia (Chemotherapy Effects)  Goal: Absence of Infection  Outcome: Ongoing, Progressing     Problem: Thrombocytopenia Bleeding Risk (Chemotherapy Effects)  Goal: Absence of Bleeding  Outcome: Ongoing, Progressing     Problem: Adult Inpatient Plan of Care  Goal: Plan of Care Review  Outcome: Ongoing, Progressing  Flowsheets (Taken 8/9/2022 1003)  Plan of Care Reviewed With: patient  Goal: Patient-Specific Goal (Individualized)  Outcome: Ongoing, Progressing  Flowsheets (Taken 8/9/2022 1003)  Anxieties, Fears or Concerns: No concerns at this time  Individualized Care Needs: Legs elevated, pillow provided and snacks offered  Patient-Specific Goals (Include Timeframe): Tolerate imfinzi today with no adverse reactions  Goal: Optimal Comfort and Wellbeing  Outcome: Ongoing, Progressing     Problem: Fall Injury Risk  Goal: Absence of Fall and Fall-Related Injury  Outcome: Ongoing, Progressing

## 2022-09-06 ENCOUNTER — OFFICE VISIT (OUTPATIENT)
Dept: HEMATOLOGY/ONCOLOGY | Facility: CLINIC | Age: 82
End: 2022-09-06
Payer: MEDICARE

## 2022-09-06 ENCOUNTER — TELEPHONE (OUTPATIENT)
Dept: HEMATOLOGY/ONCOLOGY | Facility: CLINIC | Age: 82
End: 2022-09-06
Payer: MEDICARE

## 2022-09-06 ENCOUNTER — LAB VISIT (OUTPATIENT)
Dept: LAB | Facility: HOSPITAL | Age: 82
End: 2022-09-06
Attending: INTERNAL MEDICINE
Payer: MEDICARE

## 2022-09-06 VITALS
SYSTOLIC BLOOD PRESSURE: 132 MMHG | HEIGHT: 71 IN | WEIGHT: 213.19 LBS | DIASTOLIC BLOOD PRESSURE: 77 MMHG | TEMPERATURE: 98 F | BODY MASS INDEX: 29.85 KG/M2 | HEART RATE: 75 BPM | OXYGEN SATURATION: 96 %

## 2022-09-06 DIAGNOSIS — R93.89 ABNORMAL FINDINGS ON DIAGNOSTIC IMAGING OF OTHER SPECIFIED BODY STRUCTURES: ICD-10-CM

## 2022-09-06 DIAGNOSIS — C34.90 SQUAMOUS CELL CARCINOMA OF LUNG, UNSPECIFIED LATERALITY: ICD-10-CM

## 2022-09-06 DIAGNOSIS — E06.4 DRUG-INDUCED THYROIDITIS: ICD-10-CM

## 2022-09-06 DIAGNOSIS — C34.90 MALIGNANT NEOPLASM OF UNSPECIFIED PART OF UNSPECIFIED BRONCHUS OR LUNG: Primary | ICD-10-CM

## 2022-09-06 LAB
ALBUMIN SERPL BCP-MCNC: 3.7 G/DL (ref 3.5–5.2)
ALP SERPL-CCNC: 134 U/L (ref 55–135)
ALT SERPL W/O P-5'-P-CCNC: 26 U/L (ref 10–44)
ANION GAP SERPL CALC-SCNC: 6 MMOL/L (ref 8–16)
AST SERPL-CCNC: 21 U/L (ref 10–40)
BASOPHILS # BLD AUTO: 0.03 K/UL (ref 0–0.2)
BASOPHILS NFR BLD: 0.5 % (ref 0–1.9)
BILIRUB SERPL-MCNC: 0.7 MG/DL (ref 0.1–1)
BUN SERPL-MCNC: 7 MG/DL (ref 8–23)
CALCIUM SERPL-MCNC: 9 MG/DL (ref 8.7–10.5)
CHLORIDE SERPL-SCNC: 106 MMOL/L (ref 95–110)
CO2 SERPL-SCNC: 28 MMOL/L (ref 23–29)
CREAT SERPL-MCNC: 0.8 MG/DL (ref 0.5–1.4)
DIFFERENTIAL METHOD: ABNORMAL
EOSINOPHIL # BLD AUTO: 0.1 K/UL (ref 0–0.5)
EOSINOPHIL NFR BLD: 1 % (ref 0–8)
ERYTHROCYTE [DISTWIDTH] IN BLOOD BY AUTOMATED COUNT: 14.1 % (ref 11.5–14.5)
EST. GFR  (NO RACE VARIABLE): >60 ML/MIN/1.73 M^2
GLUCOSE SERPL-MCNC: 101 MG/DL (ref 70–110)
HCT VFR BLD AUTO: 43.4 % (ref 40–54)
HGB BLD-MCNC: 14.5 G/DL (ref 14–18)
IMM GRANULOCYTES # BLD AUTO: 0.02 K/UL (ref 0–0.04)
IMM GRANULOCYTES NFR BLD AUTO: 0.3 % (ref 0–0.5)
LYMPHOCYTES # BLD AUTO: 1.3 K/UL (ref 1–4.8)
LYMPHOCYTES NFR BLD: 20.4 % (ref 18–48)
MCH RBC QN AUTO: 32.5 PG (ref 27–31)
MCHC RBC AUTO-ENTMCNC: 33.4 G/DL (ref 32–36)
MCV RBC AUTO: 97 FL (ref 82–98)
MONOCYTES # BLD AUTO: 0.6 K/UL (ref 0.3–1)
MONOCYTES NFR BLD: 10.2 % (ref 4–15)
NEUTROPHILS # BLD AUTO: 4.2 K/UL (ref 1.8–7.7)
NEUTROPHILS NFR BLD: 67.6 % (ref 38–73)
NRBC BLD-RTO: 0 /100 WBC
PLATELET # BLD AUTO: 193 K/UL (ref 150–450)
PMV BLD AUTO: 9 FL (ref 9.2–12.9)
POTASSIUM SERPL-SCNC: 4 MMOL/L (ref 3.5–5.1)
PROT SERPL-MCNC: 6.7 G/DL (ref 6–8.4)
RBC # BLD AUTO: 4.46 M/UL (ref 4.6–6.2)
SODIUM SERPL-SCNC: 140 MMOL/L (ref 136–145)
TSH SERPL DL<=0.005 MIU/L-ACNC: 1.51 UIU/ML (ref 0.4–4)
WBC # BLD AUTO: 6.19 K/UL (ref 3.9–12.7)

## 2022-09-06 PROCEDURE — 3078F PR MOST RECENT DIASTOLIC BLOOD PRESSURE < 80 MM HG: ICD-10-PCS | Mod: CPTII,S$GLB,,

## 2022-09-06 PROCEDURE — 99999 PR PBB SHADOW E&M-EST. PATIENT-LVL III: CPT | Mod: PBBFAC,,,

## 2022-09-06 PROCEDURE — 1160F RVW MEDS BY RX/DR IN RCRD: CPT | Mod: CPTII,S$GLB,,

## 2022-09-06 PROCEDURE — 84443 ASSAY THYROID STIM HORMONE: CPT | Performed by: INTERNAL MEDICINE

## 2022-09-06 PROCEDURE — 99214 OFFICE O/P EST MOD 30 MIN: CPT | Mod: S$GLB,,,

## 2022-09-06 PROCEDURE — 1159F MED LIST DOCD IN RCRD: CPT | Mod: CPTII,S$GLB,,

## 2022-09-06 PROCEDURE — 1160F PR REVIEW ALL MEDS BY PRESCRIBER/CLIN PHARMACIST DOCUMENTED: ICD-10-PCS | Mod: CPTII,S$GLB,,

## 2022-09-06 PROCEDURE — 3075F PR MOST RECENT SYSTOLIC BLOOD PRESS GE 130-139MM HG: ICD-10-PCS | Mod: CPTII,S$GLB,,

## 2022-09-06 PROCEDURE — 1159F PR MEDICATION LIST DOCUMENTED IN MEDICAL RECORD: ICD-10-PCS | Mod: CPTII,S$GLB,,

## 2022-09-06 PROCEDURE — 3288F FALL RISK ASSESSMENT DOCD: CPT | Mod: CPTII,S$GLB,,

## 2022-09-06 PROCEDURE — 85025 COMPLETE CBC W/AUTO DIFF WBC: CPT | Performed by: INTERNAL MEDICINE

## 2022-09-06 PROCEDURE — 36415 COLL VENOUS BLD VENIPUNCTURE: CPT | Performed by: INTERNAL MEDICINE

## 2022-09-06 PROCEDURE — 1101F PR PT FALLS ASSESS DOC 0-1 FALLS W/OUT INJ PAST YR: ICD-10-PCS | Mod: CPTII,S$GLB,,

## 2022-09-06 PROCEDURE — 80053 COMPREHEN METABOLIC PANEL: CPT | Performed by: INTERNAL MEDICINE

## 2022-09-06 PROCEDURE — 3075F SYST BP GE 130 - 139MM HG: CPT | Mod: CPTII,S$GLB,,

## 2022-09-06 PROCEDURE — 1126F PR PAIN SEVERITY QUANTIFIED, NO PAIN PRESENT: ICD-10-PCS | Mod: CPTII,S$GLB,,

## 2022-09-06 PROCEDURE — 99214 PR OFFICE/OUTPT VISIT, EST, LEVL IV, 30-39 MIN: ICD-10-PCS | Mod: S$GLB,,,

## 2022-09-06 PROCEDURE — 1101F PT FALLS ASSESS-DOCD LE1/YR: CPT | Mod: CPTII,S$GLB,,

## 2022-09-06 PROCEDURE — 3078F DIAST BP <80 MM HG: CPT | Mod: CPTII,S$GLB,,

## 2022-09-06 PROCEDURE — 3288F PR FALLS RISK ASSESSMENT DOCUMENTED: ICD-10-PCS | Mod: CPTII,S$GLB,,

## 2022-09-06 PROCEDURE — 99999 PR PBB SHADOW E&M-EST. PATIENT-LVL III: ICD-10-PCS | Mod: PBBFAC,,,

## 2022-09-06 PROCEDURE — 1126F AMNT PAIN NOTED NONE PRSNT: CPT | Mod: CPTII,S$GLB,,

## 2022-09-06 RX ORDER — DIPHENHYDRAMINE HYDROCHLORIDE 50 MG/ML
50 INJECTION INTRAMUSCULAR; INTRAVENOUS ONCE AS NEEDED
Status: CANCELLED | OUTPATIENT
Start: 2022-09-06

## 2022-09-06 RX ORDER — EPINEPHRINE 0.3 MG/.3ML
0.3 INJECTION SUBCUTANEOUS ONCE AS NEEDED
Status: CANCELLED | OUTPATIENT
Start: 2022-09-06

## 2022-09-06 RX ORDER — HEPARIN 100 UNIT/ML
500 SYRINGE INTRAVENOUS
Status: CANCELLED | OUTPATIENT
Start: 2022-09-06

## 2022-09-06 RX ORDER — SODIUM CHLORIDE 0.9 % (FLUSH) 0.9 %
10 SYRINGE (ML) INJECTION
Status: CANCELLED | OUTPATIENT
Start: 2022-09-06

## 2022-09-07 ENCOUNTER — INFUSION (OUTPATIENT)
Dept: INFUSION THERAPY | Facility: HOSPITAL | Age: 82
End: 2022-09-07
Attending: INTERNAL MEDICINE
Payer: MEDICARE

## 2022-09-07 VITALS
TEMPERATURE: 98 F | OXYGEN SATURATION: 95 % | SYSTOLIC BLOOD PRESSURE: 130 MMHG | RESPIRATION RATE: 16 BRPM | HEART RATE: 62 BPM | DIASTOLIC BLOOD PRESSURE: 66 MMHG

## 2022-09-07 DIAGNOSIS — C34.90 SQUAMOUS CELL CARCINOMA OF LUNG, UNSPECIFIED LATERALITY: Primary | ICD-10-CM

## 2022-09-07 PROCEDURE — 96413 CHEMO IV INFUSION 1 HR: CPT

## 2022-09-07 PROCEDURE — 25000003 PHARM REV CODE 250: Performed by: INTERNAL MEDICINE

## 2022-09-07 PROCEDURE — 63600175 PHARM REV CODE 636 W HCPCS: Mod: JG | Performed by: INTERNAL MEDICINE

## 2022-09-07 RX ORDER — HEPARIN 100 UNIT/ML
500 SYRINGE INTRAVENOUS
Status: DISCONTINUED | OUTPATIENT
Start: 2022-09-07 | End: 2022-09-07 | Stop reason: HOSPADM

## 2022-09-07 RX ADMIN — DURVALUMAB 1500 MG: 500 INJECTION, SOLUTION INTRAVENOUS at 10:09

## 2022-09-07 RX ADMIN — HEPARIN 500 UNITS: 100 SYRINGE at 11:09

## 2022-09-07 NOTE — PROGRESS NOTES
Subjective:     Patient ID:?Alton Black is a 81 y.o. male.?? MR#: [id number]   ?   PRIMARY ONCOLOGIST: Dr. Baez  ?   CHIEF COMPLAINT: lung ca/lab review/assessment for chemo?????   ?   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   CURRENT TREATMENT: OP DURVALUMAB 1500 MG Q4W       HPI Mr. 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 Vences with complaints of SOB. CXR done on 11/29/202 revealed a right hilar mass. FNA biopsy of station 10R LN done on 12/8/2021 was positive for malignant cells with histopathologic features of squamous cell carcinoma. PET/CT performed on 12/14/2021 showed a highly FDG avid perihilar mass in the superior segment of the right lower lobe which is highly concerning for malignancy and no evidence of metastasis.      He was treated with Carbo/Taxol + XRT in 1/2022 which he completed in 2/2022. He is currently on consolidation maintenance immunotherapy with Imfinzi every 4 weeks for 13 cycles.        Interval History: Pt states he continues to feel well. No issues with appetite or maintaining hydration--he drinks 3 22oz bottles of water daily. Denies fatigue, n/v/d/c, cp, cough, sob, fever, chills.      Oncology History   Squamous cell carcinoma of lung   1/13/2021 - 2/24/2022 Radiation Therapy    Treating physician: Karol  Total Dose: 60 Gy  Fractions: 30     12/17/2021 Initial Diagnosis    Squamous cell lung cancer     12/22/2021 Cancer Staged    Staging form: Lung, AJCC 8th Edition  - Clinical stage from 12/22/2021: Stage IIB (cT3, cN0, cM0)       1/14/2022 - 2/21/2022 Chemotherapy    Treatment Summary   Plan Name: OP NSCLC PACLITAXEL + CARBOPLATIN (AUC) QW + RADIATION  Treatment Goal: Curative  Status: Inactive  Start Date: 1/14/2022  End Date: 2/21/2022  Provider: Juan Lopez MD  Chemotherapy:  dexAMETHasone (DECADRON) 4 MG Tab, 8 mg, Oral, Daily, 1 of 1 cycle, Start date: --, End date: --  CARBOplatin (PARAPLATIN) 230 mg in sodium chloride 0.9% 273 mL chemo infusion, 230 mg (100 % of original dose 230 mg), Intravenous, Clinic/HOD 1 time, 6 of 6 cycles  Dose modification:   (original dose 230 mg, Cycle 1)  Administration: 230 mg (1/14/2022), 230 mg (1/21/2022), 255 mg (1/28/2022), 255 mg (2/4/2022), 255 mg (2/14/2022), 255 mg (2/21/2022)  PACLitaxeL (TAXOL) 45 mg/m2 = 96 mg in sodium chloride 0.9% 266 mL chemo infusion, 45 mg/m2 = 96 mg, Intravenous, Clinic/HOD 1 time, 6 of 6 cycles  Administration: 96 mg (1/14/2022), 96 mg (1/21/2022), 96 mg (1/28/2022), 96 mg (2/4/2022), 96 mg (2/14/2022), 96 mg (2/21/2022)       3/21/2022 -  Chemotherapy    Treatment Summary   Plan Name: OP DURVALUMAB 1500 MG Q4W  Treatment Goal: Curative  Status: Active  Start Date: 3/21/2022  End Date: 2/20/2023 (Planned)  Provider: Winnie Baez MD  Chemotherapy: durvalumab (IMFINZI) 1,500 mg in sodium chloride 0.9% 280 mL chemo infusion, 1,500 mg, Intravenous, Clinic/HOD 1 time, 6 of 13 cycles  Administration: 1,500 mg (3/21/2022), 1,500 mg (4/19/2022), 1,500 mg (5/17/2022), 1,500 mg (6/14/2022), 1,500 mg (7/12/2022), 1,500 mg (8/9/2022)          Social History     Socioeconomic History    Marital status: Single   Tobacco Use    Smoking status: Never    Smokeless tobacco: Never    Tobacco comments:     Chews on cigars   Substance and Sexual Activity    Alcohol use: Not Currently    Drug use: Never    Sexual activity: Not Currently      Family History   Problem Relation Age of Onset    Heart attack Father       Past Surgical History:   Procedure Laterality Date    BRONCHOSCOPY Bilateral 12/8/2021    Procedure: Bronchoscopy - insert lighted tube into airway to take a biopsy of lung;  Surgeon: Rigo Vences MD;  Location: [facility];  Service: Endoscopy;  Laterality: Bilateral;    CHOLECYSTECTOMY      CORONARY ANGIOPLASTY  WITH STENT PLACEMENT      ENDOBRONCHIAL ULTRASOUND Bilateral 12/8/2021    Procedure: ENDOBRONCHIAL ULTRASOUND (EBUS);  Surgeon: Rigo Vences MD;  Location: [facility] ENDO;  Service: Endoscopy;  Laterality: Bilateral;    FLUOROSCOPY N/A 1/4/2022    Procedure: Mediport Insertion;  Surgeon: Elaina Larios MD;  Location: [facility] CATH LAB;  Service: General;  Laterality: N/A;  yuridia from 1/3 to 1/4        Review of Systems   Constitutional:  Negative for activity change, appetite change, chills, fatigue, fever and unexpected weight change.   HENT:  Negative for congestion, dental problem, mouth sores and nosebleeds.    Eyes:  Negative for visual disturbance.   Respiratory:  Negative for cough, choking and chest tightness.    Cardiovascular:  Negative for chest pain, palpitations and leg swelling.   Gastrointestinal:  Negative for abdominal distention, abdominal pain, anal bleeding, blood in stool, constipation, diarrhea, nausea and vomiting.   Endocrine: Negative.    Genitourinary:  Negative for dysuria, frequency, hematuria and urgency.   Musculoskeletal:  Negative for arthralgias, back pain, gait problem, joint swelling and myalgias.   Skin:  Negative for wound.   Allergic/Immunologic: Negative for immunocompromised state.   Neurological:  Negative for dizziness, light-headedness, numbness and headaches.   Hematological:  Negative for adenopathy. Does not bruise/bleed easily.   Psychiatric/Behavioral:  The patient is not nervous/anxious.      ?   A comprehensive 14-point review of systems was reviewed with patient and was negative other than as specified above.   ?     Objective:      Physical Exam  Vitals and nursing note reviewed.   Constitutional:       Appearance: Normal appearance. He is not ill-appearing.   HENT:      Head: Normocephalic and atraumatic.      Right Ear: External ear normal.      Left Ear: External ear normal.      Nose: Nose normal.      Mouth/Throat:      Mouth: Mucous membranes are moist.       Pharynx: Oropharynx is clear.   Eyes:      Conjunctiva/sclera: Conjunctivae normal.   Cardiovascular:      Rate and Rhythm: Normal rate and regular rhythm.      Pulses: Normal pulses.      Heart sounds: Normal heart sounds.   Pulmonary:      Effort: Pulmonary effort is normal.      Breath sounds: Normal breath sounds.   Abdominal:      General: Abdomen is flat.      Palpations: Abdomen is soft.   Musculoskeletal:         General: Normal range of motion.      Cervical back: Normal range of motion.      Right lower leg: No edema.      Left lower leg: No edema.   Skin:     General: Skin is warm and dry.      Capillary Refill: Capillary refill takes less than 2 seconds.   Neurological:      Mental Status: He is alert and oriented to person, place, and time.      Motor: No weakness.      Gait: Gait normal.   Psychiatric:         Mood and Affect: Mood normal.         Behavior: Behavior normal.         Thought Content: Thought content normal.         Judgment: Judgment normal.         ?   Vitals:    09/06/22 0941   BP: 132/77   Pulse: 75   Temp: 97.9 °F (36.6 °C)      ?       ?   Laboratory:  ?   Lab Visit on 09/06/2022   Component Date Value Ref Range Status    WBC 09/06/2022 6.19  3.90 - 12.70 K/uL Final    RBC 09/06/2022 4.46 (L)  4.60 - 6.20 M/uL Final    Hemoglobin 09/06/2022 14.5  14.0 - 18.0 g/dL Final    Hematocrit 09/06/2022 43.4  40.0 - 54.0 % Final    MCV 09/06/2022 97  82 - 98 fL Final    MCH 09/06/2022 32.5 (H)  27.0 - 31.0 pg Final    MCHC 09/06/2022 33.4  32.0 - 36.0 g/dL Final    RDW 09/06/2022 14.1  11.5 - 14.5 % Final    Platelets 09/06/2022 193  150 - 450 K/uL Final    MPV 09/06/2022 9.0 (L)  9.2 - 12.9 fL Final    Immature Granulocytes 09/06/2022 0.3  0.0 - 0.5 % Final    Gran # (ANC) 09/06/2022 4.2  1.8 - 7.7 K/uL Final    Immature Grans (Abs) 09/06/2022 0.02  0.00 - 0.04 K/uL Final    Lymph # 09/06/2022 1.3  1.0 - 4.8 K/uL Final    Mono # 09/06/2022 0.6  0.3 - 1.0 K/uL Final    Eos # 09/06/2022  0.1  0.0 - 0.5 K/uL Final    Baso # 09/06/2022 0.03  0.00 - 0.20 K/uL Final    nRBC 09/06/2022 0  0 /100 WBC Final    Gran % 09/06/2022 67.6  38.0 - 73.0 % Final    Lymph % 09/06/2022 20.4  18.0 - 48.0 % Final    Mono % 09/06/2022 10.2  4.0 - 15.0 % Final    Eosinophil % 09/06/2022 1.0  0.0 - 8.0 % Final    Basophil % 09/06/2022 0.5  0.0 - 1.9 % Final    Differential Method 09/06/2022 Automated   Final    Sodium 09/06/2022 140  136 - 145 mmol/L Final    Potassium 09/06/2022 4.0  3.5 - 5.1 mmol/L Final    Chloride 09/06/2022 106  95 - 110 mmol/L Final    CO2 09/06/2022 28  23 - 29 mmol/L Final    Glucose 09/06/2022 101  70 - 110 mg/dL Final    BUN 09/06/2022 7 (L)  8 - 23 mg/dL Final    Creatinine 09/06/2022 0.8  0.5 - 1.4 mg/dL Final    Calcium 09/06/2022 9.0  8.7 - 10.5 mg/dL Final    Total Protein 09/06/2022 6.7  6.0 - 8.4 g/dL Final    Albumin 09/06/2022 3.7  3.5 - 5.2 g/dL Final    Total Bilirubin 09/06/2022 0.7  0.1 - 1.0 mg/dL Final    Alkaline Phosphatase 09/06/2022 134  55 - 135 U/L Final    AST 09/06/2022 21  10 - 40 U/L Final    ALT 09/06/2022 26  10 - 44 U/L Final    Anion Gap 09/06/2022 6 (L)  8 - 16 mmol/L Final    eGFR 09/06/2022 >60  >60 mL/min/1.73 m^2 Final    TSH 09/06/2022 1.515  0.400 - 4.000 uIU/mL Final      ?   Imaging:    Results for orders placed or performed during the hospital encounter of 06/21/22 (from the past 2160 hour(s))   NM PET CT Routine Skull to Mid Thigh    Impression    Findings consistent with positive treatment response with decrease in size and FDG avidity of the right perihilar mass extending into the superior segment right lower lobe.  Adjacent right lung parenchymal opacities which are favored inflammatory in etiology as above.  However, attention on follow-up CT can be performed to ensure stability/resolution.  Other incidental findings as above.      Electronically signed by: Dave Lopez MD  Date:    06/23/2022  Time:    14:11        No results found for this or any  previous visit (from the past 2160 hour(s)).     ?   Assessment/Plan:     Problem List Items Addressed This Visit          Oncology    Squamous cell carcinoma of lung      Cancer Staging   Squamous cell carcinoma of lung  Staging form: Lung, AJCC 8th Edition  - Clinical stage from 12/22/2021: Stage IIB (cT3, cN0, cM0) - Signed by Juan Lopez MD on 12/22/2021    CT CAP 03/14/22  -Findings consistent with favorable treatment response with decrease in size of the superior segment right lower lobe pulmonary mass.  Other stable findings as above.    Last PET 06/23/22  -Findings consistent with positive treatment response with decrease in size and FDG avidity of the right perihilar mass extending into the superior segment right lower lobe.      Labs reviewed, no concerning  -Case discussed with oncologist  -Ok to proceed with c7D1 of Imfinzi tomorrow(decoupled)    Follow-up in 4 weeks with cbc, cmp, tsh, and pet scan prior for c7d1 imfinzi          Other Visit Diagnoses       Malignant neoplasm of unspecified part of unspecified bronchus or lung    -  Primary    Relevant Orders    NM PET CT Routine FDG    CBC Auto Differential    Comprehensive Metabolic Panel    TSH    Drug-induced thyroiditis        Relevant Orders    TSH                   LEXIS Bhagat  Hematology/Oncology

## 2022-09-07 NOTE — ASSESSMENT & PLAN NOTE
Cancer Staging   Squamous cell carcinoma of lung  Staging form: Lung, AJCC 8th Edition  - Clinical stage from 12/22/2021: Stage IIB (cT3, cN0, cM0) - Signed by Juan Lopez MD on 12/22/2021    CT CAP 03/14/22  -Findings consistent with favorable treatment response with decrease in size of the superior segment right lower lobe pulmonary mass.  Other stable findings as above.    Last PET 06/23/22  -Findings consistent with positive treatment response with decrease in size and FDG avidity of the right perihilar mass extending into the superior segment right lower lobe.      Labs reviewed, no concerning  -Case discussed with oncologist  -Ok to proceed with c7D1 of Imfinzi tomorrow(decoupled)    Follow-up in 4 weeks with cbc, cmp, tsh, and pet scan prior for c7d1 imfinzi

## 2022-09-26 ENCOUNTER — HOSPITAL ENCOUNTER (OUTPATIENT)
Dept: RADIOLOGY | Facility: HOSPITAL | Age: 82
Discharge: HOME | End: 2022-09-26
Payer: MEDICARE

## 2022-09-26 DIAGNOSIS — C34.90 MALIGNANT NEOPLASM OF UNSPECIFIED PART OF UNSPECIFIED BRONCHUS OR LUNG: ICD-10-CM

## 2022-09-26 PROCEDURE — 78815 PET IMAGE W/CT SKULL-THIGH: CPT | Mod: 26,PS,, | Performed by: RADIOLOGY

## 2022-09-26 PROCEDURE — 78815 PET IMAGE W/CT SKULL-THIGH: CPT | Mod: TC

## 2022-09-26 PROCEDURE — 78815 NM PET CT ROUTINE: ICD-10-PCS | Mod: 26,PS,, | Performed by: RADIOLOGY

## 2022-09-29 ENCOUNTER — OFFICE VISIT (OUTPATIENT)
Dept: RADIATION ONCOLOGY | Facility: CLINIC | Age: 82
End: 2022-09-29
Payer: MEDICARE

## 2022-09-29 VITALS
SYSTOLIC BLOOD PRESSURE: 163 MMHG | TEMPERATURE: 97 F | RESPIRATION RATE: 18 BRPM | WEIGHT: 213.81 LBS | HEIGHT: 71 IN | DIASTOLIC BLOOD PRESSURE: 71 MMHG | BODY MASS INDEX: 29.93 KG/M2 | HEART RATE: 74 BPM | OXYGEN SATURATION: 98 %

## 2022-09-29 DIAGNOSIS — C34.91 SQUAMOUS CELL CARCINOMA OF RIGHT LUNG: Primary | ICD-10-CM

## 2022-09-29 PROCEDURE — 3078F DIAST BP <80 MM HG: CPT | Mod: CPTII,S$GLB,, | Performed by: RADIOLOGY

## 2022-09-29 PROCEDURE — 1126F PR PAIN SEVERITY QUANTIFIED, NO PAIN PRESENT: ICD-10-PCS | Mod: CPTII,S$GLB,, | Performed by: RADIOLOGY

## 2022-09-29 PROCEDURE — 3078F PR MOST RECENT DIASTOLIC BLOOD PRESSURE < 80 MM HG: ICD-10-PCS | Mod: CPTII,S$GLB,, | Performed by: RADIOLOGY

## 2022-09-29 PROCEDURE — 3288F PR FALLS RISK ASSESSMENT DOCUMENTED: ICD-10-PCS | Mod: CPTII,S$GLB,, | Performed by: RADIOLOGY

## 2022-09-29 PROCEDURE — 3077F PR MOST RECENT SYSTOLIC BLOOD PRESSURE >= 140 MM HG: ICD-10-PCS | Mod: CPTII,S$GLB,, | Performed by: RADIOLOGY

## 2022-09-29 PROCEDURE — 3288F FALL RISK ASSESSMENT DOCD: CPT | Mod: CPTII,S$GLB,, | Performed by: RADIOLOGY

## 2022-09-29 PROCEDURE — 99999 PR PBB SHADOW E&M-EST. PATIENT-LVL III: CPT | Mod: PBBFAC,,, | Performed by: RADIOLOGY

## 2022-09-29 PROCEDURE — 1159F MED LIST DOCD IN RCRD: CPT | Mod: CPTII,S$GLB,, | Performed by: RADIOLOGY

## 2022-09-29 PROCEDURE — 1159F PR MEDICATION LIST DOCUMENTED IN MEDICAL RECORD: ICD-10-PCS | Mod: CPTII,S$GLB,, | Performed by: RADIOLOGY

## 2022-09-29 PROCEDURE — 1101F PR PT FALLS ASSESS DOC 0-1 FALLS W/OUT INJ PAST YR: ICD-10-PCS | Mod: CPTII,S$GLB,, | Performed by: RADIOLOGY

## 2022-09-29 PROCEDURE — 99213 OFFICE O/P EST LOW 20 MIN: CPT | Mod: S$GLB,,, | Performed by: RADIOLOGY

## 2022-09-29 PROCEDURE — 1101F PT FALLS ASSESS-DOCD LE1/YR: CPT | Mod: CPTII,S$GLB,, | Performed by: RADIOLOGY

## 2022-09-29 PROCEDURE — 1126F AMNT PAIN NOTED NONE PRSNT: CPT | Mod: CPTII,S$GLB,, | Performed by: RADIOLOGY

## 2022-09-29 PROCEDURE — 3077F SYST BP >= 140 MM HG: CPT | Mod: CPTII,S$GLB,, | Performed by: RADIOLOGY

## 2022-09-29 PROCEDURE — 99213 PR OFFICE/OUTPT VISIT, EST, LEVL III, 20-29 MIN: ICD-10-PCS | Mod: S$GLB,,, | Performed by: RADIOLOGY

## 2022-09-29 PROCEDURE — 99999 PR PBB SHADOW E&M-EST. PATIENT-LVL III: ICD-10-PCS | Mod: PBBFAC,,, | Performed by: RADIOLOGY

## 2022-09-29 NOTE — PROGRESS NOTES
"[facility]  RADIATION ONCOLOGY FOLLOW UP    Name: Alton Black : 1940     DIAGNOSIS: R lung squamous cell carcinoma, cT2b vT4 N1M0, stage IIB v IIIA    TREATMENT HISTORY:   1. 60Gy/30fx chemoradiation completed 22  2. Adjuvant immunotherapy    INTERVAL HISTORY: Alton Black is a pleasant 81 y.o. male who presents today for follow-up.  Last seen 2022    PET 22 showed continued improvement of R perihilar opacities, low level uptake, no measure nodule/mass no enlarged adenopathy, no distant lesions.    Since then, he is continues on immunotherapy.    Today, doing well overall. No dysphagia, good appetite, No chest pain or hemoptysis or cough.    PHYSICAL EXAM:   Constitutional: well appearing, no acute distress, ECOG 1  Vitals:    BP (!) 163/71 (BP Location: Left arm, Patient Position: Sitting, BP Method: Large (Automatic))   Pulse 74   Temp 97.4 °F (36.3 °C) (Temporal)   Resp 18   Ht 5' 11" (1.803 m)   Wt 97 kg (213 lb 12.8 oz)   SpO2 98%   BMI 29.82 kg/m²   Eyes: sclera anicteric, EOMI, pupils equal, round and reactive to light  ENT: oral cavity without lesions, moist mucous membranes  Neck: trachea midline, neck supple  Lymphatic: no cervical, supraclavicular or axillary adenopathy  Cardiovascular: regular rate, no edema of the upper or lower extremities, radial pulse 2+  Respiratory: unlabored effort, clear to auscultation, no wheezes  Abdomen: soft, non-tender, no rigidity, no masses, no hepatomegaly    Laboratory & X-Ray Findings: Per above.  Images reviewed personally.    ASSESSMENT: good radiographic response to treatment    PLAN: Mr. Black tolerated chemoradiation very well and imaging response is encouraging. Has continues on immunotherapy and should finish early next spring.  Continue f/u with [facility].    Follow up with me in 6 months after next imaging once completed immunotherapy.    I spent approximately 20 minutes " reviewing the available records and evaluating the patient, out of which over 50% of the time was spent face to face with the patient in counseling and coordinating this patient's care.    Nadira Roberson III, M.D.  Radiation Oncology  [facility] [address] [place] [address]  Ph: [phone]  [email or web address]

## 2022-10-05 ENCOUNTER — LAB VISIT (OUTPATIENT)
Dept: LAB | Facility: HOSPITAL | Age: 82
End: 2022-10-05
Payer: MEDICARE

## 2022-10-05 ENCOUNTER — OFFICE VISIT (OUTPATIENT)
Dept: HEMATOLOGY/ONCOLOGY | Facility: CLINIC | Age: 82
End: 2022-10-05
Payer: MEDICARE

## 2022-10-05 VITALS
WEIGHT: 213.88 LBS | BODY MASS INDEX: 29.94 KG/M2 | TEMPERATURE: 98 F | HEIGHT: 71 IN | OXYGEN SATURATION: 96 % | SYSTOLIC BLOOD PRESSURE: 144 MMHG | HEART RATE: 68 BPM | DIASTOLIC BLOOD PRESSURE: 70 MMHG

## 2022-10-05 DIAGNOSIS — C34.90 MALIGNANT NEOPLASM OF UNSPECIFIED PART OF UNSPECIFIED BRONCHUS OR LUNG: ICD-10-CM

## 2022-10-05 DIAGNOSIS — T45.1X5A IMMUNODEFICIENCY DUE TO CHEMOTHERAPY: ICD-10-CM

## 2022-10-05 DIAGNOSIS — D84.821 IMMUNODEFICIENCY DUE TO CHEMOTHERAPY: ICD-10-CM

## 2022-10-05 DIAGNOSIS — E06.4 DRUG-INDUCED THYROIDITIS: ICD-10-CM

## 2022-10-05 DIAGNOSIS — Z79.899 IMMUNODEFICIENCY DUE TO CHEMOTHERAPY: ICD-10-CM

## 2022-10-05 DIAGNOSIS — C34.90 MALIGNANT NEOPLASM OF UNSPECIFIED PART OF UNSPECIFIED BRONCHUS OR LUNG: Primary | ICD-10-CM

## 2022-10-05 LAB
ALBUMIN SERPL BCP-MCNC: 3.6 G/DL (ref 3.5–5.2)
ALP SERPL-CCNC: 121 U/L (ref 55–135)
ALT SERPL W/O P-5'-P-CCNC: 22 U/L (ref 10–44)
ANION GAP SERPL CALC-SCNC: 7 MMOL/L (ref 8–16)
AST SERPL-CCNC: 18 U/L (ref 10–40)
BASOPHILS # BLD AUTO: 0.03 K/UL (ref 0–0.2)
BASOPHILS NFR BLD: 0.5 % (ref 0–1.9)
BILIRUB SERPL-MCNC: 0.7 MG/DL (ref 0.1–1)
BUN SERPL-MCNC: 7 MG/DL (ref 8–23)
CALCIUM SERPL-MCNC: 8.6 MG/DL (ref 8.7–10.5)
CHLORIDE SERPL-SCNC: 106 MMOL/L (ref 95–110)
CO2 SERPL-SCNC: 25 MMOL/L (ref 23–29)
CREAT SERPL-MCNC: 0.8 MG/DL (ref 0.5–1.4)
DIFFERENTIAL METHOD: ABNORMAL
EOSINOPHIL # BLD AUTO: 0.1 K/UL (ref 0–0.5)
EOSINOPHIL NFR BLD: 1.2 % (ref 0–8)
ERYTHROCYTE [DISTWIDTH] IN BLOOD BY AUTOMATED COUNT: 13.7 % (ref 11.5–14.5)
EST. GFR  (NO RACE VARIABLE): >60 ML/MIN/1.73 M^2
GLUCOSE SERPL-MCNC: 90 MG/DL (ref 70–110)
HCT VFR BLD AUTO: 42.6 % (ref 40–54)
HGB BLD-MCNC: 14.1 G/DL (ref 14–18)
IMM GRANULOCYTES # BLD AUTO: 0.01 K/UL (ref 0–0.04)
IMM GRANULOCYTES NFR BLD AUTO: 0.2 % (ref 0–0.5)
LYMPHOCYTES # BLD AUTO: 1 K/UL (ref 1–4.8)
LYMPHOCYTES NFR BLD: 17.9 % (ref 18–48)
MCH RBC QN AUTO: 32.7 PG (ref 27–31)
MCHC RBC AUTO-ENTMCNC: 33.1 G/DL (ref 32–36)
MCV RBC AUTO: 99 FL (ref 82–98)
MONOCYTES # BLD AUTO: 0.7 K/UL (ref 0.3–1)
MONOCYTES NFR BLD: 12.4 % (ref 4–15)
NEUTROPHILS # BLD AUTO: 3.8 K/UL (ref 1.8–7.7)
NEUTROPHILS NFR BLD: 67.8 % (ref 38–73)
NRBC BLD-RTO: 0 /100 WBC
PLATELET # BLD AUTO: 187 K/UL (ref 150–450)
PMV BLD AUTO: 8.9 FL (ref 9.2–12.9)
POTASSIUM SERPL-SCNC: 4 MMOL/L (ref 3.5–5.1)
PROT SERPL-MCNC: 6.4 G/DL (ref 6–8.4)
RBC # BLD AUTO: 4.31 M/UL (ref 4.6–6.2)
SODIUM SERPL-SCNC: 138 MMOL/L (ref 136–145)
TSH SERPL DL<=0.005 MIU/L-ACNC: 1.33 UIU/ML (ref 0.4–4)
WBC # BLD AUTO: 5.65 K/UL (ref 3.9–12.7)

## 2022-10-05 PROCEDURE — 99215 PR OFFICE/OUTPT VISIT, EST, LEVL V, 40-54 MIN: ICD-10-PCS | Mod: S$GLB,,, | Performed by: INTERNAL MEDICINE

## 2022-10-05 PROCEDURE — 3288F FALL RISK ASSESSMENT DOCD: CPT | Mod: CPTII,S$GLB,, | Performed by: INTERNAL MEDICINE

## 2022-10-05 PROCEDURE — 99999 PR PBB SHADOW E&M-EST. PATIENT-LVL III: CPT | Mod: PBBFAC,,, | Performed by: INTERNAL MEDICINE

## 2022-10-05 PROCEDURE — 3288F PR FALLS RISK ASSESSMENT DOCUMENTED: ICD-10-PCS | Mod: CPTII,S$GLB,, | Performed by: INTERNAL MEDICINE

## 2022-10-05 PROCEDURE — 1126F PR PAIN SEVERITY QUANTIFIED, NO PAIN PRESENT: ICD-10-PCS | Mod: CPTII,S$GLB,, | Performed by: INTERNAL MEDICINE

## 2022-10-05 PROCEDURE — 36415 COLL VENOUS BLD VENIPUNCTURE: CPT

## 2022-10-05 PROCEDURE — 1101F PT FALLS ASSESS-DOCD LE1/YR: CPT | Mod: CPTII,S$GLB,, | Performed by: INTERNAL MEDICINE

## 2022-10-05 PROCEDURE — 85025 COMPLETE CBC W/AUTO DIFF WBC: CPT

## 2022-10-05 PROCEDURE — 1159F PR MEDICATION LIST DOCUMENTED IN MEDICAL RECORD: ICD-10-PCS | Mod: CPTII,S$GLB,, | Performed by: INTERNAL MEDICINE

## 2022-10-05 PROCEDURE — 3077F SYST BP >= 140 MM HG: CPT | Mod: CPTII,S$GLB,, | Performed by: INTERNAL MEDICINE

## 2022-10-05 PROCEDURE — 3078F PR MOST RECENT DIASTOLIC BLOOD PRESSURE < 80 MM HG: ICD-10-PCS | Mod: CPTII,S$GLB,, | Performed by: INTERNAL MEDICINE

## 2022-10-05 PROCEDURE — 1101F PR PT FALLS ASSESS DOC 0-1 FALLS W/OUT INJ PAST YR: ICD-10-PCS | Mod: CPTII,S$GLB,, | Performed by: INTERNAL MEDICINE

## 2022-10-05 PROCEDURE — 80053 COMPREHEN METABOLIC PANEL: CPT

## 2022-10-05 PROCEDURE — 1126F AMNT PAIN NOTED NONE PRSNT: CPT | Mod: CPTII,S$GLB,, | Performed by: INTERNAL MEDICINE

## 2022-10-05 PROCEDURE — 1159F MED LIST DOCD IN RCRD: CPT | Mod: CPTII,S$GLB,, | Performed by: INTERNAL MEDICINE

## 2022-10-05 PROCEDURE — 84443 ASSAY THYROID STIM HORMONE: CPT

## 2022-10-05 PROCEDURE — 99999 PR PBB SHADOW E&M-EST. PATIENT-LVL III: ICD-10-PCS | Mod: PBBFAC,,, | Performed by: INTERNAL MEDICINE

## 2022-10-05 PROCEDURE — 3077F PR MOST RECENT SYSTOLIC BLOOD PRESSURE >= 140 MM HG: ICD-10-PCS | Mod: CPTII,S$GLB,, | Performed by: INTERNAL MEDICINE

## 2022-10-05 PROCEDURE — 3078F DIAST BP <80 MM HG: CPT | Mod: CPTII,S$GLB,, | Performed by: INTERNAL MEDICINE

## 2022-10-05 PROCEDURE — 99215 OFFICE O/P EST HI 40 MIN: CPT | Mod: S$GLB,,, | Performed by: INTERNAL MEDICINE

## 2022-10-05 RX ORDER — EPINEPHRINE 0.3 MG/.3ML
0.3 INJECTION SUBCUTANEOUS ONCE AS NEEDED
Status: CANCELLED | OUTPATIENT
Start: 2022-10-05

## 2022-10-05 RX ORDER — DIPHENHYDRAMINE HYDROCHLORIDE 50 MG/ML
50 INJECTION INTRAMUSCULAR; INTRAVENOUS ONCE AS NEEDED
Status: CANCELLED | OUTPATIENT
Start: 2022-10-05

## 2022-10-05 RX ORDER — SODIUM CHLORIDE 0.9 % (FLUSH) 0.9 %
10 SYRINGE (ML) INJECTION
Status: CANCELLED | OUTPATIENT
Start: 2022-10-05

## 2022-10-05 RX ORDER — HEPARIN 100 UNIT/ML
500 SYRINGE INTRAVENOUS
Status: CANCELLED | OUTPATIENT
Start: 2022-10-05

## 2022-10-05 NOTE — Clinical Note
Just got PET scan looks great.  For surveillance going forward are you okay with CT chest in 3 months/January 2023?

## 2022-10-05 NOTE — PATIENT INSTRUCTIONS
Tomorrow durvalumab  RV in 4 wks with cbc, cmp, tsh and durvalumab planned day after, npok  Ct chest 1/2023 planned

## 2022-10-05 NOTE — PROGRESS NOTES
Subjective:      DATE OF VISIT: 10/5/22     ?  Patient ID:?Alton Black is a 81 y.o. male.?? MR#: [id number]   ?   PRIMARY ONCOLOGIST: Dr. Lopez -> Dr. Baez    ? Primary Care Providers:  Rohan Kenny MD, MD (General)     CHIEF COMPLAINT: ?  Follow-up after completion of chemoradiation  ?   ONCOLOGIC DIAGNOSIS:  Stage IIB squamous cell carcinoma lung  ?   CURRENT TREATMENT:  Chemoradiation with carboplatin paclitaxel weekly, cycle 1 day 1 1/14/22, completed 02/24/2022; currently on durvalumab maintenance, q.4 weeks the    PAST TREATMENT:  None  ?   ONCOLOGIC HISTORY:   ?   Oncology History   Squamous cell carcinoma of lung   1/13/2021 - 2/24/2022 Radiation Therapy    Treating physician: Karol  Total Dose: 60 Gy  Fractions: 30     12/17/2021 Initial Diagnosis    Squamous cell lung cancer     12/22/2021 Cancer Staged    Staging form: Lung, AJCC 8th Edition  - Clinical stage from 12/22/2021: Stage IIB (cT3, cN0, cM0)       1/14/2022 - 2/21/2022 Chemotherapy    Treatment Summary   Plan Name: OP NSCLC PACLITAXEL + CARBOPLATIN (AUC) QW + RADIATION  Treatment Goal: Curative  Status: Inactive  Start Date: 1/14/2022  End Date: 2/21/2022  Provider: Juan Lopez MD  Chemotherapy: dexAMETHasone (DECADRON) 4 MG Tab, 8 mg, Oral, Daily, 1 of 1 cycle, Start date: --, End date: --  CARBOplatin (PARAPLATIN) 230 mg in sodium chloride 0.9% 273 mL chemo infusion, 230 mg (100 % of original dose 230 mg), Intravenous, Clinic/HOD 1 time, 6 of 6 cycles  Dose modification:   (original dose 230 mg, Cycle 1)  Administration: 230 mg (1/14/2022), 230 mg (1/21/2022), 255 mg (1/28/2022), 255 mg (2/4/2022), 255 mg (2/14/2022), 255 mg (2/21/2022)  PACLitaxeL (TAXOL) 45 mg/m2 = 96 mg in sodium chloride 0.9% 266 mL chemo infusion, 45 mg/m2 = 96 mg, Intravenous, Clinic/HOD 1 time, 6 of 6 cycles  Administration: 96 mg (1/14/2022), 96 mg (1/21/2022), 96 mg (1/28/2022), 96 mg (2/4/2022), 96 mg (2/14/2022), 96 mg (2/21/2022)        3/21/2022 -  Chemotherapy    Treatment Summary   Plan Name: OP DURVALUMAB 1500 MG Q4W  Treatment Goal: Curative  Status: Active  Start Date: 3/21/2022  End Date: 2/22/2023 (Planned)  Provider: Winnie aBez MD  Chemotherapy: [No matching medication found in this treatment plan]          Cancer Staging   Squamous cell carcinoma of lung  - Clinical stage from 12/22/2021: Stage IIB (cT3, cN0, cM0) - Signed by Juan Lopez MD on 12/22/2021         HPI    He has been in stable health with recent PET scan showing continued good response to therapy.  He denies any respiratory or other symptoms.  Good energy appetite no unintentional weight loss.      Review of Systems    ?   A comprehensive 14-point review of systems was reviewed with patient and was negative other than as specified above.   ?      Objective:      Physical Exam        Vitals:    10/05/22 0843   BP: (!) 144/70   Pulse: 68   Temp: 97.6 °F (36.4 °C)      ?   ECOG:?0   General appearance: Generally well appearing, in no acute distress.   Head, eyes, ears, nose, and throat: moist mucous membranes.   Respiratory:  Normal work of breathing  Abdomen: nontender, nondistended.   Extremities: Warm, without edema.   Neurologic: Alert and oriented. Grossly normal strength, coordination, and gait.   Skin: No rashes, ecchymoses or petechial lesion.   Psychiatric:  Normal mood and affect.    Laboratory:  ?   Lab Visit on 10/05/2022   Component Date Value Ref Range Status    WBC 10/05/2022 5.65  3.90 - 12.70 K/uL Final    RBC 10/05/2022 4.31 (L)  4.60 - 6.20 M/uL Final    Hemoglobin 10/05/2022 14.1  14.0 - 18.0 g/dL Final    Hematocrit 10/05/2022 42.6  40.0 - 54.0 % Final    MCV 10/05/2022 99 (H)  82 - 98 fL Final    MCH 10/05/2022 32.7 (H)  27.0 - 31.0 pg Final    MCHC 10/05/2022 33.1  32.0 - 36.0 g/dL Final    RDW 10/05/2022 13.7  11.5 - 14.5 % Final    Platelets 10/05/2022 187  150 - 450 K/uL Final    MPV 10/05/2022 8.9 (L)  9.2 - 12.9 fL Final     Immature Granulocytes 10/05/2022 0.2  0.0 - 0.5 % Final    Gran # (ANC) 10/05/2022 3.8  1.8 - 7.7 K/uL Final    Immature Grans (Abs) 10/05/2022 0.01  0.00 - 0.04 K/uL Final    Lymph # 10/05/2022 1.0  1.0 - 4.8 K/uL Final    Mono # 10/05/2022 0.7  0.3 - 1.0 K/uL Final    Eos # 10/05/2022 0.1  0.0 - 0.5 K/uL Final    Baso # 10/05/2022 0.03  0.00 - 0.20 K/uL Final    nRBC 10/05/2022 0  0 /100 WBC Final    Gran % 10/05/2022 67.8  38.0 - 73.0 % Final    Lymph % 10/05/2022 17.9 (L)  18.0 - 48.0 % Final    Mono % 10/05/2022 12.4  4.0 - 15.0 % Final    Eosinophil % 10/05/2022 1.2  0.0 - 8.0 % Final    Basophil % 10/05/2022 0.5  0.0 - 1.9 % Final    Differential Method 10/05/2022 Automated   Final    TSH 10/05/2022 1.327  0.400 - 4.000 uIU/mL Final      ?     Imaging:  ?    No results found for this or any previous visit (from the past 2160 hour(s)).  No results found for this or any previous visit (from the past 2160 hour(s)).  Results for orders placed or performed during the hospital encounter of 09/26/22 (from the past 2160 hour(s))   NM PET CT Routine FDG    Impression    Continued positive response to therapy with no measurable lesion seen on today's study.  Patchy right perihilar and infrahilar opacities with low level uptake are favored to relate to post treatment change.      Electronically signed by: Juan J Wilkins MD  Date:    09/26/2022  Time:    11:22       No results found for this or any previous visit (from the past 2160 hour(s)).  NM PET CT ROUTINE     CLINICAL HISTORY:  Non-small cell lung cancer (NSCLC), staging;Non-small cell lung cancer (NSCLC), metastatic, assess treatment response; Malignant neoplasm of unspecified part of unspecified bronchus or lung     TECHNIQUE:  12.31 mCi F18-FDG was administered intravenously via the right antecubital fossa.  Approximately 60 minutes after radiotracer distribution, PET images were acquired from the skull base to the mid thigh. Transmission images were acquired to  correct for attenuation using a whole body low-dose CT scan without contrast.     COMPARISON:  06/23/2022     FINDINGS:  Head and Neck:     Brain demonstrates normal metabolism.  However, FDG-PET has an approximate 60% sensitivity for brain metastases which are best detected by MRI with gadolinium.  Physiologic uptake is in the neck.     Chest:     Right perihilar/infrahilar patchy consolidative changes have slightly improved compared to the prior study and demonstrate low level uptake, maximum SUV of 3.3.  No measurable nodule or mass is identified on today's examination.  No enlarged or FDG avid lymph nodes are in the chest.     Abdomen and Pelvis:     Physiologic uptake is in the liver, spleen, urinary system and bowel. No enlarged or FDG avid lymph nodes are in the abdomen or pelvis. Adrenal glands are normal.     Musculoskeletal:     No FDG avid osseous lesions are present.     Impression:     Continued positive response to therapy with no measurable lesion seen on today's study.  Patchy right perihilar and infrahilar opacities with low level uptake are favored to relate to post treatment change.  Assessment/Plan:   Malignant neoplasm of unspecified part of unspecified bronchus or lung    Immunodeficiency due to chemotherapy    Other orders  -     durvalumab (IMFINZI) 1,500 mg in sodium chloride 0.9% 280 mL chemo infusion  -     EPINEPHrine (EPIPEN) 0.3 mg/0.3 mL pen injection 0.3 mg  -     diphenhydrAMINE injection 50 mg  -     hydrocortisone sodium succinate injection 100 mg  -     sodium chloride 0.9% 100 mL flush bag  -     sodium chloride 0.9% flush 10 mL  -     heparin, porcine (PF) 100 unit/mL injection flush 500 Units  -     alteplase injection 2 mg       1. Malignant neoplasm of unspecified part of unspecified bronchus or lung    2. Immunodeficiency due to chemotherapy            Plan:     Problem List Items Addressed This Visit    None  Visit Diagnoses       Malignant neoplasm of unspecified part of  unspecified bronchus or lung    -  Primary    Immunodeficiency due to chemotherapy              Squamous cell carcinoma lung stage II:  patient expressed not interested in surgical option and therefore definitive chemoradiation was recommended by primary oncologist Dr. Jessica doty and radiation oncologist Dr. Roberson.  Cycle 1 day 1  01/14/2022 and completed 02/24/2022.  He has continued on maintenance immunotherapy with durvalumab q.4 weeks tolerating well.  We review restaging imaging 06/25/2022 showing continued treatment response possible some inflammatory affect.  Recommend follow-up with Radiation Oncology in continuation on his maintenance regimen at this time.    Labs reviewed no abnormalities which would prevent ongoing immunotherapy.  Will follow up with treatment tomorrow on next treatment plan in follow-up weeks by K. Would plan on restaging imaging 3 months from last 9/25/22.    FOLLOW-UP  Durvalumab tomorrow pending CMP  Revisit 4 weeks with CBC CMP TSH and durvalumab planned  CT chest tentatively 1/2023

## 2022-10-06 ENCOUNTER — INFUSION (OUTPATIENT)
Dept: INFUSION THERAPY | Facility: HOSPITAL | Age: 82
End: 2022-10-06
Attending: INTERNAL MEDICINE
Payer: MEDICARE

## 2022-10-06 VITALS
OXYGEN SATURATION: 96 % | TEMPERATURE: 98 F | SYSTOLIC BLOOD PRESSURE: 127 MMHG | HEART RATE: 62 BPM | DIASTOLIC BLOOD PRESSURE: 60 MMHG | RESPIRATION RATE: 18 BRPM

## 2022-10-06 DIAGNOSIS — C34.90 SQUAMOUS CELL CARCINOMA OF LUNG, UNSPECIFIED LATERALITY: Primary | ICD-10-CM

## 2022-10-06 PROCEDURE — 63600175 PHARM REV CODE 636 W HCPCS: Performed by: INTERNAL MEDICINE

## 2022-10-06 PROCEDURE — 96413 CHEMO IV INFUSION 1 HR: CPT

## 2022-10-06 PROCEDURE — 25000003 PHARM REV CODE 250: Performed by: INTERNAL MEDICINE

## 2022-10-06 RX ORDER — HEPARIN 100 UNIT/ML
500 SYRINGE INTRAVENOUS
Status: DISCONTINUED | OUTPATIENT
Start: 2022-10-06 | End: 2022-10-06 | Stop reason: HOSPADM

## 2022-10-06 RX ORDER — EPINEPHRINE 1 MG/ML
0.3 INJECTION, SOLUTION INTRACARDIAC; INTRAMUSCULAR; INTRAVENOUS; SUBCUTANEOUS ONCE AS NEEDED
Status: DISCONTINUED | OUTPATIENT
Start: 2022-10-06 | End: 2022-10-06 | Stop reason: HOSPADM

## 2022-10-06 RX ORDER — DIPHENHYDRAMINE HYDROCHLORIDE 50 MG/ML
50 INJECTION INTRAMUSCULAR; INTRAVENOUS ONCE AS NEEDED
Status: DISCONTINUED | OUTPATIENT
Start: 2022-10-06 | End: 2022-10-06 | Stop reason: HOSPADM

## 2022-10-06 RX ADMIN — DURVALUMAB 1500 MG: 500 INJECTION, SOLUTION INTRAVENOUS at 10:10

## 2022-10-06 RX ADMIN — HEPARIN 500 UNITS: 100 SYRINGE at 11:10

## 2022-10-06 NOTE — DISCHARGE INSTRUCTIONS
.[facility]  [address] [address]  [address]  [phone] phone     [phone] fax  Hours of Operation: Monday- Friday 8:00am- 5:00pm  After hours phone  [phone]  Hematology / Oncology Physicians on call    Dr. Jae Young      Nurse Practitioners:    Laquita Gallegos, HEDY Rubio, HEDY Stacy, HEDY Patel, HEDY Montes, MELIZA Ruiz      Please don't hesitate to call if you have any concerns.

## 2022-11-01 NOTE — PROGRESS NOTES
Subjective:       Patient ID: Alton Black is a 81 y.o. male.    Chief Complaint: Chemotherapy and Lung Cancer    Primary Oncologist/Hematologist: Dr. Baez    HPI: Mr. Black is an 81 year old male who is following up for his Stage IIB squamous cell carcinoma lung. He was previously treated with chemoradiation with carboplatin paclitaxel weekly, cycle 1 day 1 1/14/22, completed 02/24/2022. He is now on durvalumab maintenance q 4 weeks. He is here for cycle 9 day 1.   Cancer Hx: PET scan showing continued good response to therapy  Squamous cell carcinoma of lung   1/13/2021 - 2/24/2022 Radiation Therapy     Treating physician: Karol  Total Dose: 60 Gy  Fractions: 30      12/17/2021 Initial Diagnosis     Squamous cell lung cancer      12/22/2021 Cancer Staged     Staging form: Lung, AJCC 8th Edition  - Clinical stage from 12/22/2021: Stage IIB (cT3, cN0, cM0)         1/14/2022 - 2/21/2022 Chemotherapy     Treatment Summary   Plan Name: OP NSCLC PACLITAXEL + CARBOPLATIN (AUC) QW + RADIATION  Treatment Goal: Curative  Status: Inactive  Start Date: 1/14/2022  End Date: 2/21/2022  Provider: Juan Lopez MD  Chemotherapy: dexAMETHasone (DECADRON) 4 MG Tab, 8 mg, Oral, Daily, 1 of 1 cycle, Start date: --, End date: --  CARBOplatin (PARAPLATIN) 230 mg in sodium chloride 0.9% 273 mL chemo infusion, 230 mg (100 % of original dose 230 mg), Intravenous, Clinic/HOD 1 time, 6 of 6 cycles  Dose modification:   (original dose 230 mg, Cycle 1)  Administration: 230 mg (1/14/2022), 230 mg (1/21/2022), 255 mg (1/28/2022), 255 mg (2/4/2022), 255 mg (2/14/2022), 255 mg (2/21/2022)  PACLitaxeL (TAXOL) 45 mg/m2 = 96 mg in sodium chloride 0.9% 266 mL chemo infusion, 45 mg/m2 = 96 mg, Intravenous, Clinic/HOD 1 time, 6 of 6 cycles  Administration: 96 mg (1/14/2022), 96 mg (1/21/2022), 96 mg (1/28/2022), 96 mg (2/4/2022), 96 mg (2/14/2022), 96 mg (2/21/2022)         3/21/2022 -  Chemotherapy     Treatment Summary   Plan  Name: OP DURVALUMAB 1500 MG Q4W  Treatment Goal: Curative  Status: Active  Start Date: 3/21/2022  End Date: 2/22/2023 (Planned)  Provider: Winnie Baez MD  Chemotherapy: [No matching medication found in this treatment plan]           Today: Patient states he has been well. He says his appetite is good and he is staying hydrated. He denies any sob, cough, chest pain, other pain, n/v/d/c. He states he is always fatigued. He was able to receive his flu vaccine and covid vaccine.     Social History     Socioeconomic History    Marital status: Single   Tobacco Use    Smoking status: Never    Smokeless tobacco: Never    Tobacco comments:     Chews on cigars   Substance and Sexual Activity    Alcohol use: Not Currently    Drug use: Never    Sexual activity: Not Currently       Past Medical History:   Diagnosis Date    Benign essential HTN     CAD (coronary artery disease)     HLD (hyperlipidemia)        Family History   Problem Relation Age of Onset    Heart attack Father        Past Surgical History:   Procedure Laterality Date    BRONCHOSCOPY Bilateral 12/8/2021    Procedure: Bronchoscopy - insert lighted tube into airway to take a biopsy of lung;  Surgeon: Rigo Vences MD;  Location: [facility] ENDO;  Service: Endoscopy;  Laterality: Bilateral;    CHOLECYSTECTOMY      CORONARY ANGIOPLASTY WITH STENT PLACEMENT      ENDOBRONCHIAL ULTRASOUND Bilateral 12/8/2021    Procedure: ENDOBRONCHIAL ULTRASOUND (EBUS);  Surgeon: Rigo Vences MD;  Location: [facility] ENDO;  Service: Endoscopy;  Laterality: Bilateral;    FLUOROSCOPY N/A 1/4/2022    Procedure: Mediport Insertion;  Surgeon: Elaina Larios MD;  Location: [facility] CATH LAB;  Service: General;  Laterality: N/A;  yuridia from 1/3 to 1/4       Review of Systems   Constitutional:  Positive for fatigue. Negative for activity change, appetite change, chills, diaphoresis, fever and unexpected weight change.   HENT:  Negative for congestion, nosebleeds and rhinorrhea.     Respiratory:  Negative for cough and shortness of breath.    Cardiovascular:  Negative for chest pain and leg swelling.   Gastrointestinal:  Negative for abdominal pain, anal bleeding, blood in stool, constipation, diarrhea, nausea and vomiting.   Genitourinary:  Negative for hematuria.   Musculoskeletal:  Positive for arthralgias. Negative for myalgias.   Skin:  Negative for color change and pallor.   Allergic/Immunologic: Positive for immunocompromised state.   Neurological:  Negative for dizziness, weakness, light-headedness, numbness and headaches.       Medication List with Changes/Refills   Current Medications    ALPRAZOLAM (XANAX) 0.5 MG TABLET    Take 0.5 mg by mouth 2 (two) times daily as needed.    ASPIRIN (ECOTRIN) 81 MG EC TABLET    Take 81 mg by mouth once daily.    ATORVASTATIN (LIPITOR) 80 MG TABLET    Take 1 tablet by mouth once daily.    ESOMEPRAZOLE (NEXIUM) 40 MG CAPSULE    Take 1 capsule by mouth every morning.    LINACLOTIDE (LINZESS) 72 MCG CAP CAPSULE    Take 1 capsule by mouth every morning.    NITROFURANTOIN, MACROCRYSTAL-MONOHYDRATE, (MACROBID) 100 MG CAPSULE    Take 100 mg by mouth 2 (two) times daily.    ONDANSETRON (ZOFRAN-ODT) 8 MG TBDL    Take 1 tablet (8 mg total) by mouth every 12 (twelve) hours as needed.    PROCHLORPERAZINE (COMPAZINE) 5 MG TABLET    Take 1 tablet (5 mg total) by mouth every 6 (six) hours as needed.    TRANDOLAPRIL (MAVIK) 4 MG TAB    Take 1 tablet by mouth once daily.     Objective:     Vitals:    11/02/22 1011   BP: 125/74   Pulse: 68   Temp: 97 °F (36.1 °C)       Physical Exam  Vitals reviewed.   Constitutional:       General: He is not in acute distress.     Appearance: He is not ill-appearing, toxic-appearing or diaphoretic.   HENT:      Head: Normocephalic and atraumatic.   Eyes:      Conjunctiva/sclera: Conjunctivae normal.   Cardiovascular:      Rate and Rhythm: Normal rate.   Abdominal:      General: Bowel sounds are normal.   Musculoskeletal:      Right  lower leg: No edema.      Left lower leg: No edema.   Skin:     General: Skin is warm.      Coloration: Skin is not jaundiced or pale.      Findings: No bruising, erythema, lesion or rash.   Neurological:      Mental Status: He is alert.      Motor: No weakness.      Gait: Gait normal.   Psychiatric:         Mood and Affect: Mood normal.         Behavior: Behavior normal.         Thought Content: Thought content normal.          Labs/Results:  Lab Results   Component Value Date    WBC 5.70 11/02/2022    RBC 4.51 (L) 11/02/2022    HGB 14.5 11/02/2022    HCT 45.3 11/02/2022     (H) 11/02/2022    MCH 32.2 (H) 11/02/2022    MCHC 32.0 11/02/2022    RDW 13.4 11/02/2022     11/02/2022    MPV 8.9 (L) 11/02/2022    GRAN 3.9 11/02/2022    GRAN 68.5 11/02/2022    LYMPH 1.1 11/02/2022    LYMPH 19.6 11/02/2022    MONO 0.6 11/02/2022    MONO 9.6 11/02/2022    EOS 0.1 11/02/2022    BASO 0.04 11/02/2022    EOSINOPHIL 1.2 11/02/2022    BASOPHIL 0.7 11/02/2022     CMP  Sodium   Date Value Ref Range Status   11/02/2022 140 136 - 145 mmol/L Final     Potassium   Date Value Ref Range Status   11/02/2022 4.0 3.5 - 5.1 mmol/L Final     Chloride   Date Value Ref Range Status   11/02/2022 106 95 - 110 mmol/L Final     CO2   Date Value Ref Range Status   11/02/2022 28 23 - 29 mmol/L Final     Glucose   Date Value Ref Range Status   11/02/2022 96 70 - 110 mg/dL Final     BUN   Date Value Ref Range Status   11/02/2022 7 (L) 8 - 23 mg/dL Final     Creatinine   Date Value Ref Range Status   11/02/2022 0.8 0.5 - 1.4 mg/dL Final     Calcium   Date Value Ref Range Status   11/02/2022 9.0 8.7 - 10.5 mg/dL Final     Total Protein   Date Value Ref Range Status   11/02/2022 6.4 6.0 - 8.4 g/dL Final     Albumin   Date Value Ref Range Status   11/02/2022 3.6 3.5 - 5.2 g/dL Final     Total Bilirubin   Date Value Ref Range Status   11/02/2022 0.6 0.1 - 1.0 mg/dL Final     Comment:     For infants and newborns, interpretation of results should  be based  on gestational age, weight and in agreement with clinical  observations.    Premature Infant recommended reference ranges:  Up to 24 hours.............<8.0 mg/dL  Up to 48 hours............<12.0 mg/dL  3-5 days..................<15.0 mg/dL  6-29 days.................<15.0 mg/dL       Alkaline Phosphatase   Date Value Ref Range Status   11/02/2022 136 (H) 55 - 135 U/L Final     AST   Date Value Ref Range Status   11/02/2022 20 10 - 40 U/L Final     ALT   Date Value Ref Range Status   11/02/2022 24 10 - 44 U/L Final     Anion Gap   Date Value Ref Range Status   11/02/2022 6 (L) 8 - 16 mmol/L Final     eGFR if    Date Value Ref Range Status   07/11/2022 >60 >60 mL/min/1.73 m^2 Final     eGFR if non    Date Value Ref Range Status   07/11/2022 >60 >60 mL/min/1.73 m^2 Final     Comment:     Calculation used to obtain the estimated glomerular filtration  rate (eGFR) is the CKD-EPI equation.        TSH 0.400 - 4.000 uIU/mL 1.502      Pet scan 9/26/22  Impression:  Continued positive response to therapy with no measurable lesion seen on today's study.  Patchy right perihilar and infrahilar opacities with low level uptake are favored to relate to post treatment change    Assessment:     Problem List Items Addressed This Visit          Oncology    Anemia    Squamous cell carcinoma of lung - Primary     Plan:     Squamous cell carcinoma of lung, unspecified laterality  --patient expressed not interested in surgical option and therefore definitive chemoradiation was recommended by primary oncologist Dr. Jessica doty and radiation oncologist Dr. Roberson.   --Cycle 1 day 1  01/14/2022 and completed 02/24/2022.  --continued on maintenance immunotherapy with durvalumab q.4 weeks.  --continue with cycle 9 day 1   --CT chest tentatively 1/2023  --restaging imaging 06/25/2022 showing continued treatment response possible some inflammatory affect.  Recommend follow-up with Radiation Oncology in  continuation on his maintenance regimen at this time-saw them 9/29/22  --tbili:0.6, AST:20, ALT:24, tsh:1.502    Follow-Up: 4 weeks with cbcb cmp tsh prior for next cycle-standing orders in.     Krystin King PA-C  Hematology Oncology

## 2022-11-02 ENCOUNTER — LAB VISIT (OUTPATIENT)
Dept: LAB | Facility: HOSPITAL | Age: 82
End: 2022-11-02
Attending: INTERNAL MEDICINE
Payer: MEDICARE

## 2022-11-02 ENCOUNTER — OFFICE VISIT (OUTPATIENT)
Dept: HEMATOLOGY/ONCOLOGY | Facility: CLINIC | Age: 82
End: 2022-11-02
Payer: MEDICARE

## 2022-11-02 VITALS
HEART RATE: 68 BPM | SYSTOLIC BLOOD PRESSURE: 125 MMHG | OXYGEN SATURATION: 96 % | DIASTOLIC BLOOD PRESSURE: 74 MMHG | HEIGHT: 71 IN | BODY MASS INDEX: 29.85 KG/M2 | WEIGHT: 213.19 LBS | TEMPERATURE: 97 F

## 2022-11-02 DIAGNOSIS — D64.9 ANEMIA, UNSPECIFIED TYPE: ICD-10-CM

## 2022-11-02 DIAGNOSIS — C34.90 SQUAMOUS CELL CARCINOMA OF LUNG, UNSPECIFIED LATERALITY: ICD-10-CM

## 2022-11-02 DIAGNOSIS — C34.90 SQUAMOUS CELL CARCINOMA OF LUNG, UNSPECIFIED LATERALITY: Primary | ICD-10-CM

## 2022-11-02 DIAGNOSIS — R93.89 ABNORMAL FINDINGS ON DIAGNOSTIC IMAGING OF OTHER SPECIFIED BODY STRUCTURES: ICD-10-CM

## 2022-11-02 LAB
ALBUMIN SERPL BCP-MCNC: 3.6 G/DL (ref 3.5–5.2)
ALP SERPL-CCNC: 136 U/L (ref 55–135)
ALT SERPL W/O P-5'-P-CCNC: 24 U/L (ref 10–44)
ANION GAP SERPL CALC-SCNC: 6 MMOL/L (ref 8–16)
AST SERPL-CCNC: 20 U/L (ref 10–40)
BASOPHILS # BLD AUTO: 0.04 K/UL (ref 0–0.2)
BASOPHILS NFR BLD: 0.7 % (ref 0–1.9)
BILIRUB SERPL-MCNC: 0.6 MG/DL (ref 0.1–1)
BUN SERPL-MCNC: 7 MG/DL (ref 8–23)
CALCIUM SERPL-MCNC: 9 MG/DL (ref 8.7–10.5)
CHLORIDE SERPL-SCNC: 106 MMOL/L (ref 95–110)
CO2 SERPL-SCNC: 28 MMOL/L (ref 23–29)
CREAT SERPL-MCNC: 0.8 MG/DL (ref 0.5–1.4)
DIFFERENTIAL METHOD: ABNORMAL
EOSINOPHIL # BLD AUTO: 0.1 K/UL (ref 0–0.5)
EOSINOPHIL NFR BLD: 1.2 % (ref 0–8)
ERYTHROCYTE [DISTWIDTH] IN BLOOD BY AUTOMATED COUNT: 13.4 % (ref 11.5–14.5)
EST. GFR  (NO RACE VARIABLE): >60 ML/MIN/1.73 M^2
GLUCOSE SERPL-MCNC: 96 MG/DL (ref 70–110)
HCT VFR BLD AUTO: 45.3 % (ref 40–54)
HGB BLD-MCNC: 14.5 G/DL (ref 14–18)
IMM GRANULOCYTES # BLD AUTO: 0.02 K/UL (ref 0–0.04)
IMM GRANULOCYTES NFR BLD AUTO: 0.4 % (ref 0–0.5)
LYMPHOCYTES # BLD AUTO: 1.1 K/UL (ref 1–4.8)
LYMPHOCYTES NFR BLD: 19.6 % (ref 18–48)
MCH RBC QN AUTO: 32.2 PG (ref 27–31)
MCHC RBC AUTO-ENTMCNC: 32 G/DL (ref 32–36)
MCV RBC AUTO: 100 FL (ref 82–98)
MONOCYTES # BLD AUTO: 0.6 K/UL (ref 0.3–1)
MONOCYTES NFR BLD: 9.6 % (ref 4–15)
NEUTROPHILS # BLD AUTO: 3.9 K/UL (ref 1.8–7.7)
NEUTROPHILS NFR BLD: 68.5 % (ref 38–73)
NRBC BLD-RTO: 0 /100 WBC
PLATELET # BLD AUTO: 203 K/UL (ref 150–450)
PMV BLD AUTO: 8.9 FL (ref 9.2–12.9)
POTASSIUM SERPL-SCNC: 4 MMOL/L (ref 3.5–5.1)
PROT SERPL-MCNC: 6.4 G/DL (ref 6–8.4)
RBC # BLD AUTO: 4.51 M/UL (ref 4.6–6.2)
SODIUM SERPL-SCNC: 140 MMOL/L (ref 136–145)
TSH SERPL DL<=0.005 MIU/L-ACNC: 1.5 UIU/ML (ref 0.4–4)
WBC # BLD AUTO: 5.7 K/UL (ref 3.9–12.7)

## 2022-11-02 PROCEDURE — 3078F DIAST BP <80 MM HG: CPT | Mod: CPTII,S$GLB,,

## 2022-11-02 PROCEDURE — 1101F PT FALLS ASSESS-DOCD LE1/YR: CPT | Mod: CPTII,S$GLB,,

## 2022-11-02 PROCEDURE — 3078F PR MOST RECENT DIASTOLIC BLOOD PRESSURE < 80 MM HG: ICD-10-PCS | Mod: CPTII,S$GLB,,

## 2022-11-02 PROCEDURE — 99215 OFFICE O/P EST HI 40 MIN: CPT | Mod: S$GLB,,,

## 2022-11-02 PROCEDURE — 99999 PR PBB SHADOW E&M-EST. PATIENT-LVL III: CPT | Mod: PBBFAC,,,

## 2022-11-02 PROCEDURE — 1101F PR PT FALLS ASSESS DOC 0-1 FALLS W/OUT INJ PAST YR: ICD-10-PCS | Mod: CPTII,S$GLB,,

## 2022-11-02 PROCEDURE — 84443 ASSAY THYROID STIM HORMONE: CPT | Performed by: INTERNAL MEDICINE

## 2022-11-02 PROCEDURE — 99215 PR OFFICE/OUTPT VISIT, EST, LEVL V, 40-54 MIN: ICD-10-PCS | Mod: S$GLB,,,

## 2022-11-02 PROCEDURE — 3288F FALL RISK ASSESSMENT DOCD: CPT | Mod: CPTII,S$GLB,,

## 2022-11-02 PROCEDURE — 1159F PR MEDICATION LIST DOCUMENTED IN MEDICAL RECORD: ICD-10-PCS | Mod: CPTII,S$GLB,,

## 2022-11-02 PROCEDURE — 99999 PR PBB SHADOW E&M-EST. PATIENT-LVL III: ICD-10-PCS | Mod: PBBFAC,,,

## 2022-11-02 PROCEDURE — 1126F PR PAIN SEVERITY QUANTIFIED, NO PAIN PRESENT: ICD-10-PCS | Mod: CPTII,S$GLB,,

## 2022-11-02 PROCEDURE — 1126F AMNT PAIN NOTED NONE PRSNT: CPT | Mod: CPTII,S$GLB,,

## 2022-11-02 PROCEDURE — 1159F MED LIST DOCD IN RCRD: CPT | Mod: CPTII,S$GLB,,

## 2022-11-02 PROCEDURE — 85025 COMPLETE CBC W/AUTO DIFF WBC: CPT | Performed by: INTERNAL MEDICINE

## 2022-11-02 PROCEDURE — 80053 COMPREHEN METABOLIC PANEL: CPT | Performed by: INTERNAL MEDICINE

## 2022-11-02 PROCEDURE — 3288F PR FALLS RISK ASSESSMENT DOCUMENTED: ICD-10-PCS | Mod: CPTII,S$GLB,,

## 2022-11-02 PROCEDURE — 36415 COLL VENOUS BLD VENIPUNCTURE: CPT | Performed by: INTERNAL MEDICINE

## 2022-11-02 PROCEDURE — 3074F PR MOST RECENT SYSTOLIC BLOOD PRESSURE < 130 MM HG: ICD-10-PCS | Mod: CPTII,S$GLB,,

## 2022-11-02 PROCEDURE — 3074F SYST BP LT 130 MM HG: CPT | Mod: CPTII,S$GLB,,

## 2022-11-02 RX ORDER — DIPHENHYDRAMINE HYDROCHLORIDE 50 MG/ML
50 INJECTION INTRAMUSCULAR; INTRAVENOUS ONCE AS NEEDED
Status: CANCELLED | OUTPATIENT
Start: 2022-11-02

## 2022-11-02 RX ORDER — EPINEPHRINE 0.3 MG/.3ML
0.3 INJECTION SUBCUTANEOUS ONCE AS NEEDED
Status: CANCELLED | OUTPATIENT
Start: 2022-11-02

## 2022-11-02 RX ORDER — SODIUM CHLORIDE 0.9 % (FLUSH) 0.9 %
10 SYRINGE (ML) INJECTION
Status: CANCELLED | OUTPATIENT
Start: 2022-11-02

## 2022-11-02 RX ORDER — HEPARIN 100 UNIT/ML
500 SYRINGE INTRAVENOUS
Status: CANCELLED | OUTPATIENT
Start: 2022-11-02

## 2022-11-03 ENCOUNTER — INFUSION (OUTPATIENT)
Dept: INFUSION THERAPY | Facility: HOSPITAL | Age: 82
End: 2022-11-03
Attending: INTERNAL MEDICINE
Payer: MEDICARE

## 2022-11-03 VITALS
RESPIRATION RATE: 18 BRPM | HEART RATE: 63 BPM | DIASTOLIC BLOOD PRESSURE: 69 MMHG | SYSTOLIC BLOOD PRESSURE: 122 MMHG | OXYGEN SATURATION: 98 % | TEMPERATURE: 97 F

## 2022-11-03 DIAGNOSIS — C34.90 SQUAMOUS CELL CARCINOMA OF LUNG, UNSPECIFIED LATERALITY: Primary | ICD-10-CM

## 2022-11-03 PROCEDURE — 25000003 PHARM REV CODE 250

## 2022-11-03 PROCEDURE — 96413 CHEMO IV INFUSION 1 HR: CPT

## 2022-11-03 PROCEDURE — 63600175 PHARM REV CODE 636 W HCPCS: Mod: JG

## 2022-11-03 RX ORDER — HEPARIN 100 UNIT/ML
500 SYRINGE INTRAVENOUS
Status: DISCONTINUED | OUTPATIENT
Start: 2022-11-03 | End: 2022-11-03 | Stop reason: HOSPADM

## 2022-11-03 RX ADMIN — SODIUM CHLORIDE: 9 INJECTION, SOLUTION INTRAVENOUS at 09:11

## 2022-11-03 RX ADMIN — DURVALUMAB 1500 MG: 500 INJECTION, SOLUTION INTRAVENOUS at 09:11

## 2022-11-03 RX ADMIN — HEPARIN 500 UNITS: 100 SYRINGE at 11:11

## 2022-11-03 NOTE — PLAN OF CARE
Patient reports no complaints at this time. Tolerated infusion well with no adverse reactions. Patient to return to clinic in 4 weeks for follow-up.

## 2022-11-03 NOTE — DISCHARGE INSTRUCTIONS
.[facility]  [address] [facility]  [address]  [phone] phone     [phone] fax  Hours of Operation: Monday- Friday 8:00am- 5:00pm  After hours phone  [phone]  Hematology / Oncology Physicians on call    Dr. Jae Young      Nurse Practitioners:    Laquita Gallegos, NP  Bryanna Rubio, HEDY Stacy, HEDY Patel, HEDY Montes, HEDY King, PA      Please don't hesitate to call if you have any concerns.      .WAYS TO HELP PREVENT INFECTION        WASH YOUR HANDS OFTEN DURING THE DAY, ESPECIALLY BEFORE YOU EAT, AFTER USING THE BATHROOM, AND AFTER TOUCHING ANIMALS    STAY AWAY FROM PEOPLE WHO HAVE ILLNESSES YOU CAN CATCH; SUCH AS COLDS, FLU, CHICKEN POX    TRY TO AVOID CROWDS    STAY AWAY FROM CHILDREN WHO RECENTLY HAVE RECEIVED LIVE VIRUS VACCINES    MAINTAIN GOOD MOUTH CARE    DO NOT SQUEEZE OR SCRATCH PIMPLES    CLEAN CUTS & SCRAPES RIGHT AWAY AND DAILY UNTIL HEALED WITH WARM WATER, SOAP & AN ANTISEPTIC    AVOID CONTACT WITH LITTER BOXES, BIRD CAGES, & FISH TANKS    AVOID STANDING WATER, IE., BIRD BATHS, FLOWER POTS/VASES, OR HUMIDIFIERS    WEAR GLOVES WHEN GARDENING OR CLEANING UP AFTER OTHERS, ESPECIALLY BABIES & SMALL CHILDREN    DO NOT EAT RAW FISH, SEAFOOD, MEAT, OR EGGS     .FALL PREVENTION   Falls often occur due to slipping, tripping or losing your balance. Here are ways to reduce your risk of falling again.   Was there anything that caused your fall that can be fixed, removed or replaced?   Make your home safe by keeping walkways clear of objects you may trip over.   Use non-slip pads under rugs.   Do not walk in poorly lit areas.   Do not stand on chairs or wobbly ladders.   Use caution when reaching overhead or looking upward. This position can cause a loss of balance.   Be sure your shoes fit properly, have non-slip bottoms and are in good condition.   Be cautious when  going up and down stairs, curbs, and when walking on uneven sidewalks.   If your balance is poor, consider using a cane or walker.   If your fall was related to alcohol use, stop or limit alcohol intake.   If your fall was related to use of sleeping medicines, talk to your doctor about this. You may need to reduce your dosage at bedtime if you awaken during the night to go to the bathroom.   To reduce the need for nighttime bathroom trips:   Avoid drinking fluids for several hours before going to bed   Empty your bladder before going to bed   Men can keep a urinal at the bedside   © 9205-3293 [facility], [address] [place] [address], [place] [address]. All rights reserved. This information is not intended as a substitute for professional medical care. Always follow your healthcare professional's instructions.

## 2022-11-30 ENCOUNTER — LAB VISIT (OUTPATIENT)
Dept: LAB | Facility: HOSPITAL | Age: 82
End: 2022-11-30
Attending: INTERNAL MEDICINE
Payer: MEDICARE

## 2022-11-30 ENCOUNTER — OFFICE VISIT (OUTPATIENT)
Dept: HEMATOLOGY/ONCOLOGY | Facility: CLINIC | Age: 82
End: 2022-11-30
Payer: MEDICARE

## 2022-11-30 VITALS
WEIGHT: 211.19 LBS | SYSTOLIC BLOOD PRESSURE: 107 MMHG | OXYGEN SATURATION: 98 % | HEIGHT: 71 IN | TEMPERATURE: 97 F | DIASTOLIC BLOOD PRESSURE: 59 MMHG | HEART RATE: 73 BPM | BODY MASS INDEX: 29.56 KG/M2

## 2022-11-30 DIAGNOSIS — E06.4 DRUG-INDUCED THYROIDITIS: ICD-10-CM

## 2022-11-30 DIAGNOSIS — C34.90 SQUAMOUS CELL CARCINOMA OF LUNG, UNSPECIFIED LATERALITY: ICD-10-CM

## 2022-11-30 DIAGNOSIS — C34.90 SQUAMOUS CELL CARCINOMA OF LUNG, UNSPECIFIED LATERALITY: Primary | ICD-10-CM

## 2022-11-30 DIAGNOSIS — R93.89 ABNORMAL FINDINGS ON DIAGNOSTIC IMAGING OF OTHER SPECIFIED BODY STRUCTURES: ICD-10-CM

## 2022-11-30 LAB
ALBUMIN SERPL BCP-MCNC: 3.8 G/DL (ref 3.5–5.2)
ALP SERPL-CCNC: 137 U/L (ref 55–135)
ALT SERPL W/O P-5'-P-CCNC: 30 U/L (ref 10–44)
ANION GAP SERPL CALC-SCNC: 8 MMOL/L (ref 8–16)
AST SERPL-CCNC: 22 U/L (ref 10–40)
BASOPHILS # BLD AUTO: 0.03 K/UL (ref 0–0.2)
BASOPHILS NFR BLD: 0.5 % (ref 0–1.9)
BILIRUB SERPL-MCNC: 0.7 MG/DL (ref 0.1–1)
BUN SERPL-MCNC: 7 MG/DL (ref 8–23)
CALCIUM SERPL-MCNC: 8.9 MG/DL (ref 8.7–10.5)
CHLORIDE SERPL-SCNC: 106 MMOL/L (ref 95–110)
CO2 SERPL-SCNC: 26 MMOL/L (ref 23–29)
CREAT SERPL-MCNC: 0.8 MG/DL (ref 0.5–1.4)
DIFFERENTIAL METHOD: ABNORMAL
EOSINOPHIL # BLD AUTO: 0.1 K/UL (ref 0–0.5)
EOSINOPHIL NFR BLD: 1.4 % (ref 0–8)
ERYTHROCYTE [DISTWIDTH] IN BLOOD BY AUTOMATED COUNT: 13.2 % (ref 11.5–14.5)
EST. GFR  (NO RACE VARIABLE): >60 ML/MIN/1.73 M^2
GLUCOSE SERPL-MCNC: 93 MG/DL (ref 70–110)
HCT VFR BLD AUTO: 46.1 % (ref 40–54)
HGB BLD-MCNC: 15.2 G/DL (ref 14–18)
IMM GRANULOCYTES # BLD AUTO: 0.01 K/UL (ref 0–0.04)
IMM GRANULOCYTES NFR BLD AUTO: 0.2 % (ref 0–0.5)
LYMPHOCYTES # BLD AUTO: 1.3 K/UL (ref 1–4.8)
LYMPHOCYTES NFR BLD: 19.7 % (ref 18–48)
MCH RBC QN AUTO: 33 PG (ref 27–31)
MCHC RBC AUTO-ENTMCNC: 33 G/DL (ref 32–36)
MCV RBC AUTO: 100 FL (ref 82–98)
MONOCYTES # BLD AUTO: 0.6 K/UL (ref 0.3–1)
MONOCYTES NFR BLD: 9.3 % (ref 4–15)
NEUTROPHILS # BLD AUTO: 4.6 K/UL (ref 1.8–7.7)
NEUTROPHILS NFR BLD: 68.9 % (ref 38–73)
NRBC BLD-RTO: 0 /100 WBC
PLATELET # BLD AUTO: 210 K/UL (ref 150–450)
PMV BLD AUTO: 8.9 FL (ref 9.2–12.9)
POTASSIUM SERPL-SCNC: 4.2 MMOL/L (ref 3.5–5.1)
PROT SERPL-MCNC: 6.8 G/DL (ref 6–8.4)
RBC # BLD AUTO: 4.61 M/UL (ref 4.6–6.2)
SODIUM SERPL-SCNC: 140 MMOL/L (ref 136–145)
TSH SERPL DL<=0.005 MIU/L-ACNC: 1.81 UIU/ML (ref 0.4–4)
WBC # BLD AUTO: 6.59 K/UL (ref 3.9–12.7)

## 2022-11-30 PROCEDURE — 1160F PR REVIEW ALL MEDS BY PRESCRIBER/CLIN PHARMACIST DOCUMENTED: ICD-10-PCS | Mod: CPTII,S$GLB,,

## 2022-11-30 PROCEDURE — 99999 PR PBB SHADOW E&M-EST. PATIENT-LVL III: CPT | Mod: PBBFAC,,,

## 2022-11-30 PROCEDURE — 3074F SYST BP LT 130 MM HG: CPT | Mod: CPTII,S$GLB,,

## 2022-11-30 PROCEDURE — 3074F PR MOST RECENT SYSTOLIC BLOOD PRESSURE < 130 MM HG: ICD-10-PCS | Mod: CPTII,S$GLB,,

## 2022-11-30 PROCEDURE — 84443 ASSAY THYROID STIM HORMONE: CPT | Performed by: INTERNAL MEDICINE

## 2022-11-30 PROCEDURE — 1101F PT FALLS ASSESS-DOCD LE1/YR: CPT | Mod: CPTII,S$GLB,,

## 2022-11-30 PROCEDURE — 1126F AMNT PAIN NOTED NONE PRSNT: CPT | Mod: CPTII,S$GLB,,

## 2022-11-30 PROCEDURE — 99215 PR OFFICE/OUTPT VISIT, EST, LEVL V, 40-54 MIN: ICD-10-PCS | Mod: S$GLB,,,

## 2022-11-30 PROCEDURE — 80053 COMPREHEN METABOLIC PANEL: CPT | Performed by: INTERNAL MEDICINE

## 2022-11-30 PROCEDURE — 3288F PR FALLS RISK ASSESSMENT DOCUMENTED: ICD-10-PCS | Mod: CPTII,S$GLB,,

## 2022-11-30 PROCEDURE — 1101F PR PT FALLS ASSESS DOC 0-1 FALLS W/OUT INJ PAST YR: ICD-10-PCS | Mod: CPTII,S$GLB,,

## 2022-11-30 PROCEDURE — 1159F PR MEDICATION LIST DOCUMENTED IN MEDICAL RECORD: ICD-10-PCS | Mod: CPTII,S$GLB,,

## 2022-11-30 PROCEDURE — 99999 PR PBB SHADOW E&M-EST. PATIENT-LVL III: ICD-10-PCS | Mod: PBBFAC,,,

## 2022-11-30 PROCEDURE — 1126F PR PAIN SEVERITY QUANTIFIED, NO PAIN PRESENT: ICD-10-PCS | Mod: CPTII,S$GLB,,

## 2022-11-30 PROCEDURE — 99215 OFFICE O/P EST HI 40 MIN: CPT | Mod: S$GLB,,,

## 2022-11-30 PROCEDURE — 1159F MED LIST DOCD IN RCRD: CPT | Mod: CPTII,S$GLB,,

## 2022-11-30 PROCEDURE — 3288F FALL RISK ASSESSMENT DOCD: CPT | Mod: CPTII,S$GLB,,

## 2022-11-30 PROCEDURE — 36415 COLL VENOUS BLD VENIPUNCTURE: CPT | Performed by: INTERNAL MEDICINE

## 2022-11-30 PROCEDURE — 3078F DIAST BP <80 MM HG: CPT | Mod: CPTII,S$GLB,,

## 2022-11-30 PROCEDURE — 3078F PR MOST RECENT DIASTOLIC BLOOD PRESSURE < 80 MM HG: ICD-10-PCS | Mod: CPTII,S$GLB,,

## 2022-11-30 PROCEDURE — 1160F RVW MEDS BY RX/DR IN RCRD: CPT | Mod: CPTII,S$GLB,,

## 2022-11-30 PROCEDURE — 85025 COMPLETE CBC W/AUTO DIFF WBC: CPT | Performed by: INTERNAL MEDICINE

## 2022-12-01 ENCOUNTER — INFUSION (OUTPATIENT)
Dept: INFUSION THERAPY | Facility: HOSPITAL | Age: 82
End: 2022-12-01
Attending: INTERNAL MEDICINE
Payer: MEDICARE

## 2022-12-01 VITALS
RESPIRATION RATE: 16 BRPM | DIASTOLIC BLOOD PRESSURE: 68 MMHG | OXYGEN SATURATION: 98 % | SYSTOLIC BLOOD PRESSURE: 127 MMHG | HEART RATE: 57 BPM | TEMPERATURE: 98 F

## 2022-12-01 DIAGNOSIS — C34.90 SQUAMOUS CELL CARCINOMA OF LUNG, UNSPECIFIED LATERALITY: Primary | ICD-10-CM

## 2022-12-01 PROCEDURE — 63600175 PHARM REV CODE 636 W HCPCS: Performed by: INTERNAL MEDICINE

## 2022-12-01 PROCEDURE — 96413 CHEMO IV INFUSION 1 HR: CPT

## 2022-12-01 PROCEDURE — 25000003 PHARM REV CODE 250: Performed by: INTERNAL MEDICINE

## 2022-12-01 RX ORDER — HEPARIN 100 UNIT/ML
500 SYRINGE INTRAVENOUS
Status: CANCELLED | OUTPATIENT
Start: 2022-12-01

## 2022-12-01 RX ORDER — DIPHENHYDRAMINE HYDROCHLORIDE 50 MG/ML
50 INJECTION INTRAMUSCULAR; INTRAVENOUS ONCE AS NEEDED
Status: DISCONTINUED | OUTPATIENT
Start: 2022-12-01 | End: 2022-12-01 | Stop reason: HOSPADM

## 2022-12-01 RX ORDER — EPINEPHRINE 1 MG/ML
0.3 INJECTION, SOLUTION, CONCENTRATE INTRAVENOUS ONCE AS NEEDED
Status: DISCONTINUED | OUTPATIENT
Start: 2022-12-01 | End: 2022-12-01 | Stop reason: HOSPADM

## 2022-12-01 RX ORDER — EPINEPHRINE 0.3 MG/.3ML
0.3 INJECTION SUBCUTANEOUS ONCE AS NEEDED
Status: CANCELLED | OUTPATIENT
Start: 2022-12-01

## 2022-12-01 RX ORDER — HEPARIN 100 UNIT/ML
500 SYRINGE INTRAVENOUS
Status: DISCONTINUED | OUTPATIENT
Start: 2022-12-01 | End: 2022-12-01 | Stop reason: HOSPADM

## 2022-12-01 RX ORDER — SODIUM CHLORIDE 0.9 % (FLUSH) 0.9 %
10 SYRINGE (ML) INJECTION
Status: CANCELLED | OUTPATIENT
Start: 2022-12-01

## 2022-12-01 RX ORDER — DIPHENHYDRAMINE HYDROCHLORIDE 50 MG/ML
50 INJECTION INTRAMUSCULAR; INTRAVENOUS ONCE AS NEEDED
Status: CANCELLED | OUTPATIENT
Start: 2022-12-01

## 2022-12-01 RX ADMIN — DURVALUMAB 1500 MG: 500 INJECTION, SOLUTION INTRAVENOUS at 10:12

## 2022-12-01 RX ADMIN — HEPARIN 500 UNITS: 100 SYRINGE at 11:12

## 2022-12-01 NOTE — PLAN OF CARE
Problem: Adult Inpatient Plan of Care  Goal: Plan of Care Review  Outcome: Ongoing, Progressing  Flowsheets (Taken 12/1/2022 0949)  Plan of Care Reviewed With: patient  Goal: Patient-Specific Goal (Individualized)  Outcome: Ongoing, Progressing  Flowsheets (Taken 12/1/2022 0949)  Anxieties, Fears or Concerns: None today  Individualized Care Needs: Legs elevated, snack, drink offered, reclined  Patient-Specific Goals (Include Timeframe): Tolerate infusoin today  Goal: Optimal Comfort and Wellbeing  Outcome: Ongoing, Progressing  Intervention: Provide Person-Centered Care  Flowsheets (Taken 12/1/2022 0949)  Trust Relationship/Rapport:   care explained   choices provided   emotional support provided   empathic listening provided   questions answered   questions encouraged   reassurance provided   thoughts/feelings acknowledged     Problem: Neutropenia (Chemotherapy Effects)  Goal: Absence of Infection  Outcome: Ongoing, Progressing  Intervention: Prevent Infection and Maximize Resistance  Flowsheets (Taken 12/1/2022 0949)  Infection Prevention:   rest/sleep promoted   personal protective equipment utilized   hand hygiene promoted   equipment surfaces disinfected

## 2022-12-01 NOTE — ASSESSMENT & PLAN NOTE
Cancer Staging   Squamous cell carcinoma of lung  Staging form: Lung, AJCC 8th Edition  - Clinical stage from 12/22/2021: Stage IIB (cT3, cN0, cM0) - Signed by Juan Lopez MD on 12/22/2021    CT CAP 03/14/22  -Findings consistent with favorable treatment response with decrease in size of the superior segment right lower lobe pulmonary mass.  Other stable findings as above.    Last PET 09/26/22  -Continued positive response to therapy with no measurable lesion seen on today's study.  Patchy right perihilar and infrahilar opacities with low level uptake are favored to relate to post treatment change.      Labs reviewed, no concerning  -Case discussed with oncologist  -Ok to proceed with C10D1 of Imfinzi tomorrow(decoupled)    Follow-up in 4 weeks with cbc, cmp, tsh, and CT CHEST prior with MD for possible c11d1 imfinzi

## 2022-12-01 NOTE — PROGRESS NOTES
Subjective:     Patient ID:?Alton Black is a 81 y.o. male.?? MR#: [id number]   ?   PRIMARY ONCOLOGIST: Dr. Baez  ?   CHIEF COMPLAINT: lung ca/lab review/assessment for chemo?????   ?   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   CURRENT TREATMENT: OP DURVALUMAB 1500 MG Q4W       HPI Mr. 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 Vences with complaints of SOB. CXR done on 11/29/202 revealed a right hilar mass. FNA biopsy of station 10R LN done on 12/8/2021 was positive for malignant cells with histopathologic features of squamous cell carcinoma. PET/CT performed on 12/14/2021 showed a highly FDG avid perihilar mass in the superior segment of the right lower lobe which is highly concerning for malignancy and no evidence of metastasis.      He was treated with Carbo/Taxol + XRT in 1/2022 which he completed in 2/2022. He is currently on consolidation maintenance immunotherapy with Imfinzi every 4 weeks for 13 cycles.        Interval History: Pt states he continues to feel well. No issues with appetite or maintaining hydration. Denies fatigue, n/v/d/c, cp, cough, sob, fever, chills. Pt states this tx may his last as he is unsure insurance will continue to cover d/t excellent tx response--advised pt we plan for Imfinzi and repeating imaging and defer tx plan going fwd to oncologist.    Oncology History   Squamous cell carcinoma of lung   1/13/2021 - 2/24/2022 Radiation Therapy    Treating physician: Karol  Total Dose: 60 Gy  Fractions: 30     12/17/2021 Initial Diagnosis    Squamous cell lung cancer     12/22/2021 Cancer Staged    Staging form: Lung, AJCC 8th Edition  - Clinical stage from 12/22/2021: Stage IIB (cT3, cN0, cM0)       1/14/2022 - 2/21/2022 Chemotherapy    Treatment Summary   Plan Name: OP NSCLC PACLITAXEL + CARBOPLATIN (AUC) QW +  RADIATION  Treatment Goal: Curative  Status: Inactive  Start Date: 1/14/2022  End Date: 2/21/2022  Provider: Juan Lopez MD  Chemotherapy: dexAMETHasone (DECADRON) 4 MG Tab, 8 mg, Oral, Daily, 1 of 1 cycle, Start date: --, End date: --  CARBOplatin (PARAPLATIN) 230 mg in sodium chloride 0.9% 273 mL chemo infusion, 230 mg (100 % of original dose 230 mg), Intravenous, Clinic/HOD 1 time, 6 of 6 cycles  Dose modification:   (original dose 230 mg, Cycle 1)  Administration: 230 mg (1/14/2022), 230 mg (1/21/2022), 255 mg (1/28/2022), 255 mg (2/4/2022), 255 mg (2/14/2022), 255 mg (2/21/2022)  PACLitaxeL (TAXOL) 45 mg/m2 = 96 mg in sodium chloride 0.9% 266 mL chemo infusion, 45 mg/m2 = 96 mg, Intravenous, Clinic/HOD 1 time, 6 of 6 cycles  Administration: 96 mg (1/14/2022), 96 mg (1/21/2022), 96 mg (1/28/2022), 96 mg (2/4/2022), 96 mg (2/14/2022), 96 mg (2/21/2022)       3/21/2022 -  Chemotherapy    Treatment Summary   Plan Name: OP DURVALUMAB 1500 MG Q4W  Treatment Goal: Curative  Status: Active  Start Date: 3/21/2022  End Date: 2/23/2023 (Planned)  Provider: Winnie Baez MD  Chemotherapy: [No matching medication found in this treatment plan]          Social History     Socioeconomic History    Marital status: Single   Tobacco Use    Smoking status: Never    Smokeless tobacco: Never    Tobacco comments:     Chews on cigars   Substance and Sexual Activity    Alcohol use: Not Currently    Drug use: Never    Sexual activity: Not Currently      Family History   Problem Relation Age of Onset    Heart attack Father       Past Surgical History:   Procedure Laterality Date    BRONCHOSCOPY Bilateral 12/8/2021    Procedure: Bronchoscopy - insert lighted tube into airway to take a biopsy of lung;  Surgeon: Rigo Vences MD;  Location: [facility];  Service: Endoscopy;  Laterality: Bilateral;    CHOLECYSTECTOMY      CORONARY ANGIOPLASTY WITH STENT PLACEMENT      ENDOBRONCHIAL ULTRASOUND Bilateral 12/8/2021     Procedure: ENDOBRONCHIAL ULTRASOUND (EBUS);  Surgeon: Rigo Vences MD;  Location: [facility] ENDO;  Service: Endoscopy;  Laterality: Bilateral;    FLUOROSCOPY N/A 1/4/2022    Procedure: Mediport Insertion;  Surgeon: Elaina Larios MD;  Location: [facility] CATH LAB;  Service: General;  Laterality: N/A;  yuridia from 1/3 to 1/4        Review of Systems   Constitutional:  Negative for activity change, appetite change, chills, fatigue, fever and unexpected weight change.   HENT:  Negative for congestion, dental problem, mouth sores and nosebleeds.    Eyes:  Negative for visual disturbance.   Respiratory:  Negative for cough, choking and chest tightness.    Cardiovascular:  Negative for chest pain, palpitations and leg swelling.   Gastrointestinal:  Negative for abdominal distention, abdominal pain, anal bleeding, blood in stool, constipation, diarrhea, nausea and vomiting.   Endocrine: Negative.    Genitourinary:  Negative for dysuria, frequency, hematuria and urgency.   Musculoskeletal:  Negative for arthralgias, back pain, gait problem, joint swelling and myalgias.   Skin:  Negative for wound.   Allergic/Immunologic: Negative for immunocompromised state.   Neurological:  Negative for dizziness, light-headedness, numbness and headaches.   Hematological:  Negative for adenopathy. Does not bruise/bleed easily.   Psychiatric/Behavioral:  The patient is not nervous/anxious.      ?   A comprehensive 14-point review of systems was reviewed with patient and was negative other than as specified above.   ?     Objective:      Physical Exam  Vitals and nursing note reviewed.   Constitutional:       Appearance: Normal appearance. He is not ill-appearing.   HENT:      Head: Normocephalic and atraumatic.      Right Ear: External ear normal.      Left Ear: External ear normal.      Nose: Nose normal.      Mouth/Throat:      Mouth: Mucous membranes are moist.      Pharynx: Oropharynx is clear.   Eyes:      Conjunctiva/sclera:  Conjunctivae normal.   Cardiovascular:      Rate and Rhythm: Normal rate and regular rhythm.      Pulses: Normal pulses.      Heart sounds: Normal heart sounds.   Pulmonary:      Effort: Pulmonary effort is normal.      Breath sounds: Normal breath sounds.   Abdominal:      General: Abdomen is flat.      Palpations: Abdomen is soft.   Musculoskeletal:         General: Normal range of motion.      Cervical back: Normal range of motion.      Right lower leg: No edema.      Left lower leg: No edema.   Skin:     General: Skin is warm and dry.      Capillary Refill: Capillary refill takes less than 2 seconds.   Neurological:      Mental Status: He is alert and oriented to person, place, and time.      Motor: No weakness.      Gait: Gait normal.   Psychiatric:         Mood and Affect: Mood normal.         Behavior: Behavior normal.         Thought Content: Thought content normal.         Judgment: Judgment normal.         ?   Vitals:    11/30/22 1018   BP: (!) 107/59   Pulse: 73   Temp: 97.4 °F (36.3 °C)      ?       ?   Laboratory:  ?   Lab Visit on 11/30/2022   Component Date Value Ref Range Status    WBC 11/30/2022 6.59  3.90 - 12.70 K/uL Final    RBC 11/30/2022 4.61  4.60 - 6.20 M/uL Final    Hemoglobin 11/30/2022 15.2  14.0 - 18.0 g/dL Final    Hematocrit 11/30/2022 46.1  40.0 - 54.0 % Final    MCV 11/30/2022 100 (H)  82 - 98 fL Final    MCH 11/30/2022 33.0 (H)  27.0 - 31.0 pg Final    MCHC 11/30/2022 33.0  32.0 - 36.0 g/dL Final    RDW 11/30/2022 13.2  11.5 - 14.5 % Final    Platelets 11/30/2022 210  150 - 450 K/uL Final    MPV 11/30/2022 8.9 (L)  9.2 - 12.9 fL Final    Immature Granulocytes 11/30/2022 0.2  0.0 - 0.5 % Final    Gran # (ANC) 11/30/2022 4.6  1.8 - 7.7 K/uL Final    Immature Grans (Abs) 11/30/2022 0.01  0.00 - 0.04 K/uL Final    Lymph # 11/30/2022 1.3  1.0 - 4.8 K/uL Final    Mono # 11/30/2022 0.6  0.3 - 1.0 K/uL Final    Eos # 11/30/2022 0.1  0.0 - 0.5 K/uL Final    Baso # 11/30/2022  0.03  0.00 - 0.20 K/uL Final    nRBC 11/30/2022 0  0 /100 WBC Final    Gran % 11/30/2022 68.9  38.0 - 73.0 % Final    Lymph % 11/30/2022 19.7  18.0 - 48.0 % Final    Mono % 11/30/2022 9.3  4.0 - 15.0 % Final    Eosinophil % 11/30/2022 1.4  0.0 - 8.0 % Final    Basophil % 11/30/2022 0.5  0.0 - 1.9 % Final    Differential Method 11/30/2022 Automated   Final    Sodium 11/30/2022 140  136 - 145 mmol/L Final    Potassium 11/30/2022 4.2  3.5 - 5.1 mmol/L Final    Chloride 11/30/2022 106  95 - 110 mmol/L Final    CO2 11/30/2022 26  23 - 29 mmol/L Final    Glucose 11/30/2022 93  70 - 110 mg/dL Final    BUN 11/30/2022 7 (L)  8 - 23 mg/dL Final    Creatinine 11/30/2022 0.8  0.5 - 1.4 mg/dL Final    Calcium 11/30/2022 8.9  8.7 - 10.5 mg/dL Final    Total Protein 11/30/2022 6.8  6.0 - 8.4 g/dL Final    Albumin 11/30/2022 3.8  3.5 - 5.2 g/dL Final    Total Bilirubin 11/30/2022 0.7  0.1 - 1.0 mg/dL Final    Alkaline Phosphatase 11/30/2022 137 (H)  55 - 135 U/L Final    AST 11/30/2022 22  10 - 40 U/L Final    ALT 11/30/2022 30  10 - 44 U/L Final    Anion Gap 11/30/2022 8  8 - 16 mmol/L Final    eGFR 11/30/2022 >60  >60 mL/min/1.73 m^2 Final    TSH 11/30/2022 1.812  0.400 - 4.000 uIU/mL Final      ?   Imaging:    Results for orders placed or performed during the hospital encounter of 09/26/22 (from the past 2160 hour(s))   NM PET CT Routine FDG    Impression    Continued positive response to therapy with no measurable lesion seen on today's study.  Patchy right perihilar and infrahilar opacities with low level uptake are favored to relate to post treatment change.      Electronically signed by: Juan J Wilkins MD  Date:    09/26/2022  Time:    11:22        No results found for this or any previous visit (from the past 2160 hour(s)).     ?   Assessment/Plan:     Problem List Items Addressed This Visit          Oncology    Squamous cell carcinoma of lung - Primary      Cancer Staging   Squamous cell carcinoma of  lung  Staging form: Lung, AJCC 8th Edition  - Clinical stage from 12/22/2021: Stage IIB (cT3, cN0, cM0) - Signed by Juan Lopez MD on 12/22/2021    CT CAP 03/14/22  -Findings consistent with favorable treatment response with decrease in size of the superior segment right lower lobe pulmonary mass.  Other stable findings as above.    Last PET 09/26/22  -Continued positive response to therapy with no measurable lesion seen on today's study.  Patchy right perihilar and infrahilar opacities with low level uptake are favored to relate to post treatment change.      Labs reviewed, no concerning  -Case discussed with oncologist  -Ok to proceed with C10D1 of Imfinzi tomorrow(decoupled)    Follow-up in 4 weeks with cbc, cmp, tsh, and CT CHEST prior with MD for possible c11d1 imfinzi         Relevant Orders    CBC Auto Differential    Comprehensive Metabolic Panel    TSH     Other Visit Diagnoses       Drug-induced thyroiditis        Relevant Orders    TSH             Med Onc Chart Routing      Follow up with physician 4 weeks.   Follow up with REBECA    Infusion scheduling note C11D1 Imfinzi--Pt prefers 10AM appt times whenever possible   Injection scheduling note    Labs CMP, CBC and TSH   Lab interval:     Imaging Other   CT CHEST prior   Pharmacy appointment    Other referrals               LEXIS Bhagat  Hematology/Oncology

## 2022-12-20 ENCOUNTER — HOSPITAL ENCOUNTER (OUTPATIENT)
Dept: RADIOLOGY | Facility: HOSPITAL | Age: 82
Discharge: HOME | End: 2022-12-20
Attending: INTERNAL MEDICINE
Payer: MEDICARE

## 2022-12-20 DIAGNOSIS — C34.90 MALIGNANT NEOPLASM OF UNSPECIFIED PART OF UNSPECIFIED BRONCHUS OR LUNG: ICD-10-CM

## 2022-12-20 PROCEDURE — 71260 CT THORAX DX C+: CPT | Mod: 26,,, | Performed by: STUDENT IN AN ORGANIZED HEALTH CARE EDUCATION/TRAINING PROGRAM

## 2022-12-20 PROCEDURE — 71260 CT THORAX DX C+: CPT | Mod: TC

## 2022-12-20 PROCEDURE — 71260 CT CHEST WITH CONTRAST: ICD-10-PCS | Mod: 26,,, | Performed by: STUDENT IN AN ORGANIZED HEALTH CARE EDUCATION/TRAINING PROGRAM

## 2022-12-20 PROCEDURE — 25500020 PHARM REV CODE 255: Performed by: INTERNAL MEDICINE

## 2022-12-20 RX ADMIN — IOHEXOL 100 ML: 350 INJECTION, SOLUTION INTRAVENOUS at 10:12

## 2022-12-28 ENCOUNTER — LAB VISIT (OUTPATIENT)
Dept: LAB | Facility: HOSPITAL | Age: 82
End: 2022-12-28
Payer: MEDICARE

## 2022-12-28 ENCOUNTER — OFFICE VISIT (OUTPATIENT)
Dept: HEMATOLOGY/ONCOLOGY | Facility: CLINIC | Age: 82
End: 2022-12-28
Payer: MEDICARE

## 2022-12-28 VITALS
BODY MASS INDEX: 29.66 KG/M2 | SYSTOLIC BLOOD PRESSURE: 114 MMHG | WEIGHT: 211.88 LBS | HEART RATE: 81 BPM | DIASTOLIC BLOOD PRESSURE: 61 MMHG | TEMPERATURE: 97 F | HEIGHT: 71 IN | OXYGEN SATURATION: 97 %
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
## 2022-12-28 DIAGNOSIS — C77.1 SECONDARY AND UNSPECIFIED MALIGNANT NEOPLASM OF INTRATHORACIC LYMPH NODES: ICD-10-CM

## 2022-12-28 DIAGNOSIS — E78.1 PURE HYPERTRIGLYCERIDEMIA: ICD-10-CM

## 2022-12-28 DIAGNOSIS — I44.7 LBBB (LEFT BUNDLE BRANCH BLOCK): ICD-10-CM

## 2022-12-28 DIAGNOSIS — T45.1X5A IMMUNODEFICIENCY DUE TO CHEMOTHERAPY: ICD-10-CM

## 2022-12-28 DIAGNOSIS — C34.90 SQUAMOUS CELL CARCINOMA OF LUNG, UNSPECIFIED LATERALITY: ICD-10-CM

## 2022-12-28 DIAGNOSIS — Z79.899 IMMUNODEFICIENCY DUE TO CHEMOTHERAPY: ICD-10-CM

## 2022-12-28 DIAGNOSIS — D84.821 IMMUNODEFICIENCY DUE TO CHEMOTHERAPY: ICD-10-CM

## 2022-12-28 DIAGNOSIS — E78.00 PURE HYPERCHOLESTEROLEMIA: ICD-10-CM

## 2022-12-28 DIAGNOSIS — C34.90 SQUAMOUS CELL CARCINOMA OF LUNG, UNSPECIFIED LATERALITY: Primary | ICD-10-CM

## 2022-12-28 DIAGNOSIS — R94.6 ABNORMAL RESULTS OF THYROID FUNCTION STUDIES: ICD-10-CM

## 2022-12-28 DIAGNOSIS — E06.4 DRUG-INDUCED THYROIDITIS: ICD-10-CM

## 2022-12-28 DIAGNOSIS — D84.822 IMMUNODEFICIENCY SECONDARY TO RADIATION THERAPY: ICD-10-CM

## 2022-12-28 PROBLEM — Z79.69 IMMUNODEFICIENCY DUE TO CHEMOTHERAPY: Status: ACTIVE | Noted: 2022-12-28

## 2022-12-28 LAB
ALBUMIN SERPL BCP-MCNC: 3.8 G/DL (ref 3.5–5.2)
ALP SERPL-CCNC: 133 U/L (ref 55–135)
ALT SERPL W/O P-5'-P-CCNC: 28 U/L (ref 10–44)
ANION GAP SERPL CALC-SCNC: 8 MMOL/L (ref 8–16)
AST SERPL-CCNC: 24 U/L (ref 10–40)
BASOPHILS # BLD AUTO: 0.03 K/UL (ref 0–0.2)
BASOPHILS NFR BLD: 0.5 % (ref 0–1.9)
BILIRUB SERPL-MCNC: 0.8 MG/DL (ref 0.1–1)
BUN SERPL-MCNC: 5 MG/DL (ref 8–23)
CALCIUM SERPL-MCNC: 9.3 MG/DL (ref 8.7–10.5)
CHLORIDE SERPL-SCNC: 106 MMOL/L (ref 95–110)
CO2 SERPL-SCNC: 28 MMOL/L (ref 23–29)
CREAT SERPL-MCNC: 0.8 MG/DL (ref 0.5–1.4)
DIFFERENTIAL METHOD: ABNORMAL
EOSINOPHIL # BLD AUTO: 0.1 K/UL (ref 0–0.5)
EOSINOPHIL NFR BLD: 1.7 % (ref 0–8)
ERYTHROCYTE [DISTWIDTH] IN BLOOD BY AUTOMATED COUNT: 13.4 % (ref 11.5–14.5)
EST. GFR  (NO RACE VARIABLE): >60 ML/MIN/1.73 M^2
GLUCOSE SERPL-MCNC: 96 MG/DL (ref 70–110)
HCT VFR BLD AUTO: 47.4 % (ref 40–54)
HGB BLD-MCNC: 15.6 G/DL (ref 14–18)
IMM GRANULOCYTES # BLD AUTO: 0.02 K/UL (ref 0–0.04)
IMM GRANULOCYTES NFR BLD AUTO: 0.3 % (ref 0–0.5)
LYMPHOCYTES # BLD AUTO: 1.5 K/UL (ref 1–4.8)
LYMPHOCYTES NFR BLD: 22.2 % (ref 18–48)
MCH RBC QN AUTO: 33 PG (ref 27–31)
MCHC RBC AUTO-ENTMCNC: 32.9 G/DL (ref 32–36)
MCV RBC AUTO: 100 FL (ref 82–98)
MONOCYTES # BLD AUTO: 0.8 K/UL (ref 0.3–1)
MONOCYTES NFR BLD: 11.3 % (ref 4–15)
NEUTROPHILS # BLD AUTO: 4.2 K/UL (ref 1.8–7.7)
NEUTROPHILS NFR BLD: 64 % (ref 38–73)
NRBC BLD-RTO: 0 /100 WBC
PLATELET # BLD AUTO: 185 K/UL (ref 150–450)
PMV BLD AUTO: 8.7 FL (ref 9.2–12.9)
POTASSIUM SERPL-SCNC: 4.6 MMOL/L (ref 3.5–5.1)
PROT SERPL-MCNC: 6.8 G/DL (ref 6–8.4)
RBC # BLD AUTO: 4.73 M/UL (ref 4.6–6.2)
SODIUM SERPL-SCNC: 142 MMOL/L (ref 136–145)
TSH SERPL DL<=0.005 MIU/L-ACNC: 2.41 UIU/ML (ref 0.4–4)
WBC # BLD AUTO: 6.62 K/UL (ref 3.9–12.7)

## 2022-12-28 PROCEDURE — 1160F PR REVIEW ALL MEDS BY PRESCRIBER/CLIN PHARMACIST DOCUMENTED: ICD-10-PCS | Mod: CPTII,S$GLB,, | Performed by: INTERNAL MEDICINE

## 2022-12-28 PROCEDURE — 84443 ASSAY THYROID STIM HORMONE: CPT

## 2022-12-28 PROCEDURE — 99215 PR OFFICE/OUTPT VISIT, EST, LEVL V, 40-54 MIN: ICD-10-PCS | Mod: S$GLB,,, | Performed by: INTERNAL MEDICINE

## 2022-12-28 PROCEDURE — 1126F AMNT PAIN NOTED NONE PRSNT: CPT | Mod: CPTII,S$GLB,, | Performed by: INTERNAL MEDICINE

## 2022-12-28 PROCEDURE — 3078F DIAST BP <80 MM HG: CPT | Mod: CPTII,S$GLB,, | Performed by: INTERNAL MEDICINE

## 2022-12-28 PROCEDURE — 99215 OFFICE O/P EST HI 40 MIN: CPT | Mod: S$GLB,,, | Performed by: INTERNAL MEDICINE

## 2022-12-28 PROCEDURE — 3074F PR MOST RECENT SYSTOLIC BLOOD PRESSURE < 130 MM HG: ICD-10-PCS | Mod: CPTII,S$GLB,, | Performed by: INTERNAL MEDICINE

## 2022-12-28 PROCEDURE — 1126F PR PAIN SEVERITY QUANTIFIED, NO PAIN PRESENT: ICD-10-PCS | Mod: CPTII,S$GLB,, | Performed by: INTERNAL MEDICINE

## 2022-12-28 PROCEDURE — 36415 COLL VENOUS BLD VENIPUNCTURE: CPT

## 2022-12-28 PROCEDURE — 85025 COMPLETE CBC W/AUTO DIFF WBC: CPT

## 2022-12-28 PROCEDURE — 3288F PR FALLS RISK ASSESSMENT DOCUMENTED: ICD-10-PCS | Mod: CPTII,S$GLB,, | Performed by: INTERNAL MEDICINE

## 2022-12-28 PROCEDURE — 3288F FALL RISK ASSESSMENT DOCD: CPT | Mod: CPTII,S$GLB,, | Performed by: INTERNAL MEDICINE

## 2022-12-28 PROCEDURE — 1101F PT FALLS ASSESS-DOCD LE1/YR: CPT | Mod: CPTII,S$GLB,, | Performed by: INTERNAL MEDICINE

## 2022-12-28 PROCEDURE — 3078F PR MOST RECENT DIASTOLIC BLOOD PRESSURE < 80 MM HG: ICD-10-PCS | Mod: CPTII,S$GLB,, | Performed by: INTERNAL MEDICINE

## 2022-12-28 PROCEDURE — 99999 PR PBB SHADOW E&M-EST. PATIENT-LVL III: ICD-10-PCS | Mod: PBBFAC,,, | Performed by: INTERNAL MEDICINE

## 2022-12-28 PROCEDURE — 1101F PR PT FALLS ASSESS DOC 0-1 FALLS W/OUT INJ PAST YR: ICD-10-PCS | Mod: CPTII,S$GLB,, | Performed by: INTERNAL MEDICINE

## 2022-12-28 PROCEDURE — 3074F SYST BP LT 130 MM HG: CPT | Mod: CPTII,S$GLB,, | Performed by: INTERNAL MEDICINE

## 2022-12-28 PROCEDURE — 80053 COMPREHEN METABOLIC PANEL: CPT

## 2022-12-28 PROCEDURE — 99999 PR PBB SHADOW E&M-EST. PATIENT-LVL III: CPT | Mod: PBBFAC,,, | Performed by: INTERNAL MEDICINE

## 2022-12-28 PROCEDURE — 1160F RVW MEDS BY RX/DR IN RCRD: CPT | Mod: CPTII,S$GLB,, | Performed by: INTERNAL MEDICINE

## 2022-12-28 PROCEDURE — 1159F PR MEDICATION LIST DOCUMENTED IN MEDICAL RECORD: ICD-10-PCS | Mod: CPTII,S$GLB,, | Performed by: INTERNAL MEDICINE

## 2022-12-28 PROCEDURE — 1159F MED LIST DOCD IN RCRD: CPT | Mod: CPTII,S$GLB,, | Performed by: INTERNAL MEDICINE

## 2022-12-28 RX ORDER — HEPARIN 100 UNIT/ML
500 SYRINGE INTRAVENOUS
Status: CANCELLED | OUTPATIENT
Start: 2022-12-29

## 2022-12-28 RX ORDER — SODIUM CHLORIDE 0.9 % (FLUSH) 0.9 %
10 SYRINGE (ML) INJECTION
Status: CANCELLED | OUTPATIENT
Start: 2022-12-29

## 2022-12-28 RX ORDER — DIPHENHYDRAMINE HYDROCHLORIDE 50 MG/ML
50 INJECTION INTRAMUSCULAR; INTRAVENOUS ONCE AS NEEDED
Status: CANCELLED | OUTPATIENT
Start: 2022-12-29

## 2022-12-28 RX ORDER — EPINEPHRINE 0.3 MG/.3ML
0.3 INJECTION SUBCUTANEOUS ONCE AS NEEDED
Status: CANCELLED | OUTPATIENT
Start: 2022-12-29

## 2022-12-28 NOTE — PROGRESS NOTES
To the office Subjective:       Patient ID: Alton Black is a 82 y.o. male.    Chief Complaint: Results, Chemotherapy, and Lung Cancer    HPI:  82-year-old male presents for cycle 11/12 of maintenance immunotherapy stage II non-small cell lung carcinoma tolerating therapy well ECOG status 1 patient denies nausea vomiting fevers chills night sweats a number of questions concerning prognostic factors in his malignancy    Past Medical History:   Diagnosis Date    Benign essential HTN     CAD (coronary artery disease)     HLD (hyperlipidemia)      Family History   Problem Relation Age of Onset    Heart attack Father      Social History     Socioeconomic History    Marital status: Single   Tobacco Use    Smoking status: Never    Smokeless tobacco: Never    Tobacco comments:     Chews on cigars   Substance and Sexual Activity    Alcohol use: Not Currently    Drug use: Never    Sexual activity: Not Currently     Past Surgical History:   Procedure Laterality Date    BRONCHOSCOPY Bilateral 12/8/2021    Procedure: Bronchoscopy - insert lighted tube into airway to take a biopsy of lung;  Surgeon: Rigo Vences MD;  Location: [facility] ENDO;  Service: Endoscopy;  Laterality: Bilateral;    CHOLECYSTECTOMY      CORONARY ANGIOPLASTY WITH STENT PLACEMENT      ENDOBRONCHIAL ULTRASOUND Bilateral 12/8/2021    Procedure: ENDOBRONCHIAL ULTRASOUND (EBUS);  Surgeon: Rigo Vences MD;  Location: [facility] ENDO;  Service: Endoscopy;  Laterality: Bilateral;    FLUOROSCOPY N/A 1/4/2022    Procedure: Mediport Insertion;  Surgeon: Elaina Larios MD;  Location: [facility] CATH LAB;  Service: General;  Laterality: N/A;  yuridia from 1/3 to 1/4       Labs:  Lab Results   Component Value Date    WBC 6.62 12/28/2022    HGB 15.6 12/28/2022    HCT 47.4 12/28/2022     (H) 12/28/2022     12/28/2022     BMP  Lab Results   Component Value Date     11/30/2022    K 4.2 11/30/2022     11/30/2022    CO2  26 11/30/2022    BUN 7 (L) 11/30/2022    CREATININE 0.8 11/30/2022    CALCIUM 8.9 11/30/2022    ANIONGAP 8 11/30/2022    ESTGFRAFRICA >60 07/11/2022    EGFRNONAA >60 07/11/2022     Lab Results   Component Value Date    ALT 30 11/30/2022    AST 22 11/30/2022    ALKPHOS 137 (H) 11/30/2022    BILITOT 0.7 11/30/2022       No results found for: IRON, TIBC, FERRITIN, SATURATEDIRO  No results found for: [id number]  No results found for: FOLATE  Lab Results   Component Value Date    TSH 1.812 11/30/2022         Review of Systems   Constitutional:  Negative for activity change, appetite change, chills, diaphoresis, fatigue, fever and unexpected weight change.   HENT:  Negative for congestion, dental problem, drooling, ear discharge, ear pain, facial swelling, hearing loss, mouth sores, nosebleeds, postnasal drip, rhinorrhea, sinus pressure, sneezing, sore throat, tinnitus, trouble swallowing and voice change.    Eyes:  Negative for photophobia, pain, discharge, redness, itching and visual disturbance.   Respiratory:  Negative for apnea, cough, choking, chest tightness, shortness of breath, wheezing and stridor.    Cardiovascular:  Negative for chest pain, palpitations and leg swelling.   Gastrointestinal:  Negative for abdominal distention, abdominal pain, anal bleeding, blood in stool, constipation, diarrhea, nausea, rectal pain and vomiting.   Endocrine: Negative for cold intolerance, heat intolerance, polydipsia, polyphagia and polyuria.   Genitourinary:  Negative for decreased urine volume, difficulty urinating, dysuria, enuresis, flank pain, frequency, genital sores, hematuria, penile discharge, penile pain, penile swelling, scrotal swelling, testicular pain and urgency.   Musculoskeletal:  Negative for arthralgias, back pain, gait problem, joint swelling, myalgias, neck pain and neck stiffness.   Skin:  Negative for color change, pallor, rash and wound.   Allergic/Immunologic: Negative for environmental allergies, food  allergies and immunocompromised state.   Neurological:  Negative for dizziness, tremors, seizures, syncope, facial asymmetry, speech difficulty, weakness, light-headedness, numbness and headaches.   Hematological:  Negative for adenopathy. Does not bruise/bleed easily.   Psychiatric/Behavioral:  Positive for dysphoric mood. Negative for agitation, behavioral problems, confusion, decreased concentration, hallucinations, self-injury, sleep disturbance and suicidal ideas. The patient is nervous/anxious. The patient is not hyperactive.      Objective:      Physical Exam  Vitals reviewed.   Constitutional:       General: He is not in acute distress.     Appearance: He is well-developed. He is not diaphoretic.   HENT:      Head: Normocephalic.      Right Ear: External ear normal.      Left Ear: External ear normal.      Nose: Nose normal.      Right Sinus: No maxillary sinus tenderness or frontal sinus tenderness.      Left Sinus: No maxillary sinus tenderness or frontal sinus tenderness.      Mouth/Throat:      Pharynx: No oropharyngeal exudate.   Eyes:      General: Lids are normal. No scleral icterus.        Right eye: No discharge.         Left eye: No discharge.      Extraocular Movements:      Right eye: Normal extraocular motion.      Left eye: Normal extraocular motion.      Conjunctiva/sclera:      Right eye: Right conjunctiva is not injected. No hemorrhage.     Left eye: Left conjunctiva is not injected. No hemorrhage.     Pupils: Pupils are equal, round, and reactive to light.   Neck:      Thyroid: No thyromegaly.      Vascular: No JVD.      Trachea: No tracheal deviation.   Cardiovascular:      Rate and Rhythm: Normal rate.   Pulmonary:      Effort: Pulmonary effort is normal. No respiratory distress.      Breath sounds: No stridor.   Abdominal:      General: Bowel sounds are normal.      Palpations: Abdomen is soft. There is no hepatomegaly, splenomegaly or mass.      Tenderness: There is no abdominal  tenderness.   Musculoskeletal:         General: No tenderness. Normal range of motion.      Cervical back: Normal range of motion and neck supple.   Lymphadenopathy:      Head:      Right side of head: No posterior auricular or occipital adenopathy.      Left side of head: No posterior auricular or occipital adenopathy.      Cervical: No cervical adenopathy.      Right cervical: No superficial, deep or posterior cervical adenopathy.     Left cervical: No superficial, deep or posterior cervical adenopathy.      Upper Body:      Right upper body: No supraclavicular adenopathy.      Left upper body: No supraclavicular adenopathy.   Skin:     General: Skin is dry.      Findings: No erythema or rash.      Nails: There is no clubbing.   Neurological:      Mental Status: He is alert and oriented to person, place, and time.      Cranial Nerves: No cranial nerve deficit.      Coordination: Coordination normal.   Psychiatric:         Behavior: Behavior normal.         Thought Content: Thought content normal.         Judgment: Judgment normal.           Assessment:      1. Squamous cell carcinoma of lung, unspecified laterality    2. Abnormal results of thyroid function studies    3. Immunodeficiency due to chemotherapy    4. Immunodeficiency secondary to radiation therapy    5. Pure hypercholesterolemia    6. LBBB (left bundle branch block)    7. Pure hypertriglyceridemia    8. Secondary and unspecified malignant neoplasm of intrathoracic lymph nodes           Plan:     Extensive conversation reviewed information with patient article from up-to-date on stage II non-small cell lung carcinoma sent to patient maintenance immunotherapy discussed.  At this time will proceed with continuation of maintenance immunotherapy for cycle 11 and 12.  Orders written reviewed patient can be seen back by MD in 1 month after which time would repeat probable PET scan at some point in future as baseline for clinical follow-up.  Discussed  implications answered questions standing labs CBC CMP and TSH in for next cycle of treatment      Med Onc Chart Routing      Follow up with physician 4 weeks.   Follow up with REBECA    Infusion scheduling note    Injection scheduling note RTC 1 month for final dose of maintenance immunotherapy   Labs CBC, CMP and TSH   Lab interval:     Imaging    Pharmacy appointment    Other referrals         Benji Rowe Jr, MD FACP

## 2022-12-29 ENCOUNTER — INFUSION (OUTPATIENT)
Dept: INFUSION THERAPY | Facility: HOSPITAL | Age: 82
End: 2022-12-29
Attending: INTERNAL MEDICINE
Payer: MEDICARE

## 2022-12-29 VITALS
TEMPERATURE: 98 F | DIASTOLIC BLOOD PRESSURE: 60 MMHG | BODY MASS INDEX: 29.66 KG/M2 | OXYGEN SATURATION: 98 % | WEIGHT: 211.88 LBS | RESPIRATION RATE: 18 BRPM | HEART RATE: 70 BPM | SYSTOLIC BLOOD PRESSURE: 123 MMHG | HEIGHT: 71 IN

## 2022-12-29 DIAGNOSIS — C34.90 SQUAMOUS CELL CARCINOMA OF LUNG, UNSPECIFIED LATERALITY: Primary | ICD-10-CM

## 2022-12-29 PROCEDURE — A4216 STERILE WATER/SALINE, 10 ML: HCPCS | Performed by: INTERNAL MEDICINE

## 2022-12-29 PROCEDURE — 96413 CHEMO IV INFUSION 1 HR: CPT

## 2022-12-29 PROCEDURE — 63600175 PHARM REV CODE 636 W HCPCS: Performed by: INTERNAL MEDICINE

## 2022-12-29 PROCEDURE — 25000003 PHARM REV CODE 250: Performed by: INTERNAL MEDICINE

## 2022-12-29 RX ORDER — SODIUM CHLORIDE 0.9 % (FLUSH) 0.9 %
10 SYRINGE (ML) INJECTION
Status: DISCONTINUED | OUTPATIENT
Start: 2022-12-29 | End: 2022-12-29 | Stop reason: HOSPADM

## 2022-12-29 RX ORDER — HEPARIN 100 UNIT/ML
500 SYRINGE INTRAVENOUS
Status: DISCONTINUED | OUTPATIENT
Start: 2022-12-29 | End: 2022-12-29 | Stop reason: HOSPADM

## 2022-12-29 RX ADMIN — Medication 10 ML: at 11:12

## 2022-12-29 RX ADMIN — HEPARIN 500 UNITS: 100 SYRINGE at 11:12

## 2022-12-29 RX ADMIN — SODIUM CHLORIDE: 9 INJECTION, SOLUTION INTRAVENOUS at 10:12

## 2022-12-29 RX ADMIN — SODIUM CHLORIDE 1500 MG: 9 INJECTION, SOLUTION INTRAVENOUS at 10:12

## 2022-12-29 NOTE — DISCHARGE INSTRUCTIONS
THANKS FOR ALLOWING ME TO CARE FOR YOU TODAY!!!           THANKS FOR CHOOSING [facility]!!!      [facility]  [address] [address]  [phone] phone     [phone] fax  Hours of Operation: Monday- Friday 8:00am- 5:00pm  After hours phone  [phone]  Hematology / Oncology Physicians on call      DARINEL Garcia Dr., Dr., NP Sydney Prescott, HEDY Patel, AURELIANO Waldrop    Please call with any concerns regarding your appointment today.

## 2022-12-29 NOTE — PLAN OF CARE
Discussed plan of care with pt. Addressed any and ongoing concerns. Pt denies     Problem: Adult Inpatient Plan of Care  Goal: Plan of Care Review  Outcome: Ongoing, Progressing  Goal: Patient-Specific Goal (Individualized)  Outcome: Ongoing, Progressing  Flowsheets (Taken 12/29/2022 1201)  Anxieties, Fears or Concerns: Unsure how many treatments he has left  Individualized Care Needs: In recliner with legs elevated and pillow declines snack and drinks at this time  Patient-Specific Goals (Include Timeframe): Will tolerate infusion today without adverse affects  Goal: Absence of Hospital-Acquired Illness or Injury  Outcome: Ongoing, Progressing  Intervention: Identify and Manage Fall Risk  Flowsheets (Taken 12/29/2022 1201)  Safety Promotion/Fall Prevention: in recliner, wheels locked  Intervention: Prevent Infection  Flowsheets (Taken 12/29/2022 1201)  Infection Prevention:   equipment surfaces disinfected   hand hygiene promoted   personal protective equipment utilized  Goal: Optimal Comfort and Wellbeing  Outcome: Ongoing, Progressing  Intervention: Provide Person-Centered Care  Flowsheets (Taken 12/29/2022 1201)  Trust Relationship/Rapport:   care explained   emotional support provided   questions answered   questions encouraged   empathic listening provided

## 2023-01-25 ENCOUNTER — OFFICE VISIT (OUTPATIENT)
Dept: HEMATOLOGY/ONCOLOGY | Facility: CLINIC | Age: 83
End: 2023-01-25
Payer: MEDICARE

## 2023-01-25 ENCOUNTER — LAB VISIT (OUTPATIENT)
Dept: LAB | Facility: HOSPITAL | Age: 83
End: 2023-01-25
Attending: INTERNAL MEDICINE
Payer: MEDICARE

## 2023-01-25 VITALS
DIASTOLIC BLOOD PRESSURE: 58 MMHG | SYSTOLIC BLOOD PRESSURE: 117 MMHG | HEIGHT: 71 IN | BODY MASS INDEX: 29.54 KG/M2 | TEMPERATURE: 97 F | HEART RATE: 85 BPM | OXYGEN SATURATION: 98 % | WEIGHT: 211 LBS
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
## 2023-01-25 DIAGNOSIS — D84.822 IMMUNODEFICIENCY SECONDARY TO RADIATION THERAPY: ICD-10-CM

## 2023-01-25 DIAGNOSIS — C34.90 SQUAMOUS CELL CARCINOMA OF LUNG, UNSPECIFIED LATERALITY: ICD-10-CM

## 2023-01-25 DIAGNOSIS — C34.91 SQUAMOUS CELL CARCINOMA OF RIGHT LUNG: Primary | ICD-10-CM

## 2023-01-25 DIAGNOSIS — C77.1 SECONDARY AND UNSPECIFIED MALIGNANT NEOPLASM OF INTRATHORACIC LYMPH NODES: ICD-10-CM

## 2023-01-25 DIAGNOSIS — R94.6 ABNORMAL RESULTS OF THYROID FUNCTION STUDIES: ICD-10-CM

## 2023-01-25 DIAGNOSIS — T45.1X5A IMMUNODEFICIENCY DUE TO CHEMOTHERAPY: ICD-10-CM

## 2023-01-25 DIAGNOSIS — Z79.899 IMMUNODEFICIENCY DUE TO CHEMOTHERAPY: ICD-10-CM

## 2023-01-25 DIAGNOSIS — D69.6 THROMBOCYTOPENIA: ICD-10-CM

## 2023-01-25 DIAGNOSIS — D84.821 IMMUNODEFICIENCY DUE TO CHEMOTHERAPY: ICD-10-CM

## 2023-01-25 LAB
ALBUMIN SERPL BCP-MCNC: 3.8 G/DL (ref 3.5–5.2)
ALP SERPL-CCNC: 140 U/L (ref 55–135)
ALT SERPL W/O P-5'-P-CCNC: 22 U/L (ref 10–44)
ANION GAP SERPL CALC-SCNC: 12 MMOL/L (ref 8–16)
AST SERPL-CCNC: 22 U/L (ref 10–40)
BASOPHILS # BLD AUTO: 0.04 K/UL (ref 0–0.2)
BASOPHILS NFR BLD: 0.4 % (ref 0–1.9)
BILIRUB SERPL-MCNC: 0.8 MG/DL (ref 0.1–1)
BUN SERPL-MCNC: 7 MG/DL (ref 8–23)
CALCIUM SERPL-MCNC: 9.4 MG/DL (ref 8.7–10.5)
CHLORIDE SERPL-SCNC: 106 MMOL/L (ref 95–110)
CO2 SERPL-SCNC: 21 MMOL/L (ref 23–29)
CREAT SERPL-MCNC: 0.8 MG/DL (ref 0.5–1.4)
DIFFERENTIAL METHOD: ABNORMAL
EOSINOPHIL # BLD AUTO: 0.1 K/UL (ref 0–0.5)
EOSINOPHIL NFR BLD: 1 % (ref 0–8)
ERYTHROCYTE [DISTWIDTH] IN BLOOD BY AUTOMATED COUNT: 13.6 % (ref 11.5–14.5)
EST. GFR  (NO RACE VARIABLE): >60 ML/MIN/1.73 M^2
GLUCOSE SERPL-MCNC: 82 MG/DL (ref 70–110)
HCT VFR BLD AUTO: 46.8 % (ref 40–54)
HGB BLD-MCNC: 15.7 G/DL (ref 14–18)
IMM GRANULOCYTES # BLD AUTO: 0.05 K/UL (ref 0–0.04)
IMM GRANULOCYTES NFR BLD AUTO: 0.5 % (ref 0–0.5)
LYMPHOCYTES # BLD AUTO: 1.2 K/UL (ref 1–4.8)
LYMPHOCYTES NFR BLD: 11.2 % (ref 18–48)
MCH RBC QN AUTO: 33.1 PG (ref 27–31)
MCHC RBC AUTO-ENTMCNC: 33.5 G/DL (ref 32–36)
MCV RBC AUTO: 99 FL (ref 82–98)
MONOCYTES # BLD AUTO: 1.1 K/UL (ref 0.3–1)
MONOCYTES NFR BLD: 10.7 % (ref 4–15)
NEUTROPHILS # BLD AUTO: 7.9 K/UL (ref 1.8–7.7)
NEUTROPHILS NFR BLD: 76.2 % (ref 38–73)
NRBC BLD-RTO: 0 /100 WBC
PLATELET # BLD AUTO: 197 K/UL (ref 150–450)
PMV BLD AUTO: 9.2 FL (ref 9.2–12.9)
POTASSIUM SERPL-SCNC: 3.7 MMOL/L (ref 3.5–5.1)
PROT SERPL-MCNC: 7 G/DL (ref 6–8.4)
RBC # BLD AUTO: 4.74 M/UL (ref 4.6–6.2)
SODIUM SERPL-SCNC: 139 MMOL/L (ref 136–145)
TSH SERPL DL<=0.005 MIU/L-ACNC: 1.55 UIU/ML (ref 0.4–4)
WBC # BLD AUTO: 10.3 K/UL (ref 3.9–12.7)

## 2023-01-25 PROCEDURE — 1159F MED LIST DOCD IN RCRD: CPT | Mod: CPTII,S$GLB,, | Performed by: INTERNAL MEDICINE

## 2023-01-25 PROCEDURE — 80053 COMPREHEN METABOLIC PANEL: CPT | Performed by: INTERNAL MEDICINE

## 2023-01-25 PROCEDURE — 3078F PR MOST RECENT DIASTOLIC BLOOD PRESSURE < 80 MM HG: ICD-10-PCS | Mod: CPTII,S$GLB,, | Performed by: INTERNAL MEDICINE

## 2023-01-25 PROCEDURE — 1126F PR PAIN SEVERITY QUANTIFIED, NO PAIN PRESENT: ICD-10-PCS | Mod: CPTII,S$GLB,, | Performed by: INTERNAL MEDICINE

## 2023-01-25 PROCEDURE — 99215 PR OFFICE/OUTPT VISIT, EST, LEVL V, 40-54 MIN: ICD-10-PCS | Mod: S$GLB,,, | Performed by: INTERNAL MEDICINE

## 2023-01-25 PROCEDURE — 3074F SYST BP LT 130 MM HG: CPT | Mod: CPTII,S$GLB,, | Performed by: INTERNAL MEDICINE

## 2023-01-25 PROCEDURE — 3074F PR MOST RECENT SYSTOLIC BLOOD PRESSURE < 130 MM HG: ICD-10-PCS | Mod: CPTII,S$GLB,, | Performed by: INTERNAL MEDICINE

## 2023-01-25 PROCEDURE — 1160F RVW MEDS BY RX/DR IN RCRD: CPT | Mod: CPTII,S$GLB,, | Performed by: INTERNAL MEDICINE

## 2023-01-25 PROCEDURE — 99999 PR PBB SHADOW E&M-EST. PATIENT-LVL IV: ICD-10-PCS | Mod: PBBFAC,,, | Performed by: INTERNAL MEDICINE

## 2023-01-25 PROCEDURE — 1101F PR PT FALLS ASSESS DOC 0-1 FALLS W/OUT INJ PAST YR: ICD-10-PCS | Mod: CPTII,S$GLB,, | Performed by: INTERNAL MEDICINE

## 2023-01-25 PROCEDURE — 3288F PR FALLS RISK ASSESSMENT DOCUMENTED: ICD-10-PCS | Mod: CPTII,S$GLB,, | Performed by: INTERNAL MEDICINE

## 2023-01-25 PROCEDURE — 3078F DIAST BP <80 MM HG: CPT | Mod: CPTII,S$GLB,, | Performed by: INTERNAL MEDICINE

## 2023-01-25 PROCEDURE — 99215 OFFICE O/P EST HI 40 MIN: CPT | Mod: S$GLB,,, | Performed by: INTERNAL MEDICINE

## 2023-01-25 PROCEDURE — 99999 PR PBB SHADOW E&M-EST. PATIENT-LVL IV: CPT | Mod: PBBFAC,,, | Performed by: INTERNAL MEDICINE

## 2023-01-25 PROCEDURE — 3288F FALL RISK ASSESSMENT DOCD: CPT | Mod: CPTII,S$GLB,, | Performed by: INTERNAL MEDICINE

## 2023-01-25 PROCEDURE — 1159F PR MEDICATION LIST DOCUMENTED IN MEDICAL RECORD: ICD-10-PCS | Mod: CPTII,S$GLB,, | Performed by: INTERNAL MEDICINE

## 2023-01-25 PROCEDURE — 85025 COMPLETE CBC W/AUTO DIFF WBC: CPT | Performed by: INTERNAL MEDICINE

## 2023-01-25 PROCEDURE — 1126F AMNT PAIN NOTED NONE PRSNT: CPT | Mod: CPTII,S$GLB,, | Performed by: INTERNAL MEDICINE

## 2023-01-25 PROCEDURE — 1160F PR REVIEW ALL MEDS BY PRESCRIBER/CLIN PHARMACIST DOCUMENTED: ICD-10-PCS | Mod: CPTII,S$GLB,, | Performed by: INTERNAL MEDICINE

## 2023-01-25 PROCEDURE — 36415 COLL VENOUS BLD VENIPUNCTURE: CPT | Performed by: INTERNAL MEDICINE

## 2023-01-25 PROCEDURE — 84443 ASSAY THYROID STIM HORMONE: CPT | Performed by: INTERNAL MEDICINE

## 2023-01-25 PROCEDURE — 1101F PT FALLS ASSESS-DOCD LE1/YR: CPT | Mod: CPTII,S$GLB,, | Performed by: INTERNAL MEDICINE

## 2023-01-25 RX ORDER — HEPARIN 100 UNIT/ML
500 SYRINGE INTRAVENOUS
Status: CANCELLED | OUTPATIENT
Start: 2023-01-26

## 2023-01-25 RX ORDER — SODIUM CHLORIDE 0.9 % (FLUSH) 0.9 %
10 SYRINGE (ML) INJECTION
Status: CANCELLED | OUTPATIENT
Start: 2023-01-26

## 2023-01-25 RX ORDER — EPINEPHRINE 0.3 MG/.3ML
0.3 INJECTION SUBCUTANEOUS ONCE AS NEEDED
Status: CANCELLED | OUTPATIENT
Start: 2023-01-26

## 2023-01-25 RX ORDER — DIPHENHYDRAMINE HYDROCHLORIDE 50 MG/ML
50 INJECTION INTRAMUSCULAR; INTRAVENOUS ONCE AS NEEDED
Status: CANCELLED | OUTPATIENT
Start: 2023-01-26

## 2023-01-25 NOTE — PROGRESS NOTES
Subjective:       Patient ID: Alton Black is a 82 y.o. male.    Chief Complaint: Results and Lung Cancer    HPI:  82-year-old male history of stage III non-small cell lung carcinoma receiving maintenance immunotherapy.  Patient is tolerating therapy well denies nausea vomiting fevers chills night sweats ECOG status 1 cycle 12 today    Past Medical History:   Diagnosis Date    Benign essential HTN     CAD (coronary artery disease)     HLD (hyperlipidemia)     Squamous cell carcinoma of lung 12/17/2021     Family History   Problem Relation Age of Onset    Heart attack Father      Social History     Socioeconomic History    Marital status: Single   Tobacco Use    Smoking status: Never    Smokeless tobacco: Never    Tobacco comments:     Chews on cigars   Substance and Sexual Activity    Alcohol use: Not Currently    Drug use: Never    Sexual activity: Not Currently     Past Surgical History:   Procedure Laterality Date    BRONCHOSCOPY Bilateral 12/8/2021    Procedure: Bronchoscopy - insert lighted tube into airway to take a biopsy of lung;  Surgeon: Rigo Vences MD;  Location: [facility] ENDO;  Service: Endoscopy;  Laterality: Bilateral;    CHOLECYSTECTOMY      CORONARY ANGIOPLASTY WITH STENT PLACEMENT      ENDOBRONCHIAL ULTRASOUND Bilateral 12/8/2021    Procedure: ENDOBRONCHIAL ULTRASOUND (EBUS);  Surgeon: Rigo Vences MD;  Location: [facility] ENDO;  Service: Endoscopy;  Laterality: Bilateral;    FLUOROSCOPY N/A 1/4/2022    Procedure: Mediport Insertion;  Surgeon: Elaina Larios MD;  Location: [facility] CATH LAB;  Service: General;  Laterality: N/A;  yuridia from 1/3 to 1/4       Labs:  Lab Results   Component Value Date    WBC 10.30 01/25/2023    HGB 15.7 01/25/2023    HCT 46.8 01/25/2023    MCV 99 (H) 01/25/2023     01/25/2023     BMP  Lab Results   Component Value Date     12/28/2022    K 4.6 12/28/2022     12/28/2022    CO2 28 12/28/2022    BUN 5 (L) 12/28/2022     CREATININE 0.8 12/28/2022    CALCIUM 9.3 12/28/2022    ANIONGAP 8 12/28/2022    ESTGFRAFRICA >60 07/11/2022    EGFRNONAA >60 07/11/2022     Lab Results   Component Value Date    ALT 28 12/28/2022    AST 24 12/28/2022    ALKPHOS 133 12/28/2022    BILITOT 0.8 12/28/2022       No results found for: IRON, TIBC, FERRITIN, SATURATEDIRO  No results found for: [id number]  No results found for: FOLATE  Lab Results   Component Value Date    TSH 2.412 12/28/2022         Review of Systems   Constitutional:  Negative for activity change, appetite change, chills, diaphoresis, fatigue, fever and unexpected weight change.   HENT:  Negative for congestion, dental problem, drooling, ear discharge, ear pain, facial swelling, hearing loss, mouth sores, nosebleeds, postnasal drip, rhinorrhea, sinus pressure, sneezing, sore throat, tinnitus, trouble swallowing and voice change.    Eyes:  Negative for photophobia, pain, discharge, redness, itching and visual disturbance.   Respiratory:  Negative for apnea, cough, choking, chest tightness, shortness of breath, wheezing and stridor.    Cardiovascular:  Negative for chest pain, palpitations and leg swelling.   Gastrointestinal:  Negative for abdominal distention, abdominal pain, anal bleeding, blood in stool, constipation, diarrhea, nausea, rectal pain and vomiting.   Endocrine: Negative for cold intolerance, heat intolerance, polydipsia, polyphagia and polyuria.   Genitourinary:  Negative for decreased urine volume, difficulty urinating, dysuria, enuresis, flank pain, frequency, genital sores, hematuria, penile discharge, penile pain, penile swelling, scrotal swelling, testicular pain and urgency.   Musculoskeletal:  Positive for arthralgias, back pain and myalgias. Negative for gait problem, joint swelling, neck pain and neck stiffness.   Skin:  Negative for color change, pallor, rash and wound.   Allergic/Immunologic: Negative for environmental allergies, food allergies and  immunocompromised state.   Neurological:  Negative for dizziness, tremors, seizures, syncope, facial asymmetry, speech difficulty, weakness, light-headedness, numbness and headaches.   Hematological:  Negative for adenopathy. Does not bruise/bleed easily.   Psychiatric/Behavioral:  Positive for dysphoric mood. Negative for agitation, behavioral problems, confusion, decreased concentration, hallucinations, self-injury, sleep disturbance and suicidal ideas. The patient is nervous/anxious. The patient is not hyperactive.      Objective:      Physical Exam  Vitals reviewed.   Constitutional:       General: He is not in acute distress.     Appearance: He is well-developed. He is ill-appearing. He is not diaphoretic.   HENT:      Head: Normocephalic.      Right Ear: External ear normal.      Left Ear: External ear normal.      Nose: Nose normal.      Right Sinus: No maxillary sinus tenderness or frontal sinus tenderness.      Left Sinus: No maxillary sinus tenderness or frontal sinus tenderness.      Mouth/Throat:      Pharynx: No oropharyngeal exudate.   Eyes:      General: Lids are normal. No scleral icterus.        Right eye: No discharge.         Left eye: No discharge.      Extraocular Movements:      Right eye: Normal extraocular motion.      Left eye: Normal extraocular motion.      Conjunctiva/sclera:      Right eye: Right conjunctiva is not injected. No hemorrhage.     Left eye: Left conjunctiva is not injected. No hemorrhage.     Pupils: Pupils are equal, round, and reactive to light.   Neck:      Thyroid: No thyromegaly.      Vascular: No JVD.      Trachea: No tracheal deviation.   Cardiovascular:      Rate and Rhythm: Normal rate.   Pulmonary:      Effort: Pulmonary effort is normal. No respiratory distress.      Breath sounds: No stridor.   Abdominal:      General: Bowel sounds are normal.      Palpations: Abdomen is soft. There is no hepatomegaly, splenomegaly or mass.      Tenderness: There is no abdominal  tenderness.   Musculoskeletal:         General: No tenderness. Normal range of motion.      Cervical back: Normal range of motion and neck supple.   Lymphadenopathy:      Head:      Right side of head: No posterior auricular or occipital adenopathy.      Left side of head: No posterior auricular or occipital adenopathy.      Cervical: No cervical adenopathy.      Right cervical: No superficial, deep or posterior cervical adenopathy.     Left cervical: No superficial, deep or posterior cervical adenopathy.      Upper Body:      Right upper body: No supraclavicular adenopathy.      Left upper body: No supraclavicular adenopathy.   Skin:     General: Skin is dry.      Findings: No erythema or rash.      Nails: There is no clubbing.   Neurological:      Mental Status: He is alert and oriented to person, place, and time.      Cranial Nerves: No cranial nerve deficit.      Motor: Weakness present.      Coordination: Coordination normal.      Gait: Gait abnormal.   Psychiatric:         Behavior: Behavior normal.         Thought Content: Thought content normal.         Judgment: Judgment normal.           Assessment:      1. Squamous cell carcinoma of right lung    2. Secondary and unspecified malignant neoplasm of intrathoracic lymph nodes    3. Immunodeficiency due to chemotherapy    4. Immunodeficiency secondary to radiation therapy    5. Thrombocytopenia           Plan:     Proceed with systemic therapy today.  Return next cycle for primary oncologist to see.  Standing labs have been placed.  Referral made to  has a high distress score.  Discussed implications of answered questions with patient's      Med Onc Chart Routing      Follow up with physician 1 month. Wishes to be seen by primary oncologist    Follow up with [facility]    Infusion scheduling note    Injection scheduling note Durvalumab treatment today and again in 1 month   Labs CBC, CMP and TSH   Lab interval:     Imaging    Pharmacy  appointment    Other referrals         Benji Rowe Jr, MD FACP

## 2023-01-26 ENCOUNTER — TELEPHONE (OUTPATIENT)
Dept: HEMATOLOGY/ONCOLOGY | Facility: CLINIC | Age: 83
End: 2023-01-26
Payer: MEDICARE

## 2023-01-26 ENCOUNTER — INFUSION (OUTPATIENT)
Dept: INFUSION THERAPY | Facility: HOSPITAL | Age: 83
End: 2023-01-26
Attending: INTERNAL MEDICINE
Payer: MEDICARE

## 2023-01-26 VITALS
TEMPERATURE: 97 F | OXYGEN SATURATION: 97 % | RESPIRATION RATE: 16 BRPM | SYSTOLIC BLOOD PRESSURE: 126 MMHG | DIASTOLIC BLOOD PRESSURE: 67 MMHG | HEART RATE: 56 BPM

## 2023-01-26 DIAGNOSIS — C34.90 SQUAMOUS CELL CARCINOMA OF LUNG, UNSPECIFIED LATERALITY: Primary | ICD-10-CM

## 2023-01-26 PROCEDURE — 25000003 PHARM REV CODE 250: Performed by: INTERNAL MEDICINE

## 2023-01-26 PROCEDURE — 63600175 PHARM REV CODE 636 W HCPCS: Performed by: INTERNAL MEDICINE

## 2023-01-26 PROCEDURE — 96413 CHEMO IV INFUSION 1 HR: CPT

## 2023-01-26 RX ORDER — HEPARIN 100 UNIT/ML
500 SYRINGE INTRAVENOUS
Status: DISCONTINUED | OUTPATIENT
Start: 2023-01-26 | End: 2023-01-26 | Stop reason: HOSPADM

## 2023-01-26 RX ADMIN — HEPARIN 500 UNITS: 100 SYRINGE at 11:01

## 2023-01-26 RX ADMIN — SODIUM CHLORIDE: 9 INJECTION, SOLUTION INTRAVENOUS at 10:01

## 2023-01-26 RX ADMIN — SODIUM CHLORIDE 1500 MG: 9 INJECTION, SOLUTION INTRAVENOUS at 10:01

## 2023-01-26 NOTE — DISCHARGE INSTRUCTIONS
.[facility]  [address] [address]  [address]  [phone] phone     [phone] fax  Hours of Operation: Monday- Friday 8:00am- 5:00pm  After hours phone  [phone]  Hematology / Oncology Physicians on call    Dr. Jae Young      Nurse Practitioners:    Laquita Gallegos, NP  Bryanna Rubio, HEDY Stacy, HEDY Patel, HEDY Montes, HEDY King, PA      Please don't hesitate to call if you have any concerns.    .WAYS TO HELP PREVENT INFECTION        WASH YOUR HANDS OFTEN DURING THE DAY, ESPECIALLY BEFORE YOU EAT, AFTER USING THE BATHROOM, AND AFTER TOUCHING ANIMALS    STAY AWAY FROM PEOPLE WHO HAVE ILLNESSES YOU CAN CATCH; SUCH AS COLDS, FLU, CHICKEN POX    TRY TO AVOID CROWDS    STAY AWAY FROM CHILDREN WHO RECENTLY HAVE RECEIVED LIVE VIRUS VACCINES    MAINTAIN GOOD MOUTH CARE    DO NOT SQUEEZE OR SCRATCH PIMPLES    CLEAN CUTS & SCRAPES RIGHT AWAY AND DAILY UNTIL HEALED WITH WARM WATER, SOAP & AN ANTISEPTIC    AVOID CONTACT WITH LITTER BOXES, BIRD CAGES, & FISH TANKS    AVOID STANDING WATER, IE., BIRD BATHS, FLOWER POTS/VASES, OR HUMIDIFIERS    WEAR GLOVES WHEN GARDENING OR CLEANING UP AFTER OTHERS, ESPECIALLY BABIES & SMALL CHILDREN    DO NOT EAT RAW FISH, SEAFOOD, MEAT, OR EGGS  .FALL PREVENTION   Falls often occur due to slipping, tripping or losing your balance. Here are ways to reduce your risk of falling again.   Was there anything that caused your fall that can be fixed, removed or replaced?   Make your home safe by keeping walkways clear of objects you may trip over.   Use non-slip pads under rugs.   Do not walk in poorly lit areas.   Do not stand on chairs or wobbly ladders.   Use caution when reaching overhead or looking upward. This position can cause a loss of balance.   Be sure your shoes fit properly, have non-slip bottoms and are in good condition.   Be cautious when going up  and down stairs, curbs, and when walking on uneven sidewalks.   If your balance is poor, consider using a cane or walker.   If your fall was related to alcohol use, stop or limit alcohol intake.   If your fall was related to use of sleeping medicines, talk to your doctor about this. You may need to reduce your dosage at bedtime if you awaken during the night to go to the bathroom.   To reduce the need for nighttime bathroom trips:   Avoid drinking fluids for several hours before going to bed   Empty your bladder before going to bed   Men can keep a urinal at the bedside   © [id number] [employer] \[facility]\"", [address] [place] [address], [place] [address]. All rights reserved. This information is not intended as a substitute for professional medical care. Always follow your healthcare professional's instructions.

## 2023-01-26 NOTE — TELEPHONE ENCOUNTER
SW intern called pt regarding distress score from appt on 1/25/23. Pt reports this is r/t tingling in fingertips but it only lasts temporarily. Pt does not have any further needs at the present time. SW intern will remain available.

## 2023-01-26 NOTE — PLAN OF CARE
Patient said he felt good. Discussed POC, answered any questions he had.     Problem: Adult Inpatient Plan of Care  Goal: Plan of Care Review  Outcome: Ongoing, Progressing  Flowsheets (Taken 1/26/2023 1146)  Plan of Care Reviewed With: patient  Goal: Patient-Specific Goal (Individualized)  Outcome: Ongoing, Progressing  Flowsheets (Taken 1/26/2023 1146)  Anxieties, Fears or Concerns: Was asking if he should get is port taken out once he done with treatment, asking how many more treatments left to go. Instructed him to write down his questions and be sure to bring it up with the doctor next visit.  Individualized Care Needs: Reclined legs, pillow, and gingerale provided  Goal: Optimal Comfort and Wellbeing  Outcome: Ongoing, Progressing  Intervention: Monitor Pain and Promote Comfort  Flowsheets (Taken 1/26/2023 1146)  Pain Management Interventions: pillow support provided  Intervention: Provide Person-Centered Care  Flowsheets (Taken 1/26/2023 1146)  Trust Relationship/Rapport:   care explained   choices provided   emotional support provided   empathic listening provided   questions answered   questions encouraged   reassurance provided   thoughts/feelings acknowledged     Problem: Fall Injury Risk  Goal: Absence of Fall and Fall-Related Injury  Outcome: Ongoing, Progressing  Intervention: Identify and Manage Contributors  Flowsheets (Taken 1/26/2023 1146)  Self-Care Promotion: BADL personal objects within reach  Medication Review/Management:   medications reviewed   infusion initiated  Intervention: Promote Injury-Free Environment  Flowsheets (Taken 1/26/2023 1146)  Safety Promotion/Fall Prevention:   in recliner, wheels locked   lighting adjusted   medications reviewed   instructed to call staff for mobility     Problem: Anemia (Chemotherapy Effects)  Goal: Anemia Symptom Improvement  Outcome: Ongoing, Progressing  Intervention: Monitor and Manage Anemia  Flowsheets (Taken 1/26/2023 1146)  Safety Promotion/Fall  Prevention:   in recliner, wheels locked   lighting adjusted   medications reviewed   instructed to call staff for mobility  Fatigue Management: paced activity encouraged     Problem: Nausea and Vomiting (Chemotherapy Effects)  Goal: Fluid and Electrolyte Balance  Outcome: Ongoing, Progressing  Intervention: Prevent and Manage Nausea and Vomiting  Flowsheets (Taken 1/26/2023 1146)  Environmental Support: calm environment promoted

## 2023-02-22 ENCOUNTER — LAB VISIT (OUTPATIENT)
Dept: LAB | Facility: HOSPITAL | Age: 83
End: 2023-02-22
Attending: INTERNAL MEDICINE
Payer: MEDICARE

## 2023-02-22 ENCOUNTER — OFFICE VISIT (OUTPATIENT)
Dept: HEMATOLOGY/ONCOLOGY | Facility: CLINIC | Age: 83
End: 2023-02-22
Payer: MEDICARE

## 2023-02-22 VITALS
DIASTOLIC BLOOD PRESSURE: 72 MMHG | OXYGEN SATURATION: 96 % | SYSTOLIC BLOOD PRESSURE: 136 MMHG | HEIGHT: 71 IN | BODY MASS INDEX: 29.81 KG/M2 | WEIGHT: 212.94 LBS | TEMPERATURE: 98 F | HEART RATE: 75 BPM

## 2023-02-22 DIAGNOSIS — D84.821 IMMUNODEFICIENCY DUE TO CHEMOTHERAPY: ICD-10-CM

## 2023-02-22 DIAGNOSIS — C34.90 SQUAMOUS CELL CARCINOMA OF LUNG, UNSPECIFIED LATERALITY: ICD-10-CM

## 2023-02-22 DIAGNOSIS — Z79.899 IMMUNODEFICIENCY DUE TO CHEMOTHERAPY: ICD-10-CM

## 2023-02-22 DIAGNOSIS — D84.822 IMMUNODEFICIENCY SECONDARY TO RADIATION THERAPY: ICD-10-CM

## 2023-02-22 DIAGNOSIS — T45.1X5A IMMUNODEFICIENCY DUE TO CHEMOTHERAPY: ICD-10-CM
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
## 2023-02-22 DIAGNOSIS — R94.6 ABNORMAL RESULTS OF THYROID FUNCTION STUDIES: ICD-10-CM

## 2023-02-22 DIAGNOSIS — C34.91 SQUAMOUS CELL CARCINOMA OF RIGHT LUNG: Primary | ICD-10-CM

## 2023-02-22 LAB
ALBUMIN SERPL BCP-MCNC: 3.7 G/DL (ref 3.5–5.2)
ALP SERPL-CCNC: 107 U/L (ref 55–135)
ALT SERPL W/O P-5'-P-CCNC: 23 U/L (ref 10–44)
ANION GAP SERPL CALC-SCNC: 10 MMOL/L (ref 8–16)
AST SERPL-CCNC: 21 U/L (ref 10–40)
BASOPHILS # BLD AUTO: 0.03 K/UL (ref 0–0.2)
BASOPHILS NFR BLD: 0.5 % (ref 0–1.9)
BILIRUB SERPL-MCNC: 0.6 MG/DL (ref 0.1–1)
BUN SERPL-MCNC: 7 MG/DL (ref 8–23)
CALCIUM SERPL-MCNC: 9 MG/DL (ref 8.7–10.5)
CHLORIDE SERPL-SCNC: 105 MMOL/L (ref 95–110)
CO2 SERPL-SCNC: 24 MMOL/L (ref 23–29)
CREAT SERPL-MCNC: 0.8 MG/DL (ref 0.5–1.4)
DIFFERENTIAL METHOD: ABNORMAL
EOSINOPHIL # BLD AUTO: 0.1 K/UL (ref 0–0.5)
EOSINOPHIL NFR BLD: 1.3 % (ref 0–8)
ERYTHROCYTE [DISTWIDTH] IN BLOOD BY AUTOMATED COUNT: 13.2 % (ref 11.5–14.5)
EST. GFR  (NO RACE VARIABLE): >60 ML/MIN/1.73 M^2
GLUCOSE SERPL-MCNC: 95 MG/DL (ref 70–110)
HCT VFR BLD AUTO: 43.3 % (ref 40–54)
HGB BLD-MCNC: 14.6 G/DL (ref 14–18)
IMM GRANULOCYTES # BLD AUTO: 0.02 K/UL (ref 0–0.04)
IMM GRANULOCYTES NFR BLD AUTO: 0.3 % (ref 0–0.5)
LYMPHOCYTES # BLD AUTO: 1.2 K/UL (ref 1–4.8)
LYMPHOCYTES NFR BLD: 19.8 % (ref 18–48)
MCH RBC QN AUTO: 33 PG (ref 27–31)
MCHC RBC AUTO-ENTMCNC: 33.7 G/DL (ref 32–36)
MCV RBC AUTO: 98 FL (ref 82–98)
MONOCYTES # BLD AUTO: 0.7 K/UL (ref 0.3–1)
MONOCYTES NFR BLD: 10.6 % (ref 4–15)
NEUTROPHILS # BLD AUTO: 4.2 K/UL (ref 1.8–7.7)
NEUTROPHILS NFR BLD: 67.5 % (ref 38–73)
NRBC BLD-RTO: 0 /100 WBC
PLATELET # BLD AUTO: 222 K/UL (ref 150–450)
PMV BLD AUTO: 9.1 FL (ref 9.2–12.9)
POTASSIUM SERPL-SCNC: 4 MMOL/L (ref 3.5–5.1)
PROT SERPL-MCNC: 6.4 G/DL (ref 6–8.4)
RBC # BLD AUTO: 4.43 M/UL (ref 4.6–6.2)
SODIUM SERPL-SCNC: 139 MMOL/L (ref 136–145)
TSH SERPL DL<=0.005 MIU/L-ACNC: 1.1 UIU/ML (ref 0.4–4)
WBC # BLD AUTO: 6.15 K/UL (ref 3.9–12.7)

## 2023-02-22 PROCEDURE — 1159F MED LIST DOCD IN RCRD: CPT | Mod: CPTII,S$GLB,, | Performed by: INTERNAL MEDICINE

## 2023-02-22 PROCEDURE — 99215 OFFICE O/P EST HI 40 MIN: CPT | Mod: S$GLB,,, | Performed by: INTERNAL MEDICINE

## 2023-02-22 PROCEDURE — 36415 COLL VENOUS BLD VENIPUNCTURE: CPT | Performed by: INTERNAL MEDICINE

## 2023-02-22 PROCEDURE — 84443 ASSAY THYROID STIM HORMONE: CPT | Performed by: INTERNAL MEDICINE

## 2023-02-22 PROCEDURE — 99215 PR OFFICE/OUTPT VISIT, EST, LEVL V, 40-54 MIN: ICD-10-PCS | Mod: S$GLB,,, | Performed by: INTERNAL MEDICINE

## 2023-02-22 PROCEDURE — 3078F DIAST BP <80 MM HG: CPT | Mod: CPTII,S$GLB,, | Performed by: INTERNAL MEDICINE

## 2023-02-22 PROCEDURE — 3078F PR MOST RECENT DIASTOLIC BLOOD PRESSURE < 80 MM HG: ICD-10-PCS | Mod: CPTII,S$GLB,, | Performed by: INTERNAL MEDICINE

## 2023-02-22 PROCEDURE — 85025 COMPLETE CBC W/AUTO DIFF WBC: CPT | Performed by: INTERNAL MEDICINE

## 2023-02-22 PROCEDURE — 3288F PR FALLS RISK ASSESSMENT DOCUMENTED: ICD-10-PCS | Mod: CPTII,S$GLB,, | Performed by: INTERNAL MEDICINE

## 2023-02-22 PROCEDURE — 1126F AMNT PAIN NOTED NONE PRSNT: CPT | Mod: CPTII,S$GLB,, | Performed by: INTERNAL MEDICINE

## 2023-02-22 PROCEDURE — 1126F PR PAIN SEVERITY QUANTIFIED, NO PAIN PRESENT: ICD-10-PCS | Mod: CPTII,S$GLB,, | Performed by: INTERNAL MEDICINE

## 2023-02-22 PROCEDURE — 1101F PR PT FALLS ASSESS DOC 0-1 FALLS W/OUT INJ PAST YR: ICD-10-PCS | Mod: CPTII,S$GLB,, | Performed by: INTERNAL MEDICINE

## 2023-02-22 PROCEDURE — 3288F FALL RISK ASSESSMENT DOCD: CPT | Mod: CPTII,S$GLB,, | Performed by: INTERNAL MEDICINE

## 2023-02-22 PROCEDURE — 99999 PR PBB SHADOW E&M-EST. PATIENT-LVL III: CPT | Mod: PBBFAC,,, | Performed by: INTERNAL MEDICINE

## 2023-02-22 PROCEDURE — 99999 PR PBB SHADOW E&M-EST. PATIENT-LVL III: ICD-10-PCS | Mod: PBBFAC,,, | Performed by: INTERNAL MEDICINE

## 2023-02-22 PROCEDURE — 1159F PR MEDICATION LIST DOCUMENTED IN MEDICAL RECORD: ICD-10-PCS | Mod: CPTII,S$GLB,, | Performed by: INTERNAL MEDICINE

## 2023-02-22 PROCEDURE — 80053 COMPREHEN METABOLIC PANEL: CPT | Performed by: INTERNAL MEDICINE

## 2023-02-22 PROCEDURE — 1101F PT FALLS ASSESS-DOCD LE1/YR: CPT | Mod: CPTII,S$GLB,, | Performed by: INTERNAL MEDICINE

## 2023-02-22 PROCEDURE — 3075F PR MOST RECENT SYSTOLIC BLOOD PRESS GE 130-139MM HG: ICD-10-PCS | Mod: CPTII,S$GLB,, | Performed by: INTERNAL MEDICINE

## 2023-02-22 PROCEDURE — 3075F SYST BP GE 130 - 139MM HG: CPT | Mod: CPTII,S$GLB,, | Performed by: INTERNAL MEDICINE

## 2023-02-22 RX ORDER — EPINEPHRINE 0.3 MG/.3ML
0.3 INJECTION SUBCUTANEOUS ONCE AS NEEDED
Status: CANCELLED | OUTPATIENT
Start: 2023-02-23

## 2023-02-22 RX ORDER — DIPHENHYDRAMINE HYDROCHLORIDE 50 MG/ML
50 INJECTION INTRAMUSCULAR; INTRAVENOUS ONCE AS NEEDED
Status: CANCELLED | OUTPATIENT
Start: 2023-02-23

## 2023-02-22 RX ORDER — SODIUM CHLORIDE 0.9 % (FLUSH) 0.9 %
10 SYRINGE (ML) INJECTION
Status: CANCELLED | OUTPATIENT
Start: 2023-02-23

## 2023-02-22 RX ORDER — HEPARIN 100 UNIT/ML
500 SYRINGE INTRAVENOUS
Status: CANCELLED | OUTPATIENT
Start: 2023-04-05

## 2023-02-22 RX ORDER — SODIUM CHLORIDE 0.9 % (FLUSH) 0.9 %
10 SYRINGE (ML) INJECTION
Status: CANCELLED | OUTPATIENT
Start: 2023-04-05

## 2023-02-22 RX ORDER — HEPARIN 100 UNIT/ML
500 SYRINGE INTRAVENOUS
Status: CANCELLED | OUTPATIENT
Start: 2023-02-23

## 2023-02-22 NOTE — PROGRESS NOTES
Subjective:      DATE OF VISIT: 2/22/23     ?  Patient ID:?Alton Black is a 82 y.o. male.?? MR#: [id number]   ?   PRIMARY ONCOLOGIST: Dr. Baez    ? Primary Care Providers:  Rohan Kenny MD, MD (General)     CHIEF COMPLAINT: ?  Follow-up after completion of chemoradiation  ?   ONCOLOGIC DIAGNOSIS:  Stage IIB squamous cell carcinoma lung  ?   CURRENT TREATMENT: durvalumab maintenance, q.4 weeks, last dose planned 02/23/2023    PAST TREATMENT:   Chemoradiation with carboplatin paclitaxel weekly, cycle 1 day 1 1/14/22, completed 02/24/2022  ?   ONCOLOGIC HISTORY:   ?   Oncology History   Squamous cell carcinoma of lung   1/13/2021 - 2/24/2022 Radiation Therapy    Treating physician: Karol  Total Dose: 60 Gy  Fractions: 30     12/17/2021 Initial Diagnosis    Squamous cell lung cancer     12/22/2021 Cancer Staged    Staging form: Lung, AJCC 8th Edition  - Clinical stage from 12/22/2021: Stage IIB (cT3, cN0, cM0)       1/14/2022 - 2/21/2022 Chemotherapy    Treatment Summary   Plan Name: OP NSCLC PACLITAXEL + CARBOPLATIN (AUC) QW + RADIATION  Treatment Goal: Curative  Status: Inactive  Start Date: 1/14/2022  End Date: 2/21/2022  Provider: Juan Lopez MD  Chemotherapy: dexAMETHasone (DECADRON) 4 MG Tab, 8 mg, Oral, Daily, 1 of 1 cycle, Start date: --, End date: --  CARBOplatin (PARAPLATIN) 230 mg in sodium chloride 0.9% 273 mL chemo infusion, 230 mg (100 % of original dose 230 mg), Intravenous, Clinic/HOD 1 time, 6 of 6 cycles  Dose modification:   (original dose 230 mg, Cycle 1)  Administration: 230 mg (1/14/2022), 230 mg (1/21/2022), 255 mg (1/28/2022), 255 mg (2/4/2022), 255 mg (2/14/2022), 255 mg (2/21/2022)  PACLitaxeL (TAXOL) 45 mg/m2 = 96 mg in sodium chloride 0.9% 266 mL chemo infusion, 45 mg/m2 = 96 mg, Intravenous, Clinic/HOD 1 time, 6 of 6 cycles  Administration: 96 mg (1/14/2022), 96 mg (1/21/2022), 96 mg (1/28/2022), 96 mg (2/4/2022), 96 mg (2/14/2022), 96 mg (2/21/2022)        3/21/2022 -  Chemotherapy    Treatment Summary   Plan Name: OP DURVALUMAB 1500 MG Q4W  Treatment Goal: Curative  Status: Active  Start Date: 3/21/2022  End Date: 2/23/2023 (Planned)  Provider: Winnie Baez MD  Chemotherapy: [No matching medication found in this treatment plan]          Cancer Staging   Squamous cell carcinoma of lung  - Clinical stage from 12/22/2021: Stage IIB (cT3, cN0, cM0) - Signed by Juan Lopez MD on 12/22/2021           HPI    In the interim he has been following with nurse practitioners and colleague Dr. Rowe returns for follow-up last planned immunotherapy infusion durvalumab for tomorrow.  He has been doing well without any interval concerns.  No unintentional weight loss or respiratory changes.    Review of Systems    ?   A comprehensive 14-point review of systems was reviewed with patient and was negative other than as specified above.   ?      Objective:      Physical Exam        Vitals:    02/22/23 0948   BP: 136/72   Pulse: 75   Temp: 97.8 °F (36.6 °C)      ?   ECOG:?0   General appearance: Generally well appearing, in no acute distress.   Head, eyes, ears, nose, and throat: moist mucous membranes.   Respiratory:  Clear to auscultation bilaterally no wheezing rhonchi or rales  Abdomen: nontender, nondistended.   Extremities: Warm, without edema.   Neurologic: Alert and oriented. Grossly normal strength, coordination, and gait.   Skin: No rashes, ecchymoses or petechial lesion.   Psychiatric:  Normal mood and affect.    Laboratory:  ?   Lab Visit on 02/22/2023   Component Date Value Ref Range Status    Sodium 02/22/2023 139  136 - 145 mmol/L Final    Potassium 02/22/2023 4.0  3.5 - 5.1 mmol/L Final    Chloride 02/22/2023 105  95 - 110 mmol/L Final    CO2 02/22/2023 24  23 - 29 mmol/L Final    Glucose 02/22/2023 95  70 - 110 mg/dL Final    BUN 02/22/2023 7 (L)  8 - 23 mg/dL Final    Creatinine 02/22/2023 0.8  0.5 - 1.4 mg/dL Final    Calcium 02/22/2023 9.0   8.7 - 10.5 mg/dL Final    Total Protein 02/22/2023 6.4  6.0 - 8.4 g/dL Final    Albumin 02/22/2023 3.7  3.5 - 5.2 g/dL Final    Total Bilirubin 02/22/2023 0.6  0.1 - 1.0 mg/dL Final    Alkaline Phosphatase 02/22/2023 107  55 - 135 U/L Final    AST 02/22/2023 21  10 - 40 U/L Final    ALT 02/22/2023 23  10 - 44 U/L Final    Anion Gap 02/22/2023 10  8 - 16 mmol/L Final    eGFR 02/22/2023 >60  >60 mL/min/1.73 m^2 Final    WBC 02/22/2023 6.15  3.90 - 12.70 K/uL Final    RBC 02/22/2023 4.43 (L)  4.60 - 6.20 M/uL Final    Hemoglobin 02/22/2023 14.6  14.0 - 18.0 g/dL Final    Hematocrit 02/22/2023 43.3  40.0 - 54.0 % Final    MCV 02/22/2023 98  82 - 98 fL Final    MCH 02/22/2023 33.0 (H)  27.0 - 31.0 pg Final    MCHC 02/22/2023 33.7  32.0 - 36.0 g/dL Final    RDW 02/22/2023 13.2  11.5 - 14.5 % Final    Platelets 02/22/2023 222  150 - 450 K/uL Final    MPV 02/22/2023 9.1 (L)  9.2 - 12.9 fL Final    Immature Granulocytes 02/22/2023 0.3  0.0 - 0.5 % Final    Gran # (ANC) 02/22/2023 4.2  1.8 - 7.7 K/uL Final    Immature Grans (Abs) 02/22/2023 0.02  0.00 - 0.04 K/uL Final    Lymph # 02/22/2023 1.2  1.0 - 4.8 K/uL Final    Mono # 02/22/2023 0.7  0.3 - 1.0 K/uL Final    Eos # 02/22/2023 0.1  0.0 - 0.5 K/uL Final    Baso # 02/22/2023 0.03  0.00 - 0.20 K/uL Final    nRBC 02/22/2023 0  0 /100 WBC Final    Gran % 02/22/2023 67.5  38.0 - 73.0 % Final    Lymph % 02/22/2023 19.8  18.0 - 48.0 % Final    Mono % 02/22/2023 10.6  4.0 - 15.0 % Final    Eosinophil % 02/22/2023 1.3  0.0 - 8.0 % Final    Basophil % 02/22/2023 0.5  0.0 - 1.9 % Final    Differential Method 02/22/2023 Automated   Final      ?     Imaging:  ?    Results for orders placed or performed during the hospital encounter of 12/20/22 (from the past 2160 hour(s))   CT Chest With Contrast    Narrative    EXAMINATION:  CT CHEST WITH CONTRAST    CLINICAL HISTORY:  Non-small cell lung cancer (NSCLC), non-metastatic, assess treatment response;   Malignant neoplasm of unspecified part of unspecified bronchus or lung    TECHNIQUE:  Axial images of the chest were obtained with IV contrast administration.  Coronal and sagittal reconstructions were provided.  Three dimensional and MIP images were obtained and evaluated.  Total DLP was 478.63 mGy-cm. Dose lowering technique and automated exposure control were utilized for this exam.    COMPARISON:  PET-CT 09/26/2022.    FINDINGS:  There is a left IJ port.  The airway is widely patent.  There is no mediastinal or hilar lymphadenopathy.  There is no central pulmonary embolus.  There are scattered coronary calcifications.  The heart is not enlarged.    The right hilar and perihilar soft tissue thickening with associated bronchiectasis and posterior right lower lobe opacity are unchanged in appearance from the prior exam.  There is no evidence of soft tissue progression.  There is no new pulmonary nodule or mass.  There is no pleural effusion.  There is no ground-glass or reticulonodular airspace opacity.    The upper abdomen demonstrates a small hiatal hernia surgical clips in the periportal region.  There is no lytic or blastic osseous lesion.      Impression    1. Unchanged appearance of the right hilar and perihilar opacity favored to represent post treatment change with no new site of disease on this exam.  2. Unchanged small hiatal hernia.      Electronically signed by: Rohan Pinedo MD  Date:    12/20/2022  Time:    12:02     No results found for this or any previous visit (from the past 2160 hour(s)).  No results found for this or any previous visit (from the past 2160 hour(s)).      No results found for this or any previous visit (from the past 2160 hour(s)).  NM PET CT ROUTINE     CLINICAL HISTORY:  Non-small cell lung cancer (NSCLC), staging;Non-small cell lung cancer (NSCLC), metastatic, assess treatment response; Malignant neoplasm of unspecified part of unspecified bronchus or lung     TECHNIQUE:  12.31  mCi F18-FDG was administered intravenously via the right antecubital fossa.  Approximately 60 minutes after radiotracer distribution, PET images were acquired from the skull base to the mid thigh. Transmission images were acquired to correct for attenuation using a whole body low-dose CT scan without contrast.     COMPARISON:  06/23/2022     FINDINGS:  Head and Neck:     Brain demonstrates normal metabolism.  However, FDG-PET has an approximate 60% sensitivity for brain metastases which are best detected by MRI with gadolinium.  Physiologic uptake is in the neck.     Chest:     Right perihilar/infrahilar patchy consolidative changes have slightly improved compared to the prior study and demonstrate low level uptake, maximum SUV of 3.3.  No measurable nodule or mass is identified on today's examination.  No enlarged or FDG avid lymph nodes are in the chest.     Abdomen and Pelvis:     Physiologic uptake is in the liver, spleen, urinary system and bowel. No enlarged or FDG avid lymph nodes are in the abdomen or pelvis. Adrenal glands are normal.     Musculoskeletal:     No FDG avid osseous lesions are present.     Impression:     Continued positive response to therapy with no measurable lesion seen on today's study.  Patchy right perihilar and infrahilar opacities with low level uptake are favored to relate to post treatment change.  Assessment/Plan:   Squamous cell carcinoma of right lung  -     CT Chest Abdomen Pelvis With Contrast (xpd); Future; Expected date: 02/22/2023    Other orders  -     durvalumab (IMFINZI) 1,500 mg in sodium chloride 0.9% SolP 280 mL chemo infusion  -     EPINEPHrine (EPIPEN) 0.3 mg/0.3 mL pen injection 0.3 mg  -     diphenhydrAMINE injection 50 mg  -     hydrocortisone sodium succinate injection 100 mg  -     sodium chloride 0.9% 100 mL flush bag  -     sodium chloride 0.9% flush 10 mL  -     heparin, porcine (PF) 100 unit/mL injection flush 500 Units  -     alteplase injection 2  mg           1. Squamous cell carcinoma of right lung              Plan:     Problem List Items Addressed This Visit          Oncology    Squamous cell carcinoma of lung - Primary       Squamous cell carcinoma lung stage II:  patient expressed not interested in surgical option and therefore definitive chemoradiation was recommended by primary oncologist Dr. Jessica doty and radiation oncologist Dr. Roberson.  Cycle 1 day 1  01/14/2022 and completed 02/24/2022.  He has continued on maintenance immunotherapy with durvalumab q.4 weeks tolerating well.  Plan to complete 1 year maintenance therapy 02/23/2023.  Labs reviewed without concerning findings pending CMP and TSH.  Will get restaging CT chest abdomen pelvis following completion of maintenance therapy.      FOLLOW-UP  Durvalumab tomorrow pending CMP  Revisit 2-4 weeks after repeat CT chest abdomen pelvis, 10:00 a.m. per patient request  Note patient preference to leave port in 4 year discussed need for maintenance every 6 week port flushes, ordered.      Route Chart for Scheduling    Med Onc Chart Routing      Follow up with physician 4 weeks and 3 months. after Ct scan and then in 3 mo   Follow up with [facility]    Infusion scheduling note durvalumab 2/23/23 final cycle; port flush q6wks   Injection scheduling note    Labs    Imaging CT chest abdomen pelvis   in 3-4 wks, 10 oclock please   Pharmacy appointment    Other referrals          Treatment Plan Information   OP DURVALUMAB 1500 MG Q4W   Winnie Baez MD   Upcoming Treatment Dates - OP DURVALUMAB 1500 MG Q4W    2/23/2023       Chemotherapy       durvalumab (IMFINZI) 1,500 mg in sodium chloride 0.9% SolP 280 mL chemo infusion

## 2023-02-23 ENCOUNTER — INFUSION (OUTPATIENT)
Dept: INFUSION THERAPY | Facility: HOSPITAL | Age: 83
End: 2023-02-23
Attending: INTERNAL MEDICINE
Payer: MEDICARE

## 2023-02-23 VITALS
OXYGEN SATURATION: 98 % | TEMPERATURE: 98 F | SYSTOLIC BLOOD PRESSURE: 129 MMHG | RESPIRATION RATE: 18 BRPM | HEART RATE: 65 BPM | DIASTOLIC BLOOD PRESSURE: 64 MMHG

## 2023-02-23 DIAGNOSIS — C34.90 SQUAMOUS CELL CARCINOMA OF LUNG, UNSPECIFIED LATERALITY: Primary | ICD-10-CM

## 2023-02-23 PROCEDURE — 63600175 PHARM REV CODE 636 W HCPCS: Mod: JG | Performed by: INTERNAL MEDICINE

## 2023-02-23 PROCEDURE — 25000003 PHARM REV CODE 250: Performed by: INTERNAL MEDICINE

## 2023-02-23 PROCEDURE — 96413 CHEMO IV INFUSION 1 HR: CPT

## 2023-02-23 RX ORDER — EPINEPHRINE 0.3 MG/.3ML
0.3 INJECTION SUBCUTANEOUS ONCE AS NEEDED
Status: DISCONTINUED | OUTPATIENT
Start: 2023-02-23 | End: 2023-02-23 | Stop reason: HOSPADM

## 2023-02-23 RX ORDER — HEPARIN 100 UNIT/ML
500 SYRINGE INTRAVENOUS
Status: DISCONTINUED | OUTPATIENT
Start: 2023-02-23 | End: 2023-02-23 | Stop reason: HOSPADM

## 2023-02-23 RX ORDER — DIPHENHYDRAMINE HYDROCHLORIDE 50 MG/ML
50 INJECTION INTRAMUSCULAR; INTRAVENOUS ONCE AS NEEDED
Status: DISCONTINUED | OUTPATIENT
Start: 2023-02-23 | End: 2023-02-23 | Stop reason: HOSPADM

## 2023-02-23 RX ADMIN — SODIUM CHLORIDE 1500 MG: 9 INJECTION, SOLUTION INTRAVENOUS at 09:02

## 2023-02-23 RX ADMIN — HEPARIN 500 UNITS: 100 SYRINGE at 10:02

## 2023-02-23 NOTE — PLAN OF CARE
Problem: Adult Inpatient Plan of Care  Goal: Plan of Care Review  Outcome: Ongoing, Progressing  Flowsheets (Taken 2/23/2023 0935)  Plan of Care Reviewed With: patient  Goal: Optimal Comfort and Wellbeing  Outcome: Ongoing, Progressing  Intervention: Provide Person-Centered Care  Flowsheets (Taken 2/23/2023 0935)  Trust Relationship/Rapport:   care explained   choices provided   emotional support provided   empathic listening provided   questions answered   questions encouraged   reassurance provided   thoughts/feelings acknowledged     Problem: Neutropenia (Chemotherapy Effects)  Goal: Absence of Infection  Outcome: Ongoing, Progressing  Intervention: Prevent Infection and Maximize Resistance  Flowsheets (Taken 2/23/2023 0935)  Infection Prevention:   personal protective equipment utilized   rest/sleep promoted   hand hygiene promoted   equipment surfaces disinfected

## 2023-03-15 ENCOUNTER — HOSPITAL ENCOUNTER (OUTPATIENT)
Dept: RADIOLOGY | Facility: HOSPITAL | Age: 83
Discharge: HOME | End: 2023-03-15
Attending: INTERNAL MEDICINE
Payer: MEDICARE

## 2023-03-15 DIAGNOSIS — C34.91 SQUAMOUS CELL CARCINOMA OF RIGHT LUNG: ICD-10-CM

## 2023-03-15 PROCEDURE — 74177 CT CHEST ABDOMEN PELVIS WITH CONTRAST (XPD): ICD-10-PCS | Mod: 26,,, | Performed by: STUDENT IN AN ORGANIZED HEALTH CARE EDUCATION/TRAINING PROGRAM

## 2023-03-15 PROCEDURE — 71260 CT THORAX DX C+: CPT | Mod: 26,,, | Performed by: STUDENT IN AN ORGANIZED HEALTH CARE EDUCATION/TRAINING PROGRAM

## 2023-03-15 PROCEDURE — 71260 CT CHEST ABDOMEN PELVIS WITH CONTRAST (XPD): ICD-10-PCS | Mod: 26,,, | Performed by: STUDENT IN AN ORGANIZED HEALTH CARE EDUCATION/TRAINING PROGRAM

## 2023-03-15 PROCEDURE — 71260 CT THORAX DX C+: CPT | Mod: TC

## 2023-03-15 PROCEDURE — 25500020 PHARM REV CODE 255: Performed by: INTERNAL MEDICINE

## 2023-03-15 PROCEDURE — 74177 CT ABD & PELVIS W/CONTRAST: CPT | Mod: TC

## 2023-03-15 PROCEDURE — 74177 CT ABD & PELVIS W/CONTRAST: CPT | Mod: 26,,, | Performed by: STUDENT IN AN ORGANIZED HEALTH CARE EDUCATION/TRAINING PROGRAM

## 2023-03-15 PROCEDURE — A9698 NON-RAD CONTRAST MATERIALNOC: HCPCS | Performed by: INTERNAL MEDICINE

## 2023-03-15 RX ADMIN — IOHEXOL 1000 ML: 9 SOLUTION ORAL at 12:03

## 2023-03-15 RX ADMIN — IOHEXOL 100 ML: 350 INJECTION, SOLUTION INTRAVENOUS at 12:03

## 2023-03-23 ENCOUNTER — OFFICE VISIT (OUTPATIENT)
Dept: HEMATOLOGY/ONCOLOGY | Facility: CLINIC | Age: 83
End: 2023-03-23
Payer: MEDICARE

## 2023-03-23 DIAGNOSIS — E06.4 DRUG-INDUCED THYROIDITIS: ICD-10-CM
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
## 2023-03-23 DIAGNOSIS — C34.91 SQUAMOUS CELL CARCINOMA OF RIGHT LUNG: Primary | ICD-10-CM

## 2023-03-23 DIAGNOSIS — D84.822 IMMUNODEFICIENCY SECONDARY TO RADIATION THERAPY: ICD-10-CM

## 2023-03-23 DIAGNOSIS — C77.1 SECONDARY AND UNSPECIFIED MALIGNANT NEOPLASM OF INTRATHORACIC LYMPH NODES: ICD-10-CM

## 2023-03-23 PROCEDURE — 99215 OFFICE O/P EST HI 40 MIN: CPT | Mod: 95,,, | Performed by: INTERNAL MEDICINE

## 2023-03-23 PROCEDURE — 99215 PR OFFICE/OUTPT VISIT, EST, LEVL V, 40-54 MIN: ICD-10-PCS | Mod: 95,,, | Performed by: INTERNAL MEDICINE

## 2023-03-23 NOTE — PROGRESS NOTES
Subjective:      DATE OF VISIT: 3/23/23   The patient location is: [facility]  The chief complaint leading to consultation is: followup    Visit type: audiovisual    Face to Face time with patient: 10 minutes  25 [facility] follow-up minutes of total time spent on the encounter, which includes face to face time and non-face to face time preparing to see the patient (eg, review of tests), Obtaining and/or reviewing separately obtained history, Documenting clinical information in the electronic or other health record, Independently interpreting results (not separately reported) and communicating results to the patient/family/caregiver, or Care coordination (not separately reported).         Each patient to whom he or she provides medical services by telemedicine is:  (1) informed of the relationship between the physician and patient and the respective role of any other health care provider with respect to management of the patient; and (2) notified that he or she may decline to receive medical services by telemedicine and may withdraw from such care at any time.    Notes:    ?  Patient ID:?Alton Black is a 82 y.o. male.?? MR#: [id number]   ?   PRIMARY ONCOLOGIST: Dr. Baez    ? Primary Care Providers:  Rohan Kenny MD, MD (General)     CHIEF COMPLAINT: ?  Follow-up after completion of chemoradiation  ?   ONCOLOGIC DIAGNOSIS:  Stage IIB squamous cell carcinoma lung  ?   CURRENT TREATMENT:  Surveillance    PAST TREATMENT:   Chemoradiation with carboplatin paclitaxel weekly, cycle 1 day 1 1/14/22, completed 02/24/2022  ? durvalumab maintenance, q.4 weeks, completed 02/23/2023  ONCOLOGIC HISTORY:   ?   Oncology History   Squamous cell carcinoma of lung   1/13/2021 - 2/24/2022 Radiation Therapy    Treating physician: Karol  Total Dose: 60 Gy  Fractions: 30     12/17/2021 Initial Diagnosis    Squamous cell lung cancer     12/22/2021 Cancer Staged    Staging form: Lung, AJCC 8th Edition  -  Clinical stage from 12/22/2021: Stage IIB (cT3, cN0, cM0)     1/14/2022 - 2/21/2022 Chemotherapy    Treatment Summary   Plan Name: OP NSCLC PACLITAXEL + CARBOPLATIN (AUC) QW + RADIATION  Treatment Goal: Curative  Status: Inactive  Start Date: 1/14/2022  End Date: 2/21/2022  Provider: Juan Lopez MD  Chemotherapy: dexAMETHasone (DECADRON) 4 MG Tab, 8 mg, Oral, Daily, 1 of 1 cycle, Start date: --, End date: --  CARBOplatin (PARAPLATIN) 230 mg in sodium chloride 0.9% 273 mL chemo infusion, 230 mg (100 % of original dose 230 mg), Intravenous, Clinic/HOD 1 time, 6 of 6 cycles  Dose modification:   (original dose 230 mg, Cycle 1)  Administration: 230 mg (1/14/2022), 230 mg (1/21/2022), 255 mg (1/28/2022), 255 mg (2/4/2022), 255 mg (2/14/2022), 255 mg (2/21/2022)  PACLitaxeL (TAXOL) 45 mg/m2 = 96 mg in sodium chloride 0.9% 266 mL chemo infusion, 45 mg/m2 = 96 mg, Intravenous, Clinic/HOD 1 time, 6 of 6 cycles  Administration: 96 mg (1/14/2022), 96 mg (1/21/2022), 96 mg (1/28/2022), 96 mg (2/4/2022), 96 mg (2/14/2022), 96 mg (2/21/2022)     3/21/2022 -  Chemotherapy    Treatment Summary   Plan Name: OP DURVALUMAB 1500 MG Q4W  Treatment Goal: Curative  Status: Active  Start Date: 3/21/2022  End Date: 2/23/2023  Provider: Winnie Baez MD  Chemotherapy: [No matching medication found in this treatment plan]        Cancer Staging   Squamous cell carcinoma of lung  - Clinical stage from 12/22/2021: Stage IIB (cT3, cN0, cM0) - Signed by Juan Lopez MD on 12/22/2021           HPI    Since last visit he has had some respiratory symptoms attributed to allergies.  No fevers or chills new chest pain or shortness of breath.  He is otherwise feeling at baseline.      Review of Systems    ?   A comprehensive 14-point review of systems was reviewed with patient and was negative other than as specified above.   ?      Objective:      Physical Exam      Limited due to virtual  There were no vitals filed for this  visit.     ?   ECOG:?0   General appearance: Generally well appearing, in no acute distress.   Head, eyes, ears, nose, and throat: moist mucous membranes.   Respiratory:  No increased work of breathing  Psychiatric:  Normal mood and affect.    Laboratory:  ?   No visits with results within 1 Day(s) from this visit.   Latest known visit with results is:   Lab Visit on 02/22/2023   Component Date Value Ref Range Status    Sodium 02/22/2023 139  136 - 145 mmol/L Final    Potassium 02/22/2023 4.0  3.5 - 5.1 mmol/L Final    Chloride 02/22/2023 105  95 - 110 mmol/L Final    CO2 02/22/2023 24  23 - 29 mmol/L Final    Glucose 02/22/2023 95  70 - 110 mg/dL Final    BUN 02/22/2023 7 (L)  8 - 23 mg/dL Final    Creatinine 02/22/2023 0.8  0.5 - 1.4 mg/dL Final    Calcium 02/22/2023 9.0  8.7 - 10.5 mg/dL Final    Total Protein 02/22/2023 6.4  6.0 - 8.4 g/dL Final    Albumin 02/22/2023 3.7  3.5 - 5.2 g/dL Final    Total Bilirubin 02/22/2023 0.6  0.1 - 1.0 mg/dL Final    Alkaline Phosphatase 02/22/2023 107  55 - 135 U/L Final    AST 02/22/2023 21  10 - 40 U/L Final    ALT 02/22/2023 23  10 - 44 U/L Final    Anion Gap 02/22/2023 10  8 - 16 mmol/L Final    eGFR 02/22/2023 >60  >60 mL/min/1.73 m^2 Final    WBC 02/22/2023 6.15  3.90 - 12.70 K/uL Final    RBC 02/22/2023 4.43 (L)  4.60 - 6.20 M/uL Final    Hemoglobin 02/22/2023 14.6  14.0 - 18.0 g/dL Final    Hematocrit 02/22/2023 43.3  40.0 - 54.0 % Final    MCV 02/22/2023 98  82 - 98 fL Final    MCH 02/22/2023 33.0 (H)  27.0 - 31.0 pg Final    MCHC 02/22/2023 33.7  32.0 - 36.0 g/dL Final    RDW 02/22/2023 13.2  11.5 - 14.5 % Final    Platelets 02/22/2023 222  150 - 450 K/uL Final    MPV 02/22/2023 9.1 (L)  9.2 - 12.9 fL Final    Immature Granulocytes 02/22/2023 0.3  0.0 - 0.5 % Final    Gran # (ANC) 02/22/2023 4.2  1.8 - 7.7 K/uL Final    Immature Grans (Abs) 02/22/2023 0.02  0.00 - 0.04 K/uL Final    Lymph # 02/22/2023 1.2  1.0 - 4.8 K/uL Final    Mono #  02/22/2023 0.7  0.3 - 1.0 K/uL Final    Eos # 02/22/2023 0.1  0.0 - 0.5 K/uL Final    Baso # 02/22/2023 0.03  0.00 - 0.20 K/uL Final    nRBC 02/22/2023 0  0 /100 WBC Final    Gran % 02/22/2023 67.5  38.0 - 73.0 % Final    Lymph % 02/22/2023 19.8  18.0 - 48.0 % Final    Mono % 02/22/2023 10.6  4.0 - 15.0 % Final    Eosinophil % 02/22/2023 1.3  0.0 - 8.0 % Final    Basophil % 02/22/2023 0.5  0.0 - 1.9 % Final    Differential Method 02/22/2023 Automated   Final    TSH 02/22/2023 1.100  0.400 - 4.000 uIU/mL Final      ?     Imaging:  ?    Results for orders placed or performed during the hospital encounter of 03/15/23 (from the past 2160 hour(s))   CT Chest Abdomen Pelvis With Contrast (xpd)    Narrative    EXAMINATION:  CT CHEST ABDOMEN PELVIS WITH CONTRAST (XPD)    CLINICAL HISTORY:  Metastatic disease evaluation;assess nsclc tx response; Malignant neoplasm of unspecified part of right bronchus or lung    TECHNIQUE:  Low dose axial images, sagittal and coronal reformations were obtained from the thoracic inlet to the pubic symphysis following the IV administration of 100 mL of Omnipaque 350 and the oral administration of 1000 mL of Omnipaque 9.    All CT scans at this location are performed using dose optimization techniques including the following: Automated exposure control; adjustment of the mA and/or kv; use of iterative reconstruction technique.    COMPARISON:  CT dated 12/20/2022 and 03/14/2022    FINDINGS:  Chest:    Trachea and bronchial trees are patent with branching pattern.  Mild bronchiectasis noted in the infra juan right greater than left lower lobe.  No significant bronchial wall thickening.    Chronic appearing consolidation with volume loss in the medial aspect of the superior segment of the right lower lobe appears slightly more prominent compared to prior study.  More mild bandlike opacities noted in the lung bases and inferior lingula.  Remainder of the lungs are otherwise clear.  No new  discrete nodule or mass.    No significant pleural effusion or pneumothorax.    No new suspicious mediastinal or hilar lymphadenopathy.    Heart is normal in size.  No significant pericardial effusion.    Coronary and aortic atherosclerosis noted.  No large central pulmonary embolus.    Left-sided MediPort catheter is unchanged in position.  Soft tissues of the visualized lower neck, chest wall, and axilla are otherwise unremarkable.    No acute bony abnormality.  No aggressive lytic or blastic lesion.  Degenerative changes noted in the spine.    Abdomen/pelvis:    Small cyst in the right hepatic lobe appears unchanged.  Liver is otherwise normal in size, contour, and enhancement.  No new suspicious lesion.  Portal venous system is patent.    Gallbladder is surgically absent.  Biliary tree is otherwise unremarkable.    Spleen is normal in size and enhancement.  No focal lesion.    Pancreas is normal in size, contour, and enhancement.  No focal lesion or ductal dilatation.    Adrenal glands are unremarkable.    Wedge-shaped hypoattenuation with focal cortical scarring in the lower pole the right kidney appears unchanged.  Kidneys are otherwise normal in size and enhancement.  No new suspicious lesion, urolithiasis, or hydroureteronephrosis.    Small moderate hiatal hernia is unchanged.  Colonic diverticulosis noted without evidence of acute diverticulitis.  Gastrointestinal tract is otherwise unremarkable.  No evidence of obstruction, mass lesion, or inflammation.    Urinary bladder is unremarkable.  Prostate is enlarged with coarse prostatic calcifications.    Scattered atherosclerosis with focal aneurysmal dilatation of the infrarenal abdominal aorta proximal to the origin of the SHADY.  Major vessels of the abdomen and pelvis are otherwise unremarkable.    No new suspicious lymphadenopathy.    Prominent coarse calcifications in the region of the dany hepatis appear unchanged.    No significant ascites,  pneumoperitoneum, or peritoneal implant.  Very small fat containing umbilical hernia.    No acute bony abnormality.  No aggressive lytic or blastic lesion.  Osteopenia and degenerative changes noted in the spine and pelvis.      Impression    Chronic appearing consolidation with volume loss in the medial aspect of the superior segment of the right lower lobe appears more prominent when compared to prior study likely representing progression of post treatment related changes although superimposed infection versus recurrence difficult to fully exclude.  Consider short interval follow-up with repeat CT chest in 3 months.    Remaining findings appear unchanged with no new evidence of intrathoracic or abdominopelvic metastatic disease.      Electronically signed by: Jf Funes  Date:    03/15/2023  Time:    16:43     No results found for this or any previous visit (from the past 2160 hour(s)).  No results found for this or any previous visit (from the past 2160 hour(s)).      No results found for this or any previous visit (from the past 2160 hour(s)).  NM PET CT ROUTINE     CLINICAL HISTORY:  Non-small cell lung cancer (NSCLC), staging;Non-small cell lung cancer (NSCLC), metastatic, assess treatment response; Malignant neoplasm of unspecified part of unspecified bronchus or lung     TECHNIQUE:  12.31 mCi F18-FDG was administered intravenously via the right antecubital fossa.  Approximately 60 minutes after radiotracer distribution, PET images were acquired from the skull base to the mid thigh. Transmission images were acquired to correct for attenuation using a whole body low-dose CT scan without contrast.     COMPARISON:  06/23/2022     FINDINGS:  Head and Neck:     Brain demonstrates normal metabolism.  However, FDG-PET has an approximate 60% sensitivity for brain metastases which are best detected by MRI with gadolinium.  Physiologic uptake is in the neck.     Chest:     Right perihilar/infrahilar patchy  consolidative changes have slightly improved compared to the prior study and demonstrate low level uptake, maximum SUV of 3.3.  No measurable nodule or mass is identified on today's examination.  No enlarged or FDG avid lymph nodes are in the chest.     Abdomen and Pelvis:     Physiologic uptake is in the liver, spleen, urinary system and bowel. No enlarged or FDG avid lymph nodes are in the abdomen or pelvis. Adrenal glands are normal.     Musculoskeletal:     No FDG avid osseous lesions are present.     Impression:     Continued positive response to therapy with no measurable lesion seen on today's study.  Patchy right perihilar and infrahilar opacities with low level uptake are favored to relate to post treatment change.  Assessment/Plan:   Squamous cell carcinoma of right lung  -     CT Chest With Contrast; Future; Expected date: 06/23/2023         1. Squamous cell carcinoma of right lung              Plan:     Problem List Items Addressed This Visit          Oncology    Squamous cell carcinoma of lung - Primary       Squamous cell carcinoma lung stage II:  patient expressed not interested in surgical option and therefore definitive chemoradiation was recommended by primary oncologist Dr. Jessica doty and radiation oncologist Dr. Roberson.  Cycle 1 day 1  01/14/2022 and completed 02/24/2022.  He has continued on maintenance immunotherapy with durvalumab q.4 weeks tolerating well.  Plan to complete 1 year maintenance therapy 02/23/2023.  After completion of immunotherapy we obtain restaging CT chest abdomen and pelvis and reviewed with him changes in right lower lobe with differential including evolving post treatment changes, infectious inflammatory or possible disease progression.  .  He does endorse recent mild respiratory symptoms attributed to allergies.  Recommend discussion with Radiation Oncology for further review he has appointment already scheduled later this month.  May consider additional imaging at  least short interval repeat CT chest in 3 months, ordered  FOLLOW-UP    Route Chart for Scheduling    Med Onc Chart Routing      Follow up with physician 3 months.   Follow up with [facility]    Infusion scheduling note    Injection scheduling note port flush in 6 wks q6wks   Labs CBC, CMP and TSH   Scheduling:  Preferred lab:  Lab interval:  same day scan 10-11am cancer center please   Imaging Other   10-11am cancer center please   Pharmacy appointment    Other referrals         Treatment Plan Information   OP DURVALUMAB 1500 MG Q4W   Winnie Baez MD   Upcoming Treatment Dates - OP DURVALUMAB 1500 MG Q4W    No upcoming days in selected categories.    Therapy Plan Information  Flushes  heparin, porcine (PF) 100 unit/mL injection flush 500 Units  500 Units, Intravenous, Every visit  sodium chloride 0.9% flush 10 mL  10 mL, Intravenous, Every visit

## 2023-03-23 NOTE — Clinical Note
Good morning, he will be seeing him next week follow-up after chemoradiation and a year of maintenance immunotherapy.  Restaging scans with some changes in right lower lobe wanted to get your thoughts on this if we should get PET scan at this time or just CT chest in 3 mo

## 2023-03-31 ENCOUNTER — OFFICE VISIT (OUTPATIENT)
Dept: RADIATION ONCOLOGY | Facility: CLINIC | Age: 83
End: 2023-03-31
Payer: MEDICARE

## 2023-03-31 VITALS
SYSTOLIC BLOOD PRESSURE: 122 MMHG | BODY MASS INDEX: 29.6 KG/M2 | DIASTOLIC BLOOD PRESSURE: 67 MMHG | OXYGEN SATURATION: 98 % | HEIGHT: 71 IN | HEART RATE: 77 BPM | TEMPERATURE: 98 F | RESPIRATION RATE: 18 BRPM | WEIGHT: 211.44 LBS

## 2023-03-31 DIAGNOSIS — Z85.118 PERSONAL HISTORY OF LUNG CANCER: ICD-10-CM

## 2023-03-31 PROCEDURE — 3074F SYST BP LT 130 MM HG: CPT | Mod: CPTII,S$GLB,, | Performed by: RADIOLOGY

## 2023-03-31 PROCEDURE — 1159F PR MEDICATION LIST DOCUMENTED IN MEDICAL RECORD: ICD-10-PCS | Mod: CPTII,S$GLB,, | Performed by: RADIOLOGY

## 2023-03-31 PROCEDURE — 1126F AMNT PAIN NOTED NONE PRSNT: CPT | Mod: CPTII,S$GLB,, | Performed by: RADIOLOGY

## 2023-03-31 PROCEDURE — 1101F PR PT FALLS ASSESS DOC 0-1 FALLS W/OUT INJ PAST YR: ICD-10-PCS | Mod: CPTII,S$GLB,, | Performed by: RADIOLOGY

## 2023-03-31 PROCEDURE — 3288F PR FALLS RISK ASSESSMENT DOCUMENTED: ICD-10-PCS | Mod: CPTII,S$GLB,, | Performed by: RADIOLOGY

## 2023-03-31 PROCEDURE — 3078F PR MOST RECENT DIASTOLIC BLOOD PRESSURE < 80 MM HG: ICD-10-PCS | Mod: CPTII,S$GLB,, | Performed by: RADIOLOGY

## 2023-03-31 PROCEDURE — 99999 PR PBB SHADOW E&M-EST. PATIENT-LVL III: CPT | Mod: PBBFAC,,, | Performed by: RADIOLOGY

## 2023-03-31 PROCEDURE — 1101F PT FALLS ASSESS-DOCD LE1/YR: CPT | Mod: CPTII,S$GLB,, | Performed by: RADIOLOGY

## 2023-03-31 PROCEDURE — 99999 PR PBB SHADOW E&M-EST. PATIENT-LVL III: ICD-10-PCS | Mod: PBBFAC,,, | Performed by: RADIOLOGY

## 2023-03-31 PROCEDURE — 1126F PR PAIN SEVERITY QUANTIFIED, NO PAIN PRESENT: ICD-10-PCS | Mod: CPTII,S$GLB,, | Performed by: RADIOLOGY

## 2023-03-31 PROCEDURE — 3288F FALL RISK ASSESSMENT DOCD: CPT | Mod: CPTII,S$GLB,, | Performed by: RADIOLOGY

## 2023-03-31 PROCEDURE — 99213 PR OFFICE/OUTPT VISIT, EST, LEVL III, 20-29 MIN: ICD-10-PCS | Mod: S$GLB,,, | Performed by: RADIOLOGY

## 2023-03-31 PROCEDURE — 3078F DIAST BP <80 MM HG: CPT | Mod: CPTII,S$GLB,, | Performed by: RADIOLOGY

## 2023-03-31 PROCEDURE — 3074F PR MOST RECENT SYSTOLIC BLOOD PRESSURE < 130 MM HG: ICD-10-PCS | Mod: CPTII,S$GLB,, | Performed by: RADIOLOGY

## 2023-03-31 PROCEDURE — 99213 OFFICE O/P EST LOW 20 MIN: CPT | Mod: S$GLB,,, | Performed by: RADIOLOGY

## 2023-03-31 PROCEDURE — 1159F MED LIST DOCD IN RCRD: CPT | Mod: CPTII,S$GLB,, | Performed by: RADIOLOGY

## 2023-03-31 NOTE — PROGRESS NOTES
3/31/2023    Radiation Oncology Follow-Up Visit      Prior Radiation History:      Site  [employer]  Dose/Fx (Gy)  #Fx  Total Dose (Gy)  Start Date  End Date  Elapsed Days    RLL Lung VMAT 6X  2  30 / 30  60  1/13/2022 2/24/2022  42        Is the patient female between ages 15-55:  no    Does the patient have a CIED:  no      Assessment   This is an 82 y.o. male with h/o cT2b vT4 N1M0, stage IIB v IIIA NSCLC (squam) diagnosed in 12/2021 s/p definitive chemo-RT to 60 Gy in 30 fx completed 2/24/22 with Dr. Roberson. He completed 1 year of maintenance immunotherapy in 2/2023. He returns for routine follow-up.     No late toxicity from treatment. CT C/A/P 3/15/23 demonstrated evolving post-radiation changes in the RLL without evidence of disease.          Plan   1) F/U in Radiation Oncology in 1 year.   2) F/U with Dr. Baez as scheduled and for continued systemic surveillance.           CHIEF COMPLAINT: F/U after lung radiation    HPI/Focused ROS: Returns for routine follow-up. Denies significant dyspnea, cough, fevers, or chest pain.         Past Medical History:   Diagnosis Date    Benign essential HTN     CAD (coronary artery disease)     HLD (hyperlipidemia)     Squamous cell carcinoma of lung 12/17/2021       Past Surgical History:   Procedure Laterality Date    BRONCHOSCOPY Bilateral 12/8/2021    Procedure: Bronchoscopy - insert lighted tube into airway to take a biopsy of lung;  Surgeon: Rigo Vences MD;  Location: [facility] ENDO;  Service: Endoscopy;  Laterality: Bilateral;    CHOLECYSTECTOMY      CORONARY ANGIOPLASTY WITH STENT PLACEMENT      ENDOBRONCHIAL ULTRASOUND Bilateral 12/8/2021    Procedure: ENDOBRONCHIAL ULTRASOUND (EBUS);  Surgeon: Rigo Vences MD;  Location: [facility] ENDO;  Service: Endoscopy;  Laterality: Bilateral;    FLUOROSCOPY N/A 1/4/2022    Procedure: Mediport Insertion;  Surgeon: Elaina Larios MD;  Location: [facility] CATH LAB;  Service: General;  Laterality: N/A;  yuridia from  "1/3 to 1/4       Social History     Tobacco Use    Smoking status: Never    Smokeless tobacco: Never    Tobacco comments:     Chews on cigars   Substance Use Topics    Alcohol use: Not Currently    Drug use: Never       Cancer-related family history is not on file.    Current Outpatient Medications on File Prior to Visit   Medication Sig Dispense Refill    ALPRAZolam (XANAX) 0.5 MG tablet Take 0.5 mg by mouth 2 (two) times daily as needed.      aspirin (ECOTRIN) 81 MG EC tablet Take 81 mg by mouth once daily.      atorvastatin (LIPITOR) 80 MG tablet Take 1 tablet by mouth once daily.      esomeprazole (NEXIUM) 40 MG capsule Take 1 capsule by mouth every morning.      linaCLOtide (LINZESS) 72 mcg Cap capsule Take 1 capsule by mouth every morning.      nitrofurantoin, macrocrystal-monohydrate, (MACROBID) 100 MG capsule Take 100 mg by mouth 2 (two) times daily.      trandolapriL (MAVIK) 4 MG Tab Take 1 tablet by mouth once daily.       No current facility-administered medications on file prior to visit.       Review of patient's allergies indicates:  No Known Allergies      Vital Signs: /67   Pulse 77   Temp 97.8 °F (36.6 °C)   Resp 18   Ht 5' 11" (1.803 m)   Wt 95.9 kg (211 lb 6.7 oz)   SpO2 98%   BMI 29.49 kg/m²     ECOG Performance Status: 2 - Ambulates, capable of self care only    Physical Exam  Vitals reviewed.   Constitutional:       Appearance: Normal appearance.   HENT:      Head: Normocephalic and atraumatic.   Eyes:      General: No scleral icterus.     Extraocular Movements: Extraocular movements intact.   Pulmonary:      Effort: Pulmonary effort is normal. No respiratory distress.   Abdominal:      General: There is no distension.   Musculoskeletal:      Cervical back: Neck supple.   Lymphadenopathy:      Cervical: No cervical adenopathy.   Skin:     General: Skin is dry.      Coloration: Skin is not jaundiced.   Neurological:      Mental Status: He is alert and oriented to person, place, and " time.      Cranial Nerves: No cranial nerve deficit.   Psychiatric:         Mood and Affect: Mood normal.         Behavior: Behavior normal.         Judgment: Judgment normal.        Labs:    Imaging: I have personally reviewed the patient's available images and reports and summarized pertinent findings above in HPI.     Pathology: No new path

## 2023-04-10 ENCOUNTER — INFUSION (OUTPATIENT)
Dept: INFUSION THERAPY | Facility: HOSPITAL | Age: 83
End: 2023-04-10
Attending: INTERNAL MEDICINE
Payer: MEDICARE

## 2023-04-10 VITALS
TEMPERATURE: 97 F | DIASTOLIC BLOOD PRESSURE: 69 MMHG | RESPIRATION RATE: 18 BRPM | SYSTOLIC BLOOD PRESSURE: 131 MMHG | HEART RATE: 66 BPM | OXYGEN SATURATION: 97 %

## 2023-04-10 DIAGNOSIS — C34.91 SQUAMOUS CELL CARCINOMA OF RIGHT LUNG: Primary | ICD-10-CM

## 2023-04-10 PROCEDURE — 63600175 PHARM REV CODE 636 W HCPCS: Performed by: INTERNAL MEDICINE

## 2023-04-10 PROCEDURE — 25000003 PHARM REV CODE 250: Performed by: INTERNAL MEDICINE

## 2023-04-10 PROCEDURE — 96523 IRRIG DRUG DELIVERY DEVICE: CPT

## 2023-04-10 PROCEDURE — A4216 STERILE WATER/SALINE, 10 ML: HCPCS | Performed by: INTERNAL MEDICINE

## 2023-04-10 RX ORDER — HEPARIN 100 UNIT/ML
500 SYRINGE INTRAVENOUS
Status: DISCONTINUED | OUTPATIENT
Start: 2023-04-10 | End: 2023-04-10 | Stop reason: HOSPADM

## 2023-04-10 RX ORDER — HEPARIN 100 UNIT/ML
500 SYRINGE INTRAVENOUS
Status: CANCELLED | OUTPATIENT
Start: 2023-04-10

## 2023-04-10 RX ORDER — SODIUM CHLORIDE 0.9 % (FLUSH) 0.9 %
10 SYRINGE (ML) INJECTION
Status: DISCONTINUED | OUTPATIENT
Start: 2023-04-10 | End: 2023-04-10 | Stop reason: HOSPADM

## 2023-04-10 RX ORDER — SODIUM CHLORIDE 0.9 % (FLUSH) 0.9 %
10 SYRINGE (ML) INJECTION
Status: CANCELLED | OUTPATIENT
Start: 2023-04-10

## 2023-04-10 RX ADMIN — SODIUM CHLORIDE, PRESERVATIVE FREE 10 ML: 5 INJECTION INTRAVENOUS at 09:04

## 2023-04-10 RX ADMIN — HEPARIN 500 UNITS: 100 SYRINGE at 09:04

## 2023-04-10 NOTE — NURSING
Patient here today for port flush. PAC accessed via sterile technique with 20g 1 in bhatia needle without difficulty.  Positive blood return noted.  Flushed per protocol NS and Heparin 500 units as documented on MAR. Bhatia needle removed and intact. Patient tolerated well. Band-Aid applied to site. Vital signs and assessment documented. Patient confirmed next appt and ambulated out of treatment room.

## 2023-04-10 NOTE — DISCHARGE INSTRUCTIONS
[facility]  [address] [facility]  [address]  [phone] phone     [phone] fax  Hours of Operation: Monday- Friday 8:00am- 5:00pm  After hours phone  [phone]  Hematology / Oncology Physicians on call      DARINEL Garcia Dr., Dr., NP Sydney Prescott, HEDY Tillman FNP    Please call with any concerns regarding your appointment today.

## 2023-05-31 ENCOUNTER — TELEPHONE (OUTPATIENT)
Dept: INFUSION THERAPY | Facility: HOSPITAL | Age: 83
End: 2023-05-31
Payer: MEDICARE

## 2023-05-31 NOTE — TELEPHONE ENCOUNTER
Returned patient's call. Requests to r/s his port flush to the following day. I acknowledged and port flush was rescheduled. Call ended well.

## 2023-06-08 ENCOUNTER — INFUSION (OUTPATIENT)
Dept: INFUSION THERAPY | Facility: HOSPITAL | Age: 83
End: 2023-06-08
Attending: INTERNAL MEDICINE
Payer: MEDICARE

## 2023-06-08 VITALS — RESPIRATION RATE: 18 BRPM | HEART RATE: 80 BPM | TEMPERATURE: 98 F | OXYGEN SATURATION: 98 %

## 2023-06-08 DIAGNOSIS — C34.91 SQUAMOUS CELL CARCINOMA OF RIGHT LUNG: Primary | ICD-10-CM

## 2023-06-08 PROCEDURE — 63600175 PHARM REV CODE 636 W HCPCS: Performed by: INTERNAL MEDICINE

## 2023-06-08 PROCEDURE — A4216 STERILE WATER/SALINE, 10 ML: HCPCS | Performed by: INTERNAL MEDICINE

## 2023-06-08 PROCEDURE — 96523 IRRIG DRUG DELIVERY DEVICE: CPT

## 2023-06-08 PROCEDURE — 25000003 PHARM REV CODE 250: Performed by: INTERNAL MEDICINE

## 2023-06-08 RX ORDER — SODIUM CHLORIDE 0.9 % (FLUSH) 0.9 %
10 SYRINGE (ML) INJECTION
Status: DISCONTINUED | OUTPATIENT
Start: 2023-06-08 | End: 2023-06-08 | Stop reason: HOSPADM

## 2023-06-08 RX ORDER — HEPARIN 100 UNIT/ML
500 SYRINGE INTRAVENOUS
Status: CANCELLED | OUTPATIENT
Start: 2023-06-08

## 2023-06-08 RX ORDER — HEPARIN 100 UNIT/ML
500 SYRINGE INTRAVENOUS
Status: DISCONTINUED | OUTPATIENT
Start: 2023-06-08 | End: 2023-06-08 | Stop reason: HOSPADM

## 2023-06-08 RX ORDER — SODIUM CHLORIDE 0.9 % (FLUSH) 0.9 %
10 SYRINGE (ML) INJECTION
Status: CANCELLED | OUTPATIENT
Start: 2023-06-08

## 2023-06-08 RX ADMIN — SODIUM CHLORIDE, PRESERVATIVE FREE 10 ML: 5 INJECTION INTRAVENOUS at 09:06

## 2023-06-08 RX ADMIN — HEPARIN 500 UNITS: 100 SYRINGE at 09:06

## 2023-06-08 NOTE — DISCHARGE INSTRUCTIONS
[facility]  [address] [facility]  [address]  [phone] phone     [phone] fax  Hours of Operation: Monday- Friday 8:00am- 5:00pm  After hours phone  [phone]  Hematology / Oncology Physicians on call      DARINEL Sosa Dr., Dr., NP Sydney Prescott, HEDY Tillman FNP    Please call with any concerns regarding your appointment today.

## 2023-06-20 ENCOUNTER — HOSPITAL ENCOUNTER (OUTPATIENT)
Dept: RADIOLOGY | Facility: HOSPITAL | Age: 83
Discharge: HOME | End: 2023-06-20
Attending: INTERNAL MEDICINE
Payer: MEDICARE

## 2023-06-20 DIAGNOSIS — C34.91 SQUAMOUS CELL CARCINOMA OF RIGHT LUNG: ICD-10-CM

## 2023-06-20 PROCEDURE — 71260 CT THORAX DX C+: CPT | Mod: 26,,, | Performed by: RADIOLOGY

## 2023-06-20 PROCEDURE — 25500020 PHARM REV CODE 255: Performed by: INTERNAL MEDICINE

## 2023-06-20 PROCEDURE — 71260 CT CHEST WITH CONTRAST: ICD-10-PCS | Mod: 26,,, | Performed by: RADIOLOGY

## 2023-06-20 PROCEDURE — 71260 CT THORAX DX C+: CPT | Mod: TC

## 2023-06-20 RX ADMIN — IOHEXOL 100 ML: 350 INJECTION, SOLUTION INTRAVENOUS at 10:06

## 2023-06-21 ENCOUNTER — OFFICE VISIT (OUTPATIENT)
Dept: HEMATOLOGY/ONCOLOGY | Facility: CLINIC | Age: 83
End: 2023-06-21
Payer: MEDICARE

## 2023-06-21 VITALS
BODY MASS INDEX: 30.09 KG/M2 | DIASTOLIC BLOOD PRESSURE: 63 MMHG | OXYGEN SATURATION: 99 % | WEIGHT: 214.94 LBS | TEMPERATURE: 97 F | HEIGHT: 71 IN | SYSTOLIC BLOOD PRESSURE: 122 MMHG | HEART RATE: 83 BPM

## 2023-06-21 DIAGNOSIS — C34.91 SQUAMOUS CELL CARCINOMA OF RIGHT LUNG: Primary | ICD-10-CM
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
## 2023-06-21 DIAGNOSIS — D84.822 IMMUNODEFICIENCY SECONDARY TO RADIATION THERAPY: ICD-10-CM

## 2023-06-21 PROCEDURE — 1101F PR PT FALLS ASSESS DOC 0-1 FALLS W/OUT INJ PAST YR: ICD-10-PCS | Mod: CPTII,S$GLB,, | Performed by: INTERNAL MEDICINE

## 2023-06-21 PROCEDURE — 99214 PR OFFICE/OUTPT VISIT, EST, LEVL IV, 30-39 MIN: ICD-10-PCS | Mod: S$GLB,,, | Performed by: INTERNAL MEDICINE

## 2023-06-21 PROCEDURE — 3288F PR FALLS RISK ASSESSMENT DOCUMENTED: ICD-10-PCS | Mod: CPTII,S$GLB,, | Performed by: INTERNAL MEDICINE

## 2023-06-21 PROCEDURE — 1101F PT FALLS ASSESS-DOCD LE1/YR: CPT | Mod: CPTII,S$GLB,, | Performed by: INTERNAL MEDICINE

## 2023-06-21 PROCEDURE — 99214 OFFICE O/P EST MOD 30 MIN: CPT | Mod: S$GLB,,, | Performed by: INTERNAL MEDICINE

## 2023-06-21 PROCEDURE — 1159F PR MEDICATION LIST DOCUMENTED IN MEDICAL RECORD: ICD-10-PCS | Mod: CPTII,S$GLB,, | Performed by: INTERNAL MEDICINE

## 2023-06-21 PROCEDURE — 3074F SYST BP LT 130 MM HG: CPT | Mod: CPTII,S$GLB,, | Performed by: INTERNAL MEDICINE

## 2023-06-21 PROCEDURE — 3078F PR MOST RECENT DIASTOLIC BLOOD PRESSURE < 80 MM HG: ICD-10-PCS | Mod: CPTII,S$GLB,, | Performed by: INTERNAL MEDICINE

## 2023-06-21 PROCEDURE — 99999 PR PBB SHADOW E&M-EST. PATIENT-LVL III: ICD-10-PCS | Mod: PBBFAC,,, | Performed by: INTERNAL MEDICINE

## 2023-06-21 PROCEDURE — 3288F FALL RISK ASSESSMENT DOCD: CPT | Mod: CPTII,S$GLB,, | Performed by: INTERNAL MEDICINE

## 2023-06-21 PROCEDURE — 1159F MED LIST DOCD IN RCRD: CPT | Mod: CPTII,S$GLB,, | Performed by: INTERNAL MEDICINE

## 2023-06-21 PROCEDURE — 99999 PR PBB SHADOW E&M-EST. PATIENT-LVL III: CPT | Mod: PBBFAC,,, | Performed by: INTERNAL MEDICINE

## 2023-06-21 PROCEDURE — 3074F PR MOST RECENT SYSTOLIC BLOOD PRESSURE < 130 MM HG: ICD-10-PCS | Mod: CPTII,S$GLB,, | Performed by: INTERNAL MEDICINE

## 2023-06-21 PROCEDURE — 1126F AMNT PAIN NOTED NONE PRSNT: CPT | Mod: CPTII,S$GLB,, | Performed by: INTERNAL MEDICINE

## 2023-06-21 PROCEDURE — 1126F PR PAIN SEVERITY QUANTIFIED, NO PAIN PRESENT: ICD-10-PCS | Mod: CPTII,S$GLB,, | Performed by: INTERNAL MEDICINE

## 2023-06-21 PROCEDURE — 3078F DIAST BP <80 MM HG: CPT | Mod: CPTII,S$GLB,, | Performed by: INTERNAL MEDICINE

## 2023-06-21 NOTE — PROGRESS NOTES
Subjective:      DATE OF VISIT: 6/21/23     ?  Patient ID:?Alton Black is a 82 y.o. male.?? MR#: [id number]   ?   PRIMARY ONCOLOGIST: Dr. Baez    ? Primary Care Providers:  Rohan Kenny MD, MD (General)     CHIEF COMPLAINT: ?  Follow-up after completion of chemoradiation  ?   ONCOLOGIC DIAGNOSIS:  Stage IIB squamous cell carcinoma lung  ?   CURRENT TREATMENT:  Surveillance    PAST TREATMENT:   Chemoradiation with carboplatin paclitaxel weekly, cycle 1 day 1 1/14/22, completed 02/24/2022  ? durvalumab maintenance, q.4 weeks, completed 02/23/2023  ONCOLOGIC HISTORY:   ?   Oncology History   Squamous cell carcinoma of lung   1/13/2021 - 2/24/2022 Radiation Therapy    Treating physician: Karol  Total Dose: 60 Gy  Fractions: 30     12/17/2021 Initial Diagnosis    Squamous cell lung cancer     12/22/2021 Cancer Staged    Staging form: Lung, AJCC 8th Edition  - Clinical stage from 12/22/2021: Stage IIB (cT3, cN0, cM0)     1/14/2022 - 2/21/2022 Chemotherapy    Treatment Summary   Plan Name: OP NSCLC PACLITAXEL + CARBOPLATIN (AUC) QW + RADIATION  Treatment Goal: Curative  Status: Inactive  Start Date: 1/14/2022  End Date: 2/21/2022  Provider: Juan Lopez MD  Chemotherapy: dexAMETHasone (DECADRON) 4 MG Tab, 8 mg, Oral, Daily, 1 of 1 cycle, Start date: --, End date: --  CARBOplatin (PARAPLATIN) 230 mg in sodium chloride 0.9% 273 mL chemo infusion, 230 mg (100 % of original dose 230 mg), Intravenous, Clinic/HOD 1 time, 6 of 6 cycles  Dose modification:   (original dose 230 mg, Cycle 1)  Administration: 230 mg (1/14/2022), 230 mg (1/21/2022), 255 mg (1/28/2022), 255 mg (2/4/2022), 255 mg (2/14/2022), 255 mg (2/21/2022)  PACLitaxeL (TAXOL) 45 mg/m2 = 96 mg in sodium chloride 0.9% 266 mL chemo infusion, 45 mg/m2 = 96 mg, Intravenous, Clinic/HOD 1 time, 6 of 6 cycles  Administration: 96 mg (1/14/2022), 96 mg (1/21/2022), 96 mg (1/28/2022), 96 mg (2/4/2022), 96 mg (2/14/2022), 96 mg (2/21/2022)      3/21/2022 -  Chemotherapy    Treatment Summary   Plan Name: OP DURVALUMAB 1500 MG Q4W  Treatment Goal: Curative  Status: Active  Start Date: 3/21/2022  End Date: 2/23/2023  Provider: Winnie Baez MD  Chemotherapy: [No matching medication found in this treatment plan]        Cancer Staging   Squamous cell carcinoma of lung  - Clinical stage from 12/22/2021: Stage IIB (cT3, cN0, cM0) - Signed by Juan Lopez MD on 12/22/2021           HPI  He continues to feel well staying active no new respiratory symptoms or unintentional weight loss.  He comes after restaging CT chest.    Review of Systems    ?   A comprehensive 14-point review of systems was reviewed with patient and was negative other than as specified above.   ?      Objective:      Physical Exam      Limited due to virtual  Vitals:    06/21/23 0941   BP: 122/63   Pulse: 83   Temp: 97.3 °F (36.3 °C)        ? General appearance: Generally well appearing, in no acute distress.   Head, eyes, ears, nose, and throat: Oropharynx clear with moist mucous membranes.   Cardiovascular: Regular rate and rhythm, S1, S2, no audible murmurs.   Respiratory: Lungs clear to auscultation bilaterally.   Abdomen: nontender, nondistended.   Extremities: Warm, without edema.   Neurologic: Alert and oriented.  Skin: No rashes, ecchymoses or petechial lesion.   Psychiatric: normal mood and affect, conversant and appropriate      Laboratory:  ?   No visits with results within 1 Day(s) from this visit.   Latest known visit with results is:   Lab Visit on 06/20/2023   Component Date Value Ref Range Status    WBC 06/20/2023 7.76  3.90 - 12.70 K/uL Final    RBC 06/20/2023 4.69  4.60 - 6.20 M/uL Final    Hemoglobin 06/20/2023 15.5  14.0 - 18.0 g/dL Final    Hematocrit 06/20/2023 47.1  40.0 - 54.0 % Final    MCV 06/20/2023 100 (H)  82 - 98 fL Final    MCH 06/20/2023 33.0 (H)  27.0 - 31.0 pg Final    MCHC 06/20/2023 32.9  32.0 - 36.0 g/dL Final    RDW 06/20/2023 13.4  11.5 -  14.5 % Final    Platelets 06/20/2023 177  150 - 450 K/uL Final    MPV 06/20/2023 8.6 (L)  9.2 - 12.9 fL Final    Immature Granulocytes 06/20/2023 0.4  0.0 - 0.5 % Final    Gran # (ANC) 06/20/2023 5.6  1.8 - 7.7 K/uL Final    Immature Grans (Abs) 06/20/2023 0.03  0.00 - 0.04 K/uL Final    Lymph # 06/20/2023 1.4  1.0 - 4.8 K/uL Final    Mono # 06/20/2023 0.7  0.3 - 1.0 K/uL Final    Eos # 06/20/2023 0.1  0.0 - 0.5 K/uL Final    Baso # 06/20/2023 0.02  0.00 - 0.20 K/uL Final    nRBC 06/20/2023 0  0 /100 WBC Final    Gran % 06/20/2023 71.6  38.0 - 73.0 % Final    Lymph % 06/20/2023 18.3  18.0 - 48.0 % Final    Mono % 06/20/2023 8.6  4.0 - 15.0 % Final    Eosinophil % 06/20/2023 0.8  0.0 - 8.0 % Final    Basophil % 06/20/2023 0.3  0.0 - 1.9 % Final    Differential Method 06/20/2023 Automated   Final    Sodium 06/20/2023 136  136 - 145 mmol/L Final    Potassium 06/20/2023 4.3  3.5 - 5.1 mmol/L Final    Chloride 06/20/2023 103  95 - 110 mmol/L Final    CO2 06/20/2023 25  23 - 29 mmol/L Final    Glucose 06/20/2023 102  70 - 110 mg/dL Final    BUN 06/20/2023 5 (L)  8 - 23 mg/dL Final    Creatinine 06/20/2023 0.8  0.5 - 1.4 mg/dL Final    Calcium 06/20/2023 8.9  8.7 - 10.5 mg/dL Final    Total Protein 06/20/2023 6.5  6.0 - 8.4 g/dL Final    Albumin 06/20/2023 3.9  3.5 - 5.2 g/dL Final    Total Bilirubin 06/20/2023 1.1 (H)  0.1 - 1.0 mg/dL Final    Alkaline Phosphatase 06/20/2023 96  55 - 135 U/L Final    AST 06/20/2023 23  10 - 40 U/L Final    ALT 06/20/2023 21  10 - 44 U/L Final    eGFR 06/20/2023 >60  >60 mL/min/1.73 m^2 Final    Anion Gap 06/20/2023 8  8 - 16 mmol/L Final      ?     Imaging:  ?    Results for orders placed or performed during the hospital encounter of 06/20/23 (from the past 2160 hour(s))   CT Chest With Contrast    Narrative    EXAMINATION:  CT CHEST WITH CONTRAST    CLINICAL HISTORY:  Non-small cell lung cancer (NSCLC), non-metastatic, assess treatment response;  Malignant neoplasm of unspecified part of  right bronchus or lung    TECHNIQUE:  Multiple cross-sectional images were carried from the thoracic inlet to the upper abdomen after the intravenous administration of 100 mL of Omnipaque 350.  Coronal and sagittal reformatted images were obtained.  An automated dose exposure technique was utilized this limits radiation does the patient.    COMPARISON:  03/15/2023    FINDINGS:  Heart size enlarged with coronary artery calcifications.  The course and caliber of the thoracic aorta is normal.  Tortuosity of the great vessels.  A 2 vessel aortic arch is identified.  The great vessels are patent.  The main pulmonary artery is normal caliber.  No evidence for hilar or mediastinal adenopathy.  Shotty lymph nodes are identified.    Soft tissue fullness is identified involving the right lower lobe with developing airspace opacity with air bronchograms and traction bronchiectasis.  This may represent post treatment effect.  No overt evidence for mass like lesion.  Volume loss is identified with mediastinal shift to the right.  No evidence for new masslike lesion or nodularity.  No area of consolidation.  No pleural thickening or effusion.  Trachea and airways are patent with traction bronchiectasis of the right lower lobe.    Nonspecific calcifications in the lesser sac which may represent calcified lymph nodes is not significantly changed.  The adrenal glands are normal.  Hypodensity within the liver is too small to characterize and statistically represents a simple cyst.    No suspicious osseous lesions.  Spondylotic changes are identified.      Impression    Likely post treatment effect involving the right lower lobe without evidence for local recurrence/residual disease.  Other secondary findings as noted.      Electronically signed by: Rohan Mckeon MD  Date:    06/20/2023  Time:    10:59     No results found for this or any previous visit (from the past 2160 hour(s)).  No results found for this or any previous visit (from  the past 2160 hour(s)).      No results found for this or any previous visit (from the past 2160 hour(s)).  NM PET CT ROUTINE     CLINICAL HISTORY:  Non-small cell lung cancer (NSCLC), staging;Non-small cell lung cancer (NSCLC), metastatic, assess treatment response; Malignant neoplasm of unspecified part of unspecified bronchus or lung     TECHNIQUE:  12.31 mCi F18-FDG was administered intravenously via the right antecubital fossa.  Approximately 60 minutes after radiotracer distribution, PET images were acquired from the skull base to the mid thigh. Transmission images were acquired to correct for attenuation using a whole body low-dose CT scan without contrast.     COMPARISON:  06/23/2022     FINDINGS:  Head and Neck:     Brain demonstrates normal metabolism.  However, FDG-PET has an approximate 60% sensitivity for brain metastases which are best detected by MRI with gadolinium.  Physiologic uptake is in the neck.     Chest:     Right perihilar/infrahilar patchy consolidative changes have slightly improved compared to the prior study and demonstrate low level uptake, maximum SUV of 3.3.  No measurable nodule or mass is identified on today's examination.  No enlarged or FDG avid lymph nodes are in the chest.     Abdomen and Pelvis:     Physiologic uptake is in the liver, spleen, urinary system and bowel. No enlarged or FDG avid lymph nodes are in the abdomen or pelvis. Adrenal glands are normal.     Musculoskeletal:     No FDG avid osseous lesions are present.     Impression:     Continued positive response to therapy with no measurable lesion seen on today's study.  Patchy right perihilar and infrahilar opacities with low level uptake are favored to relate to post treatment change.  Assessment/Plan:   There are no diagnoses linked to this encounter.         No diagnosis found.            Plan:     Problem List Items Addressed This Visit    None        Squamous cell carcinoma lung stage II:  patient expressed not  interested in surgical option and therefore definitive chemoradiation was recommended by primary oncologist Dr. Jessica doty and radiation oncologist Dr. Roberson.  Cycle 1 day 1  01/14/2022 and completed 02/24/2022.  He has continued on maintenance immunotherapy with durvalumab q.4 weeks tolerating well.  Plan to complete 1 year maintenance therapy 02/23/2023.  After completion of immunotherapy we obtain restaging CT chest abdomen and pelvis and reviewed with him changes in right lower lobe with differential including evolving post treatment changes, infectious inflammatory or possible disease progression.    Repeat CT chest obtained with stable right lower lobe changes which may be consistent with post treatment effect and no evidence to suggest disease recurrence progression.  Recommend repeat CT chest abdomen pelvis in 3 months.  FOLLOW-UP    Route Chart for Scheduling    Med Onc Chart Routing      Follow up with physician 3 months. 10 am please   Follow up with [facility]    Infusion scheduling note    Injection scheduling note port flush q6wks   Labs CBC and CMP   Scheduling:  Preferred lab:  Lab interval:     Imaging CT chest abdomen pelvis   10am exam plaese   Pharmacy appointment    Other referrals        Treatment Plan Information   OP DURVALUMAB 1500 MG Q4W   Winnie Baez MD   Upcoming Treatment Dates - OP DURVALUMAB 1500 MG Q4W    No upcoming days in selected categories.    Therapy Plan Information  Flushes  heparin, porcine (PF) 100 unit/mL injection flush 500 Units  500 Units, Intravenous, Every visit  sodium chloride 0.9% flush 10 mL  10 mL, Intravenous, Every visit

## 2023-07-20 ENCOUNTER — INFUSION (OUTPATIENT)
Dept: INFUSION THERAPY | Facility: HOSPITAL | Age: 83
End: 2023-07-20
Attending: INTERNAL MEDICINE
Payer: MEDICARE

## 2023-07-20 VITALS
HEART RATE: 85 BPM | SYSTOLIC BLOOD PRESSURE: 125 MMHG | DIASTOLIC BLOOD PRESSURE: 73 MMHG | TEMPERATURE: 98 F | RESPIRATION RATE: 18 BRPM | OXYGEN SATURATION: 100 %

## 2023-07-20 DIAGNOSIS — C34.91 SQUAMOUS CELL CARCINOMA OF RIGHT LUNG: Primary | ICD-10-CM

## 2023-07-20 PROCEDURE — A4216 STERILE WATER/SALINE, 10 ML: HCPCS | Performed by: INTERNAL MEDICINE

## 2023-07-20 PROCEDURE — 96523 IRRIG DRUG DELIVERY DEVICE: CPT

## 2023-07-20 PROCEDURE — 63600175 PHARM REV CODE 636 W HCPCS: Performed by: INTERNAL MEDICINE

## 2023-07-20 PROCEDURE — 25000003 PHARM REV CODE 250: Performed by: INTERNAL MEDICINE

## 2023-07-20 RX ORDER — SODIUM CHLORIDE 0.9 % (FLUSH) 0.9 %
10 SYRINGE (ML) INJECTION
Status: CANCELLED | OUTPATIENT
Start: 2023-07-20

## 2023-07-20 RX ORDER — SODIUM CHLORIDE 0.9 % (FLUSH) 0.9 %
10 SYRINGE (ML) INJECTION
Status: DISCONTINUED | OUTPATIENT
Start: 2023-07-20 | End: 2023-07-20 | Stop reason: HOSPADM

## 2023-07-20 RX ORDER — HEPARIN 100 UNIT/ML
500 SYRINGE INTRAVENOUS
Status: CANCELLED | OUTPATIENT
Start: 2023-07-20

## 2023-07-20 RX ORDER — HEPARIN 100 UNIT/ML
500 SYRINGE INTRAVENOUS
Status: DISCONTINUED | OUTPATIENT
Start: 2023-07-20 | End: 2023-07-20 | Stop reason: HOSPADM

## 2023-07-20 RX ADMIN — SODIUM CHLORIDE, PRESERVATIVE FREE 10 ML: 5 INJECTION INTRAVENOUS at 09:07

## 2023-07-20 RX ADMIN — HEPARIN 500 UNITS: 100 SYRINGE at 09:07

## 2023-07-20 NOTE — DISCHARGE INSTRUCTIONS
[facility]  [address]  [address]  [phone] phone     [phone] fax  Hours of Operation: Monday- Friday 8:00am- 5:00pm  After hours phone  [phone]  Hematology / Oncology Physicians on call    Dr. Jae Young      Nurse Practitioners:    Laquita Gallegos, HEDY Rubio, HEDY Stacy, HEDY Patel, HEDY Montes, MELIZA Ruiz      Please don't hesitate to call if you have any concerns.

## 2023-07-20 NOTE — NURSING
"Pt here for Mediport Flush. Left chestwall mediport accessed with a 20g 1" bhatia via sterile technique.  Excellent blood return noted.  Flushed with 10ml NS and 5 ml heparin solution.  Needle D/C, site without redness, swelling, or drainage noted.  Dressing applied.  Patient tolerated well.  Patient to return to clinic in 08/31/2023.   "

## 2023-08-31 ENCOUNTER — INFUSION (OUTPATIENT)
Dept: INFUSION THERAPY | Facility: HOSPITAL | Age: 83
End: 2023-08-31
Attending: INTERNAL MEDICINE
Payer: MEDICARE

## 2023-08-31 VITALS
SYSTOLIC BLOOD PRESSURE: 137 MMHG | TEMPERATURE: 99 F | DIASTOLIC BLOOD PRESSURE: 72 MMHG | RESPIRATION RATE: 18 BRPM | OXYGEN SATURATION: 97 % | HEART RATE: 80 BPM

## 2023-08-31 DIAGNOSIS — C34.91 SQUAMOUS CELL CARCINOMA OF RIGHT LUNG: Primary | ICD-10-CM

## 2023-08-31 PROCEDURE — A4216 STERILE WATER/SALINE, 10 ML: HCPCS | Performed by: INTERNAL MEDICINE

## 2023-08-31 PROCEDURE — 96523 IRRIG DRUG DELIVERY DEVICE: CPT

## 2023-08-31 PROCEDURE — 63600175 PHARM REV CODE 636 W HCPCS: Performed by: INTERNAL MEDICINE

## 2023-08-31 PROCEDURE — 25000003 PHARM REV CODE 250: Performed by: INTERNAL MEDICINE

## 2023-08-31 RX ORDER — HEPARIN 100 UNIT/ML
500 SYRINGE INTRAVENOUS
Status: CANCELLED | OUTPATIENT
Start: 2023-08-31

## 2023-08-31 RX ORDER — SODIUM CHLORIDE 0.9 % (FLUSH) 0.9 %
10 SYRINGE (ML) INJECTION
Status: CANCELLED | OUTPATIENT
Start: 2023-08-31

## 2023-08-31 RX ORDER — SODIUM CHLORIDE 0.9 % (FLUSH) 0.9 %
10 SYRINGE (ML) INJECTION
Status: DISCONTINUED | OUTPATIENT
Start: 2023-08-31 | End: 2023-08-31 | Stop reason: HOSPADM

## 2023-08-31 RX ORDER — HEPARIN 100 UNIT/ML
500 SYRINGE INTRAVENOUS
Status: DISCONTINUED | OUTPATIENT
Start: 2023-08-31 | End: 2023-08-31 | Stop reason: HOSPADM

## 2023-08-31 RX ADMIN — SODIUM CHLORIDE, PRESERVATIVE FREE 10 ML: 5 INJECTION INTRAVENOUS at 09:08

## 2023-08-31 RX ADMIN — HEPARIN 500 UNITS: 100 SYRINGE at 09:08

## 2023-09-27 ENCOUNTER — HOSPITAL ENCOUNTER (OUTPATIENT)
Dept: RADIOLOGY | Facility: HOSPITAL | Age: 83
Discharge: HOME | End: 2023-09-27
Attending: INTERNAL MEDICINE
Payer: MEDICARE

## 2023-09-27 DIAGNOSIS — C34.91 SQUAMOUS CELL CARCINOMA OF RIGHT LUNG: ICD-10-CM

## 2023-09-27 PROCEDURE — 74177 CT ABD & PELVIS W/CONTRAST: CPT | Mod: TC

## 2023-09-27 PROCEDURE — 25500020 PHARM REV CODE 255: Performed by: INTERNAL MEDICINE

## 2023-09-27 PROCEDURE — 71260 CT CHEST ABDOMEN PELVIS WITH CONTRAST (XPD): ICD-10-PCS | Mod: 26,,, | Performed by: RADIOLOGY

## 2023-09-27 PROCEDURE — 71260 CT THORAX DX C+: CPT | Mod: TC

## 2023-09-27 PROCEDURE — 74177 CT ABD & PELVIS W/CONTRAST: CPT | Mod: 26,,, | Performed by: RADIOLOGY

## 2023-09-27 PROCEDURE — A9698 NON-RAD CONTRAST MATERIALNOC: HCPCS | Performed by: INTERNAL MEDICINE

## 2023-09-27 PROCEDURE — 71260 CT THORAX DX C+: CPT | Mod: 26,,, | Performed by: RADIOLOGY

## 2023-09-27 PROCEDURE — 74177 CT CHEST ABDOMEN PELVIS WITH CONTRAST (XPD): ICD-10-PCS | Mod: 26,,, | Performed by: RADIOLOGY

## 2023-09-27 RX ADMIN — IOHEXOL 100 ML: 350 INJECTION, SOLUTION INTRAVENOUS at 11:09

## 2023-09-27 RX ADMIN — IOHEXOL 1000 ML: 9 SOLUTION ORAL at 11:09

## 2023-09-27 NOTE — PROGRESS NOTES
HEMATOLOGY / ONCOLOGY   CLINIC NOTE     ONCOLOGICAL HISTORY:     Diagnosis:  - Stage IIB squamous cell carcinoma lung    Treatment History:  - Chemoradiation with carboplatin paclitaxel weekly (1/14/22 - 02/24/2022)  -?Durvalumab maintenance, completed 02/23/2023    Current Treatment:   - surveillance    Subjective:       Chief Complaint: Lung Cancer      HPI    Alton Black  82 y.o.  male with past medical history significant for non-small-cell lung cancer was treated with chemoradiation therapy followed by immunotherapy is here for surveillance    Interval History:     Patient feeling better today.  Denies any fevers, nausea, vomiting, diarrhea, cough, phlegm, shortness of breath or any other concerns    Oncology History   Squamous cell carcinoma of lung   1/13/2021 - 2/24/2022 Radiation Therapy    Treating physician: Karol  Total Dose: 60 Gy  Fractions: 30     12/17/2021 Initial Diagnosis    Squamous cell lung cancer     12/22/2021 Cancer Staged    Staging form: Lung, AJCC 8th Edition  - Clinical stage from 12/22/2021: Stage IIB (cT3, cN0, cM0)     1/14/2022 - 2/21/2022 Chemotherapy    Treatment Summary   Plan Name: OP NSCLC PACLITAXEL + CARBOPLATIN (AUC) QW + RADIATION  Treatment Goal: Curative  Status: Inactive  Start Date: 1/14/2022  End Date: 2/21/2022  Provider: Juan Lopez MD  Chemotherapy: dexAMETHasone (DECADRON) 4 MG Tab, 8 mg, Oral, Daily, 1 of 1 cycle, Start date: --, End date: --  CARBOplatin (PARAPLATIN) 230 mg in sodium chloride 0.9% 273 mL chemo infusion, 230 mg (100 % of original dose 230 mg), Intravenous, Clinic/HOD 1 time, 6 of 6 cycles  Dose modification:   (original dose 230 mg, Cycle 1)  Administration: 230 mg (1/14/2022), 230 mg (1/21/2022), 255 mg (1/28/2022), 255 mg (2/4/2022), 255 mg (2/14/2022), 255 mg (2/21/2022)  PACLitaxeL (TAXOL) 45 mg/m2 = 96 mg in sodium chloride 0.9% 266 mL chemo infusion, 45 mg/m2 = 96 mg, Intravenous, Clinic/HOD 1 time, 6 of 6  cycles  Administration: 96 mg (1/14/2022), 96 mg (1/21/2022), 96 mg (1/28/2022), 96 mg (2/4/2022), 96 mg (2/14/2022), 96 mg (2/21/2022)     3/21/2022 -  Chemotherapy    Treatment Summary   Plan Name: OP DURVALUMAB 1500 MG Q4W  Treatment Goal: Curative  Status: Active  Start Date: 3/21/2022  End Date: 2/23/2023  Provider: Winnie Baez MD  Chemotherapy: [No matching medication found in this treatment plan]         Past Medical History:   Diagnosis Date    Benign essential HTN     CAD (coronary artery disease)     HLD (hyperlipidemia)     Squamous cell carcinoma of lung 12/17/2021      Past Surgical History:   Procedure Laterality Date    BRONCHOSCOPY Bilateral 12/8/2021    Procedure: Bronchoscopy - insert lighted tube into airway to take a biopsy of lung;  Surgeon: Rigo Vences MD;  Location: [facility] ENDO;  Service: Endoscopy;  Laterality: Bilateral;    CHOLECYSTECTOMY      CORONARY ANGIOPLASTY WITH STENT PLACEMENT      ENDOBRONCHIAL ULTRASOUND Bilateral 12/8/2021    Procedure: ENDOBRONCHIAL ULTRASOUND (EBUS);  Surgeon: Rigo Vences MD;  Location: [facility] ENDO;  Service: Endoscopy;  Laterality: Bilateral;    FLUOROSCOPY N/A 1/4/2022    Procedure: Mediport Insertion;  Surgeon: Elaina Larios MD;  Location: [facility] CATH LAB;  Service: General;  Laterality: N/A;  yuridia from 1/3 to 1/4     Social History     Socioeconomic History    Marital status: Single   Tobacco Use    Smoking status: Never    Smokeless tobacco: Never    Tobacco comments:     Chews on cigars   Substance and Sexual Activity    Alcohol use: Not Currently    Drug use: Never    Sexual activity: Not Currently     Social Determinants of Health     Stress: No Stress Concern Present (6/21/2023)    [employer] [place] [employer] - Occupational Stress Questionnaire     Feeling of Stress : Not at all      Family History   Problem Relation Age of Onset    Heart attack Father           Review of patient's allergies indicates:  No Known  Allergies   Review of Systems   Constitutional: Negative.  Negative for activity change, chills, fatigue and fever.   HENT: Negative.     Eyes: Negative.    Respiratory:  Negative for cough and shortness of breath.    Cardiovascular:  Negative for chest pain and leg swelling.   Gastrointestinal:  Negative for constipation, diarrhea, nausea and vomiting.   Endocrine: Negative.    Genitourinary: Negative.    Musculoskeletal:  Negative for arthralgias and myalgias.   Integumentary:  Negative.   Allergic/Immunologic: Negative.    Neurological:  Negative for light-headedness, numbness and headaches.   Hematological: Negative.    Psychiatric/Behavioral: Negative.           Objective:        Vitals:    09/28/23 0915   BP: 136/70   Pulse: 75   Temp: 97.2 °F (36.2 °C)        Physical Exam  Constitutional:       General: He is not in acute distress.     Appearance: He is well-developed. He is not ill-appearing.   HENT:      Head: Normocephalic and atraumatic.      Mouth/Throat:      Mouth: Mucous membranes are moist.   Eyes:      Extraocular Movements: Extraocular movements intact.   Cardiovascular:      Rate and Rhythm: Normal rate and regular rhythm.      Heart sounds: Normal heart sounds.   Pulmonary:      Effort: Pulmonary effort is normal. No respiratory distress.      Breath sounds: Normal breath sounds. No wheezing.   Abdominal:      General: There is no distension.      Palpations: Abdomen is soft.      Tenderness: There is no abdominal tenderness.   Musculoskeletal:         General: Normal range of motion.      Cervical back: Normal range of motion and neck supple.   Skin:     General: Skin is warm.   Neurological:      General: No focal deficit present.      Mental Status: He is alert and oriented to person, place, and time. Mental status is at baseline.      Cranial Nerves: No cranial nerve deficit.   Psychiatric:         Mood and Affect: Mood normal.           LABS / IMAGING      - 09/27/2023 CT CHEST ABDOMEN  PELVIS WITH CONTRAST (XPD)  Area of nodular consolidation in the superior segment right lower lobe with air bronchograms measures 6.7 x 4.2 cm compared to 7.1 x 4.2 cm on the prior exam.  No significant change.    Enlarged prostate.  Mild concentric bladder wall thickening is stable likely from chronic bladder outlet obstruction. No evidence of metastatic disease             Assessment:     ECOG SCORE    2 - Capable of all selfcare but unable to carry out any work activities, active > 50% of hours       Squamous cell carcinoma lung stage II:    - treated with chemoradiation therapy initially as was not interested in surgical option followed by maintenance immunotherapy with durvalumab completed on 02/23/2023.    - 09/27/2023 CT CAP: Stable         Plan:     - continue with surveillance with repeat history and physical in 3 months with CT scan  - referred to urology for enlarged prostate         - MD / LABS / TREATMENT VISIT - WEEKS       Med Onc Route Chart for Scheduling      The patient was seen, interviewed and examined. Pertinent lab and radiology studies were reviewed. Pt instructed to call should develop concerning signs/symptoms or have further questions.       Portions of the record may have been created with voice recognition software. Occasional wrong-word or sound-a-like substitutions may have occurred due to the inherent limitations of voice recognition software. Read the chart carefully and recognize, using context, where substitutions have occurred.    Jacqueline Rodríguez MD  Hematology / Oncology

## 2023-09-28 ENCOUNTER — OFFICE VISIT (OUTPATIENT)
Dept: HEMATOLOGY/ONCOLOGY | Facility: CLINIC | Age: 83
End: 2023-09-28
Payer: MEDICARE

## 2023-09-28 VITALS
OXYGEN SATURATION: 98 % | HEART RATE: 75 BPM | WEIGHT: 212.31 LBS | SYSTOLIC BLOOD PRESSURE: 136 MMHG | BODY MASS INDEX: 29.72 KG/M2 | HEIGHT: 71 IN | DIASTOLIC BLOOD PRESSURE: 70 MMHG | TEMPERATURE: 97 F

## 2023-09-28 DIAGNOSIS — N40.0 ENLARGED PROSTATE: Primary | ICD-10-CM

## 2023-09-28 DIAGNOSIS — C34.91 SQUAMOUS CELL CARCINOMA OF RIGHT LUNG: ICD-10-CM

## 2023-09-28 PROCEDURE — 1160F PR REVIEW ALL MEDS BY PRESCRIBER/CLIN PHARMACIST DOCUMENTED: ICD-10-PCS | Mod: CPTII,S$GLB,, | Performed by: INTERNAL MEDICINE

## 2023-09-28 PROCEDURE — 3288F FALL RISK ASSESSMENT DOCD: CPT | Mod: CPTII,S$GLB,, | Performed by: INTERNAL MEDICINE

## 2023-09-28 PROCEDURE — 99999 PR PBB SHADOW E&M-EST. PATIENT-LVL V: ICD-10-PCS | Mod: PBBFAC,,, | Performed by: INTERNAL MEDICINE

## 2023-09-28 PROCEDURE — 1126F AMNT PAIN NOTED NONE PRSNT: CPT | Mod: CPTII,S$GLB,, | Performed by: INTERNAL MEDICINE

## 2023-09-28 PROCEDURE — 1101F PT FALLS ASSESS-DOCD LE1/YR: CPT | Mod: CPTII,S$GLB,, | Performed by: INTERNAL MEDICINE

## 2023-09-28 PROCEDURE — 3075F PR MOST RECENT SYSTOLIC BLOOD PRESS GE 130-139MM HG: ICD-10-PCS | Mod: CPTII,S$GLB,, | Performed by: INTERNAL MEDICINE

## 2023-09-28 PROCEDURE — 1159F PR MEDICATION LIST DOCUMENTED IN MEDICAL RECORD: ICD-10-PCS | Mod: CPTII,S$GLB,, | Performed by: INTERNAL MEDICINE

## 2023-09-28 PROCEDURE — 1160F RVW MEDS BY RX/DR IN RCRD: CPT | Mod: CPTII,S$GLB,, | Performed by: INTERNAL MEDICINE

## 2023-09-28 PROCEDURE — 3288F PR FALLS RISK ASSESSMENT DOCUMENTED: ICD-10-PCS | Mod: CPTII,S$GLB,, | Performed by: INTERNAL MEDICINE

## 2023-09-28 PROCEDURE — 99213 PR OFFICE/OUTPT VISIT, EST, LEVL III, 20-29 MIN: ICD-10-PCS | Mod: S$GLB,,, | Performed by: INTERNAL MEDICINE

## 2023-09-28 PROCEDURE — 99213 OFFICE O/P EST LOW 20 MIN: CPT | Mod: S$GLB,,, | Performed by: INTERNAL MEDICINE

## 2023-09-28 PROCEDURE — 1126F PR PAIN SEVERITY QUANTIFIED, NO PAIN PRESENT: ICD-10-PCS | Mod: CPTII,S$GLB,, | Performed by: INTERNAL MEDICINE

## 2023-09-28 PROCEDURE — 99999 PR PBB SHADOW E&M-EST. PATIENT-LVL V: CPT | Mod: PBBFAC,,, | Performed by: INTERNAL MEDICINE

## 2023-09-28 PROCEDURE — 1101F PR PT FALLS ASSESS DOC 0-1 FALLS W/OUT INJ PAST YR: ICD-10-PCS | Mod: CPTII,S$GLB,, | Performed by: INTERNAL MEDICINE

## 2023-09-28 PROCEDURE — 3078F DIAST BP <80 MM HG: CPT | Mod: CPTII,S$GLB,, | Performed by: INTERNAL MEDICINE

## 2023-09-28 PROCEDURE — 3075F SYST BP GE 130 - 139MM HG: CPT | Mod: CPTII,S$GLB,, | Performed by: INTERNAL MEDICINE

## 2023-09-28 PROCEDURE — 3078F PR MOST RECENT DIASTOLIC BLOOD PRESSURE < 80 MM HG: ICD-10-PCS | Mod: CPTII,S$GLB,, | Performed by: INTERNAL MEDICINE

## 2023-09-28 PROCEDURE — 1159F MED LIST DOCD IN RCRD: CPT | Mod: CPTII,S$GLB,, | Performed by: INTERNAL MEDICINE

## 2023-10-26 ENCOUNTER — INFUSION (OUTPATIENT)
Dept: INFUSION THERAPY | Facility: HOSPITAL | Age: 83
End: 2023-10-26
Attending: INTERNAL MEDICINE
Payer: MEDICARE

## 2023-10-26 VITALS
OXYGEN SATURATION: 99 % | HEART RATE: 77 BPM | TEMPERATURE: 98 F | RESPIRATION RATE: 18 BRPM | SYSTOLIC BLOOD PRESSURE: 131 MMHG | DIASTOLIC BLOOD PRESSURE: 81 MMHG

## 2023-10-26 DIAGNOSIS — C34.91 SQUAMOUS CELL CARCINOMA OF RIGHT LUNG: Primary | ICD-10-CM

## 2023-10-26 PROCEDURE — 96523 IRRIG DRUG DELIVERY DEVICE: CPT

## 2023-10-26 PROCEDURE — 63600175 PHARM REV CODE 636 W HCPCS: Performed by: INTERNAL MEDICINE

## 2023-10-26 PROCEDURE — 25000003 PHARM REV CODE 250: Performed by: INTERNAL MEDICINE

## 2023-10-26 PROCEDURE — A4216 STERILE WATER/SALINE, 10 ML: HCPCS | Performed by: INTERNAL MEDICINE

## 2023-10-26 RX ORDER — HEPARIN 100 UNIT/ML
500 SYRINGE INTRAVENOUS
Status: DISCONTINUED | OUTPATIENT
Start: 2023-10-26 | End: 2023-10-26 | Stop reason: HOSPADM

## 2023-10-26 RX ORDER — SODIUM CHLORIDE 0.9 % (FLUSH) 0.9 %
10 SYRINGE (ML) INJECTION
Status: CANCELLED | OUTPATIENT
Start: 2023-10-26

## 2023-10-26 RX ORDER — SODIUM CHLORIDE 0.9 % (FLUSH) 0.9 %
10 SYRINGE (ML) INJECTION
Status: DISCONTINUED | OUTPATIENT
Start: 2023-10-26 | End: 2023-10-26 | Stop reason: HOSPADM

## 2023-10-26 RX ORDER — HEPARIN 100 UNIT/ML
500 SYRINGE INTRAVENOUS
Status: CANCELLED | OUTPATIENT
Start: 2023-10-26

## 2023-10-26 RX ADMIN — SODIUM CHLORIDE, PRESERVATIVE FREE 10 ML: 5 INJECTION INTRAVENOUS at 10:10

## 2023-10-26 RX ADMIN — HEPARIN 500 UNITS: 100 SYRINGE at 10:10

## 2023-10-30 ENCOUNTER — OFFICE VISIT (OUTPATIENT)
Dept: UROLOGY | Facility: CLINIC | Age: 83
End: 2023-10-30
Payer: MEDICARE

## 2023-10-30 VITALS
BODY MASS INDEX: 29.49 KG/M2 | TEMPERATURE: 97 F | DIASTOLIC BLOOD PRESSURE: 80 MMHG | HEART RATE: 88 BPM | RESPIRATION RATE: 16 BRPM | WEIGHT: 210.63 LBS | HEIGHT: 71 IN | SYSTOLIC BLOOD PRESSURE: 114 MMHG

## 2023-10-30 DIAGNOSIS — R35.1 NOCTURIA: ICD-10-CM

## 2023-10-30 DIAGNOSIS — N40.0 ENLARGED PROSTATE: Primary | ICD-10-CM

## 2023-10-30 LAB
BILIRUB UR QL STRIP: NEGATIVE
GLUCOSE UR QL STRIP: NEGATIVE
KETONES UR QL STRIP: NEGATIVE
LEUKOCYTE ESTERASE UR QL STRIP: POSITIVE
PH, POC UA: 6
POC BLOOD, URINE: POSITIVE
POC NITRATES, URINE: NEGATIVE
PROT UR QL STRIP: NEGATIVE
SP GR UR STRIP: 1.01 (ref 1–1.03)
UROBILINOGEN UR STRIP-ACNC: 1 (ref 0.3–2.2)

## 2023-10-30 PROCEDURE — 81003 URINALYSIS AUTO W/O SCOPE: CPT | Mod: QW,S$GLB,, | Performed by: UROLOGY

## 2023-10-30 PROCEDURE — 87077 CULTURE AEROBIC IDENTIFY: CPT | Performed by: UROLOGY

## 2023-10-30 PROCEDURE — 3079F DIAST BP 80-89 MM HG: CPT | Mod: CPTII,S$GLB,, | Performed by: UROLOGY

## 2023-10-30 PROCEDURE — 1126F AMNT PAIN NOTED NONE PRSNT: CPT | Mod: CPTII,S$GLB,, | Performed by: UROLOGY

## 2023-10-30 PROCEDURE — 1159F PR MEDICATION LIST DOCUMENTED IN MEDICAL RECORD: ICD-10-PCS | Mod: CPTII,S$GLB,, | Performed by: UROLOGY

## 2023-10-30 PROCEDURE — 1159F MED LIST DOCD IN RCRD: CPT | Mod: CPTII,S$GLB,, | Performed by: UROLOGY

## 2023-10-30 PROCEDURE — 1126F PR PAIN SEVERITY QUANTIFIED, NO PAIN PRESENT: ICD-10-PCS | Mod: CPTII,S$GLB,, | Performed by: UROLOGY

## 2023-10-30 PROCEDURE — 1101F PT FALLS ASSESS-DOCD LE1/YR: CPT | Mod: CPTII,S$GLB,, | Performed by: UROLOGY

## 2023-10-30 PROCEDURE — 87086 URINE CULTURE/COLONY COUNT: CPT | Performed by: UROLOGY

## 2023-10-30 PROCEDURE — 81001 URINALYSIS AUTO W/SCOPE: CPT | Performed by: UROLOGY

## 2023-10-30 PROCEDURE — 99204 PR OFFICE/OUTPT VISIT, NEW, LEVL IV, 45-59 MIN: ICD-10-PCS | Mod: S$GLB,,, | Performed by: UROLOGY

## 2023-10-30 PROCEDURE — 3079F PR MOST RECENT DIASTOLIC BLOOD PRESSURE 80-89 MM HG: ICD-10-PCS | Mod: CPTII,S$GLB,, | Performed by: UROLOGY

## 2023-10-30 PROCEDURE — 87186 SC STD MICRODIL/AGAR DIL: CPT | Performed by: UROLOGY

## 2023-10-30 PROCEDURE — 3074F SYST BP LT 130 MM HG: CPT | Mod: CPTII,S$GLB,, | Performed by: UROLOGY

## 2023-10-30 PROCEDURE — 1160F PR REVIEW ALL MEDS BY PRESCRIBER/CLIN PHARMACIST DOCUMENTED: ICD-10-PCS | Mod: CPTII,S$GLB,, | Performed by: UROLOGY

## 2023-10-30 PROCEDURE — 3074F PR MOST RECENT SYSTOLIC BLOOD PRESSURE < 130 MM HG: ICD-10-PCS | Mod: CPTII,S$GLB,, | Performed by: UROLOGY

## 2023-10-30 PROCEDURE — 1160F RVW MEDS BY RX/DR IN RCRD: CPT | Mod: CPTII,S$GLB,, | Performed by: UROLOGY

## 2023-10-30 PROCEDURE — 99204 OFFICE O/P NEW MOD 45 MIN: CPT | Mod: S$GLB,,, | Performed by: UROLOGY

## 2023-10-30 PROCEDURE — 3288F PR FALLS RISK ASSESSMENT DOCUMENTED: ICD-10-PCS | Mod: CPTII,S$GLB,, | Performed by: UROLOGY

## 2023-10-30 PROCEDURE — 99999 PR PBB SHADOW E&M-EST. PATIENT-LVL IV: CPT | Mod: PBBFAC,,, | Performed by: UROLOGY

## 2023-10-30 PROCEDURE — 1101F PR PT FALLS ASSESS DOC 0-1 FALLS W/OUT INJ PAST YR: ICD-10-PCS | Mod: CPTII,S$GLB,, | Performed by: UROLOGY

## 2023-10-30 PROCEDURE — 3288F FALL RISK ASSESSMENT DOCD: CPT | Mod: CPTII,S$GLB,, | Performed by: UROLOGY

## 2023-10-30 PROCEDURE — 81003 POCT URINALYSIS, DIPSTICK, AUTOMATED, W/O SCOPE: ICD-10-PCS | Mod: QW,S$GLB,, | Performed by: UROLOGY

## 2023-10-30 PROCEDURE — 99999 PR PBB SHADOW E&M-EST. PATIENT-LVL IV: ICD-10-PCS | Mod: PBBFAC,,, | Performed by: UROLOGY

## 2023-10-30 PROCEDURE — 87088 URINE BACTERIA CULTURE: CPT | Performed by: UROLOGY

## 2023-10-30 NOTE — PROGRESS NOTES
Chief Complaint:   Encounter Diagnoses   Name Primary?    Enlarged prostate Yes    Nocturia        HPI:  HPI Alton Black kelvin 82 y.o. male who presents with an evaluation for enlarged prostate seen on a CT scan.  Patient states he does have nocturia 1-3 times a night.  Otherwise he has no other lower urinary tract symptoms.  He has had PSAs annually which are all less than 1.  He has no family history of prostate cancer.  His brother does have BPH.  He is not really bothered by his symptoms currently.    History:  Social History     Tobacco Use    Smoking status: Never    Smokeless tobacco: Never    Tobacco comments:     Chews on cigars   Substance Use Topics    Alcohol use: Not Currently    Drug use: Never     Past Medical History:   Diagnosis Date    Benign essential HTN     CAD (coronary artery disease)     HLD (hyperlipidemia)     Squamous cell carcinoma of lung 12/17/2021     Past Surgical History:   Procedure Laterality Date    BRONCHOSCOPY Bilateral 12/8/2021    Procedure: Bronchoscopy - insert lighted tube into airway to take a biopsy of lung;  Surgeon: Rigo Vences MD;  Location: [facility] ENDO;  Service: Endoscopy;  Laterality: Bilateral;    CHOLECYSTECTOMY      CORONARY ANGIOPLASTY WITH STENT PLACEMENT      ENDOBRONCHIAL ULTRASOUND Bilateral 12/8/2021    Procedure: ENDOBRONCHIAL ULTRASOUND (EBUS);  Surgeon: Rigo Vences MD;  Location: [facility] ENDO;  Service: Endoscopy;  Laterality: Bilateral;    FLUOROSCOPY N/A 1/4/2022    Procedure: Mediport Insertion;  Surgeon: Elaina Larios MD;  Location: [facility] CATH LAB;  Service: General;  Laterality: N/A;  yuridia from 1/3 to 1/4     Family History   Problem Relation Age of Onset    Heart attack Father        Current Outpatient Medications on File Prior to Visit   Medication Sig Dispense Refill    ALPRAZolam (XANAX) 0.5 MG tablet Take 0.5 mg by mouth 2 (two) times daily as needed.      aspirin (ECOTRIN) 81 MG EC tablet Take 81 mg by mouth once  "daily.      atorvastatin (LIPITOR) 80 MG tablet Take 1 tablet by mouth once daily.      esomeprazole (NEXIUM) 40 MG capsule Take 1 capsule by mouth every morning.      trandolapriL (MAVIK) 4 MG Tab Take 1 tablet by mouth once daily.      linaCLOtide (LINZESS) 72 mcg Cap capsule Take 1 capsule by mouth every morning.      nitrofurantoin, macrocrystal-monohydrate, (MACROBID) 100 MG capsule Take 100 mg by mouth 2 (two) times daily.       No current facility-administered medications on file prior to visit.        Objective:     Vitals:    10/30/23 0911   BP: 114/80   BP Location: Right arm   Patient Position: Sitting   Pulse: 88   Resp: 16   Temp: 97.4 °F (36.3 °C)   TempSrc: Oral   Weight: 95.5 kg (210 lb 10.4 oz)   Height: 5' 11" (1.803 m)      BMI Readings from Last 1 Encounters:   10/30/23 29.38 kg/m²          Physical Exam    40g prostate smooth no nodules    Lab Results   Component Value Date    CREATININE 0.8 09/27/2023      Assessment:       1. Enlarged prostate    2. Nocturia        Plan:     1. Enlarged prostate    2. Nocturia       Orders Placed This Encounter    CULTURE, URINE    Urinalysis Microscopic    POCT Urinalysis, Dipstick, Automated, W/O Scope      Minimal luts. Discussed bph in detail with him and his brother. Recommend observation. No further treatment.  Urinalysis does show large leukocyte esterase.  We will get micro and urine cutlure.  Did discuss tamsulosin.  Patient will let us know if his symptoms get worse would like to try this.  Recommend no further digital rectal exam or PSA checks given his age and PSA less than 1.      "

## 2023-10-31 LAB
BACTERIA #/AREA URNS AUTO: ABNORMAL /HPF
MICROSCOPIC COMMENT: ABNORMAL
RBC #/AREA URNS AUTO: 1 /HPF (ref 0–4)
SQUAMOUS #/AREA URNS AUTO: 1 /HPF
WBC #/AREA URNS AUTO: 16 /HPF (ref 0–5)

## 2023-11-02 ENCOUNTER — TELEPHONE (OUTPATIENT)
Dept: UROLOGY | Facility: CLINIC | Age: 83
End: 2023-11-02
Payer: MEDICARE

## 2023-11-02 ENCOUNTER — PATIENT MESSAGE (OUTPATIENT)
Dept: UROLOGY | Facility: CLINIC | Age: 83
End: 2023-11-02
Payer: MEDICARE

## 2023-11-02 LAB — BACTERIA UR CULT: ABNORMAL

## 2023-11-02 RX ORDER — CIPROFLOXACIN 500 MG/1
500 TABLET ORAL 2 TIMES DAILY
Qty: 14 TABLET | Refills: 0 | Status: SHIPPED | OUTPATIENT
Start: 2023-11-02

## 2023-11-02 NOTE — PROGRESS NOTES
Please let patient know to start antibiotics for a uti that was detected during his last appointment.

## 2023-11-02 NOTE — TELEPHONE ENCOUNTER
Contacted pts brother and informed him of positive urine culture; also antibiotics were sent to his pharmacy.

## 2023-12-21 ENCOUNTER — INFUSION (OUTPATIENT)
Dept: INFUSION THERAPY | Facility: HOSPITAL | Age: 83
End: 2023-12-21
Attending: INTERNAL MEDICINE
Payer: MEDICARE

## 2023-12-21 ENCOUNTER — HOSPITAL ENCOUNTER (OUTPATIENT)
Dept: RADIOLOGY | Facility: HOSPITAL | Age: 83
Discharge: HOME | End: 2023-12-21
Attending: INTERNAL MEDICINE
Payer: MEDICARE

## 2023-12-21 VITALS
HEART RATE: 66 BPM | OXYGEN SATURATION: 98 % | DIASTOLIC BLOOD PRESSURE: 63 MMHG | TEMPERATURE: 98 F | RESPIRATION RATE: 16 BRPM | SYSTOLIC BLOOD PRESSURE: 133 MMHG

## 2023-12-21 DIAGNOSIS — C34.91 SQUAMOUS CELL CARCINOMA OF RIGHT LUNG: Primary | ICD-10-CM

## 2023-12-21 DIAGNOSIS — N40.0 ENLARGED PROSTATE: ICD-10-CM

## 2023-12-21 PROCEDURE — 96523 IRRIG DRUG DELIVERY DEVICE: CPT

## 2023-12-21 PROCEDURE — 71260 CT THORAX DX C+: CPT | Mod: TC

## 2023-12-21 PROCEDURE — 25500020 PHARM REV CODE 255: Performed by: INTERNAL MEDICINE

## 2023-12-21 PROCEDURE — 71260 CT CHEST WITH CONTRAST: ICD-10-PCS | Mod: 26,,, | Performed by: RADIOLOGY

## 2023-12-21 PROCEDURE — 25000003 PHARM REV CODE 250

## 2023-12-21 PROCEDURE — 71260 CT THORAX DX C+: CPT | Mod: 26,,, | Performed by: RADIOLOGY

## 2023-12-21 PROCEDURE — A4216 STERILE WATER/SALINE, 10 ML: HCPCS

## 2023-12-21 PROCEDURE — 63600175 PHARM REV CODE 636 W HCPCS

## 2023-12-21 RX ORDER — SODIUM CHLORIDE 0.9 % (FLUSH) 0.9 %
10 SYRINGE (ML) INJECTION
Status: DISCONTINUED | OUTPATIENT
Start: 2023-12-21 | End: 2023-12-21 | Stop reason: HOSPADM

## 2023-12-21 RX ORDER — HEPARIN 100 UNIT/ML
500 SYRINGE INTRAVENOUS
Status: DISCONTINUED | OUTPATIENT
Start: 2023-12-21 | End: 2023-12-21 | Stop reason: HOSPADM

## 2023-12-21 RX ORDER — HEPARIN 100 UNIT/ML
500 SYRINGE INTRAVENOUS
Status: CANCELLED | OUTPATIENT
Start: 2023-12-21

## 2023-12-21 RX ORDER — SODIUM CHLORIDE 0.9 % (FLUSH) 0.9 %
10 SYRINGE (ML) INJECTION
Status: CANCELLED | OUTPATIENT
Start: 2023-12-21

## 2023-12-21 RX ADMIN — Medication 10 ML: at 10:12

## 2023-12-21 RX ADMIN — HEPARIN 500 UNITS: 100 SYRINGE at 10:12

## 2023-12-21 RX ADMIN — IOHEXOL 100 ML: 350 INJECTION, SOLUTION INTRAVENOUS at 09:12

## 2023-12-21 NOTE — NURSING
"Pt here for Mediport access for CT scan. Right chestwall mediport accessed with a 20g 1" Powerport bhatia via sterile technique.  Excellent blood return noted.  Flushed with 10ml NS and 5 ml heparin solution.  Needle D/C, site without redness, swelling, or drainage noted.  Dressing applied.  Patient tolerated well.      "

## 2023-12-22 ENCOUNTER — OFFICE VISIT (OUTPATIENT)
Dept: HEMATOLOGY/ONCOLOGY | Facility: CLINIC | Age: 83
End: 2023-12-22
Payer: MEDICARE

## 2023-12-22 VITALS
HEIGHT: 71 IN | OXYGEN SATURATION: 97 % | SYSTOLIC BLOOD PRESSURE: 125 MMHG | RESPIRATION RATE: 18 BRPM | DIASTOLIC BLOOD PRESSURE: 61 MMHG | BODY MASS INDEX: 29.28 KG/M2 | WEIGHT: 209.13 LBS | HEART RATE: 63 BPM | TEMPERATURE: 98 F

## 2023-12-22 DIAGNOSIS — E53.8 DEFICIENCY OF OTHER SPECIFIED B GROUP VITAMINS: ICD-10-CM

## 2023-12-22 DIAGNOSIS — C34.90 SQUAMOUS CELL CARCINOMA OF LUNG, UNSPECIFIED LATERALITY: Primary | ICD-10-CM

## 2023-12-22 DIAGNOSIS — D53.1 OTHER MEGALOBLASTIC ANEMIAS, NOT ELSEWHERE CLASSIFIED: ICD-10-CM

## 2023-12-22 DIAGNOSIS — T45.1X5A IMMUNODEFICIENCY DUE TO CHEMOTHERAPY: ICD-10-CM

## 2023-12-22 DIAGNOSIS — Z79.899 IMMUNODEFICIENCY DUE TO CHEMOTHERAPY: ICD-10-CM
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
## 2023-12-22 DIAGNOSIS — D84.821 IMMUNODEFICIENCY DUE TO CHEMOTHERAPY: ICD-10-CM

## 2023-12-22 DIAGNOSIS — D84.822 IMMUNODEFICIENCY SECONDARY TO RADIATION THERAPY: ICD-10-CM

## 2023-12-22 PROCEDURE — 1159F MED LIST DOCD IN RCRD: CPT | Mod: CPTII,S$GLB,, | Performed by: INTERNAL MEDICINE

## 2023-12-22 PROCEDURE — 99213 PR OFFICE/OUTPT VISIT, EST, LEVL III, 20-29 MIN: ICD-10-PCS | Mod: S$GLB,,, | Performed by: INTERNAL MEDICINE

## 2023-12-22 PROCEDURE — 3078F PR MOST RECENT DIASTOLIC BLOOD PRESSURE < 80 MM HG: ICD-10-PCS | Mod: CPTII,S$GLB,, | Performed by: INTERNAL MEDICINE

## 2023-12-22 PROCEDURE — 1101F PR PT FALLS ASSESS DOC 0-1 FALLS W/OUT INJ PAST YR: ICD-10-PCS | Mod: CPTII,S$GLB,, | Performed by: INTERNAL MEDICINE

## 2023-12-22 PROCEDURE — 1159F PR MEDICATION LIST DOCUMENTED IN MEDICAL RECORD: ICD-10-PCS | Mod: CPTII,S$GLB,, | Performed by: INTERNAL MEDICINE

## 2023-12-22 PROCEDURE — 1101F PT FALLS ASSESS-DOCD LE1/YR: CPT | Mod: CPTII,S$GLB,, | Performed by: INTERNAL MEDICINE

## 2023-12-22 PROCEDURE — 1126F AMNT PAIN NOTED NONE PRSNT: CPT | Mod: CPTII,S$GLB,, | Performed by: INTERNAL MEDICINE

## 2023-12-22 PROCEDURE — 1126F PR PAIN SEVERITY QUANTIFIED, NO PAIN PRESENT: ICD-10-PCS | Mod: CPTII,S$GLB,, | Performed by: INTERNAL MEDICINE

## 2023-12-22 PROCEDURE — 3288F FALL RISK ASSESSMENT DOCD: CPT | Mod: CPTII,S$GLB,, | Performed by: INTERNAL MEDICINE

## 2023-12-22 PROCEDURE — 3074F PR MOST RECENT SYSTOLIC BLOOD PRESSURE < 130 MM HG: ICD-10-PCS | Mod: CPTII,S$GLB,, | Performed by: INTERNAL MEDICINE

## 2023-12-22 PROCEDURE — 3288F PR FALLS RISK ASSESSMENT DOCUMENTED: ICD-10-PCS | Mod: CPTII,S$GLB,, | Performed by: INTERNAL MEDICINE

## 2023-12-22 PROCEDURE — 3078F DIAST BP <80 MM HG: CPT | Mod: CPTII,S$GLB,, | Performed by: INTERNAL MEDICINE

## 2023-12-22 PROCEDURE — 99999 PR PBB SHADOW E&M-EST. PATIENT-LVL III: CPT | Mod: PBBFAC,,, | Performed by: INTERNAL MEDICINE

## 2023-12-22 PROCEDURE — 99213 OFFICE O/P EST LOW 20 MIN: CPT | Mod: S$GLB,,, | Performed by: INTERNAL MEDICINE

## 2023-12-22 PROCEDURE — 99999 PR PBB SHADOW E&M-EST. PATIENT-LVL III: ICD-10-PCS | Mod: PBBFAC,,, | Performed by: INTERNAL MEDICINE

## 2023-12-22 PROCEDURE — 3074F SYST BP LT 130 MM HG: CPT | Mod: CPTII,S$GLB,, | Performed by: INTERNAL MEDICINE

## 2023-12-22 NOTE — PROGRESS NOTES
ANGELLA[facility]jayce - Hematol Oncol [facility]  [address] [facility] [address]  Phone: [phone];  Fax: [phone]    Patient ID: Alton Black   Chief Complaint: Follow-up (Hx of lung cancer)  MRN:  [id number]     Oncologic Diagnosis:  Stage IIB squamous cell carcinoma lung   Previous Treatment:    - Chemoradiation with carboplatin paclitaxel weekly (1/14/22 - 02/24/2022)  -?Durvalumab maintenance, completed 02/23/2023  Current Treatment:  Surveillance  Subjective   Alton Black is a 83 y.o. male who presents to clinic for follow up.      He has been doing well overall and does not have any changes in his health he follows visit.  He continues to follow with Cardiology for cardiac issues.  His weight and appetite are stable.  He had a CT chest which showed posttreatment changes but no signs of new disease.  He is concerned that his abdomen was not scanned and I discussed with him repeat imaging in a few months.  We will obtain a PET-CT to spare him from the potential effects of contrast on his kidneys.  We can try to alternate PET-CT with CT chest abdomen pelvis.  He is in agreement with this.    Review of Systems:  Review of Systems   Constitutional:  Negative for activity change, appetite change, chills, diaphoresis, fatigue, fever and unexpected weight change.   HENT:  Negative for nosebleeds.    Respiratory:  Negative for shortness of breath.    Cardiovascular:  Negative for chest pain.   Gastrointestinal:  Negative for abdominal distention, abdominal pain, anal bleeding, blood in stool, constipation, diarrhea, nausea and vomiting.   Genitourinary:  Negative for difficulty urinating and hematuria.   Musculoskeletal:  Negative for arthralgias, back pain and myalgias.   Skin:  Negative for rash.   Neurological:  Negative for dizziness, weakness, light-headedness and headaches.   Hematological:  Does not bruise/bleed easily.   Psychiatric/Behavioral:  The patient is not  nervous/anxious.      History     Oncology History   Squamous cell carcinoma of lung   1/13/2021 - 2/24/2022 Radiation Therapy    Treating physician: Karol  Total Dose: 60 Gy  Fractions: 30     12/17/2021 Initial Diagnosis    Squamous cell lung cancer     12/22/2021 Cancer Staged    Staging form: Lung, AJCC 8th Edition  - Clinical stage from 12/22/2021: Stage IIB (cT3, cN0, cM0)     1/14/2022 - 2/21/2022 Chemotherapy    Treatment Summary   Plan Name: OP NSCLC PACLITAXEL + CARBOPLATIN (AUC) QW + RADIATION  Treatment Goal: Curative  Status: Inactive  Start Date: 1/14/2022  End Date: 2/21/2022  Provider: Juan Lopez MD  Chemotherapy: dexAMETHasone (DECADRON) 4 MG Tab, 8 mg, Oral, Daily, 1 of 1 cycle, Start date: --, End date: --  CARBOplatin (PARAPLATIN) 230 mg in sodium chloride 0.9% 273 mL chemo infusion, 230 mg (100 % of original dose 230 mg), Intravenous, Clinic/HOD 1 time, 6 of 6 cycles  Dose modification:   (original dose 230 mg, Cycle 1)  Administration: 230 mg (1/14/2022), 230 mg (1/21/2022), 255 mg (1/28/2022), 255 mg (2/4/2022), 255 mg (2/14/2022), 255 mg (2/21/2022)  PACLitaxeL (TAXOL) 45 mg/m2 = 96 mg in sodium chloride 0.9% 266 mL chemo infusion, 45 mg/m2 = 96 mg, Intravenous, Clinic/HOD 1 time, 6 of 6 cycles  Administration: 96 mg (1/14/2022), 96 mg (1/21/2022), 96 mg (1/28/2022), 96 mg (2/4/2022), 96 mg (2/14/2022), 96 mg (2/21/2022)     3/21/2022 -  Chemotherapy    Treatment Summary   Plan Name: OP DURVALUMAB 1500 MG Q4W  Treatment Goal: Curative  Status: Active  Start Date: 3/21/2022  End Date: 2/23/2023  Provider: Winnie Baez MD  Chemotherapy: [No matching medication found in this treatment plan]         Past Medical History:   Diagnosis Date    Benign essential HTN     CAD (coronary artery disease)     HLD (hyperlipidemia)     Squamous cell carcinoma of lung 12/17/2021       Past Surgical History:   Procedure Laterality Date    BRONCHOSCOPY Bilateral 12/8/2021    Procedure:  "Bronchoscopy - insert lighted tube into airway to take a biopsy of lung;  Surgeon: Rigo Vences MD;  Location: [facility] ENDO;  Service: Endoscopy;  Laterality: Bilateral;    CHOLECYSTECTOMY      CORONARY ANGIOPLASTY WITH STENT PLACEMENT      ENDOBRONCHIAL ULTRASOUND Bilateral 12/8/2021    Procedure: ENDOBRONCHIAL ULTRASOUND (EBUS);  Surgeon: Rigo Vences MD;  Location: [facility] ENDO;  Service: Endoscopy;  Laterality: Bilateral;    FLUOROSCOPY N/A 1/4/2022    Procedure: Mediport Insertion;  Surgeon: Elaina Larios MD;  Location: [facility] CATH LAB;  Service: General;  Laterality: N/A;  yuridia from 1/3 to 1/4       Family History   Problem Relation Age of Onset    Heart attack Father        Review of patient's allergies indicates:  No Known Allergies    Social History     Tobacco Use    Smoking status: Never    Smokeless tobacco: Never    Tobacco comments:     Chews on cigars   Substance Use Topics    Alcohol use: Not Currently    Drug use: Never       Physical Exam   ECOG:   ECOG SCORE              Vitals:  /61   Pulse 63   Temp 97.5 °F (36.4 °C)   Resp 18   Ht 5' 11" (1.803 m)   Wt 94.8 kg (209 lb 1.7 oz)   SpO2 97%   BMI 29.16 kg/m²     Physical Exam:  Physical Exam  Constitutional:       General: He is not in acute distress.     Appearance: Normal appearance. He is normal weight. He is not ill-appearing or toxic-appearing.   HENT:      Head: Normocephalic and atraumatic.   Eyes:      Extraocular Movements: Extraocular movements intact.      Conjunctiva/sclera: Conjunctivae normal.   Cardiovascular:      Rate and Rhythm: Normal rate.   Pulmonary:      Effort: Pulmonary effort is normal. No respiratory distress.   Abdominal:      Palpations: There is no hepatomegaly or splenomegaly.   Musculoskeletal:      Right lower leg: No edema.      Left lower leg: No edema.   Skin:     General: Skin is warm.      Findings: No bruising, erythema or rash.   Neurological:      General: No focal deficit " present.      Mental Status: He is alert and oriented to person, place, and time. Mental status is at baseline.   Psychiatric:         Mood and Affect: Mood normal.         Behavior: Behavior normal.         Thought Content: Thought content normal.            Labs   Labs:  Lab Visit on 12/21/2023   Component Date Value Ref Range Status    Sodium 12/21/2023 141  136 - 145 mmol/L Final    Potassium 12/21/2023 4.0  3.5 - 5.1 mmol/L Final    Chloride 12/21/2023 108  95 - 110 mmol/L Final    CO2 12/21/2023 24  23 - 29 mmol/L Final    Glucose 12/21/2023 100  70 - 110 mg/dL Final    BUN 12/21/2023 5 (L)  8 - 23 mg/dL Final    Creatinine 12/21/2023 0.8  0.5 - 1.4 mg/dL Final    Calcium 12/21/2023 8.7  8.7 - 10.5 mg/dL Final    Total Protein 12/21/2023 6.1  6.0 - 8.4 g/dL Final    Albumin 12/21/2023 3.7  3.5 - 5.2 g/dL Final    Total Bilirubin 12/21/2023 1.3 (H)  0.1 - 1.0 mg/dL Final    Comment: For infants and newborns, interpretation of results should be based  on gestational age, weight and in agreement with clinical  observations.    Premature Infant recommended reference ranges:  Up to 24 hours.............<8.0 mg/dL  Up to 48 hours............<12.0 mg/dL  3-5 days..................<15.0 mg/dL  6-29 days.................<15.0 mg/dL      Alkaline Phosphatase 12/21/2023 99  55 - 135 U/L Final    AST 12/21/2023 24  10 - 40 U/L Final    ALT 12/21/2023 33  10 - 44 U/L Final    eGFR 12/21/2023 >60  >60 mL/min/1.73 m^2 Final    Anion Gap 12/21/2023 9  8 - 16 mmol/L Final    WBC 12/21/2023 6.13  3.90 - 12.70 K/uL Final    RBC 12/21/2023 4.41 (L)  4.60 - 6.20 M/uL Final    Hemoglobin 12/21/2023 15.1  14.0 - 18.0 g/dL Final    Hematocrit 12/21/2023 45.4  40.0 - 54.0 % Final    MCV 12/21/2023 103 (H)  82 - 98 fL Final    MCH 12/21/2023 34.2 (H)  27.0 - 31.0 pg Final    MCHC 12/21/2023 33.3  32.0 - 36.0 g/dL Final    RDW 12/21/2023 12.5  11.5 - 14.5 % Final    Platelets 12/21/2023 181  150 - 450 K/uL Final    MPV 12/21/2023 9.4   9.2 - 12.9 fL Final    Immature Granulocytes 12/21/2023 0.3  0.0 - 0.5 % Final    Gran # (ANC) 12/21/2023 4.3  1.8 - 7.7 K/uL Final    Immature Grans (Abs) 12/21/2023 0.02  0.00 - 0.04 K/uL Final    Comment: Mild elevation in immature granulocytes is non specific and   can be seen in a variety of conditions including stress response,   acute inflammation, trauma and pregnancy. Correlation with other   laboratory and clinical findings is essential.      Lymph # 12/21/2023 1.2  1.0 - 4.8 K/uL Final    Mono # 12/21/2023 0.6  0.3 - 1.0 K/uL Final    Eos # 12/21/2023 0.0  0.0 - 0.5 K/uL Final    Baso # 12/21/2023 0.02  0.00 - 0.20 K/uL Final    nRBC 12/21/2023 0  0 /100 WBC Final    Gran % 12/21/2023 69.7  38.0 - 73.0 % Final    Lymph % 12/21/2023 19.7  18.0 - 48.0 % Final    Mono % 12/21/2023 9.5  4.0 - 15.0 % Final    Eosinophil % 12/21/2023 0.5  0.0 - 8.0 % Final    Basophil % 12/21/2023 0.3  0.0 - 1.9 % Final    Differential Method 12/21/2023 Automated   Final        Imaging   CT Chest With Contrast  12/21/2023  Impression:  Stable suspected post treatment changes involving the right infrahilar region and right lower lobe.     No new abnormalities.             Assessment and Plan   Squamous cell carcinoma of lung, stage II  Per review of the medical record, patient not interested in surgery for therapy and so was treated with definitive chemoradiation and maintenance immunotherapy; completed maintenance durvalumab 02/23/2023   Has been on surveillance since that time   CT Chest 12/21/23 stable only showing post treatment changes  He is due for repeat imaging; PET-CT ordered      Chronic Medical Conditions  TIA  Hypertension   GERD   Osteoarthritis   CAD status post PCI, history of MI   Hypertension  History of CVA          Med Onc Chart Routing      Follow up with physician . 5 months after PET-CT done; 9-10AM appointment   Follow up with [facility]    Infusion scheduling note    Injection scheduling note    Labs CMP, CBC,  vitamin B12 and folate   Scheduling:  Preferred lab:  Lab interval:     Imaging PET scan   please schedule for 9-10AM   Pharmacy appointment    Other referrals                     The patient was seen, interviewed and examined. Pertinent lab and radiologic studies were reviewed. Pt instructed to call should they develop concerning signs/symptoms or have further questions.        Portions of the record may have been created with voice recognition software. Occasional wrong-word or sound-a-like substitutions may have occurred due to the inherent limitations of voice recognition software. Read the chart carefully and recognize, using context, where substitutions have occurred.      Lynda Hardy MD    Hematology/Oncology

## 2024-01-24 ENCOUNTER — CLINICAL SUPPORT (OUTPATIENT)
Dept: CARDIOLOGY | Facility: CLINIC | Age: 84
End: 2024-01-24
Payer: MEDICARE

## 2024-01-24 ENCOUNTER — OFFICE VISIT (OUTPATIENT)
Dept: CARDIOLOGY | Facility: CLINIC | Age: 84
End: 2024-01-24
Payer: MEDICARE

## 2024-01-24 VITALS
WEIGHT: 213.94 LBS | BODY MASS INDEX: 29.95 KG/M2 | SYSTOLIC BLOOD PRESSURE: 130 MMHG | HEIGHT: 71 IN | DIASTOLIC BLOOD PRESSURE: 66 MMHG | OXYGEN SATURATION: 96 % | HEART RATE: 77 BPM

## 2024-01-24 DIAGNOSIS — I44.7 LBBB (LEFT BUNDLE BRANCH BLOCK): ICD-10-CM

## 2024-01-24 DIAGNOSIS — Z76.89 ENCOUNTER TO ESTABLISH CARE WITH NEW DOCTOR: ICD-10-CM

## 2024-01-24 DIAGNOSIS — I25.2 HISTORY OF MI (MYOCARDIAL INFARCTION): ICD-10-CM

## 2024-01-24 DIAGNOSIS — Z95.5 PRESENCE OF STENT IN CORONARY ARTERY: ICD-10-CM

## 2024-01-24 DIAGNOSIS — E78.00 PURE HYPERCHOLESTEROLEMIA: ICD-10-CM

## 2024-01-24 DIAGNOSIS — I25.118 CORONARY ARTERY DISEASE OF NATIVE HEART WITH STABLE ANGINA PECTORIS, UNSPECIFIED VESSEL OR LESION TYPE: Primary | ICD-10-CM

## 2024-01-24 DIAGNOSIS — E78.2 MIXED HYPERLIPIDEMIA: ICD-10-CM

## 2024-01-24 DIAGNOSIS — I63.89 CEREBROVASCULAR ACCIDENT (CVA) DUE TO OTHER MECHANISM: ICD-10-CM

## 2024-01-24 DIAGNOSIS — I10 BENIGN ESSENTIAL HTN: ICD-10-CM

## 2024-01-24 DIAGNOSIS — I44.7 LBBB (LEFT BUNDLE BRANCH BLOCK): Primary | ICD-10-CM

## 2024-01-24 DIAGNOSIS — I50.31 ACUTE DIASTOLIC CHF (CONGESTIVE HEART FAILURE): ICD-10-CM

## 2024-01-24 DIAGNOSIS — C34.91 SQUAMOUS CELL CARCINOMA OF RIGHT LUNG: ICD-10-CM

## 2024-01-24 PROCEDURE — 3078F DIAST BP <80 MM HG: CPT | Mod: CPTII,S$GLB,, | Performed by: INTERNAL MEDICINE

## 2024-01-24 PROCEDURE — 99999 PR PBB SHADOW E&M-EST. PATIENT-LVL III: CPT | Mod: PBBFAC,,, | Performed by: INTERNAL MEDICINE

## 2024-01-24 PROCEDURE — 1160F RVW MEDS BY RX/DR IN RCRD: CPT | Mod: CPTII,S$GLB,, | Performed by: INTERNAL MEDICINE

## 2024-01-24 PROCEDURE — 99205 OFFICE O/P NEW HI 60 MIN: CPT | Mod: S$GLB,,, | Performed by: INTERNAL MEDICINE

## 2024-01-24 PROCEDURE — 93010 ELECTROCARDIOGRAM REPORT: CPT | Mod: S$GLB,,, | Performed by: INTERNAL MEDICINE

## 2024-01-24 PROCEDURE — 1126F AMNT PAIN NOTED NONE PRSNT: CPT | Mod: CPTII,S$GLB,, | Performed by: INTERNAL MEDICINE

## 2024-01-24 PROCEDURE — 1159F MED LIST DOCD IN RCRD: CPT | Mod: CPTII,S$GLB,, | Performed by: INTERNAL MEDICINE

## 2024-01-24 PROCEDURE — 3288F FALL RISK ASSESSMENT DOCD: CPT | Mod: CPTII,S$GLB,, | Performed by: INTERNAL MEDICINE

## 2024-01-24 PROCEDURE — 93005 ELECTROCARDIOGRAM TRACING: CPT | Mod: S$GLB,,, | Performed by: INTERNAL MEDICINE

## 2024-01-24 PROCEDURE — 3075F SYST BP GE 130 - 139MM HG: CPT | Mod: CPTII,S$GLB,, | Performed by: INTERNAL MEDICINE

## 2024-01-24 PROCEDURE — 1101F PT FALLS ASSESS-DOCD LE1/YR: CPT | Mod: CPTII,S$GLB,, | Performed by: INTERNAL MEDICINE

## 2024-01-24 RX ORDER — ATORVASTATIN CALCIUM 80 MG/1
80 TABLET, FILM COATED ORAL NIGHTLY
Qty: 90 TABLET | Refills: 3 | Status: SHIPPED | OUTPATIENT
Start: 2024-01-24

## 2024-01-24 RX ORDER — ASPIRIN 81 MG/1
81 TABLET ORAL DAILY
Qty: 90 TABLET | Refills: 3 | Status: SHIPPED | OUTPATIENT
Start: 2024-01-24

## 2024-01-24 RX ORDER — TRANDOLAPRIL 4 MG/1
4 TABLET ORAL DAILY
Qty: 90 TABLET | Refills: 3 | Status: SHIPPED | OUTPATIENT
Start: 2024-01-24

## 2024-01-24 NOTE — PROGRESS NOTES
Subjective:   Patient ID:  Alton Black is a 83 y.o. male who presents for evaluation of No chief complaint on file.      82 yo male referred by Dr Kenny for care establish. Prior cardiologist Dr. Carranza at [facility] CAD h/o NSTEMI s/p PCI an overlapping 2.25 x 8 mm and 2.25 x 32 mm stent to the OM in October 2015 at [facility].   h/o pericardial effusion s/p paracentesis in 03/21. Repeat echo in 04/21 trivial effusion  h/o TIA in 2021, chronic LBBB. HTN hLD, aortic calcification  H/o lung Ca in remission h/o chemo and XRT in 21 and 22, stable f/u at [facility]  S/p medi port  No h/o DM   2021 echo EF nl trivial effusion  Live alone  No chest pain MCARTHUR dizziness palpitation orthopnea  Occasional feet swelling.  Chewing cigar. A beer a day  EKG reviewed by myself today reveals NSR LBBB  Yearly check carotid US at PCP  BP LDL 29 and A1c controlled           Results for orders placed during the hospital encounter of 11/19/21    Echo    Interpretation Summary  · The left ventricle is normal in size with mild concentric hypertrophy and normal systolic function.  · The estimated ejection fraction is 55%.  · Grade I left ventricular diastolic dysfunction.  · There is abnormal septal wall motion consistent with left bundle branch block.  · Normal right ventricular size with normal right ventricular systolic function.  · Mild aortic regurgitation.  · Mild tricuspid regurgitation.  · Normal central venous pressure (3 mmHg).  · The estimated PA systolic pressure is 25 mmHg.     No results found for this or any previous visit.       Past Medical History:   Diagnosis Date    Benign essential HTN     CAD (coronary artery disease)     HLD (hyperlipidemia)     Squamous cell carcinoma of lung 12/17/2021       Past Surgical History:   Procedure Laterality Date    BRONCHOSCOPY Bilateral 12/8/2021    Procedure: Bronchoscopy - insert lighted tube into airway to take a biopsy of lung;  Surgeon: Rigo Vences MD;   Location: [facility] ENDO;  Service: Endoscopy;  Laterality: Bilateral;    CHOLECYSTECTOMY      CORONARY ANGIOPLASTY WITH STENT PLACEMENT      ENDOBRONCHIAL ULTRASOUND Bilateral 12/8/2021    Procedure: ENDOBRONCHIAL ULTRASOUND (EBUS);  Surgeon: Rigo Vences MD;  Location: [facility] ENDO;  Service: Endoscopy;  Laterality: Bilateral;    FLUOROSCOPY N/A 1/4/2022    Procedure: Mediport Insertion;  Surgeon: Elaina Larios MD;  Location: [facility] CATH LAB;  Service: General;  Laterality: N/A;  yuridia from 1/3 to 1/4       Social History     Tobacco Use    Smoking status: Never    Smokeless tobacco: Never    Tobacco comments:     Chews on cigars   Substance Use Topics    Alcohol use: Not Currently    Drug use: Never       Family History   Problem Relation Age of Onset    Heart attack Father        Review of Systems   Constitutional: Negative for decreased appetite, diaphoresis, fever, malaise/fatigue and night sweats.   HENT:  Negative for nosebleeds.    Eyes:  Negative for blurred vision and double vision.   Cardiovascular:  Negative for chest pain, claudication, dyspnea on exertion, irregular heartbeat, leg swelling, near-syncope, orthopnea, palpitations, paroxysmal nocturnal dyspnea and syncope.   Respiratory:  Negative for cough, shortness of breath, sleep disturbances due to breathing, snoring, sputum production and wheezing.    Endocrine: Negative for cold intolerance and polyuria.   Hematologic/Lymphatic: Does not bruise/bleed easily.   Skin:  Negative for rash.   Musculoskeletal:  Positive for back pain. Negative for falls, joint pain, joint swelling and neck pain.   Gastrointestinal:  Negative for abdominal pain, heartburn, nausea and vomiting.   Genitourinary:  Negative for dysuria, frequency and hematuria.   Neurological:  Negative for difficulty with concentration, dizziness, focal weakness, headaches, light-headedness, numbness, seizures and weakness.   Psychiatric/Behavioral:  Negative for depression, memory  loss and substance abuse. The patient does not have insomnia.    Allergic/Immunologic: Negative for HIV exposure and hives.       Objective:   Physical Exam  HENT:      Head: Normocephalic.   Eyes:      Pupils: Pupils are equal, round, and reactive to light.   Neck:      Thyroid: No thyromegaly.      Vascular: Normal carotid pulses. No carotid bruit or JVD.   Cardiovascular:      Rate and Rhythm: Normal rate and regular rhythm. No extrasystoles are present.     Chest Wall: PMI is not displaced.      Pulses: Normal pulses.      Heart sounds: Normal heart sounds. No murmur heard.     No gallop. No S3 sounds.   Pulmonary:      Effort: No respiratory distress.      Breath sounds: Normal breath sounds. No stridor.   Abdominal:      General: Bowel sounds are normal.      Palpations: Abdomen is soft.      Tenderness: There is no abdominal tenderness. There is no rebound.   Musculoskeletal:         General: Normal range of motion.   Skin:     Findings: No rash.   Neurological:      Mental Status: He is alert and oriented to person, place, and time.   Psychiatric:         Behavior: Behavior normal.         Lab Results   Component Value Date    CHOL 104 09/12/2023    CHOL 93 (L) 11/19/2021    CHOL 86 (L) 08/16/2021     Lab Results   Component Value Date    HDL 62 09/12/2023    HDL 42 11/19/2021    HDL 37 (L) 08/16/2021     Lab Results   Component Value Date    LDLCALC 29 09/12/2023    LDLCALC 40.8 (L) 11/19/2021    LDLCALC 33 08/16/2021     Lab Results   Component Value Date    TRIG 54 09/12/2023    TRIG 51 11/19/2021    TRIG 76 08/16/2021     Lab Results   Component Value Date    CHOLHDL 45.2 11/19/2021       Chemistry        Component Value Date/Time     12/21/2023 0840    K 4.0 12/21/2023 0840     12/21/2023 0840    CO2 24 12/21/2023 0840    BUN 5 (L) 12/21/2023 0840    CREATININE 0.8 12/21/2023 0840     12/21/2023 0840        Component Value Date/Time    CALCIUM 8.7 12/21/2023 0840    ALKPHOS 99  12/21/2023 0840    AST 24 12/21/2023 0840    ALT 33 12/21/2023 0840    BILITOT 1.3 (H) 12/21/2023 0840    ESTGFRAFRICA >60 07/11/2022 0911    EGFRNONAA >60 07/11/2022 0911          Lab Results   Component Value Date    HGBA1C 5.7 (H) 09/12/2023     Lab Results   Component Value Date    TSH 1.200 09/12/2023     Lab Results   Component Value Date    INR 1.0 01/04/2022    INR 1.0 11/20/2021    INR 1.3 03/26/2021     Lab Results   Component Value Date    WBC 6.13 12/21/2023    HGB 15.1 12/21/2023    HCT 45.4 12/21/2023     (H) 12/21/2023     12/21/2023     BNP  @LABRCNTIP(BNP,BNPTRIAGEBLO)@  CrCl cannot be calculated (Patient's most recent lab result is older than the maximum 7 days allowed.).  No results found in the last 24 hours.  No results found in the last 24 hours.  No results found in the last 24 hours.    Assessment:      1. Coronary artery disease of native heart with stable angina pectoris, unspecified vessel or lesion type    2. Acute diastolic CHF (congestive heart failure)    3. Mixed hyperlipidemia    4. Benign essential HTN    5. History of MI (myocardial infarction)    6. LBBB (left bundle branch block)    7. Presence of stent in coronary artery    8. Cerebrovascular accident (CVA) due to other mechanism    9. Pure hypercholesterolemia    10. Squamous cell carcinoma of right lung        Plan:   Continue asa Statin trandolipril  Echo and Phar MPI for EF eval, r/o pericardial effusion and ischemia  YOLA and LE aretrial US routine check up    Counseled DASH  Check Lipid profile with PCP in 6 months  Recommend heart-healthy diet, weight control and regular exercise.  Ej. Risk modification.   I have reviewed all pertinent labs and cardiac studies independently. Plans and recommendations have been formulated under my direct supervision. All questions answered and patient voiced understanding.   If symptoms persist go to the ED  RTC in 6 months

## 2024-02-01 ENCOUNTER — HOSPITAL ENCOUNTER (OUTPATIENT)
Dept: CARDIOLOGY | Facility: HOSPITAL | Age: 84
Discharge: HOME | End: 2024-02-01
Attending: INTERNAL MEDICINE
Payer: MEDICARE

## 2024-02-01 VITALS
SYSTOLIC BLOOD PRESSURE: 138 MMHG | DIASTOLIC BLOOD PRESSURE: 67 MMHG | BODY MASS INDEX: 29.82 KG/M2 | HEIGHT: 71 IN | WEIGHT: 213 LBS

## 2024-02-01 VITALS
HEIGHT: 71 IN | BODY MASS INDEX: 29.82 KG/M2 | DIASTOLIC BLOOD PRESSURE: 73 MMHG | WEIGHT: 213 LBS | SYSTOLIC BLOOD PRESSURE: 127 MMHG

## 2024-02-01 DIAGNOSIS — I25.118 CORONARY ARTERY DISEASE OF NATIVE HEART WITH STABLE ANGINA PECTORIS, UNSPECIFIED VESSEL OR LESION TYPE: ICD-10-CM

## 2024-02-01 LAB
LEFT ABI: 1.85
LEFT ANT TIBIAL SYS PSV: 5 CM/S
LEFT ARM BP: 127 MMHG
LEFT CFA PSV: 102 CM/S
LEFT DORSALIS PEDIS: 255 MMHG
LEFT PERONEAL SYS PSV: 69 CM/S
LEFT POPLITEAL PSV: 44 CM/S
LEFT POST TIBIAL SYS PSV: 3 CM/S
LEFT POSTERIOR TIBIAL: 255 MMHG
LEFT PROFUNDA SYS PSV: 52 CM/S
LEFT SUPER FEMORAL DIST SYS PSV: 46 CM/S
LEFT SUPER FEMORAL MID SYS PSV: 78 CM/S
LEFT SUPER FEMORAL OSTIAL SYS PSV: 87 CM/S
LEFT SUPER FEMORAL PROX SYS PSV: 68 CM/S
LEFT TBI: 0.86
LEFT TIB/PER TRUNK SYS PSV: 43 CM/S
LEFT TOE PRESSURE: 118 MMHG
RIGHT ABI: 1.85
RIGHT ANT TIBIAL SYS PSV: 51 CM/S
RIGHT ARM BP: 138 MMHG
RIGHT CFA PSV: 59 CM/S
RIGHT DORSALIS PEDIS: 255 MMHG
RIGHT PERONEAL SYS PSV: 45 CM/S
RIGHT POPLITEAL PSV: 54 CM/S
RIGHT POST TIBIAL SYS PSV: 74 CM/S
RIGHT POSTERIOR TIBIAL: 255 MMHG
RIGHT PROFUNDA SYS PSV: 58 CM/S
RIGHT SUPER FEMORAL DIST SYS PSV: 66 CM/S
RIGHT SUPER FEMORAL MID SYS PSV: 71 CM/S
RIGHT SUPER FEMORAL OSTIAL SYS PSV: 66 CM/S
RIGHT SUPER FEMORAL PROX SYS PSV: 75 CM/S
RIGHT TBI: 1.04
RIGHT TIB/PER TRUNK SYS PSV: 52 CM/S
RIGHT TOE PRESSURE: 144 MMHG

## 2024-02-01 PROCEDURE — 93922 UPR/L XTREMITY ART 2 LEVELS: CPT | Mod: 26,,, | Performed by: INTERNAL MEDICINE

## 2024-02-01 PROCEDURE — 93922 UPR/L XTREMITY ART 2 LEVELS: CPT

## 2024-02-01 PROCEDURE — 93925 LOWER EXTREMITY STUDY: CPT | Mod: 26,,, | Performed by: INTERNAL MEDICINE

## 2024-02-01 PROCEDURE — 93925 LOWER EXTREMITY STUDY: CPT

## 2024-02-02 ENCOUNTER — TELEPHONE (OUTPATIENT)
Dept: CARDIOLOGY | Facility: CLINIC | Age: 84
End: 2024-02-02
Payer: MEDICARE

## 2024-02-02 NOTE — TELEPHONE ENCOUNTER
The patient has been notified of the results and would like to discuss the results.          LE arterial US showed distal vessel disease   Continue current Rx.   F/U as scheduled

## 2024-02-02 NOTE — TELEPHONE ENCOUNTER
----- Message from Kar Juárez MD sent at 2/1/2024  6:30 PM CST -----  LE arterial US showed distal vessel disease  Continue current Rx.   F/U as scheduled

## 2024-02-06 ENCOUNTER — HOSPITAL ENCOUNTER (OUTPATIENT)
Dept: RADIOLOGY | Facility: HOSPITAL | Age: 84
Discharge: HOME | End: 2024-02-06
Attending: INTERNAL MEDICINE
Payer: MEDICARE

## 2024-02-06 ENCOUNTER — HOSPITAL ENCOUNTER (OUTPATIENT)
Dept: CARDIOLOGY | Facility: HOSPITAL | Age: 84
Discharge: HOME | End: 2024-02-06
Attending: INTERNAL MEDICINE
Payer: MEDICARE

## 2024-02-06 ENCOUNTER — HOSPITAL ENCOUNTER (OUTPATIENT)
Dept: PULMONOLOGY | Facility: HOSPITAL | Age: 84
Discharge: HOME | End: 2024-02-06
Attending: INTERNAL MEDICINE
Payer: MEDICARE

## 2024-02-06 VITALS
BODY MASS INDEX: 29.82 KG/M2 | HEART RATE: 55 BPM | SYSTOLIC BLOOD PRESSURE: 128 MMHG | WEIGHT: 213 LBS | DIASTOLIC BLOOD PRESSURE: 72 MMHG | HEIGHT: 71 IN

## 2024-02-06 VITALS
SYSTOLIC BLOOD PRESSURE: 138 MMHG | WEIGHT: 213 LBS | BODY MASS INDEX: 29.82 KG/M2 | HEIGHT: 71 IN | DIASTOLIC BLOOD PRESSURE: 67 MMHG

## 2024-02-06 DIAGNOSIS — I25.118 CORONARY ARTERY DISEASE OF NATIVE HEART WITH STABLE ANGINA PECTORIS, UNSPECIFIED VESSEL OR LESION TYPE: ICD-10-CM

## 2024-02-06 LAB
AORTIC ROOT ANNULUS: 3.37 CM
ASCENDING AORTA: 3.39 CM
AV INDEX (PROSTH): 0.64
AV MEAN GRADIENT: 8 MMHG
AV PEAK GRADIENT: 14 MMHG
AV REGURGITATION PRESSURE HALF TIME: 970.97 MS
AV VALVE AREA BY VELOCITY RATIO: 1.7 CM²
AV VALVE AREA: 1.97 CM²
AV VELOCITY RATIO: 0.55
BSA FOR ECHO PROCEDURE: 2.2 M2
CV ECHO LV RWT: 0.88 CM
CV STRESS BASE HR: 56 BPM
DIASTOLIC BLOOD PRESSURE: 72 MMHG
DOP CALC AO PEAK VEL: 1.88 M/S
DOP CALC AO VTI: 40.4 CM
DOP CALC LVOT AREA: 3.1 CM2
DOP CALC LVOT DIAMETER: 1.98 CM
DOP CALC LVOT PEAK VEL: 1.04 M/S
DOP CALC LVOT STROKE VOLUME: 79.71 CM3
DOP CALC RVOT PEAK VEL: 0.76 M/S
DOP CALC RVOT VTI: 14.5 CM
DOP CALCLVOT PEAK VEL VTI: 25.9 CM
E WAVE DECELERATION TIME: 265.81 MSEC
E/A RATIO: 0.64
E/E' RATIO: 6.8 M/S
ECHO LV POSTERIOR WALL: 1.4 CM (ref 0.6–1.1)
EJECTION FRACTION- HIGH: 73 %
END DIASTOLIC INDEX-HIGH: 165 ML/M2
END DIASTOLIC INDEX-LOW: 101 ML/M2
END SYSTOLIC INDEX-HIGH: 64 ML/M2
END SYSTOLIC INDEX-LOW: 28 ML/M2
FRACTIONAL SHORTENING: 27 % (ref 28–44)
INTERVENTRICULAR SEPTUM: 1.36 CM (ref 0.6–1.1)
IVC DIAMETER: 1.47 CM
IVRT: 88.49 MSEC
LA MAJOR: 5.66 CM
LA MINOR: 3.2 CM
LA WIDTH: 2.7 CM
LEFT ATRIUM SIZE: 3 CM
LEFT ATRIUM VOLUME INDEX: 13 ML/M2
LEFT ATRIUM VOLUME: 28.15 CM3
LEFT INTERNAL DIMENSION IN SYSTOLE: 2.32 CM (ref 2.1–4)
LEFT VENTRICLE DIASTOLIC VOLUME INDEX: 18.78 ML/M2
LEFT VENTRICLE DIASTOLIC VOLUME: 40.75 ML
LEFT VENTRICLE MASS INDEX: 69 G/M2
LEFT VENTRICLE SYSTOLIC VOLUME INDEX: 8.6 ML/M2
LEFT VENTRICLE SYSTOLIC VOLUME: 18.57 ML
LEFT VENTRICULAR INTERNAL DIMENSION IN DIASTOLE: 3.19 CM (ref 3.5–6)
LEFT VENTRICULAR MASS: 148.85 G
LV LATERAL E/E' RATIO: 6.18 M/S
LV SEPTAL E/E' RATIO: 7.56 M/S
LVOT MG: 2.52 MMHG
LVOT MV: 0.74 CM/S
MV PEAK A VEL: 1.07 M/S
MV PEAK E VEL: 0.68 M/S
NUC REST EJECTION FRACTION: 74
NUC STRESS EJECTION FRACTION: 71 %
OHS CV CPX 85 PERCENT MAX PREDICTED HEART RATE MALE: 115
OHS CV CPX MAX PREDICTED HEART RATE: 135
OHS CV CPX PATIENT IS FEMALE: 0
OHS CV CPX PATIENT IS MALE: 1
OHS CV CPX PEAK DIASTOLIC BLOOD PRESSURE: 61 MMHG
OHS CV CPX PEAK HEAR RATE: 85 BPM
OHS CV CPX PEAK RATE PRESSURE PRODUCT: NORMAL
OHS CV CPX PEAK SYSTOLIC BLOOD PRESSURE: 131 MMHG
OHS CV CPX PERCENT MAX PREDICTED HEART RATE ACHIEVED: 63
OHS CV CPX RATE PRESSURE PRODUCT PRESENTING: 7168
PISA AR MAX VEL: 3.91 M/S
PISA MRMAX VEL: 4.28 M/S
PISA TR MAX VEL: 2.78 M/S
PV MEAN GRADIENT: 1 MMHG
RA MAJOR: 4.99 CM
RA PRESSURE ESTIMATED: 3 MMHG
RA WIDTH: 3.2 CM
RETIRED EF AND QEF - SEE NOTES: 59 %
RV TB RVSP: 6 MMHG
STJ: 3.32 CM
SYSTOLIC BLOOD PRESSURE: 128 MMHG
TDI LATERAL: 0.11 M/S
TDI SEPTAL: 0.09 M/S
TDI: 0.1 M/S
TR MAX PG: 31 MMHG
TRICUSPID ANNULAR PLANE SYSTOLIC EXCURSION: 2.11 CM
TV REST PULMONARY ARTERY PRESSURE: 34 MMHG
Z-SCORE OF LEFT VENTRICULAR DIMENSION IN END DIASTOLE: -8.11
Z-SCORE OF LEFT VENTRICULAR DIMENSION IN END SYSTOLE: -5.01

## 2024-02-06 PROCEDURE — 63600175 PHARM REV CODE 636 W HCPCS: Performed by: INTERNAL MEDICINE

## 2024-02-06 PROCEDURE — 93306 TTE W/DOPPLER COMPLETE: CPT

## 2024-02-06 PROCEDURE — 93018 CV STRESS TEST I&R ONLY: CPT | Mod: ,,, | Performed by: INTERNAL MEDICINE

## 2024-02-06 PROCEDURE — 93306 TTE W/DOPPLER COMPLETE: CPT | Mod: 26,,, | Performed by: INTERNAL MEDICINE

## 2024-02-06 PROCEDURE — 78452 HT MUSCLE IMAGE SPECT MULT: CPT | Mod: 26,,, | Performed by: INTERNAL MEDICINE

## 2024-02-06 PROCEDURE — 93016 CV STRESS TEST SUPVJ ONLY: CPT | Mod: ,,, | Performed by: INTERNAL MEDICINE

## 2024-02-06 RX ORDER — REGADENOSON 0.08 MG/ML
0.4 INJECTION, SOLUTION INTRAVENOUS ONCE
Status: COMPLETED | OUTPATIENT
Start: 2024-02-06 | End: 2024-02-06

## 2024-02-06 RX ADMIN — REGADENOSON 0.4 MG: 0.08 INJECTION, SOLUTION INTRAVENOUS at 01:02

## 2024-02-07 ENCOUNTER — TELEPHONE (OUTPATIENT)
Dept: CARDIOLOGY | Facility: CLINIC | Age: 84
End: 2024-02-07
Payer: MEDICARE

## 2024-02-07 NOTE — TELEPHONE ENCOUNTER
The patient has been notified of this information and all questions answered.    ----- Message from Kar Juárez MD sent at 2/7/2024  4:30 PM CST -----  Echo showed nl function  and mild valve leaking  Phar MPI showed no ischemia  Continue current Rx.   F/U as scheduled

## 2024-02-16 ENCOUNTER — INFUSION (OUTPATIENT)
Dept: INFUSION THERAPY | Facility: HOSPITAL | Age: 84
End: 2024-02-16
Attending: INTERNAL MEDICINE
Payer: MEDICARE

## 2024-02-16 VITALS
HEART RATE: 72 BPM | OXYGEN SATURATION: 95 % | RESPIRATION RATE: 18 BRPM | DIASTOLIC BLOOD PRESSURE: 71 MMHG | SYSTOLIC BLOOD PRESSURE: 127 MMHG | TEMPERATURE: 98 F

## 2024-02-16 DIAGNOSIS — C34.90 SQUAMOUS CELL CARCINOMA OF LUNG, UNSPECIFIED LATERALITY: Primary | ICD-10-CM

## 2024-02-16 DIAGNOSIS — C34.91 SQUAMOUS CELL CARCINOMA OF RIGHT LUNG: Primary | ICD-10-CM

## 2024-02-16 PROCEDURE — 96523 IRRIG DRUG DELIVERY DEVICE: CPT

## 2024-02-16 PROCEDURE — 63600175 PHARM REV CODE 636 W HCPCS

## 2024-02-16 RX ORDER — HEPARIN 100 UNIT/ML
500 SYRINGE INTRAVENOUS
Status: DISCONTINUED | OUTPATIENT
Start: 2024-02-16 | End: 2024-02-16 | Stop reason: HOSPADM

## 2024-02-16 RX ORDER — HEPARIN 100 UNIT/ML
500 SYRINGE INTRAVENOUS
Status: CANCELLED | OUTPATIENT
Start: 2024-02-16

## 2024-02-16 RX ORDER — SODIUM CHLORIDE 0.9 % (FLUSH) 0.9 %
10 SYRINGE (ML) INJECTION
Status: DISCONTINUED | OUTPATIENT
Start: 2024-02-16 | End: 2024-02-16 | Stop reason: HOSPADM

## 2024-02-16 RX ORDER — SODIUM CHLORIDE 0.9 % (FLUSH) 0.9 %
10 SYRINGE (ML) INJECTION
Status: CANCELLED | OUTPATIENT
Start: 2024-02-16

## 2024-02-16 RX ADMIN — HEPARIN 500 UNITS: 100 SYRINGE at 09:02

## 2024-02-16 NOTE — DISCHARGE INSTRUCTIONS
THANKS FOR ALLOWING ME TO CARE FOR YOU TODAY!!!!! ~DUSTIN          THANKS FOR CHOOSING [facility]!!!          [facility]  [address] [facility]  [id number] [place] [address] Drive  [phone] phone     [phone] fax  Hours of Operation: Monday- Friday 8:00am- 5:00pm  After hours phone  [phone]  Hematology / Oncology Physicians on call      DARINEL Bobby [address] NP Sydney Prescott, HEDY Patel, AURELIANO Waldrop    Please call with any concerns regarding your appointment today.

## 2024-02-21 ENCOUNTER — OFFICE VISIT (OUTPATIENT)
Dept: CARDIOLOGY | Facility: CLINIC | Age: 84
End: 2024-02-21
Payer: MEDICARE

## 2024-02-21 VITALS
BODY MASS INDEX: 29.56 KG/M2 | HEIGHT: 71 IN | DIASTOLIC BLOOD PRESSURE: 66 MMHG | OXYGEN SATURATION: 96 % | WEIGHT: 211.19 LBS | HEART RATE: 79 BPM | SYSTOLIC BLOOD PRESSURE: 124 MMHG

## 2024-02-21 DIAGNOSIS — I44.7 LBBB (LEFT BUNDLE BRANCH BLOCK): ICD-10-CM

## 2024-02-21 DIAGNOSIS — I25.118 CORONARY ARTERY DISEASE OF NATIVE ARTERY OF NATIVE HEART WITH STABLE ANGINA PECTORIS: Primary | ICD-10-CM

## 2024-02-21 DIAGNOSIS — I25.2 HISTORY OF MI (MYOCARDIAL INFARCTION): ICD-10-CM

## 2024-02-21 DIAGNOSIS — I73.9 PAD (PERIPHERAL ARTERY DISEASE): ICD-10-CM

## 2024-02-21 DIAGNOSIS — E78.2 MIXED HYPERLIPIDEMIA: ICD-10-CM

## 2024-02-21 DIAGNOSIS — Z95.5 PRESENCE OF STENT IN CORONARY ARTERY: ICD-10-CM

## 2024-02-21 DIAGNOSIS — I10 BENIGN ESSENTIAL HTN: ICD-10-CM

## 2024-02-21 DIAGNOSIS — I50.31 ACUTE DIASTOLIC CHF (CONGESTIVE HEART FAILURE): ICD-10-CM

## 2024-02-21 DIAGNOSIS — E78.00 PURE HYPERCHOLESTEROLEMIA: ICD-10-CM

## 2024-02-21 PROCEDURE — 3074F SYST BP LT 130 MM HG: CPT | Mod: CPTII,S$GLB,, | Performed by: INTERNAL MEDICINE

## 2024-02-21 PROCEDURE — 99214 OFFICE O/P EST MOD 30 MIN: CPT | Mod: S$GLB,,, | Performed by: INTERNAL MEDICINE

## 2024-02-21 PROCEDURE — 3288F FALL RISK ASSESSMENT DOCD: CPT | Mod: CPTII,S$GLB,, | Performed by: INTERNAL MEDICINE

## 2024-02-21 PROCEDURE — 1159F MED LIST DOCD IN RCRD: CPT | Mod: CPTII,S$GLB,, | Performed by: INTERNAL MEDICINE

## 2024-02-21 PROCEDURE — 3078F DIAST BP <80 MM HG: CPT | Mod: CPTII,S$GLB,, | Performed by: INTERNAL MEDICINE

## 2024-02-21 PROCEDURE — 99999 PR PBB SHADOW E&M-EST. PATIENT-LVL III: CPT | Mod: PBBFAC,,, | Performed by: INTERNAL MEDICINE

## 2024-02-21 PROCEDURE — 1101F PT FALLS ASSESS-DOCD LE1/YR: CPT | Mod: CPTII,S$GLB,, | Performed by: INTERNAL MEDICINE

## 2024-02-21 PROCEDURE — 1160F RVW MEDS BY RX/DR IN RCRD: CPT | Mod: CPTII,S$GLB,, | Performed by: INTERNAL MEDICINE

## 2024-02-21 PROCEDURE — 1126F AMNT PAIN NOTED NONE PRSNT: CPT | Mod: CPTII,S$GLB,, | Performed by: INTERNAL MEDICINE

## 2024-02-21 NOTE — PROGRESS NOTES
Subjective:   Patient ID:  Alton Black is a 83 y.o. male who presents for follow up of No chief complaint on file.      84 yo male referred by Dr Kenny for care establish. Prior cardiologist Dr. Carranza at [facility] CAD h/o NSTEMI s/p PCI an overlapping 2.25 x 8 mm and 2.25 x 32 mm stent to the OM in October 2015 at [facility].   h/o pericardial effusion s/p paracentesis in 03/21. Repeat echo in 04/21 trivial effusion  h/o TIA in 2021, chronic LBBB. HTN hLD, aortic calcification  H/o lung Ca in remission h/o chemo and XRT in 21 and 22, stable f/u at [facility]  S/p medi port  No h/o DM   2021 echo EF nl trivial effusion  Live alone  No chest pain MCARTHUR dizziness palpitation orthopnea  Occasional feet swelling.  Chewing cigar. A beer a day  EKG reviewed by myself today reveals NSR LBBB  Yearly check carotid US at PCP  BP LDL 29 and A1c controlled     Interval history  02/24 echo showed EF nml, LVH and mild valve leaking and Phar MPI showed no ischemia  LE arterial US showed mild Dz except occuded PTA and COLBY left  2021 carotid US < 20% lesions  Bp LDL and BS controlled  Occasional SOB and RLE swelling  No chest pain           Past Medical History:   Diagnosis Date    Benign essential HTN     CAD (coronary artery disease)     HLD (hyperlipidemia)     Squamous cell carcinoma of lung 12/17/2021       Past Surgical History:   Procedure Laterality Date    BRONCHOSCOPY Bilateral 12/8/2021    Procedure: Bronchoscopy - insert lighted tube into airway to take a biopsy of lung;  Surgeon: Rigo Vences MD;  Location: [facility] ENDO;  Service: Endoscopy;  Laterality: Bilateral;    CHOLECYSTECTOMY      CORONARY ANGIOPLASTY WITH STENT PLACEMENT      ENDOBRONCHIAL ULTRASOUND Bilateral 12/8/2021    Procedure: ENDOBRONCHIAL ULTRASOUND (EBUS);  Surgeon: Rigo Vences MD;  Location: [facility] ENDO;  Service: Endoscopy;  Laterality: Bilateral;    FLUOROSCOPY N/A 1/4/2022    Procedure: Mediport Insertion;  Surgeon: Elaina RODRIGUEZ  MD Harriet;  Location: [facility] CATH LAB;  Service: General;  Laterality: N/A;  yuridia from 1/3 to 1/4       Social History     Tobacco Use    Smoking status: Never    Smokeless tobacco: Never    Tobacco comments:     Chews on cigars   Substance Use Topics    Alcohol use: Not Currently    Drug use: Never       Family History   Problem Relation Age of Onset    Heart attack Father          ROS    Objective:   Physical Exam  HENT:      Head: Normocephalic.   Eyes:      Pupils: Pupils are equal, round, and reactive to light.   Neck:      Thyroid: No thyromegaly.      Vascular: Normal carotid pulses. No carotid bruit or JVD.   Cardiovascular:      Rate and Rhythm: Normal rate and regular rhythm. No extrasystoles are present.     Chest Wall: PMI is not displaced.      Pulses: Normal pulses.      Heart sounds: Normal heart sounds. No murmur heard.     No gallop. No S3 sounds.   Pulmonary:      Effort: No respiratory distress.      Breath sounds: Normal breath sounds. No stridor.   Abdominal:      General: Bowel sounds are normal.      Palpations: Abdomen is soft.      Tenderness: There is no abdominal tenderness. There is no rebound.   Skin:     Findings: No rash.   Neurological:      Mental Status: He is alert and oriented to person, place, and time.   Psychiatric:         Behavior: Behavior normal.         Lab Results   Component Value Date    CHOL 104 09/12/2023    CHOL 93 (L) 11/19/2021    CHOL 86 (L) 08/16/2021     Lab Results   Component Value Date    HDL 62 09/12/2023    HDL 42 11/19/2021    HDL 37 (L) 08/16/2021     Lab Results   Component Value Date    LDLCALC 29 09/12/2023    LDLCALC 40.8 (L) 11/19/2021    LDLCALC 33 08/16/2021     Lab Results   Component Value Date    TRIG 54 09/12/2023    TRIG 51 11/19/2021    TRIG 76 08/16/2021     Lab Results   Component Value Date    CHOLHDL 45.2 11/19/2021       Chemistry        Component Value Date/Time     12/21/2023 0840    K 4.0 12/21/2023 0840      12/21/2023 0840    CO2 24 12/21/2023 0840    BUN 5 (L) 12/21/2023 0840    CREATININE 0.8 12/21/2023 0840     12/21/2023 0840        Component Value Date/Time    CALCIUM 8.7 12/21/2023 0840    ALKPHOS 99 12/21/2023 0840    AST 24 12/21/2023 0840    ALT 33 12/21/2023 0840    BILITOT 1.3 (H) 12/21/2023 0840    ESTGFRAFRICA >60 07/11/2022 0911    EGFRNONAA >60 07/11/2022 0911          Lab Results   Component Value Date    HGBA1C 5.7 (H) 09/12/2023     Lab Results   Component Value Date    TSH 1.200 09/12/2023     Lab Results   Component Value Date    INR 1.0 01/04/2022    INR 1.0 11/20/2021    INR 1.3 03/26/2021     Lab Results   Component Value Date    WBC 6.13 12/21/2023    HGB 15.1 12/21/2023    HCT 45.4 12/21/2023     (H) 12/21/2023     12/21/2023     BMP  Sodium   Date Value Ref Range Status   12/21/2023 141 136 - 145 mmol/L Final     Potassium   Date Value Ref Range Status   12/21/2023 4.0 3.5 - 5.1 mmol/L Final     Chloride   Date Value Ref Range Status   12/21/2023 108 95 - 110 mmol/L Final     CO2   Date Value Ref Range Status   12/21/2023 24 23 - 29 mmol/L Final     BUN   Date Value Ref Range Status   12/21/2023 5 (L) 8 - 23 mg/dL Final     Creatinine   Date Value Ref Range Status   12/21/2023 0.8 0.5 - 1.4 mg/dL Final     Calcium   Date Value Ref Range Status   12/21/2023 8.7 8.7 - 10.5 mg/dL Final     Anion Gap   Date Value Ref Range Status   12/21/2023 9 8 - 16 mmol/L Final     eGFR if    Date Value Ref Range Status   07/11/2022 >60 >60 mL/min/1.73 m^2 Final     eGFR if non    Date Value Ref Range Status   07/11/2022 >60 >60 mL/min/1.73 m^2 Final     Comment:     Calculation used to obtain the estimated glomerular filtration  rate (eGFR) is the CKD-EPI equation.        BNP  @LABRCNTIP(BNP,BNPTRIAGEBLO)@  @LABRCNTIP(troponini)@  CrCl cannot be calculated (Patient's most recent lab result is older than the maximum 7 days allowed.).  No results found in the  last 24 hours.  No results found in the last 24 hours.  No results found in the last 24 hours.    Assessment:      1. Coronary artery disease of native artery of native heart with stable angina pectoris    2. Benign essential HTN    3. Mixed hyperlipidemia    4. History of MI (myocardial infarction)    5. LBBB (left bundle branch block)    6. Presence of stent in coronary artery    7. Pure hypercholesterolemia    8. Acute diastolic CHF (congestive heart failure)    9. PAD (peripheral artery disease)        Plan:   Continue asa and Lipitor  Carotid US in 6 M   Counseled DASH  Check Lipid profile with PCP in 6 months  Recommend heart-healthy diet, weight control and regular exercise.  Ej. Risk modification.   I have reviewed all pertinent labs and cardiac studies independently. Plans and recommendations have been formulated under my direct supervision. All questions answered and patient voiced understanding.   If symptoms persist go to the ED  RTC in 6 months

## 2024-04-10 NOTE — PROGRESS NOTES
"[facility] / [facility] - Radiation Oncology Follow Up Note    HISTORY OF PRESENT ILLNESS: 82 y.o. male with h/o cT2b vsT4 N1 M0, stage IIB v IIIA NSCLC (squam) diagnosed in 12/2021 s/p definitive chemo-RT to 60 Gy in 30 fx completed 2/24/22 with Dr. Roberson. He completed 1 year of maintenance immunotherapy in 2/2023.         INTERVAL HISTORY:  He returns for routine yearly follow-up.  He has no new complaints related to therapy are worrisome for local regional or metastatic recurrence.    Since his last follow up, he underwent contrasted chest CT on 06/20/2023, contrasted CT of the chest abdomen and pelvis on 09/27/2023, and contrasted chest CT on 12/21/2023.  These has consistently shown posttherapeutic change in the right infrahilar region without evidence of malignancy      PHYSICAL EXAMINATION:  Vitals:    04/12/24 0829   BP: (!) 148/57   Pulse: 66   Resp: 18   Temp: 97.5 °F (36.4 °C)   SpO2: 100%   Weight: 97.7 kg (215 lb 6.2 oz)   Height: 5' 11" (1.803 m)      General:  A&O x4, NAD  HEENT:  PEERLA, CN II-XII intact, EOM intact,   Lymphatics:  no cervical/sclav LAD  Lungs:  CTAB  Heart:  RRR  Abdomen:  NTND +BS, no HSM      ASSESSMENT:  Clinically and radiographically DIANA      PLAN:  Follow up in 1 year.  He undergoes restaging PET scan on May 16th and then will follow up with Dr. Martínez        "

## 2024-04-12 ENCOUNTER — OFFICE VISIT (OUTPATIENT)
Dept: RADIATION ONCOLOGY | Facility: CLINIC | Age: 84
End: 2024-04-12
Payer: MEDICARE

## 2024-04-12 ENCOUNTER — INFUSION (OUTPATIENT)
Dept: INFUSION THERAPY | Facility: HOSPITAL | Age: 84
End: 2024-04-12
Attending: INTERNAL MEDICINE
Payer: MEDICARE

## 2024-04-12 VITALS
DIASTOLIC BLOOD PRESSURE: 57 MMHG | OXYGEN SATURATION: 100 % | WEIGHT: 215.38 LBS | HEART RATE: 66 BPM | TEMPERATURE: 98 F | SYSTOLIC BLOOD PRESSURE: 148 MMHG | HEIGHT: 71 IN | RESPIRATION RATE: 18 BRPM | BODY MASS INDEX: 30.15 KG/M2

## 2024-04-12 VITALS
RESPIRATION RATE: 16 BRPM | HEART RATE: 66 BPM | TEMPERATURE: 97 F | SYSTOLIC BLOOD PRESSURE: 129 MMHG | OXYGEN SATURATION: 98 % | DIASTOLIC BLOOD PRESSURE: 72 MMHG

## 2024-04-12 DIAGNOSIS — C34.91 SQUAMOUS CELL CARCINOMA OF RIGHT LUNG: Primary | ICD-10-CM

## 2024-04-12 PROCEDURE — 99213 OFFICE O/P EST LOW 20 MIN: CPT | Mod: S$GLB,,, | Performed by: SPECIALIST

## 2024-04-12 PROCEDURE — 1159F MED LIST DOCD IN RCRD: CPT | Mod: CPTII,S$GLB,, | Performed by: SPECIALIST

## 2024-04-12 PROCEDURE — 3078F DIAST BP <80 MM HG: CPT | Mod: CPTII,S$GLB,, | Performed by: SPECIALIST

## 2024-04-12 PROCEDURE — 1126F AMNT PAIN NOTED NONE PRSNT: CPT | Mod: CPTII,S$GLB,, | Performed by: SPECIALIST

## 2024-04-12 PROCEDURE — 99999 PR PBB SHADOW E&M-EST. PATIENT-LVL III: CPT | Mod: PBBFAC,,, | Performed by: SPECIALIST

## 2024-04-12 PROCEDURE — 63600175 PHARM REV CODE 636 W HCPCS

## 2024-04-12 PROCEDURE — A4216 STERILE WATER/SALINE, 10 ML: HCPCS

## 2024-04-12 PROCEDURE — 3077F SYST BP >= 140 MM HG: CPT | Mod: CPTII,S$GLB,, | Performed by: SPECIALIST

## 2024-04-12 PROCEDURE — 1101F PT FALLS ASSESS-DOCD LE1/YR: CPT | Mod: CPTII,S$GLB,, | Performed by: SPECIALIST

## 2024-04-12 PROCEDURE — 96523 IRRIG DRUG DELIVERY DEVICE: CPT

## 2024-04-12 PROCEDURE — 25000003 PHARM REV CODE 250

## 2024-04-12 PROCEDURE — 3288F FALL RISK ASSESSMENT DOCD: CPT | Mod: CPTII,S$GLB,, | Performed by: SPECIALIST

## 2024-04-12 RX ORDER — SODIUM CHLORIDE 0.9 % (FLUSH) 0.9 %
10 SYRINGE (ML) INJECTION
Status: CANCELLED | OUTPATIENT
Start: 2024-04-12

## 2024-04-12 RX ORDER — SODIUM CHLORIDE 0.9 % (FLUSH) 0.9 %
10 SYRINGE (ML) INJECTION
Status: DISCONTINUED | OUTPATIENT
Start: 2024-04-12 | End: 2024-04-12 | Stop reason: HOSPADM

## 2024-04-12 RX ORDER — HEPARIN 100 UNIT/ML
500 SYRINGE INTRAVENOUS
Status: CANCELLED | OUTPATIENT
Start: 2024-04-12

## 2024-04-12 RX ORDER — HEPARIN 100 UNIT/ML
500 SYRINGE INTRAVENOUS
Status: DISCONTINUED | OUTPATIENT
Start: 2024-04-12 | End: 2024-04-12 | Stop reason: HOSPADM

## 2024-04-12 RX ORDER — AZELASTINE 1 MG/ML
SPRAY, METERED NASAL
COMMUNITY
Start: 2023-12-22

## 2024-04-12 RX ADMIN — SODIUM CHLORIDE, PRESERVATIVE FREE 10 ML: 5 INJECTION INTRAVENOUS at 09:04

## 2024-04-12 RX ADMIN — HEPARIN 500 UNITS: 100 SYRINGE at 09:04

## 2024-04-12 NOTE — NURSING
"Pt here for Mediport Flush. LEFT chestwall mediport accessed with a 20g 1" bhatia via sterile technique.  Excellent blood return noted.  Flushed with 10ml NS and 5 ml heparin solution.  Needle D/C, site without redness, swelling, or drainage noted.  Dressing applied.  Patient tolerated well.  Patient to return to clinic in 8 weeks.   "

## 2024-04-12 NOTE — DISCHARGE INSTRUCTIONS
.[facility]  [address] [address]  [address] [address]  [phone] phone     [phone] fax  Hours of Operation: Monday- Friday 8:00am- 5:00pm  After hours phone  [phone]  Hematology / Oncology Physicians on call    Dr. Jae Duncan           Nurse Practitioners:     Laquita Gallegos, HEDY Stacy, MELIZA Ruiz, HEDY Montes, HEDY Rubio, NP    Please don't hesitate to call if you have any concerns.      FALL PREVENTION   Falls often occur due to slipping, tripping or losing your balance. Here are ways to reduce your risk of falling again.   Was there anything that caused your fall that can be fixed, removed or replaced?   Make your home safe by keeping walkways clear of objects you may trip over.   Use non-slip pads under rugs.   Do not walk in poorly lit areas.   Do not stand on chairs or wobbly ladders.   Use caution when reaching overhead or looking upward. This position can cause a loss of balance.   Be sure your shoes fit properly, have non-slip bottoms and are in good condition.   Be cautious when going up and down stairs, curbs, and when walking on uneven sidewalks.   If your balance is poor, consider using a cane or walker.   If your fall was related to alcohol use, stop or limit alcohol intake.   If your fall was related to use of sleeping medicines, talk to your doctor about this. You may need to reduce your dosage at bedtime if you awaken during the night to go to the bathroom.   To reduce the need for nighttime bathroom trips:   Avoid drinking fluids for several hours before going to bed   Empty your bladder before going to bed   Men can keep a urinal at the bedside   © 9212-8702 [employer], [address] [place] [address], [place] [address]. All rights reserved. This information is not intended as a substitute for professional medical care. Always follow your healthcare  professional's instructions.    WAYS TO HELP PREVENT INFECTION        WASH YOUR HANDS OFTEN DURING THE DAY, ESPECIALLY BEFORE YOU EAT, AFTER USING THE BATHROOM, AND AFTER TOUCHING ANIMALS    STAY AWAY FROM PEOPLE WHO HAVE ILLNESSES YOU CAN CATCH; SUCH AS COLDS, FLU, CHICKEN POX    TRY TO AVOID CROWDS    STAY AWAY FROM CHILDREN WHO RECENTLY HAVE RECEIVED LIVE VIRUS VACCINES    MAINTAIN GOOD MOUTH CARE    DO NOT SQUEEZE OR SCRATCH PIMPLES    CLEAN CUTS & SCRAPES RIGHT AWAY AND DAILY UNTIL HEALED WITH WARM WATER, SOAP & AN ANTISEPTIC    AVOID CONTACT WITH LITTER BOXES, BIRD CAGES, & FISH TANKS    AVOID STANDING WATER, IE., BIRD BATHS, FLOWER POTS/VASES, OR HUMIDIFIERS    WEAR GLOVES WHEN GARDENING OR CLEANING UP AFTER OTHERS, ESPECIALLY BABIES & SMALL CHILDREN    DO NOT EAT RAW FISH, SEAFOOD, MEAT, OR EGGS

## 2024-05-16 ENCOUNTER — HOSPITAL ENCOUNTER (OUTPATIENT)
Dept: RADIOLOGY | Facility: HOSPITAL | Age: 84
Discharge: HOME | End: 2024-05-16
Attending: INTERNAL MEDICINE
Payer: MEDICARE

## 2024-05-16 DIAGNOSIS — C34.90 SQUAMOUS CELL CARCINOMA OF LUNG, UNSPECIFIED LATERALITY: ICD-10-CM

## 2024-05-16 PROCEDURE — 78815 PET IMAGE W/CT SKULL-THIGH: CPT | Mod: TC,PS

## 2024-05-16 PROCEDURE — 78815 PET IMAGE W/CT SKULL-THIGH: CPT | Mod: 26,PS,, | Performed by: RADIOLOGY

## 2024-05-16 PROCEDURE — A9552 F18 FDG: HCPCS | Performed by: INTERNAL MEDICINE

## 2024-05-16 RX ORDER — FLUDEOXYGLUCOSE F18 500 MCI/ML
11.17 INJECTION INTRAVENOUS
Status: COMPLETED | OUTPATIENT
Start: 2024-05-16 | End: 2024-05-16

## 2024-05-16 RX ADMIN — FLUDEOXYGLUCOSE F-18 11.17 MILLICURIE: 500 INJECTION INTRAVENOUS at 09:05

## 2024-05-20 ENCOUNTER — TELEPHONE (OUTPATIENT)
Dept: HEMATOLOGY/ONCOLOGY | Facility: CLINIC | Age: 84
End: 2024-05-20
Payer: MEDICARE

## 2024-05-20 NOTE — TELEPHONE ENCOUNTER
----- Message from Aaliyah Vick sent at 5/17/2024 12:27 PM CDT -----  Contact: pt  Type:  Test Results    Who Called:  pt  Name of Test (Lab/Mammo/Etc):  pet scan  Date of Test:  5/16  Ordering Provider:  kaelyn  Where the test was performed[facility]  Would the patient rather a call back or a response via [facility]?  phone  Best Call Back Number: [phone]  Additional Information:

## 2024-05-21 ENCOUNTER — OFFICE VISIT (OUTPATIENT)
Dept: HEMATOLOGY/ONCOLOGY | Facility: CLINIC | Age: 84
End: 2024-05-21
Payer: MEDICARE

## 2024-05-21 ENCOUNTER — LAB VISIT (OUTPATIENT)
Dept: LAB | Facility: HOSPITAL | Age: 84
End: 2024-05-21
Attending: INTERNAL MEDICINE
Payer: MEDICARE

## 2024-05-21 VITALS
HEIGHT: 71 IN | OXYGEN SATURATION: 96 % | DIASTOLIC BLOOD PRESSURE: 75 MMHG | HEART RATE: 63 BPM | BODY MASS INDEX: 30.45 KG/M2 | TEMPERATURE: 98 F | SYSTOLIC BLOOD PRESSURE: 137 MMHG | WEIGHT: 217.5 LBS

## 2024-05-21 DIAGNOSIS — C34.91 SQUAMOUS CELL CARCINOMA OF RIGHT LUNG: ICD-10-CM

## 2024-05-21 DIAGNOSIS — C77.1 SECONDARY AND UNSPECIFIED MALIGNANT NEOPLASM OF INTRATHORACIC LYMPH NODES: Primary | ICD-10-CM

## 2024-05-21 DIAGNOSIS — C34.90 SQUAMOUS CELL CARCINOMA OF LUNG, UNSPECIFIED LATERALITY: ICD-10-CM

## 2024-05-21 DIAGNOSIS — Q74.2 ABNORMALITY OF FEMUR: ICD-10-CM

## 2024-05-21 DIAGNOSIS — D53.1 OTHER MEGALOBLASTIC ANEMIAS, NOT ELSEWHERE CLASSIFIED: ICD-10-CM

## 2024-05-21 DIAGNOSIS — E53.8 DEFICIENCY OF OTHER SPECIFIED B GROUP VITAMINS: ICD-10-CM

## 2024-05-21 LAB
ALBUMIN SERPL BCP-MCNC: 3.7 G/DL (ref 3.5–5.2)
ALP SERPL-CCNC: 106 U/L (ref 55–135)
ALT SERPL W/O P-5'-P-CCNC: 23 U/L (ref 10–44)
ANION GAP SERPL CALC-SCNC: 7 MMOL/L (ref 8–16)
AST SERPL-CCNC: 20 U/L (ref 10–40)
BASOPHILS # BLD AUTO: 0.03 K/UL (ref 0–0.2)
BASOPHILS NFR BLD: 0.5 % (ref 0–1.9)
BILIRUB SERPL-MCNC: 1 MG/DL (ref 0.1–1)
BUN SERPL-MCNC: 7 MG/DL (ref 8–23)
CALCIUM SERPL-MCNC: 8.9 MG/DL (ref 8.7–10.5)
CHLORIDE SERPL-SCNC: 104 MMOL/L (ref 95–110)
CO2 SERPL-SCNC: 26 MMOL/L (ref 23–29)
CREAT SERPL-MCNC: 0.9 MG/DL (ref 0.5–1.4)
DIFFERENTIAL METHOD BLD: ABNORMAL
EOSINOPHIL # BLD AUTO: 0 K/UL (ref 0–0.5)
EOSINOPHIL NFR BLD: 0.6 % (ref 0–8)
ERYTHROCYTE [DISTWIDTH] IN BLOOD BY AUTOMATED COUNT: 13.2 % (ref 11.5–14.5)
EST. GFR  (NO RACE VARIABLE): >60 ML/MIN/1.73 M^2
GLUCOSE SERPL-MCNC: 98 MG/DL (ref 70–110)
HCT VFR BLD AUTO: 47 % (ref 40–54)
HGB BLD-MCNC: 15.4 G/DL (ref 14–18)
IMM GRANULOCYTES # BLD AUTO: 0.02 K/UL (ref 0–0.04)
IMM GRANULOCYTES NFR BLD AUTO: 0.3 % (ref 0–0.5)
LYMPHOCYTES # BLD AUTO: 1.3 K/UL (ref 1–4.8)
LYMPHOCYTES NFR BLD: 20 % (ref 18–48)
MAGNESIUM SERPL-MCNC: 2.1 MG/DL (ref 1.6–2.6)
MCH RBC QN AUTO: 33.2 PG (ref 27–31)
MCHC RBC AUTO-ENTMCNC: 32.8 G/DL (ref 32–36)
MCV RBC AUTO: 101 FL (ref 82–98)
MONOCYTES # BLD AUTO: 0.7 K/UL (ref 0.3–1)
MONOCYTES NFR BLD: 10.6 % (ref 4–15)
NEUTROPHILS # BLD AUTO: 4.4 K/UL (ref 1.8–7.7)
NEUTROPHILS NFR BLD: 68 % (ref 38–73)
NRBC BLD-RTO: 0 /100 WBC
PHOSPHATE SERPL-MCNC: 2.7 MG/DL (ref 2.7–4.5)
PLATELET # BLD AUTO: 205 K/UL (ref 150–450)
PMV BLD AUTO: 9.3 FL (ref 9.2–12.9)
POTASSIUM SERPL-SCNC: 4.3 MMOL/L (ref 3.5–5.1)
PROT SERPL-MCNC: 6.3 G/DL (ref 6–8.4)
RBC # BLD AUTO: 4.64 M/UL (ref 4.6–6.2)
SODIUM SERPL-SCNC: 137 MMOL/L (ref 136–145)
WBC # BLD AUTO: 6.41 K/UL (ref 3.9–12.7)

## 2024-05-21 PROCEDURE — 1159F MED LIST DOCD IN RCRD: CPT | Mod: CPTII,S$GLB,, | Performed by: INTERNAL MEDICINE

## 2024-05-21 PROCEDURE — 83735 ASSAY OF MAGNESIUM: CPT | Performed by: INTERNAL MEDICINE

## 2024-05-21 PROCEDURE — 85025 COMPLETE CBC W/AUTO DIFF WBC: CPT | Performed by: INTERNAL MEDICINE

## 2024-05-21 PROCEDURE — 99214 OFFICE O/P EST MOD 30 MIN: CPT | Mod: S$GLB,,, | Performed by: INTERNAL MEDICINE

## 2024-05-21 PROCEDURE — 3078F DIAST BP <80 MM HG: CPT | Mod: CPTII,S$GLB,, | Performed by: INTERNAL MEDICINE

## 2024-05-21 PROCEDURE — 82607 VITAMIN B-12: CPT | Performed by: INTERNAL MEDICINE

## 2024-05-21 PROCEDURE — 99999 PR PBB SHADOW E&M-EST. PATIENT-LVL IV: CPT | Mod: PBBFAC,,, | Performed by: INTERNAL MEDICINE

## 2024-05-21 PROCEDURE — 1101F PT FALLS ASSESS-DOCD LE1/YR: CPT | Mod: CPTII,S$GLB,, | Performed by: INTERNAL MEDICINE

## 2024-05-21 PROCEDURE — 1126F AMNT PAIN NOTED NONE PRSNT: CPT | Mod: CPTII,S$GLB,, | Performed by: INTERNAL MEDICINE

## 2024-05-21 PROCEDURE — 3288F FALL RISK ASSESSMENT DOCD: CPT | Mod: CPTII,S$GLB,, | Performed by: INTERNAL MEDICINE

## 2024-05-21 PROCEDURE — 80053 COMPREHEN METABOLIC PANEL: CPT | Performed by: INTERNAL MEDICINE

## 2024-05-21 PROCEDURE — 82746 ASSAY OF FOLIC ACID SERUM: CPT | Performed by: INTERNAL MEDICINE

## 2024-05-21 PROCEDURE — 84100 ASSAY OF PHOSPHORUS: CPT | Performed by: INTERNAL MEDICINE

## 2024-05-21 PROCEDURE — 3075F SYST BP GE 130 - 139MM HG: CPT | Mod: CPTII,S$GLB,, | Performed by: INTERNAL MEDICINE

## 2024-05-21 PROCEDURE — 36415 COLL VENOUS BLD VENIPUNCTURE: CPT | Performed by: INTERNAL MEDICINE

## 2024-05-21 NOTE — PROGRESS NOTES
[facility] - Hematol Oncol [facility]  [address] [address]  Phone: [phone];  Fax: [phone]    Patient ID: Alton Black   Chief Complaint: Follow-up  MRN:  [id number]     Oncologic Diagnosis:  Stage IIB squamous cell carcinoma lung   Previous Treatment:    - Chemoradiation with carboplatin paclitaxel weekly (1/14/22 - 02/24/2022)  -?Durvalumab maintenance, completed 02/23/2023  Current Treatment:  Surveillance  Subjective   Alton Black is a 83 y.o. male who presents to clinic for follow up.      He has been doing well overall and does not have any changes in his health since his last visit.  He has no acute complaints.    I reviewed his recent PET-CT results with him which show no concern for recurrence.  He has a mildly FDG avid area in the muscular around the left femur however this is also the sight of some of his chronic pain.  I reviewed this with him. I will obtain another PET-CT in 6 months to re-assess.        Review of Systems   Constitutional:  Negative for activity change, appetite change, chills, diaphoresis, fatigue, fever and unexpected weight change.   HENT:  Negative for nosebleeds.    Respiratory:  Negative for shortness of breath.    Cardiovascular:  Negative for chest pain.   Gastrointestinal:  Negative for abdominal distention, abdominal pain, anal bleeding, blood in stool, constipation, diarrhea, nausea and vomiting.   Genitourinary:  Negative for difficulty urinating and hematuria.   Musculoskeletal:  Negative for arthralgias, back pain and myalgias.   Skin:  Negative for rash.   Neurological:  Negative for dizziness, weakness, light-headedness and headaches.   Hematological:  Does not bruise/bleed easily.   Psychiatric/Behavioral:  The patient is not nervous/anxious.      History     Oncology History   Squamous cell carcinoma of lung   1/13/2021 - 2/24/2022 Radiation Therapy    Treating physician: Karol  Total Dose: 60  Gy  Fractions: 30     12/17/2021 Initial Diagnosis    Squamous cell lung cancer     12/22/2021 Cancer Staged    Staging form: Lung, AJCC 8th Edition  - Clinical stage from 12/22/2021: Stage IIB (cT3, cN0, cM0)     1/14/2022 - 2/21/2022 Chemotherapy    Treatment Summary   Plan Name: OP NSCLC PACLITAXEL + CARBOPLATIN (AUC) QW + RADIATION  Treatment Goal: Curative  Status: Inactive  Start Date: 1/14/2022  End Date: 2/21/2022  Provider: Juan Lopez MD  Chemotherapy: dexAMETHasone (DECADRON) 4 MG Tab, 8 mg, Oral, Daily, 1 of 1 cycle, Start date: --, End date: --  CARBOplatin (PARAPLATIN) 230 mg in sodium chloride 0.9% 273 mL chemo infusion, 230 mg (100 % of original dose 230 mg), Intravenous, Clinic/HOD 1 time, 6 of 6 cycles  Dose modification:   (original dose 230 mg, Cycle 1)  Administration: 230 mg (1/14/2022), 230 mg (1/21/2022), 255 mg (1/28/2022), 255 mg (2/4/2022), 255 mg (2/14/2022), 255 mg (2/21/2022)  PACLitaxeL (TAXOL) 45 mg/m2 = 96 mg in sodium chloride 0.9% 266 mL chemo infusion, 45 mg/m2 = 96 mg, Intravenous, Clinic/HOD 1 time, 6 of 6 cycles  Administration: 96 mg (1/14/2022), 96 mg (1/21/2022), 96 mg (1/28/2022), 96 mg (2/4/2022), 96 mg (2/14/2022), 96 mg (2/21/2022)     3/21/2022 -  Chemotherapy    Treatment Summary   Plan Name: OP DURVALUMAB 1500 MG Q4W  Treatment Goal: Curative  Status: Active  Start Date: 3/21/2022  End Date: 2/23/2023  Provider: Winnie Baez MD  Chemotherapy: [No matching medication found in this treatment plan]         Past Medical History:   Diagnosis Date    Benign essential HTN     CAD (coronary artery disease)     HLD (hyperlipidemia)     Squamous cell carcinoma of lung 12/17/2021       Past Surgical History:   Procedure Laterality Date    BRONCHOSCOPY Bilateral 12/8/2021    Procedure: Bronchoscopy - insert lighted tube into airway to take a biopsy of lung;  Surgeon: Rigo Vences MD;  Location: [facility];  Service: Endoscopy;  Laterality: Bilateral;     "CHOLECYSTECTOMY      CORONARY ANGIOPLASTY WITH STENT PLACEMENT      ENDOBRONCHIAL ULTRASOUND Bilateral 12/8/2021    Procedure: ENDOBRONCHIAL ULTRASOUND (EBUS);  Surgeon: Rigo Vences MD;  Location: [facility] ENDO;  Service: Endoscopy;  Laterality: Bilateral;    FLUOROSCOPY N/A 1/4/2022    Procedure: Mediport Insertion;  Surgeon: Elaina Larios MD;  Location: [facility] CATH LAB;  Service: General;  Laterality: N/A;  yuridia from 1/3 to 1/4       Family History   Problem Relation Name Age of Onset    Heart attack Father         Review of patient's allergies indicates:  No Known Allergies    Social History     Tobacco Use    Smoking status: Never    Smokeless tobacco: Never    Tobacco comments:     Chews on cigars   Substance Use Topics    Alcohol use: Not Currently    Drug use: Never       Physical Exam   ECOG:   ECOG SCORE    0 - Fully active-able to carry on all pre-disease performance without restriction          Vitals:  /75   Pulse 63   Temp 97.6 °F (36.4 °C) (Temporal)   Ht 5' 11" (1.803 m)   Wt 98.6 kg (217 lb 7.7 oz)   SpO2 96%   BMI 30.33 kg/m²     Physical Exam:  Physical Exam  Constitutional:       General: He is not in acute distress.     Appearance: Normal appearance. He is normal weight. He is not ill-appearing or toxic-appearing.   HENT:      Head: Normocephalic and atraumatic.   Eyes:      Extraocular Movements: Extraocular movements intact.      Conjunctiva/sclera: Conjunctivae normal.   Cardiovascular:      Rate and Rhythm: Normal rate.   Pulmonary:      Effort: Pulmonary effort is normal. No respiratory distress.   Abdominal:      Palpations: There is no hepatomegaly or splenomegaly.   Musculoskeletal:      Right lower leg: No edema.      Left lower leg: No edema.   Skin:     General: Skin is warm.      Findings: No bruising, erythema or rash.   Neurological:      General: No focal deficit present.      Mental Status: He is alert and oriented to person, place, and time. Mental status " is at baseline.   Psychiatric:         Mood and Affect: Mood normal.         Behavior: Behavior normal.         Thought Content: Thought content normal.            Labs   Labs:  Lab Visit on 05/21/2024   Component Date Value Ref Range Status    Vitamin B-12 05/21/2024 494  210 - 950 pg/mL Final    Folate 05/21/2024 7.0  4.0 - 24.0 ng/mL Final    Phosphorus 05/21/2024 2.7  2.7 - 4.5 mg/dL Final    Magnesium 05/21/2024 2.1  1.6 - 2.6 mg/dL Final    Sodium 05/21/2024 137  136 - 145 mmol/L Final    Potassium 05/21/2024 4.3  3.5 - 5.1 mmol/L Final    Chloride 05/21/2024 104  95 - 110 mmol/L Final    CO2 05/21/2024 26  23 - 29 mmol/L Final    Glucose 05/21/2024 98  70 - 110 mg/dL Final    BUN 05/21/2024 7 (L)  8 - 23 mg/dL Final    Creatinine 05/21/2024 0.9  0.5 - 1.4 mg/dL Final    Calcium 05/21/2024 8.9  8.7 - 10.5 mg/dL Final    Total Protein 05/21/2024 6.3  6.0 - 8.4 g/dL Final    Albumin 05/21/2024 3.7  3.5 - 5.2 g/dL Final    Total Bilirubin 05/21/2024 1.0  0.1 - 1.0 mg/dL Final    Comment: For infants and newborns, interpretation of results should be based  on gestational age, weight and in agreement with clinical  observations.    Premature Infant recommended reference ranges:  Up to 24 hours.............<8.0 mg/dL  Up to 48 hours............<12.0 mg/dL  3-5 days..................<15.0 mg/dL  6-29 days.................<15.0 mg/dL      Alkaline Phosphatase 05/21/2024 106  55 - 135 U/L Final    AST 05/21/2024 20  10 - 40 U/L Final    ALT 05/21/2024 23  10 - 44 U/L Final    eGFR 05/21/2024 >60  >60 mL/min/1.73 m^2 Final    Anion Gap 05/21/2024 7 (L)  8 - 16 mmol/L Final    WBC 05/21/2024 6.41  3.90 - 12.70 K/uL Final    RBC 05/21/2024 4.64  4.60 - 6.20 M/uL Final    Hemoglobin 05/21/2024 15.4  14.0 - 18.0 g/dL Final    Hematocrit 05/21/2024 47.0  40.0 - 54.0 % Final    MCV 05/21/2024 101 (H)  82 - 98 fL Final    MCH 05/21/2024 33.2 (H)  27.0 - 31.0 pg Final    MCHC 05/21/2024 32.8  32.0 - 36.0 g/dL Final    RDW  05/21/2024 13.2  11.5 - 14.5 % Final    Platelets 05/21/2024 205  150 - 450 K/uL Final    MPV 05/21/2024 9.3  9.2 - 12.9 fL Final    Immature Granulocytes 05/21/2024 0.3  0.0 - 0.5 % Final    Gran # (ANC) 05/21/2024 4.4  1.8 - 7.7 K/uL Final    Immature Grans (Abs) 05/21/2024 0.02  0.00 - 0.04 K/uL Final    Comment: Mild elevation in immature granulocytes is non specific and   can be seen in a variety of conditions including stress response,   acute inflammation, trauma and pregnancy. Correlation with other   laboratory and clinical findings is essential.      Lymph # 05/21/2024 1.3  1.0 - 4.8 K/uL Final    Mono # 05/21/2024 0.7  0.3 - 1.0 K/uL Final    Eos # 05/21/2024 0.0  0.0 - 0.5 K/uL Final    Baso # 05/21/2024 0.03  0.00 - 0.20 K/uL Final    nRBC 05/21/2024 0  0 /100 WBC Final    Gran % 05/21/2024 68.0  38.0 - 73.0 % Final    Lymph % 05/21/2024 20.0  18.0 - 48.0 % Final    Mono % 05/21/2024 10.6  4.0 - 15.0 % Final    Eosinophil % 05/21/2024 0.6  0.0 - 8.0 % Final    Basophil % 05/21/2024 0.5  0.0 - 1.9 % Final    Differential Method 05/21/2024 Automated   Final        Imaging   CT Chest With Contrast  12/21/2023  Impression:  Stable suspected post treatment changes involving the right infrahilar region and right lower lobe.     No new abnormalities.     NM PET CT FDG SKULL BASE TO MID THIGH - 05/16/2024  CLINICAL HISTORY:  surveillance imaging for lung caner s/p treatment; Malignant neoplasm of unspecified part of unspecified bronchus or lung     TECHNIQUE:  11.56 mCi of F18-FDG was administered intravenously in the right antecubital fossa.  After an approximately 60 min distribution time, PET/CT images were acquired from the skull base to the mid thigh. Transmission images were acquired to correct for attenuation using a whole body low-dose CT scan without contrast with the arms positioned above the head. Glycemia at the time of injection was 96 mg/dL.     COMPARISON:  CT-PET 09/26/2022.  CT chest  12/21/2023     FINDINGS:  Quality of the study: Adequate.     Head and neck: No abnormal uptake.     Chest: No abnormal uptake.  Stable post treatment related changes central right perihilar and right infrahilar lung without any significant FDG uptake.  No adenopathy.  No new lung nodularity.  No pleural effusions.     Abdomen and pelvis: No abnormal FDG uptake.     Musculoskeletal: No suspicious findings or abnormal FDG uptake.  Likely inflammatory low-grade uptake adjacent to the proximal left femur within soft tissues.     Physiologic uptake of the tracer is present within the brain, salivary glands, myocardium, GI and  tracts.     Incidental CT findings: No interval change in the additional incidental findings     Impression:     No FDG PET/CT findings to suggest recurrent or metastatic disease.         Assessment and Plan   Squamous cell carcinoma of lung, stage II  Per review of the medical record, patient not interested in surgery for therapy and so was treated with definitive chemoradiation and maintenance immunotherapy; completed maintenance durvalumab 02/23/2023   Has been on surveillance since that time   CT Chest 12/21/23 stable only showing post treatment changes  PET-CT 05/16/2024: DIANA; Likely inflammatory low-grade uptake adjacent to the proximal left femur within soft tissues  Labs reviewed and stable; will plan on repeat PET-CT to re-assess abnormality in 6 months    Chronic Medical Conditions  TIA  Hypertension   GERD   Osteoarthritis   CAD status post PCI, history of MI   Hypertension  History of CVA        Med Onc Chart Routing      Follow up with physician 6 months.   Follow up with REBECA    Infusion scheduling note    Injection scheduling note    Labs CBC, CMP, phosphorus and magnesium   Scheduling:  Preferred lab:  Lab interval:     Imaging PET scan      Pharmacy appointment    Other referrals                 Greater than 30 minutes was spent in chart review and consultation with the patient.      The patient was seen, interviewed and examined. Pertinent lab and radiologic studies were reviewed. Pt instructed to call should they develop concerning signs/symptoms or have further questions.        Portions of the record may have been created with voice recognition software. Occasional wrong-word or sound-a-like substitutions may have occurred due to the inherent limitations of voice recognition software. Read the chart carefully and recognize, using context, where substitutions have occurred.      Lynda Hardy MD    Hematology/Oncology

## 2024-05-22 LAB
FOLATE SERPL-MCNC: 7 NG/ML (ref 4–24)
VIT B12 SERPL-MCNC: 494 PG/ML (ref 210–950)

## 2024-06-07 ENCOUNTER — INFUSION (OUTPATIENT)
Dept: INFUSION THERAPY | Facility: HOSPITAL | Age: 84
End: 2024-06-07
Attending: INTERNAL MEDICINE
Payer: MEDICARE

## 2024-06-07 DIAGNOSIS — C34.91 SQUAMOUS CELL CARCINOMA OF RIGHT LUNG: Primary | ICD-10-CM

## 2024-06-07 PROCEDURE — 63600175 PHARM REV CODE 636 W HCPCS

## 2024-06-07 PROCEDURE — 96523 IRRIG DRUG DELIVERY DEVICE: CPT

## 2024-06-07 RX ORDER — SODIUM CHLORIDE 0.9 % (FLUSH) 0.9 %
10 SYRINGE (ML) INJECTION
OUTPATIENT
Start: 2024-06-07

## 2024-06-07 RX ORDER — HEPARIN 100 UNIT/ML
500 SYRINGE INTRAVENOUS
Status: DISCONTINUED | OUTPATIENT
Start: 2024-06-07 | End: 2024-06-07 | Stop reason: HOSPADM

## 2024-06-07 RX ORDER — SODIUM CHLORIDE 0.9 % (FLUSH) 0.9 %
10 SYRINGE (ML) INJECTION
Status: DISCONTINUED | OUTPATIENT
Start: 2024-06-07 | End: 2024-06-07 | Stop reason: HOSPADM

## 2024-06-07 RX ORDER — HEPARIN 100 UNIT/ML
500 SYRINGE INTRAVENOUS
OUTPATIENT
Start: 2024-06-07

## 2024-06-07 RX ADMIN — HEPARIN 500 UNITS: 100 SYRINGE at 10:06

## 2024-06-07 NOTE — DISCHARGE INSTRUCTIONS
[facility]  [address] [address]  [address]  [phone] phone     [phone] fax  Hours of Operation: Monday- Friday 8:00am- 5:00pm  After hours phone  [phone]  Hematology / Oncology Physicians on call      DARINEL Holguin Dr., NP Phaon Dunbar, NP Khelsea Conley, FNP    Please call with any concerns regarding your appointment today.

## 2024-08-01 ENCOUNTER — HOSPITAL ENCOUNTER (OUTPATIENT)
Dept: CARDIOLOGY | Facility: HOSPITAL | Age: 84
Discharge: HOME | End: 2024-08-01
Attending: INTERNAL MEDICINE
Payer: MEDICARE

## 2024-08-01 VITALS
DIASTOLIC BLOOD PRESSURE: 63 MMHG | HEIGHT: 71 IN | SYSTOLIC BLOOD PRESSURE: 123 MMHG | BODY MASS INDEX: 30.38 KG/M2 | WEIGHT: 217 LBS

## 2024-08-01 DIAGNOSIS — I25.118 CORONARY ARTERY DISEASE OF NATIVE ARTERY OF NATIVE HEART WITH STABLE ANGINA PECTORIS: ICD-10-CM

## 2024-08-01 PROCEDURE — 93880 EXTRACRANIAL BILAT STUDY: CPT | Mod: 26,,, | Performed by: INTERNAL MEDICINE

## 2024-08-01 PROCEDURE — 93880 EXTRACRANIAL BILAT STUDY: CPT

## 2024-08-02 ENCOUNTER — INFUSION (OUTPATIENT)
Dept: INFUSION THERAPY | Facility: HOSPITAL | Age: 84
End: 2024-08-02
Attending: INTERNAL MEDICINE
Payer: MEDICARE

## 2024-08-02 VITALS
HEART RATE: 70 BPM | TEMPERATURE: 98 F | OXYGEN SATURATION: 96 % | RESPIRATION RATE: 16 BRPM | SYSTOLIC BLOOD PRESSURE: 126 MMHG | DIASTOLIC BLOOD PRESSURE: 62 MMHG

## 2024-08-02 DIAGNOSIS — I25.2 HISTORY OF MI (MYOCARDIAL INFARCTION): ICD-10-CM

## 2024-08-02 DIAGNOSIS — C34.91 SQUAMOUS CELL CARCINOMA OF RIGHT LUNG: Primary | ICD-10-CM

## 2024-08-02 DIAGNOSIS — I50.31 ACUTE DIASTOLIC CHF (CONGESTIVE HEART FAILURE): Primary | ICD-10-CM

## 2024-08-02 LAB
LEFT ARM DIASTOLIC BLOOD PRESSURE: 63 MMHG
LEFT ARM SYSTOLIC BLOOD PRESSURE: 123 MMHG
LEFT CBA DIAS: 5 CM/S
LEFT CBA SYS: 52 CM/S
LEFT CCA DIST DIAS: 7 CM/S
LEFT CCA DIST SYS: 59 CM/S
LEFT CCA MID DIAS: 9 CM/S
LEFT CCA MID SYS: 73 CM/S
LEFT CCA PROX DIAS: 6 CM/S
LEFT CCA PROX SYS: 77 CM/S
LEFT ECA DIAS: 5 CM/S
LEFT ECA SYS: 80 CM/S
LEFT ICA DIST DIAS: 15 CM/S
LEFT ICA DIST SYS: 71 CM/S
LEFT ICA MID DIAS: 15 CM/S
LEFT ICA MID SYS: 69 CM/S
LEFT ICA PROX DIAS: 13 CM/S
LEFT ICA PROX SYS: 53 CM/S
LEFT VERTEBRAL DIAS: 7 CM/S
LEFT VERTEBRAL SYS: 43 CM/S
OHS CV CAROTID RIGHT ICA EDV HIGHEST: 19
OHS CV CAROTID ULTRASOUND LEFT ICA/CCA RATIO: 1.2
OHS CV CAROTID ULTRASOUND RIGHT ICA/CCA RATIO: 1.08
OHS CV PV CAROTID LEFT HIGHEST CCA: 77
OHS CV PV CAROTID LEFT HIGHEST ICA: 71
OHS CV PV CAROTID RIGHT HIGHEST CCA: 90
OHS CV PV CAROTID RIGHT HIGHEST ICA: 77
OHS CV US CAROTID LEFT HIGHEST EDV: 15
RIGHT ARM DIASTOLIC BLOOD PRESSURE: 64 MMHG
RIGHT ARM SYSTOLIC BLOOD PRESSURE: 123 MMHG
RIGHT CBA DIAS: 7 CM/S
RIGHT CBA SYS: 65 CM/S
RIGHT CCA DIST DIAS: 11 CM/S
RIGHT CCA DIST SYS: 71 CM/S
RIGHT CCA MID DIAS: 10 CM/S
RIGHT CCA MID SYS: 90 CM/S
RIGHT CCA PROX DIAS: 9 CM/S
RIGHT CCA PROX SYS: 56 CM/S
RIGHT ECA DIAS: 9 CM/S
RIGHT ECA SYS: 93 CM/S
RIGHT ICA DIST DIAS: 15 CM/S
RIGHT ICA DIST SYS: 67 CM/S
RIGHT ICA MID DIAS: 19 CM/S
RIGHT ICA MID SYS: 77 CM/S
RIGHT ICA PROX DIAS: 11 CM/S
RIGHT ICA PROX SYS: 52 CM/S
RIGHT VERTEBRAL DIAS: 9 CM/S
RIGHT VERTEBRAL SYS: 45 CM/S

## 2024-08-02 PROCEDURE — A4216 STERILE WATER/SALINE, 10 ML: HCPCS

## 2024-08-02 PROCEDURE — 96523 IRRIG DRUG DELIVERY DEVICE: CPT

## 2024-08-02 PROCEDURE — 25000003 PHARM REV CODE 250

## 2024-08-02 PROCEDURE — 63600175 PHARM REV CODE 636 W HCPCS

## 2024-08-02 RX ORDER — SODIUM CHLORIDE 0.9 % (FLUSH) 0.9 %
10 SYRINGE (ML) INJECTION
OUTPATIENT
Start: 2024-08-02

## 2024-08-02 RX ORDER — SODIUM CHLORIDE 0.9 % (FLUSH) 0.9 %
10 SYRINGE (ML) INJECTION
Status: DISCONTINUED | OUTPATIENT
Start: 2024-08-02 | End: 2024-08-02 | Stop reason: HOSPADM

## 2024-08-02 RX ORDER — HEPARIN 100 UNIT/ML
500 SYRINGE INTRAVENOUS
OUTPATIENT
Start: 2024-08-02

## 2024-08-02 RX ORDER — HEPARIN 100 UNIT/ML
500 SYRINGE INTRAVENOUS
Status: DISCONTINUED | OUTPATIENT
Start: 2024-08-02 | End: 2024-08-02 | Stop reason: HOSPADM

## 2024-08-02 RX ADMIN — HEPARIN 500 UNITS: 100 SYRINGE at 09:08

## 2024-08-02 RX ADMIN — SODIUM CHLORIDE, PRESERVATIVE FREE 10 ML: 5 INJECTION INTRAVENOUS at 09:08

## 2024-08-02 NOTE — NURSING
".Pt here for Mediport Flush. Right  chestwall mediport accessed with a 20g 1" bhatia via sterile technique.  Excellent blood return noted.  Flushed with 10ml NS and 5 ml heparin solution.  Needle D/C, site without redness, swelling, or drainage noted.  Dressing applied.   "

## 2024-08-05 ENCOUNTER — TELEPHONE (OUTPATIENT)
Dept: CARDIOLOGY | Facility: CLINIC | Age: 84
End: 2024-08-05
Payer: MEDICARE

## 2024-08-07 ENCOUNTER — OFFICE VISIT (OUTPATIENT)
Dept: CARDIOLOGY | Facility: CLINIC | Age: 84
End: 2024-08-07
Payer: MEDICARE

## 2024-08-07 ENCOUNTER — CLINICAL SUPPORT (OUTPATIENT)
Dept: CARDIOLOGY | Facility: CLINIC | Age: 84
End: 2024-08-07
Payer: MEDICARE

## 2024-08-07 VITALS
OXYGEN SATURATION: 79 % | HEART RATE: 96 BPM | WEIGHT: 218.06 LBS | SYSTOLIC BLOOD PRESSURE: 144 MMHG | BODY MASS INDEX: 30.53 KG/M2 | DIASTOLIC BLOOD PRESSURE: 62 MMHG | HEIGHT: 71 IN

## 2024-08-07 DIAGNOSIS — I25.2 HISTORY OF MI (MYOCARDIAL INFARCTION): ICD-10-CM

## 2024-08-07 DIAGNOSIS — I25.118 CORONARY ARTERY DISEASE OF NATIVE ARTERY OF NATIVE HEART WITH STABLE ANGINA PECTORIS: Primary | ICD-10-CM

## 2024-08-07 DIAGNOSIS — I50.32 CHRONIC DIASTOLIC CONGESTIVE HEART FAILURE: ICD-10-CM

## 2024-08-07 DIAGNOSIS — I44.7 LBBB (LEFT BUNDLE BRANCH BLOCK): ICD-10-CM

## 2024-08-07 DIAGNOSIS — I50.31 ACUTE DIASTOLIC CHF (CONGESTIVE HEART FAILURE): ICD-10-CM

## 2024-08-07 DIAGNOSIS — I73.9 PAD (PERIPHERAL ARTERY DISEASE): ICD-10-CM

## 2024-08-07 DIAGNOSIS — I10 BENIGN ESSENTIAL HTN: ICD-10-CM

## 2024-08-07 DIAGNOSIS — Z95.5 PRESENCE OF STENT IN CORONARY ARTERY: ICD-10-CM

## 2024-08-07 DIAGNOSIS — E78.2 MIXED HYPERLIPIDEMIA: ICD-10-CM

## 2024-08-07 DIAGNOSIS — E78.00 PURE HYPERCHOLESTEROLEMIA: ICD-10-CM

## 2024-08-07 PROCEDURE — 99999 PR PBB SHADOW E&M-EST. PATIENT-LVL IV: CPT | Mod: PBBFAC,,, | Performed by: INTERNAL MEDICINE

## 2024-08-07 PROCEDURE — 93000 ELECTROCARDIOGRAM COMPLETE: CPT | Mod: S$GLB,,, | Performed by: INTERNAL MEDICINE

## 2024-08-08 ENCOUNTER — OFFICE VISIT (OUTPATIENT)
Dept: FAMILY MEDICINE | Facility: CLINIC | Age: 84
End: 2024-08-08
Payer: MEDICARE

## 2024-08-08 VITALS
SYSTOLIC BLOOD PRESSURE: 130 MMHG | BODY MASS INDEX: 30.4 KG/M2 | HEART RATE: 74 BPM | TEMPERATURE: 98 F | OXYGEN SATURATION: 95 % | WEIGHT: 217.13 LBS | HEIGHT: 71 IN | DIASTOLIC BLOOD PRESSURE: 80 MMHG

## 2024-08-08 DIAGNOSIS — Z20.822 CLOSE EXPOSURE TO COVID-19 VIRUS: Primary | ICD-10-CM

## 2024-08-08 LAB
CTP QC/QA: YES
OHS QRS DURATION: 138 MS
OHS QTC CALCULATION: 481 MS
SARS-COV-2 RDRP RESP QL NAA+PROBE: NEGATIVE

## 2024-08-08 PROCEDURE — 99999 PR PBB SHADOW E&M-EST. PATIENT-LVL III: CPT | Mod: PBBFAC,,, | Performed by: NURSE PRACTITIONER

## 2024-09-27 ENCOUNTER — INFUSION (OUTPATIENT)
Dept: INFUSION THERAPY | Facility: HOSPITAL | Age: 84
End: 2024-09-27
Attending: INTERNAL MEDICINE
Payer: MEDICARE

## 2024-09-27 DIAGNOSIS — C34.91 SQUAMOUS CELL CARCINOMA OF RIGHT LUNG: Primary | ICD-10-CM

## 2024-09-27 PROCEDURE — 63600175 PHARM REV CODE 636 W HCPCS

## 2024-09-27 PROCEDURE — A4216 STERILE WATER/SALINE, 10 ML: HCPCS

## 2024-09-27 PROCEDURE — 96523 IRRIG DRUG DELIVERY DEVICE: CPT

## 2024-09-27 PROCEDURE — 25000003 PHARM REV CODE 250

## 2024-09-27 RX ORDER — HEPARIN 100 UNIT/ML
500 SYRINGE INTRAVENOUS
Status: DISCONTINUED | OUTPATIENT
Start: 2024-09-27 | End: 2024-09-27 | Stop reason: HOSPADM

## 2024-09-27 RX ORDER — SODIUM CHLORIDE 0.9 % (FLUSH) 0.9 %
10 SYRINGE (ML) INJECTION
OUTPATIENT
Start: 2024-09-27

## 2024-09-27 RX ORDER — SODIUM CHLORIDE 0.9 % (FLUSH) 0.9 %
10 SYRINGE (ML) INJECTION
Status: DISCONTINUED | OUTPATIENT
Start: 2024-09-27 | End: 2024-09-27 | Stop reason: HOSPADM

## 2024-09-27 RX ORDER — HEPARIN 100 UNIT/ML
500 SYRINGE INTRAVENOUS
OUTPATIENT
Start: 2024-09-27

## 2024-09-27 RX ADMIN — Medication 10 ML: at 10:09

## 2024-09-27 RX ADMIN — HEPARIN 500 UNITS: 100 SYRINGE at 10:09

## 2024-09-27 NOTE — NURSING
"Pt here for Mediport Flush. Left chestwall mediport accessed with a 20g 1" bhatia via sterile technique.  Excellent blood return noted.  Flushed with 10ml NS and 5 ml heparin solution.  Needle D/C, site without redness, swelling, or drainage noted.  Dressing applied.  Patient tolerated well.  Patient to return to clinic in 6 weeks.  "

## 2024-10-07 NOTE — NURSING
"Pt here for Mediport Flush. Left chestwall mediport accessed with a 20g 1" bhatia via sterile technique.  Excellent blood return noted.  Flushed with 10ml NS and 5 ml heparin solution.  Needle D/C, site without redness, swelling, or drainage noted.  Dressing applied.  Patient tolerated well.  Patient to return to clinic in 8 weeks.     "
No

## 2024-11-08 ENCOUNTER — INFUSION (OUTPATIENT)
Dept: INFUSION THERAPY | Facility: HOSPITAL | Age: 84
End: 2024-11-08
Attending: INTERNAL MEDICINE
Payer: MEDICARE

## 2024-11-08 DIAGNOSIS — C34.91 SQUAMOUS CELL CARCINOMA OF RIGHT LUNG: Primary | ICD-10-CM

## 2024-11-08 PROCEDURE — 25000003 PHARM REV CODE 250

## 2024-11-08 PROCEDURE — 96523 IRRIG DRUG DELIVERY DEVICE: CPT

## 2024-11-08 PROCEDURE — A4216 STERILE WATER/SALINE, 10 ML: HCPCS

## 2024-11-08 PROCEDURE — 63600175 PHARM REV CODE 636 W HCPCS

## 2024-11-08 RX ORDER — SODIUM CHLORIDE 0.9 % (FLUSH) 0.9 %
10 SYRINGE (ML) INJECTION
OUTPATIENT
Start: 2024-11-08

## 2024-11-08 RX ORDER — SODIUM CHLORIDE 0.9 % (FLUSH) 0.9 %
10 SYRINGE (ML) INJECTION
Status: DISCONTINUED | OUTPATIENT
Start: 2024-11-08 | End: 2024-11-08 | Stop reason: HOSPADM

## 2024-11-08 RX ORDER — HEPARIN 100 UNIT/ML
500 SYRINGE INTRAVENOUS
Status: DISCONTINUED | OUTPATIENT
Start: 2024-11-08 | End: 2024-11-08 | Stop reason: HOSPADM

## 2024-11-08 RX ORDER — HEPARIN 100 UNIT/ML
500 SYRINGE INTRAVENOUS
OUTPATIENT
Start: 2024-11-08

## 2024-11-08 RX ADMIN — HEPARIN 500 UNITS: 100 SYRINGE at 10:11

## 2024-11-08 RX ADMIN — Medication 10 ML: at 10:11

## 2024-11-08 NOTE — DISCHARGE INSTRUCTIONS
.[facility]  [address] [address]  [address]  [phone] phone     [phone] fax  Hours of Operation: Monday- Friday 8:00am- 5:00pm  After hours phone  [phone]  Hematology / Oncology Physicians on call    Dr. Jae Nassar        Nurse Practitioners:    Bryanna Rubio, HEDY Stacy, HEDY Patel, HEDY Montes, HEDY King, PA      Please don't hesitate to call if you have any concerns.

## 2024-11-19 ENCOUNTER — INFUSION (OUTPATIENT)
Dept: INFUSION THERAPY | Facility: HOSPITAL | Age: 84
End: 2024-11-19
Attending: INTERNAL MEDICINE
Payer: MEDICARE

## 2024-11-19 ENCOUNTER — HOSPITAL ENCOUNTER (OUTPATIENT)
Dept: RADIOLOGY | Facility: HOSPITAL | Age: 84
Discharge: HOME | End: 2024-11-19
Attending: INTERNAL MEDICINE
Payer: MEDICARE

## 2024-11-19 DIAGNOSIS — C34.91 SQUAMOUS CELL CARCINOMA OF RIGHT LUNG: Primary | ICD-10-CM

## 2024-11-19 DIAGNOSIS — C77.1 SECONDARY AND UNSPECIFIED MALIGNANT NEOPLASM OF INTRATHORACIC LYMPH NODES: ICD-10-CM

## 2024-11-19 DIAGNOSIS — Q74.2 ABNORMALITY OF FEMUR: ICD-10-CM

## 2024-11-19 DIAGNOSIS — C34.91 SQUAMOUS CELL CARCINOMA OF RIGHT LUNG: ICD-10-CM

## 2024-11-19 PROCEDURE — A9552 F18 FDG: HCPCS | Performed by: INTERNAL MEDICINE

## 2024-11-19 PROCEDURE — 78815 PET IMAGE W/CT SKULL-THIGH: CPT | Mod: 26,PS,, | Performed by: RADIOLOGY

## 2024-11-19 PROCEDURE — 25000003 PHARM REV CODE 250

## 2024-11-19 PROCEDURE — 78815 PET IMAGE W/CT SKULL-THIGH: CPT | Mod: TC

## 2024-11-19 PROCEDURE — 96523 IRRIG DRUG DELIVERY DEVICE: CPT

## 2024-11-19 PROCEDURE — 63600175 PHARM REV CODE 636 W HCPCS

## 2024-11-19 PROCEDURE — A4216 STERILE WATER/SALINE, 10 ML: HCPCS

## 2024-11-19 RX ORDER — HEPARIN 100 UNIT/ML
500 SYRINGE INTRAVENOUS
OUTPATIENT
Start: 2024-11-19

## 2024-11-19 RX ORDER — HEPARIN 100 UNIT/ML
500 SYRINGE INTRAVENOUS
Status: DISCONTINUED | OUTPATIENT
Start: 2024-11-19 | End: 2024-11-19 | Stop reason: HOSPADM

## 2024-11-19 RX ORDER — SODIUM CHLORIDE 0.9 % (FLUSH) 0.9 %
10 SYRINGE (ML) INJECTION
Status: DISCONTINUED | OUTPATIENT
Start: 2024-11-19 | End: 2024-11-19 | Stop reason: HOSPADM

## 2024-11-19 RX ORDER — FLUDEOXYGLUCOSE F18 500 MCI/ML
10.21 INJECTION INTRAVENOUS
Status: COMPLETED | OUTPATIENT
Start: 2024-11-19 | End: 2024-11-19

## 2024-11-19 RX ORDER — SODIUM CHLORIDE 0.9 % (FLUSH) 0.9 %
10 SYRINGE (ML) INJECTION
OUTPATIENT
Start: 2024-11-19

## 2024-11-19 RX ADMIN — HEPARIN 500 UNITS: 100 SYRINGE at 11:11

## 2024-11-19 RX ADMIN — FLUDEOXYGLUCOSE F-18 10.21 MILLICURIE: 500 INJECTION INTRAVENOUS at 10:11

## 2024-11-19 RX ADMIN — Medication 10 ML: at 11:11

## 2024-11-19 NOTE — NURSING
Port accessed for PET scan, pt tolerated well see flowsheet for full assessment.  Pt aware to report to unit to have port access removed upon completion of procedure.

## 2024-11-19 NOTE — DISCHARGE INSTRUCTIONS
[facility]  [address] [address]  [address]  [phone] phone     [phone] fax  Hours of Operation: Monday- Friday 8:00am- 5:00pm  After hours phone  [phone]  Hematology / Oncology Physicians on call      DARINEL Holguin Dr., NP Phaon Dunbar, NP Khelsea Conley, FNP    Please call with any concerns regarding your appointment today.

## 2024-12-04 ENCOUNTER — LAB VISIT (OUTPATIENT)
Dept: LAB | Facility: HOSPITAL | Age: 84
End: 2024-12-04
Attending: INTERNAL MEDICINE
Payer: MEDICARE

## 2024-12-04 ENCOUNTER — OFFICE VISIT (OUTPATIENT)
Dept: HEMATOLOGY/ONCOLOGY | Facility: CLINIC | Age: 84
End: 2024-12-04
Payer: MEDICARE

## 2024-12-04 VITALS
BODY MASS INDEX: 30.03 KG/M2 | DIASTOLIC BLOOD PRESSURE: 62 MMHG | SYSTOLIC BLOOD PRESSURE: 142 MMHG | TEMPERATURE: 97 F | OXYGEN SATURATION: 98 % | HEART RATE: 66 BPM | HEIGHT: 71 IN | WEIGHT: 214.5 LBS

## 2024-12-04 DIAGNOSIS — C34.90 SQUAMOUS CELL CARCINOMA OF LUNG, UNSPECIFIED LATERALITY: Primary | ICD-10-CM

## 2024-12-04 DIAGNOSIS — C77.1 SECONDARY AND UNSPECIFIED MALIGNANT NEOPLASM OF INTRATHORACIC LYMPH NODES: ICD-10-CM

## 2024-12-04 LAB
ALBUMIN SERPL BCP-MCNC: 3.9 G/DL (ref 3.5–5.2)
ALP SERPL-CCNC: 120 U/L (ref 40–150)
ALT SERPL W/O P-5'-P-CCNC: 28 U/L (ref 10–44)
ANION GAP SERPL CALC-SCNC: 9 MMOL/L (ref 8–16)
AST SERPL-CCNC: 20 U/L (ref 10–40)
BASOPHILS # BLD AUTO: 0.02 K/UL (ref 0–0.2)
BASOPHILS NFR BLD: 0.3 % (ref 0–1.9)
BILIRUB SERPL-MCNC: 0.9 MG/DL (ref 0.1–1)
BUN SERPL-MCNC: 7 MG/DL (ref 8–23)
CALCIUM SERPL-MCNC: 8.9 MG/DL (ref 8.7–10.5)
CHLORIDE SERPL-SCNC: 105 MMOL/L (ref 95–110)
CO2 SERPL-SCNC: 25 MMOL/L (ref 23–29)
CREAT SERPL-MCNC: 0.8 MG/DL (ref 0.5–1.4)
DIFFERENTIAL METHOD BLD: ABNORMAL
EOSINOPHIL # BLD AUTO: 0 K/UL (ref 0–0.5)
EOSINOPHIL NFR BLD: 0.7 % (ref 0–8)
ERYTHROCYTE [DISTWIDTH] IN BLOOD BY AUTOMATED COUNT: 13.1 % (ref 11.5–14.5)
EST. GFR  (NO RACE VARIABLE): >60 ML/MIN/1.73 M^2
GLUCOSE SERPL-MCNC: 103 MG/DL (ref 70–110)
HCT VFR BLD AUTO: 47.2 % (ref 40–54)
HGB BLD-MCNC: 15.6 G/DL (ref 14–18)
IMM GRANULOCYTES # BLD AUTO: 0.02 K/UL (ref 0–0.04)
IMM GRANULOCYTES NFR BLD AUTO: 0.3 % (ref 0–0.5)
LYMPHOCYTES # BLD AUTO: 1.1 K/UL (ref 1–4.8)
LYMPHOCYTES NFR BLD: 18.6 % (ref 18–48)
MAGNESIUM SERPL-MCNC: 2.1 MG/DL (ref 1.6–2.6)
MCH RBC QN AUTO: 33.5 PG (ref 27–31)
MCHC RBC AUTO-ENTMCNC: 33.1 G/DL (ref 32–36)
MCV RBC AUTO: 101 FL (ref 82–98)
MONOCYTES # BLD AUTO: 0.6 K/UL (ref 0.3–1)
MONOCYTES NFR BLD: 10.1 % (ref 4–15)
NEUTROPHILS # BLD AUTO: 4.2 K/UL (ref 1.8–7.7)
NEUTROPHILS NFR BLD: 70 % (ref 38–73)
NRBC BLD-RTO: 0 /100 WBC
PHOSPHATE SERPL-MCNC: 2.7 MG/DL (ref 2.7–4.5)
PLATELET # BLD AUTO: 185 K/UL (ref 150–450)
PMV BLD AUTO: 9 FL (ref 9.2–12.9)
POTASSIUM SERPL-SCNC: 4.2 MMOL/L (ref 3.5–5.1)
PROT SERPL-MCNC: 6.4 G/DL (ref 6–8.4)
RBC # BLD AUTO: 4.66 M/UL (ref 4.6–6.2)
SODIUM SERPL-SCNC: 139 MMOL/L (ref 136–145)
WBC # BLD AUTO: 6.06 K/UL (ref 3.9–12.7)

## 2024-12-04 PROCEDURE — 83735 ASSAY OF MAGNESIUM: CPT | Performed by: INTERNAL MEDICINE

## 2024-12-04 PROCEDURE — 99999 PR PBB SHADOW E&M-EST. PATIENT-LVL III: CPT | Mod: PBBFAC,,, | Performed by: INTERNAL MEDICINE

## 2024-12-04 PROCEDURE — 36415 COLL VENOUS BLD VENIPUNCTURE: CPT | Performed by: INTERNAL MEDICINE

## 2024-12-04 PROCEDURE — 80053 COMPREHEN METABOLIC PANEL: CPT | Performed by: INTERNAL MEDICINE

## 2024-12-04 PROCEDURE — 84100 ASSAY OF PHOSPHORUS: CPT | Performed by: INTERNAL MEDICINE

## 2024-12-04 PROCEDURE — 85025 COMPLETE CBC W/AUTO DIFF WBC: CPT | Performed by: INTERNAL MEDICINE

## 2024-12-04 NOTE — Clinical Note
Hi all,  Mr. Black would like to see Dr. Rowe.  I also offered Dr. Lynn but he requests Dr. Rowe.  I've ordered his surveillance CT chest to be done in 6 months.  He requests 10:30AM appt.  Kinza

## 2024-12-04 NOTE — PROGRESS NOTES
[facility] Hematol Oncol [facility]  [address] [facility] [address]  Phone: [phone];  Fax: [phone]    Patient ID: Alton Black   Chief Complaint: Follow-up  MRN:  [id number]     Oncologic Diagnosis:  Stage IIB squamous cell carcinoma lung   Previous Treatment:    - Chemoradiation with carboplatin paclitaxel weekly (1/14/22 - 02/24/2022)  -?Durvalumab maintenance, completed 02/23/2023  Current Treatment:  Surveillance  Subjective   Alton Black is a 83 y.o. male who presents to clinic for follow up.      He has been doing well overall and does not have any changes in his health since his last visit.  He is anxious overall about aging.  His chronic back pain is stable.  He has no acute complaints.      Review of Systems   Constitutional:  Negative for activity change, appetite change, chills, diaphoresis, fatigue, fever and unexpected weight change.   Respiratory:  Negative for shortness of breath.    Cardiovascular:  Negative for chest pain.   Gastrointestinal:  Negative for abdominal distention, abdominal pain, anal bleeding, blood in stool, constipation, diarrhea, nausea and vomiting.   Genitourinary:  Negative for difficulty urinating and hematuria.   Musculoskeletal:  Positive for back pain. Negative for arthralgias and myalgias.   Skin:  Negative for rash.   Neurological:  Negative for dizziness, weakness, light-headedness and headaches.   Hematological:  Does not bruise/bleed easily.   Psychiatric/Behavioral:  The patient is not nervous/anxious.      History     Oncology History   Squamous cell carcinoma of lung   1/13/2021 - 2/24/2022 Radiation Therapy    Treating physician: Karol  Total Dose: 60 Gy  Fractions: 30     12/17/2021 Initial Diagnosis    Squamous cell lung cancer     12/22/2021 Cancer Staged    Staging form: Lung, AJCC 8th Edition  - Clinical stage from 12/22/2021: Stage IIB (cT3, cN0, cM0)     1/14/2022 - 2/21/2022 Chemotherapy    Treatment  Summary   Plan Name: OP NSCLC PACLITAXEL + CARBOPLATIN (AUC) QW + RADIATION  Treatment Goal: Curative  Status: Inactive  Start Date: 1/14/2022  End Date: 2/21/2022  Provider: Juan Lopez MD  Chemotherapy: dexAMETHasone (DECADRON) 4 MG Tab, 8 mg, Oral, Daily, 1 of 1 cycle, Start date: --, End date: --  CARBOplatin (PARAPLATIN) 230 mg in sodium chloride 0.9% 273 mL chemo infusion, 230 mg (100 % of original dose 230 mg), Intravenous, Clinic/HOD 1 time, 6 of 6 cycles  Dose modification:   (original dose 230 mg, Cycle 1)  Administration: 230 mg (1/14/2022), 230 mg (1/21/2022), 255 mg (1/28/2022), 255 mg (2/4/2022), 255 mg (2/14/2022), 255 mg (2/21/2022)  PACLitaxeL (TAXOL) 45 mg/m2 = 96 mg in sodium chloride 0.9% 266 mL chemo infusion, 45 mg/m2 = 96 mg, Intravenous, Clinic/HOD 1 time, 6 of 6 cycles  Administration: 96 mg (1/14/2022), 96 mg (1/21/2022), 96 mg (1/28/2022), 96 mg (2/4/2022), 96 mg (2/14/2022), 96 mg (2/21/2022)     3/21/2022 -  Chemotherapy    Treatment Summary   Plan Name: OP DURVALUMAB 1500 MG Q4W  Treatment Goal: Curative  Status: Active  Start Date: 3/21/2022  End Date: 2/23/2023  Provider: Winnie Baez MD  Chemotherapy: [No matching medication found in this treatment plan]         Past Medical History:   Diagnosis Date    Benign essential HTN     CAD (coronary artery disease)     HLD (hyperlipidemia)     Squamous cell carcinoma of lung 12/17/2021       Past Surgical History:   Procedure Laterality Date    BRONCHOSCOPY Bilateral 12/8/2021    Procedure: Bronchoscopy - insert lighted tube into airway to take a biopsy of lung;  Surgeon: Rigo Vences MD;  Location: [facility];  Service: Endoscopy;  Laterality: Bilateral;    CHOLECYSTECTOMY      CORONARY ANGIOPLASTY WITH STENT PLACEMENT      ENDOBRONCHIAL ULTRASOUND Bilateral 12/8/2021    Procedure: ENDOBRONCHIAL ULTRASOUND (EBUS);  Surgeon: Rigo Vences MD;  Location: [facility];  Service: Endoscopy;  Laterality: Bilateral;     "FLUOROSCOPY N/A 1/4/2022    Procedure: Mediport Insertion;  Surgeon: Elaina Larios MD;  Location: [facility] CATH LAB;  Service: General;  Laterality: N/A;  yuridia from 1/3 to 1/4       Family History   Problem Relation Name Age of Onset    Heart attack Father         Review of patient's allergies indicates:  No Known Allergies    Social History     Tobacco Use    Smoking status: Never     Passive exposure: Never    Smokeless tobacco: Never    Tobacco comments:     Chews on cigars   Substance Use Topics    Alcohol use: Not Currently    Drug use: Never       Physical Exam   ECOG:   ECOG SCORE    0 - Fully active-able to carry on all pre-disease performance without restriction          Vitals:  BP (!) 142/62 (BP Location: Left arm, Patient Position: Sitting)   Pulse 66   Temp 97.4 °F (36.3 °C) (Temporal)   Ht 5' 11" (1.803 m)   Wt 97.3 kg (214 lb 8.1 oz)   SpO2 98%   BMI 29.92 kg/m²     Physical Exam:  Physical Exam  Constitutional:       General: He is not in acute distress.     Appearance: Normal appearance. He is normal weight. He is not ill-appearing or toxic-appearing.   HENT:      Head: Normocephalic and atraumatic.   Eyes:      Extraocular Movements: Extraocular movements intact.      Conjunctiva/sclera: Conjunctivae normal.   Cardiovascular:      Rate and Rhythm: Normal rate.   Pulmonary:      Effort: Pulmonary effort is normal. No respiratory distress.   Abdominal:      Palpations: There is no hepatomegaly or splenomegaly.   Musculoskeletal:      Right lower leg: No edema.      Left lower leg: No edema.   Skin:     General: Skin is warm.      Findings: No bruising, erythema or rash.   Neurological:      General: No focal deficit present.      Mental Status: He is alert and oriented to person, place, and time. Mental status is at baseline.   Psychiatric:         Mood and Affect: Mood normal.         Behavior: Behavior normal.         Thought Content: Thought content normal.        Labs   Labs:  Lab " Visit on 12/04/2024   Component Date Value Ref Range Status    Phosphorus 12/04/2024 2.7  2.7 - 4.5 mg/dL Final    Magnesium 12/04/2024 2.1  1.6 - 2.6 mg/dL Final    Sodium 12/04/2024 139  136 - 145 mmol/L Final    Potassium 12/04/2024 4.2  3.5 - 5.1 mmol/L Final    Chloride 12/04/2024 105  95 - 110 mmol/L Final    CO2 12/04/2024 25  23 - 29 mmol/L Final    Glucose 12/04/2024 103  70 - 110 mg/dL Final    BUN 12/04/2024 7 (L)  8 - 23 mg/dL Final    Creatinine 12/04/2024 0.8  0.5 - 1.4 mg/dL Final    Calcium 12/04/2024 8.9  8.7 - 10.5 mg/dL Final    Total Protein 12/04/2024 6.4  6.0 - 8.4 g/dL Final    Albumin 12/04/2024 3.9  3.5 - 5.2 g/dL Final    Total Bilirubin 12/04/2024 0.9  0.1 - 1.0 mg/dL Final    Comment: For infants and newborns, interpretation of results should be based  on gestational age, weight and in agreement with clinical  observations.    Premature Infant recommended reference ranges:  Up to 24 hours.............<8.0 mg/dL  Up to 48 hours............<12.0 mg/dL  3-5 days..................<15.0 mg/dL  6-29 days.................<15.0 mg/dL      Alkaline Phosphatase 12/04/2024 120  40 - 150 U/L Final    AST 12/04/2024 20  10 - 40 U/L Final    ALT 12/04/2024 28  10 - 44 U/L Final    eGFR 12/04/2024 >60  >60 mL/min/1.73 m^2 Final    Anion Gap 12/04/2024 9  8 - 16 mmol/L Final    WBC 12/04/2024 6.06  3.90 - 12.70 K/uL Final    RBC 12/04/2024 4.66  4.60 - 6.20 M/uL Final    Hemoglobin 12/04/2024 15.6  14.0 - 18.0 g/dL Final    Hematocrit 12/04/2024 47.2  40.0 - 54.0 % Final    MCV 12/04/2024 101 (H)  82 - 98 fL Final    MCH 12/04/2024 33.5 (H)  27.0 - 31.0 pg Final    MCHC 12/04/2024 33.1  32.0 - 36.0 g/dL Final    RDW 12/04/2024 13.1  11.5 - 14.5 % Final    Platelets 12/04/2024 185  150 - 450 K/uL Final    MPV 12/04/2024 9.0 (L)  9.2 - 12.9 fL Final    Immature Granulocytes 12/04/2024 0.3  0.0 - 0.5 % Final    Gran # (ANC) 12/04/2024 4.2  1.8 - 7.7 K/uL Final    Immature Grans (Abs) 12/04/2024 0.02  0.00 -  0.04 K/uL Final    Comment: Mild elevation in immature granulocytes is non specific and   can be seen in a variety of conditions including stress response,   acute inflammation, trauma and pregnancy. Correlation with other   laboratory and clinical findings is essential.      Lymph # 12/04/2024 1.1  1.0 - 4.8 K/uL Final    Mono # 12/04/2024 0.6  0.3 - 1.0 K/uL Final    Eos # 12/04/2024 0.0  0.0 - 0.5 K/uL Final    Baso # 12/04/2024 0.02  0.00 - 0.20 K/uL Final    nRBC 12/04/2024 0  0 /100 WBC Final    Gran % 12/04/2024 70.0  38.0 - 73.0 % Final    Lymph % 12/04/2024 18.6  18.0 - 48.0 % Final    Mono % 12/04/2024 10.1  4.0 - 15.0 % Final    Eosinophil % 12/04/2024 0.7  0.0 - 8.0 % Final    Basophil % 12/04/2024 0.3  0.0 - 1.9 % Final    Differential Method 12/04/2024 Automated   Final        Imaging   CT Chest With Contrast  12/21/2023  Impression:  Stable suspected post treatment changes involving the right infrahilar region and right lower lobe.     No new abnormalities.     NM PET CT FDG SKULL BASE TO MID THIGH - 05/16/2024  Impression:  No FDG PET/CT findings to suggest recurrent or metastatic disease.         NM PET CT FDG SKULL BASE TO MID THIGH - 11/19/2024  CLINICAL HISTORY:  restaging scan for NSCLC on surveillance with prior imaging showing low level upatke in the right femur; Secondary and unspecified malignant neoplasm of intrathoracic lymph nodes    FINDINGS:  Quality of the study: Adequate.     Head and neck: No abnormal uptake.     Chest: No abnormal uptake.  Stable post treatment changes paramediastinal region posterior to the right hilum.  No pleural effusions.     Abdomen and pelvis: No abnormal uptake.     Musculoskeletal: No abnormal uptake.     Physiologic uptake of the tracer is present within the brain, salivary glands, myocardium, GI and  tracts.     Incidental CT findings: No interval change     Impression:  No FDG PET/CT findings to suggest recurrent or metastatic disease.  Stable exam         Electronically signed by:Rohan Arambula MD  Date:                                            11/19/2024  Time:                                           14:08    Assessment and Plan   Squamous cell carcinoma of lung, stage II  Per review of the medical record, patient not interested in surgery for therapy and so was treated with definitive chemoradiation and maintenance immunotherapy; completed maintenance durvalumab 02/23/2023   Has been on surveillance since that time   CT Chest 12/21/23 stable only showing post treatment changes  PET-CT 11/19/2024: DIANA; Likely inflammatory low-grade uptake adjacent to the proximal left femur within soft tissues  Labs reviewed and stable  Continue with surveillance with H&P and CT Chest ± contrast every 6 mo for 2-3 y, then H&P and a low-dose non- contrast-enhanced chest CT annually    The patient also inquires about port removal; he would like to wait at least 6 months and have set up at his next appointment.      Chronic Medical Conditions  TIA  Hypertension   GERD   Osteoarthritis   CAD status post PCI, history of MI   Hypertension  History of CVA        Med Onc Chart Routing      Follow up with physician 6 months. Dr. Rowe - 10:30AM   Follow up with REBECA    Infusion scheduling note    Injection scheduling note    Labs CBC and CMP   Scheduling:  Preferred lab:  Lab interval:     Imaging Other   CT Chest - prior to MD visit   Pharmacy appointment    Other referrals                   The patient was seen, interviewed and examined. Pertinent lab and radiologic studies were reviewed. Pt instructed to call should they develop concerning signs/symptoms or have further questions.        Portions of the record may have been created with voice recognition software. Occasional wrong-word or sound-a-like substitutions may have occurred due to the inherent limitations of voice recognition software. Read the chart carefully and recognize, using context, where substitutions have  occurred.      Lynda Hardy MD    Hematology/Oncology

## 2025-01-03 ENCOUNTER — INFUSION (OUTPATIENT)
Dept: INFUSION THERAPY | Facility: HOSPITAL | Age: 85
End: 2025-01-03
Attending: INTERNAL MEDICINE
Payer: MEDICARE

## 2025-01-03 DIAGNOSIS — C77.1 SECONDARY AND UNSPECIFIED MALIGNANT NEOPLASM OF INTRATHORACIC LYMPH NODES: Primary | ICD-10-CM

## 2025-01-03 PROCEDURE — 96523 IRRIG DRUG DELIVERY DEVICE: CPT

## 2025-01-03 PROCEDURE — 63600175 PHARM REV CODE 636 W HCPCS: Performed by: INTERNAL MEDICINE

## 2025-01-03 RX ORDER — HEPARIN 100 UNIT/ML
500 SYRINGE INTRAVENOUS
OUTPATIENT
Start: 2025-01-03

## 2025-01-03 RX ORDER — HEPARIN 100 UNIT/ML
500 SYRINGE INTRAVENOUS
Status: DISCONTINUED | OUTPATIENT
Start: 2025-01-03 | End: 2025-01-03 | Stop reason: HOSPADM

## 2025-01-03 RX ORDER — SODIUM CHLORIDE 0.9 % (FLUSH) 0.9 %
10 SYRINGE (ML) INJECTION
Status: DISCONTINUED | OUTPATIENT
Start: 2025-01-03 | End: 2025-01-03 | Stop reason: HOSPADM

## 2025-01-03 RX ORDER — SODIUM CHLORIDE 0.9 % (FLUSH) 0.9 %
10 SYRINGE (ML) INJECTION
OUTPATIENT
Start: 2025-01-03

## 2025-01-03 RX ADMIN — HEPARIN 500 UNITS: 100 SYRINGE at 10:01

## 2025-01-03 NOTE — DISCHARGE INSTRUCTIONS
[facility]  [address] [facility]  [address] [address]  [phone] phone     [phone] fax  Hours of Operation: Monday- Friday 8:00am- 5:00pm  After hours phone  [phone]  Hematology / Oncology Physicians on call      DARINEL Holguin Dr., NP Phaon Dunbar, NP Khelsea Conley, FNP    Please call with any concerns regarding your appointment today.

## 2025-02-04 RX ORDER — TRANDOLAPRIL 4 MG/1
4 TABLET ORAL
Qty: 90 TABLET | Refills: 3 | Status: SHIPPED | OUTPATIENT
Start: 2025-02-04

## 2025-02-04 RX ORDER — ASPIRIN 81 MG/1
81 TABLET ORAL
Qty: 90 TABLET | Refills: 3 | Status: SHIPPED | OUTPATIENT
Start: 2025-02-04

## 2025-02-04 RX ORDER — ATORVASTATIN CALCIUM 80 MG/1
80 TABLET, FILM COATED ORAL NIGHTLY
Qty: 90 TABLET | Refills: 3 | Status: SHIPPED | OUTPATIENT
Start: 2025-02-04

## 2025-02-11 DIAGNOSIS — I44.7 LBBB (LEFT BUNDLE BRANCH BLOCK): ICD-10-CM

## 2025-02-11 DIAGNOSIS — I50.31 ACUTE DIASTOLIC CHF (CONGESTIVE HEART FAILURE): Primary | ICD-10-CM

## 2025-02-12 ENCOUNTER — OFFICE VISIT (OUTPATIENT)
Dept: CARDIOLOGY | Facility: CLINIC | Age: 85
End: 2025-02-12
Payer: MEDICARE

## 2025-02-12 ENCOUNTER — CLINICAL SUPPORT (OUTPATIENT)
Dept: CARDIOLOGY | Facility: CLINIC | Age: 85
End: 2025-02-12
Payer: MEDICARE

## 2025-02-12 VITALS
OXYGEN SATURATION: 99 % | DIASTOLIC BLOOD PRESSURE: 64 MMHG | SYSTOLIC BLOOD PRESSURE: 130 MMHG | BODY MASS INDEX: 29.6 KG/M2 | WEIGHT: 211.44 LBS | HEIGHT: 71 IN | HEART RATE: 62 BPM

## 2025-02-12 DIAGNOSIS — I44.7 LBBB (LEFT BUNDLE BRANCH BLOCK): ICD-10-CM

## 2025-02-12 DIAGNOSIS — I73.9 PAD (PERIPHERAL ARTERY DISEASE): ICD-10-CM

## 2025-02-12 DIAGNOSIS — I25.118 CORONARY ARTERY DISEASE OF NATIVE ARTERY OF NATIVE HEART WITH STABLE ANGINA PECTORIS: ICD-10-CM

## 2025-02-12 DIAGNOSIS — E78.2 MIXED HYPERLIPIDEMIA: ICD-10-CM

## 2025-02-12 DIAGNOSIS — Z95.5 PRESENCE OF STENT IN CORONARY ARTERY: ICD-10-CM

## 2025-02-12 DIAGNOSIS — I50.32 CHRONIC DIASTOLIC CONGESTIVE HEART FAILURE: Primary | ICD-10-CM

## 2025-02-12 DIAGNOSIS — I50.31 ACUTE DIASTOLIC CHF (CONGESTIVE HEART FAILURE): ICD-10-CM

## 2025-02-12 DIAGNOSIS — I63.89 CEREBROVASCULAR ACCIDENT (CVA) DUE TO OTHER MECHANISM: ICD-10-CM

## 2025-02-12 DIAGNOSIS — R00.2 PALPITATION: ICD-10-CM

## 2025-02-12 DIAGNOSIS — I10 BENIGN ESSENTIAL HTN: ICD-10-CM

## 2025-02-12 PROCEDURE — 99999 PR PBB SHADOW E&M-EST. PATIENT-LVL III: CPT | Mod: PBBFAC,,, | Performed by: INTERNAL MEDICINE

## 2025-02-12 NOTE — PROGRESS NOTES
Subjective:   Patient ID:  Alton Black is a 84 y.o. male who presents for follow up of No chief complaint on file.      85 yo male 6 m f/u  PMH CAD h/o NSTEMI s/p PCI an overlapping 2.25 x 8 mm and 2.25 x 32 mm stent to the OM in October 2015 at [facility]. LBBB, Prior cardiologist Dr. Carranza at [facility]  h/o pericardial effusion s/p paracentesis in 03/21. Repeat echo in 04/21 trivial effusion  h/o TIA in 2021, chronic LBBB. HTN hLD, aortic calcification  H/o lung Ca in remission h/o chemo and XRT in 21 and 22, stable f/u at [facility]  S/p medi port  No h/o DM   2021 echo EF nl trivial effusion  Live alone  No chest pain MCARTHUR dizziness palpitation orthopnea  Occasional feet swelling.  Chewing cigar. A beer a day  EKG reviewed by myself today reveals NSR LBBB  Yearly check carotid US at PCP  BP LDL 29 and A1c controlled     02/24 visit   02/24 echo showed EF nml, LVH and mild valve leaking and Phar MPI showed no ischemia  LE arterial US showed mild Dz except occuded PTA and COLBY left  2021 carotid US < 20% lesions  Bp LDL and BS controlled  Occasional SOB and RLE swelling  No chest pain     08/24 visit  08/24 carotid US < 19% lesions.  EKG reviewed by myself today reveals NSR LBBB   mmHG at home. Controlled  No chest pain dyspnea dizziness faint leg swelling    Interval history  EKG reviewed by myself today reveals NSR LBBB, PVC  Yesterday episode of dizziness for minutes no syncope chest pain dyspnea  BP LDL and A1c controled                Past Medical History:   Diagnosis Date    Benign essential HTN     CAD (coronary artery disease)     HLD (hyperlipidemia)     Squamous cell carcinoma of lung 12/17/2021       Past Surgical History:   Procedure Laterality Date    BRONCHOSCOPY Bilateral 12/8/2021    Procedure: Bronchoscopy - insert lighted tube into airway to take a biopsy of lung;  Surgeon: Rigo Vences MD;  Location: [facility];  Service: Endoscopy;  Laterality: Bilateral;    CHOLECYSTECTOMY       CORONARY ANGIOPLASTY WITH STENT PLACEMENT      ENDOBRONCHIAL ULTRASOUND Bilateral 12/8/2021    Procedure: ENDOBRONCHIAL ULTRASOUND (EBUS);  Surgeon: Rigo Vences MD;  Location: [facility] ENDO;  Service: Endoscopy;  Laterality: Bilateral;    FLUOROSCOPY N/A 1/4/2022    Procedure: Mediport Insertion;  Surgeon: Elaina Larios MD;  Location: [facility] CATH LAB;  Service: General;  Laterality: N/A;  yuridia from 1/3 to 1/4       Social History     Tobacco Use    Smoking status: Never     Passive exposure: Never    Smokeless tobacco: Never    Tobacco comments:     Chews on cigars   Substance Use Topics    Alcohol use: Not Currently    Drug use: Never       Family History   Problem Relation Name Age of Onset    Heart attack Father           ROS    Objective:   Physical Exam  HENT:      Head: Normocephalic.   Eyes:      Pupils: Pupils are equal, round, and reactive to light.   Neck:      Thyroid: No thyromegaly.      Vascular: Normal carotid pulses. No carotid bruit or JVD.   Cardiovascular:      Rate and Rhythm: Normal rate and regular rhythm. No extrasystoles are present.     Chest Wall: PMI is not displaced.      Pulses: Normal pulses.      Heart sounds: Normal heart sounds. No murmur heard.     No gallop. No S3 sounds.   Pulmonary:      Effort: No respiratory distress.      Breath sounds: Normal breath sounds. No stridor.   Abdominal:      General: Bowel sounds are normal.      Palpations: Abdomen is soft.      Tenderness: There is no abdominal tenderness. There is no rebound.   Skin:     Findings: No rash.   Neurological:      Mental Status: He is alert and oriented to person, place, and time.   Psychiatric:         Behavior: Behavior normal.         Lab Results   Component Value Date    CHOL 90 (L) 11/12/2024    CHOL 104 09/12/2023    CHOL 93 (L) 11/19/2021     Lab Results   Component Value Date    HDL 50 11/12/2024    HDL 62 09/12/2023    HDL 42 11/19/2021     Lab Results   Component Value Date    LDLCALC 26  11/12/2024    LDLCALC 29 09/12/2023    LDLCALC 40.8 (L) 11/19/2021     Lab Results   Component Value Date    TRIG 57 11/12/2024    TRIG 54 09/12/2023    TRIG 51 11/19/2021     Lab Results   Component Value Date    CHOLHDL 45.2 11/19/2021       Chemistry        Component Value Date/Time     12/04/2024 1019    K 4.2 12/04/2024 1019     12/04/2024 1019    CO2 25 12/04/2024 1019    BUN 7 (L) 12/04/2024 1019    CREATININE 0.8 12/04/2024 1019     12/04/2024 1019        Component Value Date/Time    CALCIUM 8.9 12/04/2024 1019    ALKPHOS 120 12/04/2024 1019    AST 20 12/04/2024 1019    ALT 28 12/04/2024 1019    BILITOT 0.9 12/04/2024 1019    ESTGFRAFRICA >60 07/11/2022 0911    EGFRNONAA >60 07/11/2022 0911          Lab Results   Component Value Date    HGBA1C 5.7 (H) 11/12/2024     Lab Results   Component Value Date    TSH 1.350 11/12/2024     Lab Results   Component Value Date    INR 1.0 01/04/2022    INR 1.0 11/20/2021    INR 1.3 03/26/2021     Lab Results   Component Value Date    WBC 6.06 12/04/2024    HGB 15.6 12/04/2024    HCT 47.2 12/04/2024     (H) 12/04/2024     12/04/2024     BMP  Sodium   Date Value Ref Range Status   12/04/2024 139 136 - 145 mmol/L Final     Potassium   Date Value Ref Range Status   12/04/2024 4.2 3.5 - 5.1 mmol/L Final     Chloride   Date Value Ref Range Status   12/04/2024 105 95 - 110 mmol/L Final     CO2   Date Value Ref Range Status   12/04/2024 25 23 - 29 mmol/L Final     BUN   Date Value Ref Range Status   12/04/2024 7 (L) 8 - 23 mg/dL Final     Creatinine   Date Value Ref Range Status   12/04/2024 0.8 0.5 - 1.4 mg/dL Final     Calcium   Date Value Ref Range Status   12/04/2024 8.9 8.7 - 10.5 mg/dL Final     Anion Gap   Date Value Ref Range Status   12/04/2024 9 8 - 16 mmol/L Final     eGFR if    Date Value Ref Range Status   07/11/2022 >60 >60 mL/min/1.73 m^2 Final     eGFR if non    Date Value Ref Range Status   07/11/2022  >60 >60 mL/min/1.73 m^2 Final     Comment:     Calculation used to obtain the estimated glomerular filtration  rate (eGFR) is the CKD-EPI equation.        BNP  @LABRCNTIP(BNP,BNPTRIAGEBLO)@  @LABRCNTIP(troponini)@  CrCl cannot be calculated (Patient's most recent lab result is older than the maximum 7 days allowed.).  No results found in the last 24 hours.  No results found in the last 24 hours.  No results found in the last 24 hours.    Assessment:      1. Chronic diastolic congestive heart failure    2. Benign essential HTN    3. Coronary artery disease of native artery of native heart with stable angina pectoris    4. Mixed hyperlipidemia    5. LBBB (left bundle branch block)    6. PAD (peripheral artery disease)    7. Presence of stent in coronary artery    8. Cerebrovascular accident (CVA) due to other mechanism    9. Palpitation      Occasional dizziness palpitation  CAD stable  CHF compensated    Plan:   VITALs for LBBB and palpitation   Go to ER for recurrent dizziness    Continue asa statin and trandolapril  RTC in 6m

## 2025-02-13 LAB
OHS QRS DURATION: 140 MS
OHS QTC CALCULATION: 454 MS

## 2025-02-18 ENCOUNTER — HOSPITAL ENCOUNTER (OUTPATIENT)
Dept: CARDIOLOGY | Facility: HOSPITAL | Age: 85
Discharge: HOME | End: 2025-02-18
Attending: INTERNAL MEDICINE
Payer: MEDICARE

## 2025-02-18 ENCOUNTER — TELEPHONE (OUTPATIENT)
Dept: UROLOGY | Facility: CLINIC | Age: 85
End: 2025-02-18
Payer: MEDICARE

## 2025-02-18 DIAGNOSIS — R00.2 PALPITATION: ICD-10-CM

## 2025-02-18 DIAGNOSIS — I44.7 LBBB (LEFT BUNDLE BRANCH BLOCK): ICD-10-CM

## 2025-02-28 ENCOUNTER — INFUSION (OUTPATIENT)
Dept: INFUSION THERAPY | Facility: HOSPITAL | Age: 85
End: 2025-02-28
Attending: INTERNAL MEDICINE
Payer: MEDICARE

## 2025-02-28 VITALS — SYSTOLIC BLOOD PRESSURE: 117 MMHG | HEART RATE: 78 BPM | DIASTOLIC BLOOD PRESSURE: 65 MMHG

## 2025-02-28 DIAGNOSIS — C34.91 SQUAMOUS CELL CARCINOMA OF RIGHT LUNG: Primary | ICD-10-CM

## 2025-02-28 PROCEDURE — 96523 IRRIG DRUG DELIVERY DEVICE: CPT

## 2025-02-28 PROCEDURE — 25000003 PHARM REV CODE 250: Performed by: INTERNAL MEDICINE

## 2025-02-28 PROCEDURE — A4216 STERILE WATER/SALINE, 10 ML: HCPCS | Performed by: INTERNAL MEDICINE

## 2025-02-28 PROCEDURE — 63600175 PHARM REV CODE 636 W HCPCS: Performed by: INTERNAL MEDICINE

## 2025-02-28 RX ORDER — HEPARIN 100 UNIT/ML
500 SYRINGE INTRAVENOUS
Status: DISCONTINUED | OUTPATIENT
Start: 2025-02-28 | End: 2025-02-28 | Stop reason: HOSPADM

## 2025-02-28 RX ORDER — SODIUM CHLORIDE 0.9 % (FLUSH) 0.9 %
10 SYRINGE (ML) INJECTION
Status: DISCONTINUED | OUTPATIENT
Start: 2025-02-28 | End: 2025-02-28 | Stop reason: HOSPADM

## 2025-02-28 RX ADMIN — Medication 10 ML: at 10:02

## 2025-02-28 RX ADMIN — HEPARIN SODIUM (PORCINE) LOCK FLUSH IV SOLN 100 UNIT/ML 500 UNITS: 100 SOLUTION at 10:02

## 2025-02-28 NOTE — NURSING
".Pt here for Mediport Flush. Left chestwall mediport accessed with a 20g 1" bhatia via sterile technique.  Excellent blood return noted.  Flushed with 10ml NS and 5 ml heparin solution.  Needle D/C, site without redness, swelling, or drainage noted.  Dressing applied.  Patient tolerated well.  Patient to return to clinic in 6 weeks  "

## 2025-03-03 ENCOUNTER — TELEPHONE (OUTPATIENT)
Dept: UROLOGY | Facility: CLINIC | Age: 85
End: 2025-03-03
Payer: MEDICARE

## 2025-03-03 NOTE — TELEPHONE ENCOUNTER
Called patient and let him know that he does have the soonest available and I added him to the wait list as well.       ----- Message from BerniceSempriusmodesta sent at 3/3/2025  8:18 AM CST -----  Contact: Alton  Type:  Sooner Apoointment RequestCaller is requesting a sooner appointment.  Caller declined first available appointment listed below.  Caller will not accept being placed on the waitlist and is requesting a message be sent to doctor.Name of Caller:Alton When is the first available appointment?Pt has appointment scheduled for 3/27/25Symptoms:enlarged prostate/leakage Would the patient rather a call back or a response via [facility]? Call Back Best Call Back Number:[phone] Information:

## 2025-03-10 ENCOUNTER — RESULTS FOLLOW-UP (OUTPATIENT)
Dept: CARDIOLOGY | Facility: HOSPITAL | Age: 85
End: 2025-03-10

## 2025-03-11 ENCOUNTER — PATIENT MESSAGE (OUTPATIENT)
Dept: CARDIOLOGY | Facility: CLINIC | Age: 85
End: 2025-03-11
Payer: MEDICARE

## 2025-03-27 ENCOUNTER — TELEPHONE (OUTPATIENT)
Dept: UROLOGY | Facility: CLINIC | Age: 85
End: 2025-03-27

## 2025-03-27 ENCOUNTER — OFFICE VISIT (OUTPATIENT)
Dept: UROLOGY | Facility: CLINIC | Age: 85
End: 2025-03-27
Payer: MEDICARE

## 2025-03-27 VITALS
HEIGHT: 71 IN | RESPIRATION RATE: 18 BRPM | BODY MASS INDEX: 29.16 KG/M2 | WEIGHT: 208.31 LBS | DIASTOLIC BLOOD PRESSURE: 69 MMHG | HEART RATE: 76 BPM | SYSTOLIC BLOOD PRESSURE: 142 MMHG

## 2025-03-27 DIAGNOSIS — R35.1 NOCTURIA: ICD-10-CM

## 2025-03-27 DIAGNOSIS — N40.0 ENLARGED PROSTATE: Primary | ICD-10-CM

## 2025-03-27 DIAGNOSIS — R82.81 PYURIA: ICD-10-CM

## 2025-03-27 LAB
BILIRUB UR QL STRIP: NEGATIVE
GLUCOSE UR QL STRIP: NEGATIVE
KETONES UR QL STRIP: NEGATIVE
LEUKOCYTE ESTERASE UR QL STRIP: POSITIVE
PH, POC UA: 6
POC BLOOD, URINE: POSITIVE
POC NITRATES, URINE: NEGATIVE
POC RESIDUAL URINE VOLUME: 78 ML (ref 0–100)
PROT UR QL STRIP: POSITIVE
SP GR UR STRIP: 1.02 (ref 1–1.03)
UROBILINOGEN UR STRIP-ACNC: 0.2 (ref 0.3–2.2)

## 2025-03-27 PROCEDURE — 99999 PR PBB SHADOW E&M-EST. PATIENT-LVL III: CPT | Mod: PBBFAC,,, | Performed by: UROLOGY

## 2025-03-27 PROCEDURE — 1160F RVW MEDS BY RX/DR IN RCRD: CPT | Mod: CPTII,S$GLB,, | Performed by: UROLOGY

## 2025-03-27 PROCEDURE — 87086 URINE CULTURE/COLONY COUNT: CPT | Performed by: UROLOGY

## 2025-03-27 PROCEDURE — 3288F FALL RISK ASSESSMENT DOCD: CPT | Mod: CPTII,S$GLB,, | Performed by: UROLOGY

## 2025-03-27 PROCEDURE — 51798 US URINE CAPACITY MEASURE: CPT | Mod: S$GLB,,, | Performed by: UROLOGY

## 2025-03-27 PROCEDURE — 1101F PT FALLS ASSESS-DOCD LE1/YR: CPT | Mod: CPTII,S$GLB,, | Performed by: UROLOGY

## 2025-03-27 PROCEDURE — 1159F MED LIST DOCD IN RCRD: CPT | Mod: CPTII,S$GLB,, | Performed by: UROLOGY

## 2025-03-27 PROCEDURE — 81003 URINALYSIS AUTO W/O SCOPE: CPT | Mod: QW,S$GLB,, | Performed by: UROLOGY

## 2025-03-27 PROCEDURE — 87086 URINE CULTURE/COLONY COUNT: CPT | Mod: 91 | Performed by: UROLOGY

## 2025-03-27 PROCEDURE — 99214 OFFICE O/P EST MOD 30 MIN: CPT | Mod: S$GLB,,, | Performed by: UROLOGY

## 2025-03-27 PROCEDURE — 81001 URINALYSIS AUTO W/SCOPE: CPT | Performed by: UROLOGY

## 2025-03-27 PROCEDURE — 1126F AMNT PAIN NOTED NONE PRSNT: CPT | Mod: CPTII,S$GLB,, | Performed by: UROLOGY

## 2025-03-27 RX ORDER — TAMSULOSIN HYDROCHLORIDE 0.4 MG/1
0.4 CAPSULE ORAL DAILY
Qty: 30 CAPSULE | Refills: 5 | Status: SHIPPED | OUTPATIENT
Start: 2025-03-27 | End: 2025-09-23

## 2025-03-27 NOTE — PROGRESS NOTES
Chief Complaint:   Encounter Diagnoses   Name Primary?    Enlarged prostate Yes    Pyuria     Nocturia        HPI:  HPI Alton Black kelvin 84 y.o. male who presents with six-month history of worsening nocturia.  He was seen about a year and a half ago with incidentally discovered a BPH on a scan.  He was having minimal lower urinary tract symptoms at that time.  He was diagnosed with a Proteus UTI which was cleared at that time.  He denies any dysuria or hematuria but has noticed more nocturia every 2 hours for the last 6 months.  He has never been treated for BPH before.  He does get routine blood tests including PSAs recently that were within normal limits and less than 1.  He does have a history of lung cancer status post radiation chemo and was without evidence of disease for about 3 years now.  He does have history of coronary artery disease.    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     Past Surgical History:   Procedure Laterality Date    BRONCHOSCOPY Bilateral 12/8/2021    Procedure: Bronchoscopy - insert lighted tube into airway to take a biopsy of lung;  Surgeon: Rigo Vences MD;  Location: [facility] ENDO;  Service: Endoscopy;  Laterality: Bilateral;    CHOLECYSTECTOMY      CORONARY ANGIOPLASTY WITH STENT PLACEMENT      ENDOBRONCHIAL ULTRASOUND Bilateral 12/8/2021    Procedure: ENDOBRONCHIAL ULTRASOUND (EBUS);  Surgeon: Rigo Vences MD;  Location: [facility] ENDO;  Service: Endoscopy;  Laterality: Bilateral;    FLUOROSCOPY N/A 1/4/2022    Procedure: Mediport Insertion;  Surgeon: Elaina Larios MD;  Location: [facility] CATH LAB;  Service: General;  Laterality: N/A;  yuridia from 1/3 to 1/4     Family History   Problem Relation Name Age of Onset    Heart attack Father         Medications Ordered Prior to Encounter[2]     Objective:  "    Vitals:    03/27/25 0748   BP: (!) 142/69   BP Location: Left arm   Patient Position: Sitting   Pulse: 76   Resp: 18   Weight: 94.5 kg (208 lb 5.4 oz)   Height: 5' 11" (1.803 m)      BMI Readings from Last 1 Encounters:   03/27/25 29.06 kg/m²          Physical Exam  No acute distress alert and oriented   Respirations even unlabored   Abdomen is obese   Digital rectal exam reveals a 35-40 g smooth prostate    Postvoid residual 78 cc     Urinalysis shows moderate blood small leukocyte esterase trace protein  Lab Results   Component Value Date    CREATININE 0.8 12/04/2024      Assessment:       1. Enlarged prostate    2. Pyuria    3. Nocturia        Plan:     1. Enlarged prostate    2. Pyuria    3. Nocturia       Orders Placed This Encounter    Urine Culture High Risk    Urinalysis Microscopic    POCT Urinalysis, Dipstick, Automated, W/O Scope    POCT Bladder Scan    tamsulosin (FLOMAX) 0.4 mg Cap      Nocturia and new urinary symptoms.  Suspect BPH related.  We will start him on tamsulosin.  Discussed side effects and proper use.  Pyuria and hematuria noted.  We will send micro and culture.  I will see him back in 1 month to reassess.       [1]   Social History  Tobacco Use    Smoking status: Never     Passive exposure: Never    Smokeless tobacco: Never    Tobacco comments:     Chews on cigars   Substance Use Topics    Alcohol use: Not Currently    Drug use: Never   [2]   Current Outpatient Medications on File Prior to Visit   Medication Sig Dispense Refill    ALPRAZolam (XANAX) 0.5 MG tablet Take 0.5 mg by mouth 2 (two) times daily as needed.      aspirin (ECOTRIN) 81 MG EC tablet TAKE 1 TABLET ONE TIME DAILY 90 tablet 3    atorvastatin (LIPITOR) 80 MG tablet TAKE 1 TABLET EVERY EVENING 90 tablet 3    azelastine (ASTELIN) 137 mcg (0.1 %) nasal spray       esomeprazole (NEXIUM) 40 MG capsule Take 1 capsule by mouth every morning.      trandolapriL (MAVIK) 4 MG Tab TAKE 1 TABLET ONE TIME DAILY 90 tablet 3    " [DISCONTINUED] ciprofloxacin HCl (CIPRO) 500 MG tablet Take 1 tablet (500 mg total) by mouth 2 (two) times daily. (Patient not taking: Reported on 3/27/2025) 14 tablet 0    [DISCONTINUED] nitrofurantoin, macrocrystal-monohydrate, (MACROBID) 100 MG capsule Take 100 mg by mouth 2 (two) times daily. (Patient not taking: Reported on 3/27/2025)       No current facility-administered medications on file prior to visit.

## 2025-03-27 NOTE — TELEPHONE ENCOUNTER
.Outgoing call placed to patient, patient verified name and date of birth, patient notified of his 1 month appointment date and time, he verbalized understanding and no further assistance needed.

## 2025-03-28 LAB
MICROSCOPIC COMMENT: ABNORMAL
RBC #/AREA URNS AUTO: 47 /HPF (ref 0–4)
WBC #/AREA URNS AUTO: >100 /HPF (ref 0–5)
WBC CLUMPS UR QL AUTO: ABNORMAL

## 2025-03-29 LAB — BACTERIA UR CULT: ABNORMAL

## 2025-03-30 ENCOUNTER — RESULTS FOLLOW-UP (OUTPATIENT)
Dept: UROLOGY | Facility: HOSPITAL | Age: 85
End: 2025-03-30

## 2025-03-30 RX ORDER — CEPHALEXIN 500 MG/1
500 CAPSULE ORAL 4 TIMES DAILY
Qty: 28 CAPSULE | Refills: 0 | Status: SHIPPED | OUTPATIENT
Start: 2025-03-30

## 2025-04-03 ENCOUNTER — TELEPHONE (OUTPATIENT)
Dept: UROLOGY | Facility: CLINIC | Age: 85
End: 2025-04-03
Payer: MEDICARE

## 2025-04-03 NOTE — TELEPHONE ENCOUNTER
.Outgoing call placed to patient, patient verified name and date of birth, patient notified as requested by Dr. Lebron that his urine culture showed infection and that he was ordered antibiotics to start and completed, patient notified of the name, dose, route and time frame as ordered by doctor and what pharmacy it was sent to, patient verbalized understanding and no further assistance needed.

## 2025-04-15 ENCOUNTER — INFUSION (OUTPATIENT)
Dept: INFUSION THERAPY | Facility: HOSPITAL | Age: 85
End: 2025-04-15
Attending: INTERNAL MEDICINE
Payer: MEDICARE

## 2025-04-15 ENCOUNTER — OFFICE VISIT (OUTPATIENT)
Dept: RADIATION ONCOLOGY | Facility: CLINIC | Age: 85
End: 2025-04-15
Payer: MEDICARE

## 2025-04-15 VITALS
TEMPERATURE: 98 F | SYSTOLIC BLOOD PRESSURE: 131 MMHG | OXYGEN SATURATION: 96 % | WEIGHT: 200.19 LBS | BODY MASS INDEX: 28.02 KG/M2 | HEIGHT: 71 IN | RESPIRATION RATE: 18 BRPM | HEART RATE: 71 BPM | DIASTOLIC BLOOD PRESSURE: 65 MMHG

## 2025-04-15 DIAGNOSIS — C34.90 SQUAMOUS CELL CARCINOMA OF LUNG, UNSPECIFIED LATERALITY: Primary | ICD-10-CM

## 2025-04-15 DIAGNOSIS — C77.1 SECONDARY AND UNSPECIFIED MALIGNANT NEOPLASM OF INTRATHORACIC LYMPH NODES: Primary | ICD-10-CM

## 2025-04-15 DIAGNOSIS — C34.91 SQUAMOUS CELL CARCINOMA OF RIGHT LUNG: Primary | ICD-10-CM

## 2025-04-15 PROCEDURE — 96523 IRRIG DRUG DELIVERY DEVICE: CPT

## 2025-04-15 PROCEDURE — 99999 PR PBB SHADOW E&M-EST. PATIENT-LVL III: CPT | Mod: PBBFAC,,, | Performed by: SPECIALIST

## 2025-04-15 PROCEDURE — 63600175 PHARM REV CODE 636 W HCPCS: Performed by: INTERNAL MEDICINE

## 2025-04-15 RX ORDER — SODIUM CHLORIDE 0.9 % (FLUSH) 0.9 %
10 SYRINGE (ML) INJECTION
Status: DISCONTINUED | OUTPATIENT
Start: 2025-04-15 | End: 2025-04-15 | Stop reason: HOSPADM

## 2025-04-15 RX ORDER — SODIUM CHLORIDE 0.9 % (FLUSH) 0.9 %
10 SYRINGE (ML) INJECTION
OUTPATIENT
Start: 2025-04-15

## 2025-04-15 RX ORDER — HEPARIN 100 UNIT/ML
500 SYRINGE INTRAVENOUS
Status: DISCONTINUED | OUTPATIENT
Start: 2025-04-15 | End: 2025-04-15 | Stop reason: HOSPADM

## 2025-04-15 RX ORDER — HEPARIN 100 UNIT/ML
500 SYRINGE INTRAVENOUS
OUTPATIENT
Start: 2025-04-15

## 2025-04-15 RX ADMIN — HEPARIN SODIUM (PORCINE) LOCK FLUSH IV SOLN 100 UNIT/ML 500 UNITS: 100 SOLUTION at 09:04

## 2025-04-15 NOTE — PROGRESS NOTES
"  [facility] / [facility] - Radiation Oncology Follow Up Note    HISTORY OF PRESENT ILLNESS: 82 y.o. male with h/o cT2b vsT4 N1 M0, stage IIB v IIIA NSCLC (squam) diagnosed in 12/2021 s/p definitive chemo-RT to 60 Gy in 30 fx completed 2/24/22 with Dr. Roberson. He completed 1 year of maintenance immunotherapy in 2/2023.           04/12/2024:  He returns for routine yearly follow-up.  He has no new complaints related to therapy are worrisome for local regional or metastatic recurrence.     Since his last follow up, he underwent contrasted chest CT on 06/20/2023, contrasted CT of the chest abdomen and pelvis on 09/27/2023, and contrasted chest CT on 12/21/2023.  These has consistently shown posttherapeutic change in the right infrahilar region without evidence of malignancy         INTERVAL HISTORY:  He returns for routine yearly follow up.  He has no new complaints related to therapy are worrisome for local regional or metastatic recurrence    PET scan on 11/19/2024, which I have reviewed today, shows no evidence of malignancy    PHYSICAL EXAMINATION:  Vitals:    04/15/25 0939   BP: 131/65   Pulse: 71   Resp: 18   Temp: 98.3 °F (36.8 °C)   TempSrc: Temporal   SpO2: 96%   Weight: 90.8 kg (200 lb 2.8 oz)   Height: 5' 11" (1.803 m)      General:  A&O x4, NAD  HEENT:  PEERLA, CN II-XII intact, EOM intact,   Lymphatics:  no cervical/sclav LAD  Lungs:  CTAB  Heart:  RRR  Abdomen:  NTND +BS      ASSESSMENT:  Clinically and radiographically DIANA more than 3 years following combined modality therapy      PLAN:  He is undergoing twice yearly scanning through Dr. Rowe office next in June.  He will return to my clinic PRN      "

## 2025-04-15 NOTE — DISCHARGE INSTRUCTIONS
[facility]  [address] [address]  [address] [address]  [phone] phone     [phone] fax  Hours of Operation: Monday- Friday 8:00am- 5:00pm  After hours phone  [phone]  Hematology / Oncology Physicians on call      DARINEL Holguin Dr., NP Phaon Dunbar, NP Khelsea Conley, FNP    Please call with any concerns regarding your appointment today.

## 2025-04-24 ENCOUNTER — OFFICE VISIT (OUTPATIENT)
Dept: UROLOGY | Facility: CLINIC | Age: 85
End: 2025-04-24
Payer: MEDICARE

## 2025-04-24 VITALS
DIASTOLIC BLOOD PRESSURE: 64 MMHG | HEART RATE: 76 BPM | SYSTOLIC BLOOD PRESSURE: 131 MMHG | WEIGHT: 207.25 LBS | BODY MASS INDEX: 29.02 KG/M2 | TEMPERATURE: 98 F | RESPIRATION RATE: 18 BRPM | HEIGHT: 71 IN

## 2025-04-24 DIAGNOSIS — N30.90 CYSTITIS: ICD-10-CM

## 2025-04-24 DIAGNOSIS — N40.0 ENLARGED PROSTATE: Primary | ICD-10-CM

## 2025-04-24 LAB
BILIRUBIN, UA POC OHS: NEGATIVE
BLOOD, UA POC OHS: NEGATIVE
CLARITY, UA POC OHS: CLEAR
COLOR, UA POC OHS: YELLOW
GLUCOSE, UA POC OHS: NEGATIVE
KETONES, UA POC OHS: NEGATIVE
LEUKOCYTES, UA POC OHS: ABNORMAL
NITRITE, UA POC OHS: NEGATIVE
PH, UA POC OHS: 5.5
POC RESIDUAL URINE VOLUME: 76 ML (ref 0–100)
PROTEIN, UA POC OHS: NEGATIVE
SPECIFIC GRAVITY, UA POC OHS: 1.01
UROBILINOGEN, UA POC OHS: 0.2

## 2025-04-24 PROCEDURE — 1159F MED LIST DOCD IN RCRD: CPT | Mod: CPTII,S$GLB,, | Performed by: UROLOGY

## 2025-04-24 PROCEDURE — 87086 URINE CULTURE/COLONY COUNT: CPT | Performed by: UROLOGY

## 2025-04-24 PROCEDURE — 3288F FALL RISK ASSESSMENT DOCD: CPT | Mod: CPTII,S$GLB,, | Performed by: UROLOGY

## 2025-04-24 PROCEDURE — 99214 OFFICE O/P EST MOD 30 MIN: CPT | Mod: S$GLB,,, | Performed by: UROLOGY

## 2025-04-24 PROCEDURE — 1126F AMNT PAIN NOTED NONE PRSNT: CPT | Mod: CPTII,S$GLB,, | Performed by: UROLOGY

## 2025-04-24 PROCEDURE — 51798 US URINE CAPACITY MEASURE: CPT | Mod: S$GLB,,, | Performed by: UROLOGY

## 2025-04-24 PROCEDURE — 99999 PR PBB SHADOW E&M-EST. PATIENT-LVL III: CPT | Mod: PBBFAC,,, | Performed by: UROLOGY

## 2025-04-24 PROCEDURE — 81003 URINALYSIS AUTO W/O SCOPE: CPT | Mod: QW,S$GLB,, | Performed by: UROLOGY

## 2025-04-24 PROCEDURE — 1101F PT FALLS ASSESS-DOCD LE1/YR: CPT | Mod: CPTII,S$GLB,, | Performed by: UROLOGY

## 2025-04-24 NOTE — PROGRESS NOTES
"Chief Complaint:   Encounter Diagnoses   Name Primary?    Enlarged prostate Yes    Cystitis        HPI:  HPI Alton Black kelvin 84 y.o. male who presents with follow up from nocturia.  He was found to have a urinary tract infection with quite a bit of pyuria.  He was treated with antibiotic therapy.  He returns today for follow up.  He is also started on tamsulosin.  He states he had side effects from this and has discontinued it as of 2 days ago.  He feels fine today.  He does have nocturia about 2 times a night but is not overly bothered by it.    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     Past Surgical History:   Procedure Laterality Date    BRONCHOSCOPY Bilateral 12/8/2021    Procedure: Bronchoscopy - insert lighted tube into airway to take a biopsy of lung;  Surgeon: Rigo Vences MD;  Location: [facility] ENDO;  Service: Endoscopy;  Laterality: Bilateral;    CHOLECYSTECTOMY      CORONARY ANGIOPLASTY WITH STENT PLACEMENT      ENDOBRONCHIAL ULTRASOUND Bilateral 12/8/2021    Procedure: ENDOBRONCHIAL ULTRASOUND (EBUS);  Surgeon: Rigo Vences MD;  Location: [facility] ENDO;  Service: Endoscopy;  Laterality: Bilateral;    FLUOROSCOPY N/A 1/4/2022    Procedure: Mediport Insertion;  Surgeon: Elaina Larios MD;  Location: [facility] CATH LAB;  Service: General;  Laterality: N/A;  yuridia from 1/3 to 1/4     Family History   Problem Relation Name Age of Onset    Heart attack Father         Medications Ordered Prior to Encounter[2]     Objective:     Vitals:    04/24/25 1005 04/24/25 1007   BP: (!) 140/70 131/64   BP Location: Left arm Right arm   Patient Position: Sitting Sitting   Pulse: 73 76   Resp: 18    Temp: 97.5 °F (36.4 °C)    TempSrc: Oral    Weight: 94 kg (207 lb 3.7 oz)    Height: 5' 11" (1.803 m)       BMI Readings from Last 1 Encounters: "   04/24/25 28.90 kg/m²          Physical Exam    No acute distress alert and oriented   Respirations even unlabored   Abdomen is soft nontender      Postvoid residual 76 cc  Lab Results   Component Value Date    CREATININE 0.8 12/04/2024      Assessment:       1. Enlarged prostate    2. Cystitis        Plan:     1. Enlarged prostate    2. Cystitis       Orders Placed This Encounter    Urine Culture High Risk    Urinalysis Microscopic    POCT Bladder Scan    POCT Urinalysis(Instrument)      Recent cystitis.  Negative upper tracts by PET scan.  Bladder appears to be emptying well.  Urinalysis much improved today.  We will send micro and culture.  We will hold off on any further treatment for his nocturia.  He states this is not too bothersome to him.  We will follow up results and let him know if he needs to be on any further antibiotic therapy.       [1]   Social History  Tobacco Use    Smoking status: Never     Passive exposure: Never    Smokeless tobacco: Never    Tobacco comments:     Chews on cigars   Substance Use Topics    Alcohol use: Not Currently    Drug use: Never   [2]   Current Outpatient Medications on File Prior to Visit   Medication Sig Dispense Refill    ALPRAZolam (XANAX) 0.5 MG tablet Take 0.5 mg by mouth 2 (two) times daily as needed.      aspirin (ECOTRIN) 81 MG EC tablet TAKE 1 TABLET ONE TIME DAILY 90 tablet 3    atorvastatin (LIPITOR) 80 MG tablet TAKE 1 TABLET EVERY EVENING 90 tablet 3    azelastine (ASTELIN) 137 mcg (0.1 %) nasal spray       cephALEXin (KEFLEX) 500 MG capsule Take 1 capsule (500 mg total) by mouth 4 (four) times daily. 28 capsule 0    esomeprazole (NEXIUM) 40 MG capsule Take 1 capsule by mouth every morning.      tamsulosin (FLOMAX) 0.4 mg Cap Take 1 capsule (0.4 mg total) by mouth once daily. 30 capsule 5    trandolapriL (MAVIK) 4 MG Tab TAKE 1 TABLET ONE TIME DAILY 90 tablet 3     No current facility-administered medications on file prior to visit.

## 2025-04-26 LAB — BACTERIA UR CULT: ABNORMAL

## 2025-04-28 ENCOUNTER — RESULTS FOLLOW-UP (OUTPATIENT)
Dept: UROLOGY | Facility: CLINIC | Age: 85
End: 2025-04-28

## 2025-04-28 ENCOUNTER — TELEPHONE (OUTPATIENT)
Dept: UROLOGY | Facility: CLINIC | Age: 85
End: 2025-04-28
Payer: MEDICARE

## 2025-04-28 RX ORDER — CIPROFLOXACIN 500 MG/1
500 TABLET, FILM COATED ORAL EVERY 12 HOURS
Qty: 14 TABLET | Refills: 0 | Status: SHIPPED | OUTPATIENT
Start: 2025-04-28

## 2025-04-28 NOTE — TELEPHONE ENCOUNTER
----- Message from Otoniel Lebron MD sent at 4/28/2025  7:53 AM CDT -----  Please let him know his urine shows a different urinary infection. I recommend 7 days of antibiotics and that the have a cystoscopy. Please arrange cysto at his convenience. I have sent antibiotics   to his pharmacy.   ----- Message -----  From: Danis Chew RN  Sent: 4/24/2025  10:11 AM CDT  To: Otoniel Lebron MD

## 2025-04-28 NOTE — TELEPHONE ENCOUNTER
Spoke with patient who was able to provide acceptable patient identifiers prior to start of conversation. Patient notified of positive urine culture results and that antibiotics Cipro have been sent to local pharmacy to be taken twice daily for 7-Days. Patient also scheduled for Cystoscopy on 05/22/2025 at 8:30AM [place] location. Patient verbalized understanding.

## 2025-05-05 ENCOUNTER — TELEPHONE (OUTPATIENT)
Dept: UROLOGY | Facility: CLINIC | Age: 85
End: 2025-05-05
Payer: MEDICARE

## 2025-05-05 DIAGNOSIS — R82.81 PYURIA: ICD-10-CM

## 2025-05-05 DIAGNOSIS — R35.1 NOCTURIA: Primary | ICD-10-CM

## 2025-05-05 NOTE — TELEPHONE ENCOUNTER
Tried to get in touch with patient, it stated that the voicemail box was not set up yet. I wanted patient to know that the orders are placed and he can go to any location at anytime to do the sample.       ----- Message from Jaxon sent at 5/5/2025  8:21 AM CDT -----  Type: Patient CallWho Called: Patient Does the patient know what this is regarding? Pt is requesting a call back to discuss getting an order for a urine test for [facility] and would like for it to be tested prior to his procedure on 5/22. Please adviseDoes the patient rather a call back or a response via [facility]? [facility] Call Back Number: [phone] Additional Information:  ----- Message -----  From: Jaxon Boudreaux  Sent: 5/5/2025   8:25 AM CDT  To: Bernardino Hwang Staff    Type: Patient CallWho Called: Patient Does the patient know what this is regarding? Pt is requesting a call back to discuss getting an order for a urine test for [facility] and would like for it to be tested prior to his procedure on 5/22. Please adviseDoes the patient rather a call back or a response via [facility]? [facility] Call Back Number: [phone] Additional Information:

## 2025-05-12 ENCOUNTER — LAB VISIT (OUTPATIENT)
Dept: LAB | Facility: HOSPITAL | Age: 85
End: 2025-05-12
Attending: FAMILY MEDICINE
Payer: MEDICARE

## 2025-05-12 ENCOUNTER — TELEPHONE (OUTPATIENT)
Dept: UROLOGY | Facility: CLINIC | Age: 85
End: 2025-05-12
Payer: MEDICARE

## 2025-05-12 DIAGNOSIS — R35.1 NOCTURIA: ICD-10-CM

## 2025-05-12 DIAGNOSIS — R82.81 PYURIA: ICD-10-CM

## 2025-05-12 PROCEDURE — 87086 URINE CULTURE/COLONY COUNT: CPT

## 2025-05-12 PROCEDURE — 81001 URINALYSIS AUTO W/SCOPE: CPT

## 2025-05-12 NOTE — TELEPHONE ENCOUNTER
Returned call to pts brother; requested to have pts urine retested; order in; stated he would get pt to the clinic today.

## 2025-05-13 LAB
BILIRUB UR QL STRIP.AUTO: NEGATIVE
CLARITY UR: CLEAR
COLOR UR AUTO: YELLOW
GLUCOSE UR QL STRIP: NEGATIVE
HGB UR QL STRIP: NEGATIVE
KETONES UR QL STRIP: NEGATIVE
LEUKOCYTE ESTERASE UR QL STRIP: ABNORMAL
MICROSCOPIC COMMENT: ABNORMAL
NITRITE UR QL STRIP: NEGATIVE
PH UR STRIP: 6 [PH]
PROT UR QL STRIP: NEGATIVE
SP GR UR STRIP: 1.02
SQUAMOUS #/AREA URNS AUTO: 1 /HPF
UROBILINOGEN UR STRIP-ACNC: NEGATIVE EU/DL
WBC #/AREA URNS AUTO: >100 /HPF (ref 0–5)

## 2025-05-14 LAB — BACTERIA UR CULT: NO GROWTH

## 2025-05-22 ENCOUNTER — PROCEDURE VISIT (OUTPATIENT)
Dept: UROLOGY | Facility: CLINIC | Age: 85
End: 2025-05-22
Payer: MEDICARE

## 2025-05-22 VITALS
WEIGHT: 206.13 LBS | BODY MASS INDEX: 28.86 KG/M2 | HEIGHT: 71 IN | SYSTOLIC BLOOD PRESSURE: 146 MMHG | HEART RATE: 78 BPM | RESPIRATION RATE: 18 BRPM | DIASTOLIC BLOOD PRESSURE: 67 MMHG

## 2025-05-22 DIAGNOSIS — R82.81 PYURIA: ICD-10-CM

## 2025-05-22 DIAGNOSIS — N40.0 ENLARGED PROSTATE: Primary | ICD-10-CM

## 2025-05-22 DIAGNOSIS — N35.912 STRICTURE OF BULBOUS URETHRA IN MALE, UNSPECIFIED STRICTURE TYPE: ICD-10-CM

## 2025-05-22 LAB
BILIRUBIN, UA POC OHS: NEGATIVE
BLOOD, UA POC OHS: NEGATIVE
CLARITY, UA POC OHS: CLEAR
COLOR, UA POC OHS: YELLOW
GLUCOSE, UA POC OHS: NEGATIVE
KETONES, UA POC OHS: NEGATIVE
LEUKOCYTES, UA POC OHS: ABNORMAL
NITRITE, UA POC OHS: NEGATIVE
PH, UA POC OHS: 6
PROTEIN, UA POC OHS: NEGATIVE
SPECIFIC GRAVITY, UA POC OHS: 1.01
UROBILINOGEN, UA POC OHS: 0.2

## 2025-05-22 PROCEDURE — 81003 URINALYSIS AUTO W/O SCOPE: CPT | Mod: QW,S$GLB,, | Performed by: UROLOGY

## 2025-05-22 PROCEDURE — 52000 CYSTOURETHROSCOPY: CPT | Mod: S$GLB,,, | Performed by: UROLOGY

## 2025-05-22 RX ORDER — LIDOCAINE HYDROCHLORIDE 20 MG/ML
JELLY TOPICAL
Status: COMPLETED | OUTPATIENT
Start: 2025-05-22 | End: 2025-05-22

## 2025-05-22 RX ORDER — CIPROFLOXACIN 500 MG/1
500 TABLET, FILM COATED ORAL
Status: COMPLETED | OUTPATIENT
Start: 2025-05-22 | End: 2025-05-22

## 2025-05-22 RX ADMIN — CIPROFLOXACIN 500 MG: 500 TABLET, FILM COATED ORAL at 08:05

## 2025-05-22 RX ADMIN — LIDOCAINE HYDROCHLORIDE 11 ML: 20 JELLY TOPICAL at 08:05

## 2025-05-22 NOTE — PROGRESS NOTES
.Patient came in for a cystoscopy, Ciprofloxacin 500 mg tab to be administered orally to help prevent infection and urojet 2% jelly to provide comfort during the procedure. Confirmed patient's allergies, Ciprofloxacin 500 mg tab administered as ordered. Pt tolerated medication administration well. Patient agreed to wait 15 minutes after medication administration in the clinic and to report any adverse reactions.        Danis Chew RN

## 2025-05-22 NOTE — PROCEDURES
Cystoscopy    Date/Time: 5/22/2025 8:30 AM    Performed by: Otoniel Lebron MD  Authorized by: Otoniel Lebron MD    Consent Done?:  Yes (Written)  Timeout: prior to procedure the correct patient, procedure, and site was verified    Prep: patient was prepped and draped in usual sterile fashion    Anesthesia:  Intraurethral instillation  Local anesthetic:  Lidocaine 2% topical gel  Indications: recurrent UTIs    Position:  Supine  Anesthesia:  Intraurethral instillation  Preparation: Patient was prepped and draped in usual sterile fashion    Scope type:  Flexible cystoscope   patient tolerated the procedure well with no immediate complications  Comments:        After informed consent, and preoperative antibiotic positioned in supine position.  Genitalia prepped and draped sterilely.  A 17 Spanish flexible cystoscope was passed through the urethra into the bladder.  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  I was able to traverse this with the scope and passively dilate it.  Systematic examination revealed moderate trabeculation and cellules.  Bilateral ureteral orifices were seen.  No urothelial lesion was seen.  Scope was retroflexed and mild BPH was noted.  Prostate were noted to be moderately enlarged with an approximate length of 4 cm. The scope was removed.  He tolerated procedure well.

## 2025-06-03 ENCOUNTER — RESULTS FOLLOW-UP (OUTPATIENT)
Dept: HEMATOLOGY/ONCOLOGY | Facility: CLINIC | Age: 85
End: 2025-06-03

## 2025-06-03 ENCOUNTER — HOSPITAL ENCOUNTER (OUTPATIENT)
Dept: RADIOLOGY | Facility: HOSPITAL | Age: 85
Discharge: HOME | End: 2025-06-03
Attending: INTERNAL MEDICINE
Payer: MEDICARE

## 2025-06-03 DIAGNOSIS — C34.90 SQUAMOUS CELL CARCINOMA OF LUNG, UNSPECIFIED LATERALITY: ICD-10-CM

## 2025-06-03 LAB
CREAT SERPL-MCNC: 0.9 MG/DL (ref 0.5–1.4)
SAMPLE: NORMAL

## 2025-06-03 PROCEDURE — 25500020 PHARM REV CODE 255: Performed by: INTERNAL MEDICINE

## 2025-06-03 PROCEDURE — 71270 CT THORAX DX C-/C+: CPT | Mod: TC

## 2025-06-03 PROCEDURE — 71270 CT THORAX DX C-/C+: CPT | Mod: 26,,, | Performed by: RADIOLOGY

## 2025-06-03 RX ADMIN — IOHEXOL 100 ML: 350 INJECTION, SOLUTION INTRAVENOUS at 10:06

## 2025-06-11 ENCOUNTER — OFFICE VISIT (OUTPATIENT)
Dept: HEMATOLOGY/ONCOLOGY | Facility: CLINIC | Age: 85
End: 2025-06-11
Payer: MEDICARE

## 2025-06-11 ENCOUNTER — RESULTS FOLLOW-UP (OUTPATIENT)
Dept: HEMATOLOGY/ONCOLOGY | Facility: CLINIC | Age: 85
End: 2025-06-11

## 2025-06-11 ENCOUNTER — TELEPHONE (OUTPATIENT)
Dept: RADIOLOGY | Facility: HOSPITAL | Age: 85
End: 2025-06-11
Payer: MEDICARE

## 2025-06-11 ENCOUNTER — INFUSION (OUTPATIENT)
Dept: INFUSION THERAPY | Facility: HOSPITAL | Age: 85
End: 2025-06-11
Attending: INTERNAL MEDICINE
Payer: MEDICARE

## 2025-06-11 VITALS
HEART RATE: 83 BPM | TEMPERATURE: 97 F | BODY MASS INDEX: 29.02 KG/M2 | HEART RATE: 83 BPM | BODY MASS INDEX: 28.9 KG/M2 | WEIGHT: 207.25 LBS | TEMPERATURE: 97 F | DIASTOLIC BLOOD PRESSURE: 57 MMHG | OXYGEN SATURATION: 99 % | DIASTOLIC BLOOD PRESSURE: 57 MMHG | WEIGHT: 207.25 LBS | OXYGEN SATURATION: 99 % | HEIGHT: 71 IN | SYSTOLIC BLOOD PRESSURE: 107 MMHG | SYSTOLIC BLOOD PRESSURE: 107 MMHG

## 2025-06-11 DIAGNOSIS — R79.1 ABNORMAL COAGULATION PROFILE: ICD-10-CM

## 2025-06-11 DIAGNOSIS — Z85.118 PERSONAL HISTORY OF LUNG CANCER: ICD-10-CM

## 2025-06-11 DIAGNOSIS — C34.91 SQUAMOUS CELL CARCINOMA OF RIGHT LUNG: Primary | ICD-10-CM

## 2025-06-11 DIAGNOSIS — C34.90 SQUAMOUS CELL CARCINOMA OF LUNG, UNSPECIFIED LATERALITY: Primary | ICD-10-CM

## 2025-06-11 DIAGNOSIS — M54.50 LOW BACK PAIN WITHOUT SCIATICA, UNSPECIFIED BACK PAIN LATERALITY, UNSPECIFIED CHRONICITY: ICD-10-CM

## 2025-06-11 PROCEDURE — 1101F PT FALLS ASSESS-DOCD LE1/YR: CPT | Mod: CPTII,S$GLB,, | Performed by: INTERNAL MEDICINE

## 2025-06-11 PROCEDURE — 1159F MED LIST DOCD IN RCRD: CPT | Mod: CPTII,S$GLB,, | Performed by: INTERNAL MEDICINE

## 2025-06-11 PROCEDURE — 99999 PR PBB SHADOW E&M-EST. PATIENT-LVL V: CPT | Mod: PBBFAC,,, | Performed by: INTERNAL MEDICINE

## 2025-06-11 PROCEDURE — 1126F AMNT PAIN NOTED NONE PRSNT: CPT | Mod: CPTII,S$GLB,, | Performed by: INTERNAL MEDICINE

## 2025-06-11 PROCEDURE — 96523 IRRIG DRUG DELIVERY DEVICE: CPT

## 2025-06-11 PROCEDURE — A4216 STERILE WATER/SALINE, 10 ML: HCPCS | Performed by: INTERNAL MEDICINE

## 2025-06-11 PROCEDURE — 99214 OFFICE O/P EST MOD 30 MIN: CPT | Mod: S$GLB,,, | Performed by: INTERNAL MEDICINE

## 2025-06-11 PROCEDURE — 3288F FALL RISK ASSESSMENT DOCD: CPT | Mod: CPTII,S$GLB,, | Performed by: INTERNAL MEDICINE

## 2025-06-11 PROCEDURE — 63600175 PHARM REV CODE 636 W HCPCS: Performed by: INTERNAL MEDICINE

## 2025-06-11 PROCEDURE — 1160F RVW MEDS BY RX/DR IN RCRD: CPT | Mod: CPTII,S$GLB,, | Performed by: INTERNAL MEDICINE

## 2025-06-11 PROCEDURE — 25000003 PHARM REV CODE 250: Performed by: INTERNAL MEDICINE

## 2025-06-11 RX ORDER — SODIUM CHLORIDE 0.9 % (FLUSH) 0.9 %
10 SYRINGE (ML) INJECTION
Status: DISCONTINUED | OUTPATIENT
Start: 2025-06-11 | End: 2025-06-11 | Stop reason: HOSPADM

## 2025-06-11 RX ORDER — SODIUM CHLORIDE 0.9 % (FLUSH) 0.9 %
10 SYRINGE (ML) INJECTION
OUTPATIENT
Start: 2025-06-11

## 2025-06-11 RX ORDER — HEPARIN 100 UNIT/ML
500 SYRINGE INTRAVENOUS
OUTPATIENT
Start: 2025-06-11

## 2025-06-11 RX ORDER — SODIUM CHLORIDE 0.9 % (FLUSH) 0.9 %
10 SYRINGE (ML) INJECTION
Status: CANCELLED | OUTPATIENT
Start: 2025-06-11

## 2025-06-11 RX ORDER — HEPARIN 100 UNIT/ML
500 SYRINGE INTRAVENOUS
Status: CANCELLED | OUTPATIENT
Start: 2025-06-11

## 2025-06-11 RX ORDER — HEPARIN 100 UNIT/ML
500 SYRINGE INTRAVENOUS
Status: DISCONTINUED | OUTPATIENT
Start: 2025-06-11 | End: 2025-06-11 | Stop reason: HOSPADM

## 2025-06-11 RX ADMIN — SODIUM CHLORIDE, PRESERVATIVE FREE 10 ML: 5 INJECTION INTRAVENOUS at 09:06

## 2025-06-11 RX ADMIN — HEPARIN 500 UNITS: 100 SYRINGE at 09:06

## 2025-06-11 NOTE — TELEPHONE ENCOUNTER
Interventional Radiology  Scheduled 10:30AM Mediport removal for 6/17/25 w/patient.  Instructed pt. to arrive by 9:00AM to the hospital ([address]/ Entr[address]) off [address] in [place] and check in at Patient Registration located on the first floor.  He must have a ride (brother) and NPO after midnight the night before.  Pt. is to take last dose of ASA on 6/11/25.  He denies taking NSAIDs, fish oil, or GLP-1/GIP agonists.  Pt. can take morning medications the day of procedure with a small sip of water.  I gave patient our number [phone] and he verbalized understanding of all instructions.

## 2025-06-11 NOTE — DISCHARGE INSTRUCTIONS
[facility]  [address] [address] [place]  [address]  [phone] phone     [phone] fax  Hours of Operation: Monday- Friday 8:00am- 5:00pm  After hours phone  [phone]  Hematology / Oncology Physicians on call      DARINEL Holguin Dr., NP Phaon Dunbar, NP Khelsea Conley, FNP    Please call with any concerns regarding your appointment today.

## 2025-06-11 NOTE — PROGRESS NOTES
Subjective:       Patient ID: Alton Black is a 84 y.o. male.    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  3.5 years patient returns for CT chest ECOG status 1 stable low back pain    Past Medical History:   Diagnosis Date    Benign essential HTN     CAD (coronary artery disease)     HLD (hyperlipidemia)     Squamous cell carcinoma of lung 12/17/2021     Family History   Problem Relation Name Age of Onset    Heart attack Father       Social History[1]  Past Surgical History:   Procedure Laterality Date    BRONCHOSCOPY Bilateral 12/8/2021    Procedure: Bronchoscopy - insert lighted tube into airway to take a biopsy of lung;  Surgeon: Rigo Vences MD;  Location: [facility] ENDO;  Service: Endoscopy;  Laterality: Bilateral;    CHOLECYSTECTOMY      CORONARY ANGIOPLASTY WITH STENT PLACEMENT      ENDOBRONCHIAL ULTRASOUND Bilateral 12/8/2021    Procedure: ENDOBRONCHIAL ULTRASOUND (EBUS);  Surgeon: Rigo Vences MD;  Location: [facility] ENDO;  Service: Endoscopy;  Laterality: Bilateral;    FLUOROSCOPY N/A 1/4/2022    Procedure: Mediport Insertion;  Surgeon: Elaina Larios MD;  Location: [facility] CATH LAB;  Service: General;  Laterality: N/A;  yuridia from 1/3 to 1/4       Labs:  Lab Results   Component Value Date    WBC 6.85 06/03/2025    HGB 15.3 06/03/2025    HCT 45.9 06/03/2025     (H) 06/03/2025     06/03/2025     BMP  Lab Results   Component Value Date     (L) 06/03/2025    K 4.6 06/03/2025     06/03/2025    CO2 21 (L) 06/03/2025    BUN 9 06/03/2025    CREATININE 0.8 06/03/2025    CALCIUM 8.5 (L) 06/03/2025    ANIONGAP 9 06/03/2025    ESTGFRAFRICA >60 07/11/2022    EGFRNONAA >60 07/11/2022     Lab Results   Component Value Date    ALT 28 06/03/2025    AST 22 06/03/2025    ALKPHOS 112 06/03/2025    BILITOT 1.2 (H) 06/03/2025       No results  "found for: "IRON", "TIBC", "FERRITIN", "SATURATEDIRO"  Lab Results   Component Value Date    PYCIJHMI85 494 05/21/2024     Lab Results   Component Value Date    FOLATE 7.0 05/21/2024     Lab Results   Component Value Date    TSH 1.24 04/03/2025         Review of Systems   Constitutional:  Positive for fatigue. Negative for activity change, appetite change, chills, diaphoresis, fever and unexpected weight change.   HENT:  Negative for congestion, dental problem, drooling, ear discharge, ear pain, facial swelling, hearing loss, mouth sores, nosebleeds, postnasal drip, rhinorrhea, sinus pressure, sneezing, sore throat, tinnitus, trouble swallowing and voice change.    Eyes:  Negative for photophobia, pain, discharge, redness, itching and visual disturbance.   Respiratory:  Negative for apnea, cough, choking, chest tightness, shortness of breath, wheezing and stridor.    Cardiovascular:  Negative for chest pain, palpitations and leg swelling.   Gastrointestinal:  Negative for abdominal distention, abdominal pain, anal bleeding, blood in stool, constipation, diarrhea, nausea, rectal pain and vomiting.   Endocrine: Negative for cold intolerance, heat intolerance, polydipsia, polyphagia and polyuria.   Genitourinary:  Negative for decreased urine volume, difficulty urinating, dysuria, enuresis, flank pain, frequency, genital sores, hematuria, penile discharge, penile pain, penile swelling, scrotal swelling, testicular pain and urgency.   Musculoskeletal:  Positive for back pain and gait problem. Negative for arthralgias, joint swelling, myalgias, neck pain and neck stiffness.   Skin:  Negative for color change, pallor, rash and wound.   Allergic/Immunologic: Negative for environmental allergies, food allergies and immunocompromised state.   Neurological:  Positive for weakness. Negative for dizziness, tremors, seizures, syncope, facial asymmetry, speech difficulty, light-headedness, numbness and headaches.   Hematological: "  Negative for adenopathy. Does not bruise/bleed easily.   Psychiatric/Behavioral:  Negative for agitation, behavioral problems, confusion, decreased concentration, dysphoric mood, hallucinations, self-injury, sleep disturbance and suicidal ideas. The patient is not nervous/anxious and is not hyperactive.        Objective:      Physical Exam  Vitals reviewed.   Constitutional:       General: He is not in acute distress.     Appearance: He is well-developed. He is not diaphoretic.   HENT:      Head: Normocephalic.      Right Ear: External ear normal.      Left Ear: External ear normal.      Nose: Nose normal.      Right Sinus: No maxillary sinus tenderness or frontal sinus tenderness.      Left Sinus: No maxillary sinus tenderness or frontal sinus tenderness.      Mouth/Throat:      Pharynx: No oropharyngeal exudate.   Eyes:      General: Lids are normal. No scleral icterus.        Right eye: No discharge.         Left eye: No discharge.      Extraocular Movements:      Right eye: Normal extraocular motion.      Left eye: Normal extraocular motion.      Conjunctiva/sclera:      Right eye: Right conjunctiva is not injected. No hemorrhage.     Left eye: Left conjunctiva is not injected. No hemorrhage.     Pupils: Pupils are equal, round, and reactive to light.   Neck:      Thyroid: No thyromegaly.      Vascular: No JVD.      Trachea: No tracheal deviation.   Cardiovascular:      Rate and Rhythm: Normal rate.   Pulmonary:      Effort: Pulmonary effort is normal. No respiratory distress.      Breath sounds: Normal breath sounds. No stridor.   Abdominal:      General: Bowel sounds are normal.      Palpations: Abdomen is soft. There is no hepatomegaly, splenomegaly or mass.      Tenderness: There is no abdominal tenderness.   Musculoskeletal:         General: No tenderness. Normal range of motion.      Cervical back: Normal range of motion and neck supple.   Lymphadenopathy:      Head:      Right side of head: No posterior  auricular or occipital adenopathy.      Left side of head: No posterior auricular or occipital adenopathy.      Cervical: No cervical adenopathy.      Right cervical: No superficial, deep or posterior cervical adenopathy.     Left cervical: No superficial, deep or posterior cervical adenopathy.      Upper Body:      Right upper body: No supraclavicular adenopathy.      Left upper body: No supraclavicular adenopathy.   Skin:     General: Skin is dry.      Findings: No erythema or rash.      Nails: There is no clubbing.   Neurological:      Mental Status: He is alert and oriented to person, place, and time.      Cranial Nerves: No cranial nerve deficit.      Motor: Weakness present.      Coordination: Coordination abnormal.      Gait: Gait abnormal.   Psychiatric:         Behavior: Behavior normal.         Thought Content: Thought content normal.         Judgment: Judgment normal.             Assessment:      1. Squamous cell carcinoma of lung, unspecified laterality    2. Personal history of lung cancer    3. Low back pain without sciatica, unspecified back pain laterality, unspecified chronicity    4. Abnormal coagulation profile           Med Onc Chart Routing      Follow up with physician . Return in 1 year with CT chest patient request a PET scan be done request appointment times 10 30-11 a.m.   Follow up with [facility]    Infusion scheduling note    Injection scheduling note    Labs    Imaging   Remove MediPort   Pharmacy appointment    Other referrals           Referral pain management [place] location          Plan:     Reviewed information.  At this time proceed with referral to pain management patient states that his back pain is essentially unchanged but he has MRIs done back in 2023 [facility] I have asked him bring these to be placed into the electronic medical record to be seen by pain management.  In terms of follow-up discontinue tobacco use recommend CT chest in a year patient's request PET scan do  not know if patient needs this order has been placed to see if it can be accomplished variant discussed implications of answered questions with him flush port today have port removed        Benji Rowe Jr, MD FACP         [1]   Social History  Socioeconomic History    Marital status: Single   Tobacco Use    Smoking status: Never     Passive exposure: Never    Smokeless tobacco: Never    Tobacco comments:     Chews on cigars   Substance and Sexual Activity    Alcohol use: Not Currently    Drug use: Never    Sexual activity: Not Currently     Social Drivers of Health     Financial Resource Strain: Low Risk  (11/28/2018)    Received from [facility] and Its Subsidiaries and Affiliates    Overall Financial Resource Strain (CARDIA)     Difficulty of Paying Living Expenses: Not hard at all   Food Insecurity: No Food Insecurity (11/28/2018)    [facility]    Hunger Vital Sign     Worried About Running Out of Food in the Last Year: Never true     Ran Out of Food in the Last Year: Never true   Transportation Needs: Unknown (11/28/2018)    [facility] and Its Subsidiaries and Affiliates    PRAPARE - Transportation     Lack of Transportation (Medical): Patient declined     Lack of Transportation (Non-Medical): Patient declined   Physical Activity: Sufficiently Active (11/28/2018)    [facility]    Exercise Vital Sign     Days of Exercise per Week: 7 days     Minutes of Exercise per Session: 60 min   Stress: No Stress Concern Present (6/21/2023)    [employer] - Occupational Stress Questionnaire     Feeling of Stress : Not at all

## 2025-06-16 ENCOUNTER — TELEPHONE (OUTPATIENT)
Dept: RADIOLOGY | Facility: HOSPITAL | Age: 85
End: 2025-06-16
Payer: MEDICARE

## 2025-06-16 NOTE — TELEPHONE ENCOUNTER
Interventional Radiology  Called pt. and confirmed his appointment tomorrow, 6/17/25 at 10:30AM.  Pt. is to arrive by 9:00AM to the hospital ([address]/ [address]) off [address] [facility] in [place] and check in at Patient Registration located on the first floor.  He confirmed he will have a ride (brother) and be NPO after midnight tonight.  Explained that he can take morning medications with a small sip of water.  Pt. took last dose of ASA on 6/11/25 and he does not take other medication that needs to be stopped prior to this procedure.  He verbalized understanding of instructions.

## 2025-06-17 ENCOUNTER — HOSPITAL ENCOUNTER (OUTPATIENT)
Dept: RADIOLOGY | Facility: HOSPITAL | Age: 85
Discharge: HOME | End: 2025-06-17
Attending: INTERNAL MEDICINE
Payer: OTHER GOVERNMENT

## 2025-06-17 VITALS
OXYGEN SATURATION: 96 % | DIASTOLIC BLOOD PRESSURE: 66 MMHG | SYSTOLIC BLOOD PRESSURE: 126 MMHG | HEART RATE: 68 BPM | RESPIRATION RATE: 18 BRPM

## 2025-06-17 DIAGNOSIS — C34.90 SQUAMOUS CELL CARCINOMA OF LUNG, UNSPECIFIED LATERALITY: ICD-10-CM

## 2025-06-17 PROCEDURE — 63600175 PHARM REV CODE 636 W HCPCS: Performed by: RADIOLOGY

## 2025-06-17 PROCEDURE — 25000003 PHARM REV CODE 250: Performed by: RADIOLOGY

## 2025-06-17 RX ORDER — LIDOCAINE HYDROCHLORIDE 20 MG/ML
INJECTION, SOLUTION INFILTRATION; PERINEURAL CODE/TRAUMA/SEDATION MEDICATION
Status: COMPLETED | OUTPATIENT
Start: 2025-06-17 | End: 2025-06-17

## 2025-06-17 RX ORDER — VANCOMYCIN HYDROCHLORIDE 1 G/20ML
INJECTION, POWDER, LYOPHILIZED, FOR SOLUTION INTRAVENOUS CODE/TRAUMA/SEDATION MEDICATION
Status: COMPLETED | OUTPATIENT
Start: 2025-06-17 | End: 2025-06-17

## 2025-06-17 RX ORDER — SODIUM CHLORIDE 9 MG/ML
INJECTION, SOLUTION INTRAVENOUS
Status: COMPLETED | OUTPATIENT
Start: 2025-06-17 | End: 2025-06-17

## 2025-06-17 RX ORDER — MIDAZOLAM HYDROCHLORIDE 1 MG/ML
INJECTION, SOLUTION INTRAMUSCULAR; INTRAVENOUS CODE/TRAUMA/SEDATION MEDICATION
Status: COMPLETED | OUTPATIENT
Start: 2025-06-17 | End: 2025-06-17

## 2025-06-17 RX ORDER — FENTANYL CITRATE 50 UG/ML
INJECTION, SOLUTION INTRAMUSCULAR; INTRAVENOUS CODE/TRAUMA/SEDATION MEDICATION
Status: COMPLETED | OUTPATIENT
Start: 2025-06-17 | End: 2025-06-17

## 2025-06-17 RX ADMIN — FENTANYL CITRATE 50 MCG: 50 INJECTION, SOLUTION INTRAMUSCULAR; INTRAVENOUS at 09:06

## 2025-06-17 RX ADMIN — MIDAZOLAM HYDROCHLORIDE 1 MG: 1 INJECTION, SOLUTION INTRAMUSCULAR; INTRAVENOUS at 09:06

## 2025-06-17 RX ADMIN — VANCOMYCIN HYDROCHLORIDE 500 MG: 1 INJECTION, POWDER, LYOPHILIZED, FOR SOLUTION INTRAVENOUS at 09:06

## 2025-06-17 RX ADMIN — SODIUM CHLORIDE 50 ML/HR: 0.9 INJECTION, SOLUTION INTRAVENOUS at 09:06

## 2025-06-17 RX ADMIN — LIDOCAINE HYDROCHLORIDE 10 ML: 20 INJECTION, SOLUTION INFILTRATION; PERINEURAL at 09:06

## 2025-06-17 NOTE — PLAN OF CARE
Dressing to left chest C/D/I with no bleeding/redness/swelling noted. VSS, NADN, and pt meets criteria for discharge. Discharge instructions given to and reviewed with pt, and pt verbalized understanding of all. Pt discharged to home, taken out via wheelchair and driven home by brother    11-Feb-2021 04:04 11-Feb-2021 03:22

## 2025-06-17 NOTE — DISCHARGE INSTRUCTIONS
Please return to ER if any of these symptoms occur:  Fever over 101 degrees,  Any purulent drainage from site (pus, yellow or has foul odor), or any redness or swelling to site  Bleeding from the puncture site not controlled, If bleeding occurs at site hold pressure for 5 mins.  If bleeding continues go to ER  Pain not controlled with Aleve or Tylenol,     No driving for 24 hours after procedure due to sedation given during procedure.      Do not submerge in standing water for 5 days after biopsy but you may shower.     May change bandage if it becomes soiled and bandage may be removed in 5 days.     Rest for the next couple of days. Do not lift any thing heavier than a gallon of milk.  Increase activity as tolerated.     Resume home medications and diet.  You may resume aspirin tomorrow.

## 2025-08-04 ENCOUNTER — TELEPHONE (OUTPATIENT)
Dept: CARDIOLOGY | Facility: CLINIC | Age: 85
End: 2025-08-04
Payer: MEDICARE

## 2025-08-04 NOTE — TELEPHONE ENCOUNTER
Attempted to contact office regarding clearance. Unable to reach or lvm.           Copied from CRM #[id number]. Topic: General Inquiry - Patient Advice  >> Aug 4, 2025  9:24 AM Jessica wrote:  Type: Staff Message     Who called: [facility] [place]  Call back number: [phone]  Reason for the call: requesting clearance  Additional information: n/a

## 2025-08-20 ENCOUNTER — OFFICE VISIT (OUTPATIENT)
Dept: CARDIOLOGY | Facility: CLINIC | Age: 85
End: 2025-08-20
Payer: MEDICARE

## 2025-08-20 VITALS
WEIGHT: 207.81 LBS | HEIGHT: 71 IN | DIASTOLIC BLOOD PRESSURE: 78 MMHG | BODY MASS INDEX: 29.09 KG/M2 | OXYGEN SATURATION: 96 % | SYSTOLIC BLOOD PRESSURE: 127 MMHG | HEART RATE: 87 BPM

## 2025-08-20 DIAGNOSIS — I50.32 CHRONIC DIASTOLIC CONGESTIVE HEART FAILURE: ICD-10-CM

## 2025-08-20 DIAGNOSIS — I25.2 HISTORY OF MI (MYOCARDIAL INFARCTION): ICD-10-CM

## 2025-08-20 DIAGNOSIS — I44.7 LBBB (LEFT BUNDLE BRANCH BLOCK): ICD-10-CM

## 2025-08-20 DIAGNOSIS — Z95.5 PRESENCE OF STENT IN CORONARY ARTERY: ICD-10-CM

## 2025-08-20 DIAGNOSIS — I73.9 PAD (PERIPHERAL ARTERY DISEASE): ICD-10-CM

## 2025-08-20 DIAGNOSIS — I10 BENIGN ESSENTIAL HTN: ICD-10-CM

## 2025-08-20 DIAGNOSIS — I25.118 CORONARY ARTERY DISEASE OF NATIVE ARTERY OF NATIVE HEART WITH STABLE ANGINA PECTORIS: Primary | ICD-10-CM

## 2025-08-20 DIAGNOSIS — E78.49 OTHER HYPERLIPIDEMIA: ICD-10-CM

## 2025-08-20 DIAGNOSIS — E78.00 PURE HYPERCHOLESTEROLEMIA: ICD-10-CM

## 2025-08-20 PROBLEM — R00.2 PALPITATION: Status: RESOLVED | Noted: 2025-02-12 | Resolved: 2025-08-20

## 2025-08-20 PROCEDURE — 99214 OFFICE O/P EST MOD 30 MIN: CPT | Mod: S$GLB,,, | Performed by: INTERNAL MEDICINE

## 2025-08-20 PROCEDURE — 1101F PT FALLS ASSESS-DOCD LE1/YR: CPT | Mod: CPTII,S$GLB,, | Performed by: INTERNAL MEDICINE

## 2025-08-20 PROCEDURE — 1126F AMNT PAIN NOTED NONE PRSNT: CPT | Mod: CPTII,S$GLB,, | Performed by: INTERNAL MEDICINE

## 2025-08-20 PROCEDURE — 99999 PR PBB SHADOW E&M-EST. PATIENT-LVL III: CPT | Mod: PBBFAC,,, | Performed by: INTERNAL MEDICINE

## 2025-08-20 PROCEDURE — 1160F RVW MEDS BY RX/DR IN RCRD: CPT | Mod: CPTII,S$GLB,, | Performed by: INTERNAL MEDICINE

## 2025-08-20 PROCEDURE — 1159F MED LIST DOCD IN RCRD: CPT | Mod: CPTII,S$GLB,, | Performed by: INTERNAL MEDICINE

## 2025-08-20 PROCEDURE — 3288F FALL RISK ASSESSMENT DOCD: CPT | Mod: CPTII,S$GLB,, | Performed by: INTERNAL MEDICINE

## (undated) DEVICE — PACK CATH LAB CUSTOM BR

## (undated) DEVICE — PORT POWER CLEAR VIEW: Type: IMPLANTABLE DEVICE | Site: CHEST | Status: NON-FUNCTIONAL